# Patient Record
Sex: MALE | Race: WHITE | NOT HISPANIC OR LATINO | Employment: FULL TIME | URBAN - METROPOLITAN AREA
[De-identification: names, ages, dates, MRNs, and addresses within clinical notes are randomized per-mention and may not be internally consistent; named-entity substitution may affect disease eponyms.]

---

## 2017-01-16 DIAGNOSIS — Z94.0 KIDNEY TRANSPLANTED: ICD-10-CM

## 2017-01-16 DIAGNOSIS — Z94.0 KIDNEY REPLACED BY TRANSPLANT: Primary | ICD-10-CM

## 2017-01-16 DIAGNOSIS — Z94.0 KIDNEY TRANSPLANTED: Primary | ICD-10-CM

## 2017-01-16 RX ORDER — SULFAMETHOXAZOLE AND TRIMETHOPRIM 400; 80 MG/1; MG/1
1 TABLET ORAL
Qty: 15 TABLET | Refills: 0 | Status: CANCELLED | OUTPATIENT
Start: 2017-01-16

## 2017-01-16 RX ORDER — SULFAMETHOXAZOLE AND TRIMETHOPRIM 400; 80 MG/1; MG/1
1 TABLET ORAL
Qty: 15 TABLET | Refills: 0 | Status: SHIPPED | OUTPATIENT
Start: 2017-01-16 | End: 2017-02-16

## 2017-01-16 RX ORDER — MYCOPHENOLATE MOFETIL 250 MG/1
1000 CAPSULE ORAL 2 TIMES DAILY
Qty: 240 CAPSULE | Refills: 0 | Status: SHIPPED | OUTPATIENT
Start: 2017-01-16 | End: 2017-05-18

## 2017-01-16 RX ORDER — CYCLOSPORINE 100 MG/1
100 CAPSULE, LIQUID FILLED ORAL 2 TIMES DAILY
Qty: 60 CAPSULE | Refills: 0 | Status: SHIPPED | OUTPATIENT
Start: 2017-01-16 | End: 2017-05-18

## 2017-01-16 NOTE — TELEPHONE ENCOUNTER
Drug Name: Smz/Tmp 400-80 tabs  Last Fill Date: 12/21/2016  Quantity: 15    Melba Oro Cpht  [facility]  [phone]

## 2017-01-18 RX ORDER — CYCLOSPORINE 100 MG/1
100 CAPSULE, LIQUID FILLED ORAL 2 TIMES DAILY
Qty: 60 CAPSULE | Refills: 0 | Status: SHIPPED
Start: 2017-01-18 | End: 2017-04-18

## 2017-01-18 RX ORDER — MYCOPHENOLATE MOFETIL 250 MG/1
1000 CAPSULE ORAL 2 TIMES DAILY
Qty: 240 CAPSULE | Refills: 0 | Status: SHIPPED
Start: 2017-01-18 | End: 2017-04-18

## 2017-01-24 ENCOUNTER — OFFICE VISIT (OUTPATIENT)
Dept: ORTHOPEDICS | Facility: CLINIC | Age: 42
End: 2017-01-24
Payer: COMMERCIAL

## 2017-01-24 ENCOUNTER — RADIANT APPOINTMENT (OUTPATIENT)
Dept: GENERAL RADIOLOGY | Facility: CLINIC | Age: 42
End: 2017-01-24
Attending: PEDIATRICS
Payer: COMMERCIAL

## 2017-01-24 VITALS
SYSTOLIC BLOOD PRESSURE: 128 MMHG | DIASTOLIC BLOOD PRESSURE: 82 MMHG | WEIGHT: 195 LBS | HEIGHT: 70 IN | BODY MASS INDEX: 27.92 KG/M2

## 2017-01-24 DIAGNOSIS — M79.641 RIGHT HAND PAIN: ICD-10-CM

## 2017-01-24 DIAGNOSIS — M79.89 SWELLING OF RIGHT HAND: Primary | ICD-10-CM

## 2017-01-24 DIAGNOSIS — M79.89 SWELLING OF RIGHT HAND: ICD-10-CM

## 2017-01-24 PROCEDURE — 73130 X-RAY EXAM OF HAND: CPT | Mod: RT | Performed by: PEDIATRICS

## 2017-01-24 PROCEDURE — 99203 OFFICE O/P NEW LOW 30 MIN: CPT | Performed by: PEDIATRICS

## 2017-01-24 RX ORDER — INDOMETHACIN 25 MG/1
CAPSULE ORAL
Qty: 42 CAPSULE | Refills: 1 | Status: SHIPPED | OUTPATIENT
Start: 2017-01-24 | End: 2017-09-05

## 2017-01-24 NOTE — MR AVS SNAPSHOT
"              After Visit Summary   1/24/2017    Miguel Angel Piña    MRN: [id number]           Patient Information     Date Of Birth          1975        Visit Information        Provider Department      1/24/2017 3:40 PM Bradford Healy,  [facility] [place]        Today's Diagnoses     Swelling of right hand    -  1     Right hand pain            Follow-ups after your visit        Follow-up notes from your care team     Return in about 1 week (around 1/31/2017), or if symptoms worsen or fail to improve.      Who to contact     If you have questions or need follow up information about today's clinic visit or your schedule please contact [facility] directly at [phone].  Normal or non-critical lab and imaging results will be communicated to you by Palmap[employer], letter or phone within 4 business days after the clinic has received the results. If you do not hear from us within 7 days, please contact the clinic through Palmaphart or phone. If you have a critical or abnormal lab result, we will notify you by phone as soon as possible.  Submit refill requests through [employer] or call your pharmacy and they will forward the refill request to us. Please allow 3 business days for your refill to be completed.          Additional Information About Your Visit        MyChart Information     [employer] lets you send messages to your doctor, view your test results, renew your prescriptions, schedule appointments and more. To sign up, go to [email or web address] . Click on \"Log in\" on the left side of the screen, which will take you to the Welcome page. Then click on \"Sign up Now\" on the right side of the page.     You will be asked to enter the access code listed below, as well as some personal information. Please follow the directions to create your username and password.     Your access code is: [email or web address]  Expires: 2/27/2017 12:58 PM     Your access code will " " in 90 days. If you need help or a new code, please call your [place] clinic or [phone].        Care EveryWhere ID     This is your Care EveryWhere ID. This could be used by other organizations to access your [place] medical records  [id number]        Your Vitals Were     Height BMI (Body Mass Index)                5' 10\" (1.778 m) 27.98 kg/m2           Blood Pressure from Last 3 Encounters:   17 128/82   16 138/84   09/17/15 124/88    Weight from Last 3 Encounters:   17 195 lb (88.451 kg)   16 199 lb 9.6 oz (90.538 kg)   09/17/15 214 lb 12.8 oz (97.433 kg)                 Where to get your medicines      These medications were sent to [facility] [place] [place] [address] [facility] [place]  [address] [facility] [place] [address]     Phone:  [phone]    - indomethacin 25 MG capsule       Primary Care Provider Office Phone #    [facility] [phone]       No address on file        Thank you!     Thank you for choosing [facility]  for your care. Our goal is always to provide you with excellent care. Hearing back from our patients is one way we can continue to improve our services. Please take a few minutes to complete the written survey that you may receive in the mail after your visit with us. Thank you!             Your Updated Medication List - Protect others around you: Learn how to safely use, store and throw away your medicines at www.disposemymeds.org.          This list is accurate as of: 17 11:59 PM.  Always use your most recent med list.                   Brand Name Dispense Instructions for use    * cycloSPORINE modified capsule     60 capsule    TAKE ONE CAPSULE BY MOUTH TWICE A DAY (NEPHROLOGY APPOINTMENT IN TRANSPLANT CENTER AND KIDNEY TRANSPLANT LABS NEEDED FOR REFILLS) DAW1       * cycloSPORINE modified capsule     60 capsule    Take 1 capsule (100 mg) by mouth 2 times daily       * cycloSPORINE " modified capsule     60 capsule    Take 1 capsule (100 mg) by mouth 2 times daily       indomethacin 25 MG capsule    INDOCIN    42 capsule    Take 2 tabs three times daily x2 days, then 1 tab three times daily until symptoms have resolved x48 hours       * mycophenolate capsule     240 capsule    Take 4 capsules (1,000 mg) by mouth 2 times daily       * mycophenolate capsule     240 capsule    Take 4 capsules (1,000 mg) by mouth 2 times daily       * mycophenolate capsule     240 capsule    Take 4 capsules (1,000 mg) by mouth 2 times daily       sulfamethoxazole-trimethoprim 400-80 MG per tablet    BACTRIM/SEPTRA    15 tablet    Take 1 tablet by mouth Every Mon, Wed, Fri Morning       * Notice:  This list has 6 medication(s) that are the same as other medications prescribed for you. Read the directions carefully, and ask your doctor or other care provider to review them with you.

## 2017-01-24 NOTE — NURSING NOTE
"Chief Complaint   Patient presents with     Musculoskeletal Problem     right hand pain       Initial /82 mmHg  Ht 5' 10\" (1.778 m)  Wt 195 lb (88.451 kg)  BMI 27.98 kg/m2 Estimated body mass index is 27.98 kg/(m^2) as calculated from the following:    Height as of this encounter: 5' 10\" (1.778 m).    Weight as of this encounter: 195 lb (88.451 kg).  BP completed using cuff size: regular  "

## 2017-01-24 NOTE — PROGRESS NOTES
Sports Medicine Clinic Visit    PCP: Darling Renee    Miguel Angel Piña is a 41 year old male who is seen  as a self referral AIC presenting with right hand pain and swelling.  He noticed the swelling on 1/20/17.  No known injury.  Does have a history of gout.  Did have left over indomethacin and did take a few of these, and had a small amount of relief.  He did use ice on Sunday and this increased the pain, but did have relief with heat.   Patient is right hand dominant.     Injury: insidious onset. Awoke with swelling.  At rest has achy sensation in ulnar hand, with some persistent ulnar and dorsal swelling.  No fever, chills, ill symptoms.    Location of Pain: right hand, ulnar side of hand  Duration of Pain: 4 day(s)  Rating of Pain at worst: 6/10  Rating of Pain Currently: 2/10  Symptoms are better with: Ice and Tylenol  Symptoms are worse with: pressure over ulnar hand  Additional Features:   Positive: swelling   Negative: bruising, popping, grinding, catching, locking, instability, paresthesias, numbness, weakness, pain in other joints and systemic symptoms  Other evaluation and/or treatments so far consists of: Nothing  Prior History of related problems: denies    Social History: Retail at [employer]    Review of Systems  Musculoskeletal: as above  Remainder of review of systems is negative including constitutional, CV, pulmonary, GI, Skin and Neurologic except as noted in HPI or medical history.    Past Medical History   Diagnosis Date     Kidney transplant status, cadaveric 4/2010 (2nd)     prior 11/1993     Polycystic kidney disease      No past surgical history on file.  No family history on file.  Social History     Social History     Marital Status: Single     Spouse Name: N/A     Number of Children: N/A     Years of Education: N/A     Occupational History     Not on file.     Social History Main Topics     Smoking status: Never Smoker      Smokeless tobacco: Never Used     Alcohol Use: No      "Drug Use: No     Sexual Activity:     Partners: Female     Other Topics Concern     Not on file     Social History Narrative     Current Outpatient Prescriptions   Medication     indomethacin (INDOCIN) 25 MG capsule     CELLCEPT 250 MG PO CAPSULE     NEORAL 100 MG PO CAPSULE     CELLCEPT 250 MG PO CAPSULE     NEORAL 100 MG PO CAPSULE     sulfamethoxazole-trimethoprim (BACTRIM/SEPTRA) 400-80 MG per tablet     CELLCEPT 250 MG PO CAPSULE     NEORAL 100 MG PO CAPSULE     No current facility-administered medications for this visit.         Objective  /82 mmHg  Ht 5' 10\" (1.778 m)  Wt 195 lb (88.451 kg)  BMI 27.98 kg/m2    GENERAL APPEARANCE: healthy, alert and no distress   GAIT: NORMAL  SKIN: no suspicious lesions or rashes  NEURO: Normal strength and tone, mentation intact and speech normal  PSYCH:  mentation appears normal and affect normal/bright  HEENT: no scleral icterus  CV: no extremity edema  RESP: nonlabored breathing    Exam:  Hand/wrist (right):  No deformity noted.  Mild diffuse dorsal and ulnar hand swelling. No ecchymosis.  Full range of motion in forearm with pronation, supination.  Full range of motion in wrist with radial and ulnar deviation.  Flexion, extension minimal limitation with stiffness, swelling.  Full digit motion.  Full  and intrinsic strength.  Sensation grossly intact.  Radial pulses normal, +2/4, capillary refill brisk.  Focal tenderness over base of fifth metacarpal, more ulnar aspect, 5th CMC joint. Remainder without focal tenderness.    Skin:       well perfused       capillary refill brisk  No erythema or skin lesions.    Radiology:  Visualized radiographs of right hand obtained today, and reviewed the images with the patient.  Impression: Three views of the right hand demonstrate no acute osseous abnormality. Mild dorsal hand and wrist soft tissue swelling on lateral view.      Assessment:  1. Swelling of right hand    2. Right hand pain    suspect inflammatory given " atraumatic, but with focal pain, tenderness, and hand swelling.    Plan:  Discussed the assessment with the patient. We discussed the following treatment options: symptom treatment, activity modification/rest, imaging, rehab, support for the affected area and lab studies. Following discussion, plan:  Topical Treatments: Heat--more comfortable he has found, and may point more towards gout  Over the counter medication: Patient's preferred OTC medication as directed on packaging. No additional NSAIDs with indocin, and advised significant caution with indocin as well  Prescription Medication as directed: indomethacin; discussed his renal transplant, obviously use indocin with caution and only short term; also discussed potential use of oral steroid, he prefers indocin as he has had that in past   Pertinent potential adverse effect profile of prescribed medication(s) was discussed with the patient, who expressed understanding.  Plain films of the hand reviewed  Activity Modification: discussed  Elevate if able  Rehab: discussed as consideration, swelling control  Medical Equipment: declined support, brace vs splint  Discussed lab studies; potential check for inflammatory component; his main concern is infection, which is very unlikely clinically without ill symptoms, erythema, overlying skin lesion, and duration of symptoms; hold labs for now  Follow up: monitor closely next 1 week with above. If improving, gradual return to activities; if not improving, additional considerations may be above  We discussed potentially concerning signs and symptoms related to the condition(s) listed above, including increase in pain, changing pain, ill symptoms, and the patient was instructed to seek appropriate medical care if noted. All questions answered to patient's satisfaction. The patient expressed understanding of the plan.     Bradford Healy DO, CAQ        Disclaimer: This note consists of symbols derived from keyboarding,  dictation and/or voice recognition software. As a result, there may be errors in the script that have gone undetected. Please consider this when interpreting information found in this chart.

## 2017-02-16 DIAGNOSIS — Z94.0 KIDNEY TRANSPLANTED: ICD-10-CM

## 2017-02-16 RX ORDER — SULFAMETHOXAZOLE AND TRIMETHOPRIM 400; 80 MG/1; MG/1
TABLET ORAL
Qty: 15 TABLET | Refills: 1 | Status: SHIPPED | OUTPATIENT
Start: 2017-02-16 | End: 2017-04-18

## 2017-03-21 DIAGNOSIS — Z94.0 KIDNEY REPLACED BY TRANSPLANT: ICD-10-CM

## 2017-03-21 DIAGNOSIS — Z94.0 KIDNEY TRANSPLANTED: Primary | ICD-10-CM

## 2017-03-21 RX ORDER — CYCLOSPORINE 100 MG/1
100 CAPSULE, LIQUID FILLED ORAL 2 TIMES DAILY
Qty: 60 CAPSULE | Refills: 0 | Status: SHIPPED | OUTPATIENT
Start: 2017-03-21 | End: 2017-06-16

## 2017-03-21 RX ORDER — MYCOPHENOLATE MOFETIL 250 MG/1
1000 CAPSULE ORAL 2 TIMES DAILY
Qty: 240 CAPSULE | Refills: 0 | Status: SHIPPED | OUTPATIENT
Start: 2017-03-21 | End: 2017-06-16

## 2017-03-21 NOTE — TELEPHONE ENCOUNTER
Drug Name: cellcept 250  Last Fill Date: 2/21/17  Quantity: 240    Drug Name: neoral 100mg  Last Fill Date: 2/21/17  Quantity: 60    Mecca Schaffer   [facility]  [phone]

## 2017-04-18 DIAGNOSIS — Z94.0 KIDNEY REPLACED BY TRANSPLANT: ICD-10-CM

## 2017-04-18 DIAGNOSIS — Z94.0 KIDNEY TRANSPLANTED: Primary | ICD-10-CM

## 2017-04-18 RX ORDER — MYCOPHENOLATE MOFETIL 250 MG/1
1000 CAPSULE ORAL 2 TIMES DAILY
Qty: 240 CAPSULE | Refills: 0 | Status: SHIPPED | OUTPATIENT
Start: 2017-04-18 | End: 2017-07-18

## 2017-04-18 RX ORDER — SULFAMETHOXAZOLE AND TRIMETHOPRIM 400; 80 MG/1; MG/1
TABLET ORAL
Qty: 15 TABLET | Refills: 0 | Status: SHIPPED | OUTPATIENT
Start: 2017-04-18 | End: 2017-05-18

## 2017-04-18 RX ORDER — CYCLOSPORINE 100 MG/1
100 CAPSULE, LIQUID FILLED ORAL 2 TIMES DAILY
Qty: 60 CAPSULE | Refills: 0 | Status: SHIPPED | OUTPATIENT
Start: 2017-04-18 | End: 2017-09-05

## 2017-04-18 NOTE — TELEPHONE ENCOUNTER
Drug: Smz/Tmp  Last Fill Date: 3/22/2017  Quantity: 15          Drug: Cellcept  Last Fill Date: 3/22/2017  Quantity: 240        Drug: Neoral  Last Fill Date: 3/22/2017  Quantity: 60    [facility]  Phone: [phone]

## 2017-05-18 DIAGNOSIS — Z94.0 KIDNEY REPLACED BY TRANSPLANT: ICD-10-CM

## 2017-05-18 DIAGNOSIS — Z94.0 KIDNEY TRANSPLANTED: Primary | ICD-10-CM

## 2017-05-18 RX ORDER — MYCOPHENOLATE MOFETIL 250 MG/1
1000 CAPSULE ORAL 2 TIMES DAILY
Qty: 240 CAPSULE | Refills: 0 | Status: SHIPPED | OUTPATIENT
Start: 2017-05-18 | End: 2017-08-21

## 2017-05-18 RX ORDER — CYCLOSPORINE 100 MG/1
100 CAPSULE, LIQUID FILLED ORAL 2 TIMES DAILY
Qty: 60 CAPSULE | Refills: 0 | Status: SHIPPED | OUTPATIENT
Start: 2017-05-18 | End: 2017-09-05

## 2017-05-18 RX ORDER — SULFAMETHOXAZOLE AND TRIMETHOPRIM 400; 80 MG/1; MG/1
TABLET ORAL
Qty: 15 TABLET | Refills: 0 | Status: SHIPPED | OUTPATIENT
Start: 2017-05-18 | End: 2017-06-16

## 2017-05-18 NOTE — TELEPHONE ENCOUNTER
Drug: Smz/Tmp  Last Fill Date: 4/20/2017  Quantity: 15        Drug: Cellcept  Last Fill Date: 4/20/2017  Quantity: 240        Drug: Neoral  Last Fill Date: 4/20/2017  Quantity: 60

## 2017-06-16 DIAGNOSIS — Z94.0 KIDNEY TRANSPLANTED: Primary | ICD-10-CM

## 2017-06-16 DIAGNOSIS — Z94.0 KIDNEY REPLACED BY TRANSPLANT: ICD-10-CM

## 2017-06-16 RX ORDER — CYCLOSPORINE 100 MG/1
100 CAPSULE, LIQUID FILLED ORAL 2 TIMES DAILY
Qty: 60 CAPSULE | Refills: 0 | Status: SHIPPED | OUTPATIENT
Start: 2017-06-16 | End: 2017-07-18

## 2017-06-16 RX ORDER — SULFAMETHOXAZOLE AND TRIMETHOPRIM 400; 80 MG/1; MG/1
TABLET ORAL
Qty: 15 TABLET | Refills: 0 | Status: SHIPPED | OUTPATIENT
Start: 2017-06-16 | End: 2017-07-18

## 2017-06-16 RX ORDER — MYCOPHENOLATE MOFETIL 250 MG/1
1000 CAPSULE ORAL 2 TIMES DAILY
Qty: 240 CAPSULE | Refills: 0 | Status: SHIPPED | OUTPATIENT
Start: 2017-06-16 | End: 2017-09-05

## 2017-06-16 NOTE — TELEPHONE ENCOUNTER
Drug Name: neoral 100mg  Last Fill Date: 5/23/17  Quantity: 60    Drug Name: cellcept 250mg  Last Fill Date: 5/23/17  Quantity: 240    Drug Name: smz/tmp 400-80  Last Fill Date: 5/23/17  Quantity: 15    Mecca Schaffer   [facility]  [phone]

## 2017-07-18 DIAGNOSIS — Z94.0 KIDNEY TRANSPLANTED: ICD-10-CM

## 2017-07-18 DIAGNOSIS — Z94.0 KIDNEY REPLACED BY TRANSPLANT: ICD-10-CM

## 2017-07-18 RX ORDER — SULFAMETHOXAZOLE AND TRIMETHOPRIM 400; 80 MG/1; MG/1
TABLET ORAL
Qty: 15 TABLET | Refills: 0 | Status: SHIPPED | OUTPATIENT
Start: 2017-07-18 | End: 2017-08-21

## 2017-07-18 RX ORDER — MYCOPHENOLATE MOFETIL 250 MG/1
1000 CAPSULE ORAL 2 TIMES DAILY
Qty: 240 CAPSULE | Refills: 0 | Status: SHIPPED | OUTPATIENT
Start: 2017-07-18 | End: 2017-09-05

## 2017-07-18 RX ORDER — CYCLOSPORINE 100 MG/1
CAPSULE, LIQUID FILLED ORAL
Qty: 60 CAPSULE | Refills: 0 | Status: SHIPPED | OUTPATIENT
Start: 2017-07-18 | End: 2017-09-05

## 2017-08-21 DIAGNOSIS — Z94.0 KIDNEY REPLACED BY TRANSPLANT: Primary | ICD-10-CM

## 2017-08-21 DIAGNOSIS — Z94.0 KIDNEY TRANSPLANTED: ICD-10-CM

## 2017-08-21 RX ORDER — MYCOPHENOLATE MOFETIL 250 MG/1
1000 CAPSULE ORAL 2 TIMES DAILY
Qty: 240 CAPSULE | Refills: 0 | Status: SHIPPED | OUTPATIENT
Start: 2017-08-21 | End: 2017-09-05

## 2017-08-21 RX ORDER — SULFAMETHOXAZOLE AND TRIMETHOPRIM 400; 80 MG/1; MG/1
1 TABLET ORAL
Qty: 14 TABLET | Refills: 0 | Status: SHIPPED | OUTPATIENT
Start: 2017-08-21 | End: 2017-12-08

## 2017-08-21 RX ORDER — CYCLOSPORINE 100 MG/1
100 CAPSULE, LIQUID FILLED ORAL 2 TIMES DAILY
Qty: 60 CAPSULE | Refills: 0 | Status: SHIPPED | OUTPATIENT
Start: 2017-08-21 | End: 2017-09-05

## 2017-08-30 ENCOUNTER — TELEPHONE (OUTPATIENT)
Dept: NEPHROLOGY | Facility: CLINIC | Age: 42
End: 2017-08-30

## 2017-08-30 NOTE — TELEPHONE ENCOUNTER
Rosie, this is Carey's office from Medicine Specialty on the 3rd floor at [facility] located at [address] [place] [address]. We are reminding you of your upcoming Nephrology appointment on 9/5/2017 at 4:35 pm. Please arrive an hour prior to your appointment time for labs. Also, please bring an updated medication list including dose of prescribed and over the counter medications you are currently taking or your labeled medication bottles with you to your appointment. If you have any questions or would like to cancel or reschedule your appointment, please call us at [phone].     If you are having labs draw same day you need a lab appointment scheduled 1 hour prior to your visit.    You are welcome to have your labs done 2-7 days before your appointment at any [facility] or [facility] facility. If you have your labs completed before your appointment, please still come 30 minutes early for check-in.     Thank-You for allowing us to be a member of your Health Care Team,     Griselda French., CMA

## 2017-08-31 ENCOUNTER — TELEPHONE (OUTPATIENT)
Dept: NEPHROLOGY | Facility: CLINIC | Age: 42
End: 2017-08-31

## 2017-09-05 ENCOUNTER — RESULTS ONLY (OUTPATIENT)
Dept: OTHER | Facility: CLINIC | Age: 42
End: 2017-09-05

## 2017-09-05 ENCOUNTER — OFFICE VISIT (OUTPATIENT)
Dept: NEPHROLOGY | Facility: CLINIC | Age: 42
End: 2017-09-05
Attending: INTERNAL MEDICINE
Payer: COMMERCIAL

## 2017-09-05 VITALS
WEIGHT: 204 LBS | HEART RATE: 76 BPM | SYSTOLIC BLOOD PRESSURE: 142 MMHG | HEIGHT: 70 IN | DIASTOLIC BLOOD PRESSURE: 95 MMHG | OXYGEN SATURATION: 99 % | BODY MASS INDEX: 29.2 KG/M2

## 2017-09-05 DIAGNOSIS — Z48.298 AFTERCARE FOLLOWING ORGAN TRANSPLANT: ICD-10-CM

## 2017-09-05 DIAGNOSIS — D63.1 ANEMIA IN STAGE 4 CHRONIC KIDNEY DISEASE (H): ICD-10-CM

## 2017-09-05 DIAGNOSIS — D84.9 IMMUNOSUPPRESSED STATUS (H): ICD-10-CM

## 2017-09-05 DIAGNOSIS — Z94.0 KIDNEY TRANSPLANTED: ICD-10-CM

## 2017-09-05 DIAGNOSIS — Z94.0 KIDNEY REPLACED BY TRANSPLANT: ICD-10-CM

## 2017-09-05 DIAGNOSIS — N18.4 ANEMIA IN STAGE 4 CHRONIC KIDNEY DISEASE (H): ICD-10-CM

## 2017-09-05 DIAGNOSIS — M10.00 IDIOPATHIC GOUT, UNSPECIFIED CHRONICITY, UNSPECIFIED SITE: ICD-10-CM

## 2017-09-05 DIAGNOSIS — N25.81 SECONDARY RENAL HYPERPARATHYROIDISM (H): Primary | ICD-10-CM

## 2017-09-05 DIAGNOSIS — N25.81 SECONDARY RENAL HYPERPARATHYROIDISM (H): ICD-10-CM

## 2017-09-05 DIAGNOSIS — Z79.899 ENCOUNTER FOR LONG-TERM CURRENT USE OF MEDICATION: ICD-10-CM

## 2017-09-05 LAB
ANION GAP SERPL CALCULATED.3IONS-SCNC: 10 MMOL/L (ref 3–14)
BUN SERPL-MCNC: 55 MG/DL (ref 7–30)
CALCIUM SERPL-MCNC: 9.4 MG/DL (ref 8.5–10.1)
CHLORIDE SERPL-SCNC: 110 MMOL/L (ref 94–109)
CO2 SERPL-SCNC: 20 MMOL/L (ref 20–32)
CREAT SERPL-MCNC: 2.7 MG/DL (ref 0.66–1.25)
CREAT UR-MCNC: 133 MG/DL
ERYTHROCYTE [DISTWIDTH] IN BLOOD BY AUTOMATED COUNT: 12.9 % (ref 10–15)
FERRITIN SERPL-MCNC: 279 NG/ML (ref 26–388)
GFR SERPL CREATININE-BSD FRML MDRD: 26 ML/MIN/1.7M2
GLUCOSE SERPL-MCNC: 89 MG/DL (ref 70–99)
HCT VFR BLD AUTO: 34.8 % (ref 40–53)
HGB BLD-MCNC: 11.1 G/DL (ref 13.3–17.7)
IRON SATN MFR SERPL: 14 % (ref 15–46)
IRON SERPL-MCNC: 39 UG/DL (ref 35–180)
MCH RBC QN AUTO: 28.5 PG (ref 26.5–33)
MCHC RBC AUTO-ENTMCNC: 31.9 G/DL (ref 31.5–36.5)
MCV RBC AUTO: 89 FL (ref 78–100)
PLATELET # BLD AUTO: 194 10E9/L (ref 150–450)
POTASSIUM SERPL-SCNC: 4.4 MMOL/L (ref 3.4–5.3)
PROT UR-MCNC: 0.43 G/L
PROT/CREAT 24H UR: 0.32 G/G CR (ref 0–0.2)
PTH-INTACT SERPL-MCNC: 259 PG/ML (ref 12–72)
RBC # BLD AUTO: 3.9 10E12/L (ref 4.4–5.9)
SODIUM SERPL-SCNC: 139 MMOL/L (ref 133–144)
TIBC SERPL-MCNC: 280 UG/DL (ref 240–430)
URATE SERPL-MCNC: 10.4 MG/DL (ref 3.5–7.2)
WBC # BLD AUTO: 6.1 10E9/L (ref 4–11)

## 2017-09-05 PROCEDURE — 86832 HLA CLASS I HIGH DEFIN QUAL: CPT | Performed by: ANESTHESIOLOGY

## 2017-09-05 PROCEDURE — 99212 OFFICE O/P EST SF 10 MIN: CPT | Mod: ZF

## 2017-09-05 PROCEDURE — 84550 ASSAY OF BLOOD/URIC ACID: CPT | Performed by: INTERNAL MEDICINE

## 2017-09-05 PROCEDURE — 83970 ASSAY OF PARATHORMONE: CPT | Performed by: INTERNAL MEDICINE

## 2017-09-05 PROCEDURE — 86359 T CELLS TOTAL COUNT: CPT | Performed by: INTERNAL MEDICINE

## 2017-09-05 PROCEDURE — 84156 ASSAY OF PROTEIN URINE: CPT | Performed by: INTERNAL MEDICINE

## 2017-09-05 PROCEDURE — 86360 T CELL ABSOLUTE COUNT/RATIO: CPT | Performed by: INTERNAL MEDICINE

## 2017-09-05 PROCEDURE — 85027 COMPLETE CBC AUTOMATED: CPT | Performed by: INTERNAL MEDICINE

## 2017-09-05 PROCEDURE — 82728 ASSAY OF FERRITIN: CPT | Performed by: INTERNAL MEDICINE

## 2017-09-05 PROCEDURE — 80158 DRUG ASSAY CYCLOSPORINE: CPT | Performed by: INTERNAL MEDICINE

## 2017-09-05 PROCEDURE — 86833 HLA CLASS II HIGH DEFIN QUAL: CPT | Performed by: ANESTHESIOLOGY

## 2017-09-05 PROCEDURE — 83550 IRON BINDING TEST: CPT | Performed by: INTERNAL MEDICINE

## 2017-09-05 PROCEDURE — 82306 VITAMIN D 25 HYDROXY: CPT | Performed by: INTERNAL MEDICINE

## 2017-09-05 PROCEDURE — 83540 ASSAY OF IRON: CPT | Performed by: INTERNAL MEDICINE

## 2017-09-05 PROCEDURE — 36415 COLL VENOUS BLD VENIPUNCTURE: CPT | Performed by: INTERNAL MEDICINE

## 2017-09-05 PROCEDURE — 80048 BASIC METABOLIC PNL TOTAL CA: CPT | Performed by: INTERNAL MEDICINE

## 2017-09-05 RX ORDER — MYCOPHENOLATE MOFETIL 250 MG/1
1000 CAPSULE ORAL 2 TIMES DAILY
Qty: 240 CAPSULE | Refills: 11 | Status: SHIPPED | OUTPATIENT
Start: 2017-09-05 | End: 2018-09-24

## 2017-09-05 RX ORDER — CYCLOSPORINE 100 MG/1
100 CAPSULE, LIQUID FILLED ORAL 2 TIMES DAILY
Qty: 60 CAPSULE | Refills: 11 | Status: SHIPPED | OUTPATIENT
Start: 2017-09-05 | End: 2018-09-24

## 2017-09-05 ASSESSMENT — PAIN SCALES - GENERAL: PAINLEVEL: NO PAIN (0)

## 2017-09-05 NOTE — MR AVS SNAPSHOT
After Visit Summary   9/5/2017    Miguel Angel Piña    MRN: [id number]           Patient Information     Date Of Birth          1975        Visit Information        Provider Department      9/5/2017 4:25 PM Lan Patino MD [facility]        Today's Diagnoses     Secondary renal hyperparathyroidism (H)    -  1    Kidney replaced by transplant        Kidney transplanted        Anemia in stage 4 chronic kidney disease (H)        Immunosuppressed status (H)        Idiopathic gout, unspecified chronicity, unspecified site           Follow-ups after your visit        Follow-up notes from your care team     Return in about 1 year (around 9/5/2018).      Your next 10 appointments already scheduled     Sep 05, 2017  5:30 PM CDT   Lab with  LAB   [facility] ([facility])    [address] [place] [address]  1st Floor  [facility] [address] [phone]            Sep 04, 2018  4:25 PM CDT   (Arrive by 3:55 PM)   Return Kidney Transplant with Lan Patino MD   [facility] ([facility])    [address] [place] [address]  3rd [facility] [address] [phone]              Future tests that were ordered for you today     Open Future Orders        Priority Expected Expires Ordered    Protein  random urine with Creat Ratio Routine  10/5/2017 9/5/2017    Uric acid Routine  10/5/2017 9/5/2017    Basic metabolic panel Routine  10/5/2017 9/5/2017    CBC with platelets Routine  10/5/2017 9/5/2017    Parathyroid Hormone Intact Routine  10/5/2017 9/5/2017    Vitamin D Deficiency Routine  10/5/2017 9/5/2017    T cell subset profile Routine  9/6/2018 9/5/2017    IRON Routine  10/5/2017 9/5/2017    IRON AND IRON BINDING CAPACITY Routine  10/5/2017 9/5/2017    FERRITIN Routine  10/5/2017 9/5/2017            Who to contact     If you have questions or need follow up information about today's clinic visit or your schedule please contact [facility] directly at [phone].  Normal or non-critical lab and imaging results will be communicated to you by MyChart, letter or phone within 4 business days after the clinic has received the results. If you do not hear from us within 7 days, please contact the clinic through [employer] or phone. If you have a critical or abnormal lab result, we will notify you by phone as soon as possible.  Submit refill requests through [employer] or call your pharmacy and they will forward the refill request to us. Please allow 3 business days for your refill to be completed.          Additional Information About Your Visit        [employer] Information     [employer] lets you send messages to your doctor, view your test results, renew your prescriptions, schedule appointments and more. To sign up, go to [email or web address] . Click on \"Log in\" on the left side of the screen, which will take you to the Welcome page. Then click on \"Sign up Now\" on the right side of the page.     You will be asked to enter the access code listed below, as well as some personal information. Please follow the directions to create your username and password.     Your access code is: [email or web address]  Expires: 2017  6:30 AM     Your access code will  in 90 days. If you need help or a new code, please call your [place] clinic or [phone].        Care EveryWhere ID     This is your Care EveryWhere ID. This could be used by other organizations to access your [place] medical records  [id number]        Your Vitals Were     Pulse Height Pulse Oximetry BMI (Body Mass Index)          76 1.778 m (5' 10\") 99% 29.27 kg/m2         Blood Pressure from Last 3 Encounters:   17 (!) 142/95   17 128/82   16 138/84    Weight from Last 3 Encounters:   17 92.5 kg (204 lb)   17 88.5 kg (195 lb)   16 90.5 kg (199 lb 9.6 oz)              We Performed the Following     PRA Donor Specific Antibody          Today's " Medication Changes          These changes are accurate as of: 9/5/17  5:20 PM.  If you have any questions, ask your nurse or doctor.               These medicines have changed or have updated prescriptions.        Dose/Directions    cycloSPORINE modified capsule   This may have changed:  Another medication with the same name was removed. Continue taking this medication, and follow the directions you see here.   Used for:  Kidney replaced by transplant   Changed by:  Lan Patino MD        Dose:  100 mg   Take 1 capsule (100 mg) by mouth 2 times daily   Quantity:  60 capsule   Refills:  11       mycophenolate capsule   This may have changed:  Another medication with the same name was removed. Continue taking this medication, and follow the directions you see here.   Used for:  Kidney replaced by transplant   Changed by:  Lan Patino MD        Dose:  1000 mg   Take 4 capsules (1,000 mg) by mouth 2 times daily   Quantity:  240 capsule   Refills:  11         Stop taking these medicines if you haven't already. Please contact your care team if you have questions.     indomethacin 25 MG capsule   Commonly known as:  INDOCIN   Stopped by:  Lan Patino MD                Where to get your medicines      These medications were sent to [facility] - [place], [place] - [address] [place] [address]  [address] [facility] [facility], [facility] [address]    Hours:  Mon-Fri 8:30am-5:00pm Toll Free [phone] Phone:  [phone]     cycloSPORINE modified capsule    mycophenolate capsule                Primary Care Provider Office Phone #    [facility] [phone]       No address on file        Equal Access to Services     [facility] AH: Hadii judy Simons, watamy mcdaniel, qaybtom kaalmada nicolas, lanny olvera. So [facility] [phone].    ATENCIÓN: Si habla español, tiene a pollard disposición servicios gratuitos de asistencia lingüística. Llame  al [phone].    We comply with applicable federal civil rights laws and [place] laws. We do not discriminate on the basis of race, color, national origin, age, disability sex, sexual orientation or gender identity.            Thank you!     Thank you for choosing [facility]  for your care. Our goal is always to provide you with excellent care. Hearing back from our patients is one way we can continue to improve our services. Please take a few minutes to complete the written survey that you may receive in the mail after your visit with us. Thank you!             Your Updated Medication List - Protect others around you: Learn how to safely use, store and throw away your medicines at www.disposemymeds.org.          This list is accurate as of: 9/5/17  5:20 PM.  Always use your most recent med list.                   Brand Name Dispense Instructions for use Diagnosis    cycloSPORINE modified capsule     60 capsule    Take 1 capsule (100 mg) by mouth 2 times daily    Kidney replaced by transplant       mycophenolate capsule     240 capsule    Take 4 capsules (1,000 mg) by mouth 2 times daily    Kidney replaced by transplant       sulfamethoxazole-trimethoprim 400-80 MG per tablet    BACTRIM/SEPTRA    14 tablet    Take 1 tablet by mouth three times a week (Mondays, Wednesdays, Fridays)    Kidney transplanted, Kidney replaced by transplant

## 2017-09-05 NOTE — LETTER
9/5/2017      RE: Miguel Angel Piña  [address] [facility] [address]       Assessment and Plan:   1. DDKT - baseline Cr ~ 2.3-2.6, which has been stable to slightly increased with today's labs.  Minimal proteinuria.  No DSA, but not checked recently.  Will make no changes in immunosuppression.    2. BP - fair control above target of less than 140/90 at this clinic visit and not checked at home.  Recommend patient check BP and would start antihypertensive therapy if not consistently at goal.  With gout and hyperuricemia, would consider ARB.  3. Anemia in chronic renal disease - stable Hgb.  He is iron deficient and recommend starting oral iron sulfate 325 mg daily at lunch.  No evidence of GI bleeding, but recommend patient establish primary care and consider evaluation for GI bleeding.  Will follow.  4. Secondary renal hyperparathyroidism - mildly elevated PTH, okay for level of kidney function and will just treat vitamin D deficiency and follow.  5. Vitamin D deficiency - low vitamin D level and recommend starting cholecalciferol 2000 iu daily.  6. Gout - no recent flare, but significant hyperuricemia.  Would recommend starting allopurinol, but this is likely to precipitate a flare unless also given other prophylaxis and would consider using colchicine.  Recommend patient establish with primary care who can manage.  7. Skin cancer risk - no new skin lesions.  Recommend regular follow up with Dermatology.  8. Medical noncompliance - patient is mostly compliant with his medications, but has not been getting regular labs done, nor attending any medical appointments.  Discussed importance of compliance to keep this kidney and address his other medical issues.  Patient was also noncompliant following his first kidney transplant.  9. Recommend return visit in 12 months.    Assessment and plan was discussed with patient and he voiced his understanding and agreement.    Reason for Visit:  Mr. Piña is here for  transplant and immunosuppression management.    HPI:  Mr. 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         Transplant Hx:       Tx: DDKT  Date: 4/25/10       Present Maintenance IS: Cyclosporine and Mycophenolate mofetil       Baseline Creatinine: 2.3-2.6       Recent DSA: No  Date last checked: 7/2011       Biopsy: 7/25/11; unremarkable with no evidence of acute rejection.    Mr. Piña reports feeling good overall with minimal medical complaints.  He was last seen in clinic about 6 years ago and reports not seeing any provider in a couple of years.  He does say he takes his medications, maybe missing a dose about once a month.  He hasn't been as good about getting his labs done with last check almost 9 months ago.  His energy level is good and has remained pretty much normal.  He is active and works about 50-60 hrs per week, but doesn't get a lot of exercise outside work and walking.  Denies any chest pain or shortness of breath with exertion.  Appetite is good and he has gained about 5-8 lbs.  No nausea, vomiting or diarrhea.  No fever, sweats or chills.  No leg swelling.  No new skin lesions, but he hasn't seen Dermatology recently.    Home BP: Not checked      ROS:  A comprehensive review of systems was obtained and negative, except as noted in the HPI or PMH.    Active Medical Problems:  Patient Active Problem List   Diagnosis     Kidney replaced by transplant     Medullary cystic kidney disease     CARDIOVASCULAR SCREENING; LDL GOAL LESS THAN 100     Conductive hearing loss, unilateral     Aftercare following organ transplant     Immunosuppressed status (H)     Anemia in chronic renal disease     Secondary renal hyperparathyroidism (H)     Gout     Vitamin D deficiency     Cerebrovascular accident (H)     Dyslipidemia     Factor V Leiden (H)     Hypertension secondary to other renal disorders     PMH:   Medical record was reviewed and  "PMH was discussed with patient and noted below.  Past Medical History:   Diagnosis Date     Anemia in chronic renal disease      CVA (cerebral vascular accident) (H)      Dyslipidemia      Factor V Leiden (H)      Gout      Hypertension secondary to other renal disorders      Immunosuppressed status (H)      Kidney replaced by transplant 4/2010 (2nd)    prior 11/1993     Medullary cystic kidney disease      Secondary renal hyperparathyroidism (H)      PSH:   Past Surgical History:   Procedure Laterality Date     REPAIR PATENT FORAMEN OVALE       s/p LDKT       Family Hx:   Family History   Problem Relation Age of Onset     Lung Cancer Paternal Grandmother      KIDNEY DISEASE Sister      medullary cystic kidney disease, s/p kidney transplant     Personal Hx:   Social History     Social History     Marital status: Single     Spouse name: N/A     Number of children: 0     Years of education: N/A     Occupational History     Not on file.     Social History Main Topics     Smoking status: Never Smoker     Smokeless tobacco: Never Used     Alcohol use No     Drug use: No     Sexual activity: Yes     Partners: Female     Other Topics Concern     Not on file     Social History Narrative     Allergies:  No Known Allergies  Medications:  Prior to Admission medications    Medication Sig Start Date End Date Taking? Authorizing Provider   NEORAL (BRAND) 100 MG CAPSULE Take 1 capsule (100 mg) by mouth 2 times daily 9/5/17  Yes Lan Patino MD   CELLCEPT (BRAND) 250 MG CAPSULE Take 4 capsules (1,000 mg) by mouth 2 times daily 9/5/17  Yes Lan Patino MD   sulfamethoxazole-trimethoprim (BACTRIM/SEPTRA) 400-80 MG per tablet Take 1 tablet by mouth three times a week (Mondays, Wednesdays, Fridays) 8/21/17  Yes Lan Patino MD     Vitals:  BP (!) 142/95  Pulse 76  Ht 1.778 m (5' 10\")  Wt 92.5 kg (204 lb)  SpO2 99%  BMI 29.27 kg/m2    Exam:   GENERAL APPEARANCE: alert and no distress  HENT: mouth " without ulcers or lesions  LYMPHATICS: no cervical or supraclavicular nodes  RESP: lungs clear to auscultation - no rales, rhonchi or wheezes  CV: regular rhythm, normal rate, no rub, no murmur  EDEMA: no LE edema bilaterally  ABDOMEN: soft, nondistended, nontender, bowel sounds normal  MS: extremities normal - no gross deformities noted, no evidence of inflammation in joints, no muscle tenderness  SKIN: no rash, actinic keratoses  TX KIDNEY: normal    Results:     Recent Results (from the past 336 hour(s))   Protein  random urine with Creat Ratio    Collection Time: 09/05/17  5:42 PM   Result Value Ref Range    Protein Random Urine 0.43 g/L    Protein Total Urine g/gr Creatinine 0.32 (H) 0 - 0.2 g/g Cr   Creatinine urine calculation only    Collection Time: 09/05/17  5:42 PM   Result Value Ref Range    Creatinine Urine 133 mg/dL   Basic metabolic panel    Collection Time: 09/05/17  5:55 PM   Result Value Ref Range    Sodium 139 133 - 144 mmol/L    Potassium 4.4 3.4 - 5.3 mmol/L    Chloride 110 (H) 94 - 109 mmol/L    Carbon Dioxide 20 20 - 32 mmol/L    Anion Gap 10 3 - 14 mmol/L    Glucose 89 70 - 99 mg/dL    Urea Nitrogen 55 (H) 7 - 30 mg/dL    Creatinine 2.70 (H) 0.66 - 1.25 mg/dL    GFR Estimate 26 (L) >60 mL/min/1.7m2    GFR Estimate If Black 32 (L) >60 mL/min/1.7m2    Calcium 9.4 8.5 - 10.1 mg/dL   CBC with platelets    Collection Time: 09/05/17  5:55 PM   Result Value Ref Range    WBC 6.1 4.0 - 11.0 10e9/L    RBC Count 3.90 (L) 4.4 - 5.9 10e12/L    Hemoglobin 11.1 (L) 13.3 - 17.7 g/dL    Hematocrit 34.8 (L) 40.0 - 53.0 %    MCV 89 78 - 100 fl    MCH 28.5 26.5 - 33.0 pg    MCHC 31.9 31.5 - 36.5 g/dL    RDW 12.9 10.0 - 15.0 %    Platelet Count 194 150 - 450 10e9/L   Parathyroid Hormone Intact    Collection Time: 09/05/17  5:55 PM   Result Value Ref Range    Parathyroid Hormone Intact 259 (H) 12 - 72 pg/mL   Vitamin D Deficiency    Collection Time: 09/05/17  5:55 PM   Result Value Ref Range    Vitamin D  Deficiency screening 24 20 - 75 ug/L   T cell subset profile    Collection Time: 09/05/17  5:55 PM   Result Value Ref Range    IFC Specimen Blood     CD3 Mature T 82 49 - 84 %    CD4 [place] T 43 28 - 63 %    CD8 Suppressor T 36 10 - 40 %    CD4:CD8 Ratio 1.19 (L) 1.40 - 2.60    Absolute CD3 1092 603 - 2990 cells/uL    Absolute CD4 573 441 - 2156 cells/uL    Absolute CD8 480 125 - 1312 cells/uL   IRON AND IRON BINDING CAPACITY    Collection Time: 09/05/17  5:55 PM   Result Value Ref Range    Iron 39 35 - 180 ug/dL    Iron Binding Cap 280 240 - 430 ug/dL    Iron Saturation Index 14 (L) 15 - 46 %   FERRITIN    Collection Time: 09/05/17  5:55 PM   Result Value Ref Range    Ferritin 279 26 - 388 ng/mL   Uric acid    Collection Time: 09/05/17  5:55 PM   Result Value Ref Range    Uric Acid 10.4 (H) 3.5 - 7.2 mg/dL   PRA Donor Specific Antibody    Collection Time: 09/05/17  5:56 PM   Result Value Ref Range    PRA Donor Specific Nancy       Specimen received - Immunology report to follow upon completion.   Cyclosporine    Collection Time: 09/05/17  5:56 PM   Result Value Ref Range    Cyclosporine Last Dose 9/4/17 @1840     Cyclosporine Level 87 50 - 400 ug/L                 Lan Patino MD

## 2017-09-05 NOTE — LETTER
Date:September 11, 2017      Patient was self referred, no letter generated. Do not send.        [facility] Health Information

## 2017-09-05 NOTE — NURSING NOTE
"Chief Complaint   Patient presents with     RECHECK     Follow up kidney tansplant status, cadaveric.       Initial BP (!) 142/95  Pulse 76  Ht 1.778 m (5' 10\")  Wt 92.5 kg (204 lb)  SpO2 99%  BMI 29.27 kg/m2 Estimated body mass index is 29.27 kg/(m^2) as calculated from the following:    Height as of this encounter: 1.778 m (5' 10\").    Weight as of this encounter: 92.5 kg (204 lb).  Medication Reconciliation: complete   Griselda French., CMA    "

## 2017-09-06 LAB
CD3 CELLS # BLD: 1092 CELLS/UL (ref 603–2990)
CD3 CELLS NFR BLD: 82 % (ref 49–84)
CD3+CD4+ CELLS # BLD: 573 CELLS/UL (ref 441–2156)
CD3+CD4+ CELLS NFR BLD: 43 % (ref 28–63)
CD3+CD4+ CELLS/CD3+CD8+ CLL BLD: 1.19 % (ref 1.4–2.6)
CD3+CD8+ CELLS # BLD: 480 CELLS/UL (ref 125–1312)
CD3+CD8+ CELLS NFR BLD: 36 % (ref 10–40)
CYCLOSPORINE BLD LC/MS/MS-MCNC: 87 UG/L (ref 50–400)
DEPRECATED CALCIDIOL+CALCIFEROL SERPL-MC: 24 UG/L (ref 20–75)
IFC SPECIMEN: ABNORMAL
PRA DONOR SPECIFIC ABY: NORMAL
TME LAST DOSE: NORMAL H

## 2017-09-07 DIAGNOSIS — Z94.0 KIDNEY TRANSPLANT RECIPIENT: Primary | ICD-10-CM

## 2017-09-07 DIAGNOSIS — D84.9 IMMUNOSUPPRESSED STATUS (H): ICD-10-CM

## 2017-09-07 DIAGNOSIS — Z79.60 LONG-TERM USE OF IMMUNOSUPPRESSANT MEDICATION: ICD-10-CM

## 2017-09-08 ENCOUNTER — TELEPHONE (OUTPATIENT)
Dept: NEPHROLOGY | Facility: CLINIC | Age: 42
End: 2017-09-08

## 2017-09-08 PROBLEM — E55.9 VITAMIN D DEFICIENCY: Status: ACTIVE | Noted: 2017-09-08

## 2017-09-08 PROBLEM — E78.5 DYSLIPIDEMIA: Status: ACTIVE | Noted: 2017-09-08

## 2017-09-08 PROBLEM — I63.9 CEREBROVASCULAR ACCIDENT (H): Status: ACTIVE | Noted: 2017-09-08

## 2017-09-08 PROBLEM — D68.51 FACTOR V LEIDEN (H): Status: ACTIVE | Noted: 2017-09-08

## 2017-09-08 PROBLEM — M10.9 GOUT: Status: ACTIVE | Noted: 2017-09-08

## 2017-09-08 PROBLEM — Z48.298 AFTERCARE FOLLOWING ORGAN TRANSPLANT: Status: ACTIVE | Noted: 2017-09-08

## 2017-09-08 PROBLEM — N25.81 SECONDARY RENAL HYPERPARATHYROIDISM (H): Status: ACTIVE | Noted: 2017-09-08

## 2017-09-08 PROBLEM — N18.9 ANEMIA IN CHRONIC RENAL DISEASE: Status: ACTIVE | Noted: 2017-09-08

## 2017-09-08 NOTE — PROGRESS NOTES
Assessment and Plan:   1. DDKT - baseline Cr ~ 2.3-2.6, which has been stable to slightly increased with today's labs.  Minimal proteinuria.  No DSA, but not checked recently.  Will make no changes in immunosuppression.    2. BP - fair control above target of less than 140/90 at this clinic visit and not checked at home.  Recommend patient check BP and would start antihypertensive therapy if not consistently at goal.  With gout and hyperuricemia, would consider ARB.  3. Anemia in chronic renal disease - stable Hgb.  He is iron deficient and recommend starting oral iron sulfate 325 mg daily at lunch.  No evidence of GI bleeding, but recommend patient establish primary care and consider evaluation for GI bleeding.  Will follow.  4. Secondary renal hyperparathyroidism - mildly elevated PTH, okay for level of kidney function and will just treat vitamin D deficiency and follow.  5. Vitamin D deficiency - low vitamin D level and recommend starting cholecalciferol 2000 iu daily.  6. Gout - no recent flare, but significant hyperuricemia.  Would recommend starting allopurinol, but this is likely to precipitate a flare unless also given other prophylaxis and would consider using colchicine.  Recommend patient establish with primary care who can manage.  7. Skin cancer risk - no new skin lesions.  Recommend regular follow up with Dermatology.  8. Medical noncompliance - patient is mostly compliant with his medications, but has not been getting regular labs done, nor attending any medical appointments.  Discussed importance of compliance to keep this kidney and address his other medical issues.  Patient was also noncompliant following his first kidney transplant.  9. Recommend return visit in 12 months.    Assessment and plan was discussed with patient and he voiced his understanding and agreement.    Reason for Visit:  Mr. Piña is here for transplant and immunosuppression management.    HPI:  Mr. 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         Transplant Hx:       Tx: DDKT  Date: 4/25/10       Present Maintenance IS: Cyclosporine and Mycophenolate mofetil       Baseline Creatinine: 2.3-2.6       Recent DSA: No  Date last checked: 7/2011       Biopsy: 7/25/11; unremarkable with no evidence of acute rejection.    Mr. Piña reports feeling good overall with minimal medical complaints.  He was last seen in clinic about 6 years ago and reports not seeing any provider in a couple of years.  He does say he takes his medications, maybe missing a dose about once a month.  He hasn't been as good about getting his labs done with last check almost 9 months ago.  His energy level is good and has remained pretty much normal.  He is active and works about 50-60 hrs per week, but doesn't get a lot of exercise outside work and walking.  Denies any chest pain or shortness of breath with exertion.  Appetite is good and he has gained about 5-8 lbs.  No nausea, vomiting or diarrhea.  No fever, sweats or chills.  No leg swelling.  No new skin lesions, but he hasn't seen Dermatology recently.    Home BP: Not checked      ROS:  A comprehensive review of systems was obtained and negative, except as noted in the HPI or PMH.    Active Medical Problems:  Patient Active Problem List   Diagnosis     Kidney replaced by transplant     Medullary cystic kidney disease     CARDIOVASCULAR SCREENING; LDL GOAL LESS THAN 100     Conductive hearing loss, unilateral     Aftercare following organ transplant     Immunosuppressed status (H)     Anemia in chronic renal disease     Secondary renal hyperparathyroidism (H)     Gout     Vitamin D deficiency     Cerebrovascular accident (H)     Dyslipidemia     Factor V Leiden (H)     Hypertension secondary to other renal disorders     PMH:   Medical record was reviewed and PMH was discussed with patient and noted below.  Past Medical History:   Diagnosis  "Date     Anemia in chronic renal disease      CVA (cerebral vascular accident) (H)      Dyslipidemia      Factor V Leiden (H)      Gout      Hypertension secondary to other renal disorders      Immunosuppressed status (H)      Kidney replaced by transplant 4/2010 (2nd)    prior 11/1993     Medullary cystic kidney disease      Secondary renal hyperparathyroidism (H)      PSH:   Past Surgical History:   Procedure Laterality Date     REPAIR PATENT FORAMEN OVALE       s/p LDKT       Family Hx:   Family History   Problem Relation Age of Onset     Lung Cancer Paternal Grandmother      KIDNEY DISEASE Sister      medullary cystic kidney disease, s/p kidney transplant     Personal Hx:   Social History     Social History     Marital status: Single     Spouse name: N/A     Number of children: 0     Years of education: N/A     Occupational History     Not on file.     Social History Main Topics     Smoking status: Never Smoker     Smokeless tobacco: Never Used     Alcohol use No     Drug use: No     Sexual activity: Yes     Partners: Female     Other Topics Concern     Not on file     Social History Narrative     Allergies:  No Known Allergies  Medications:  Prior to Admission medications    Medication Sig Start Date End Date Taking? Authorizing Provider   NEORAL (BRAND) 100 MG CAPSULE Take 1 capsule (100 mg) by mouth 2 times daily 9/5/17  Yes Lan Patino MD   CELLCEPT (BRAND) 250 MG CAPSULE Take 4 capsules (1,000 mg) by mouth 2 times daily 9/5/17  Yes Lan Patino MD   sulfamethoxazole-trimethoprim (BACTRIM/SEPTRA) 400-80 MG per tablet Take 1 tablet by mouth three times a week (Mondays, Wednesdays, Fridays) 8/21/17  Yes Lan Patino MD     Vitals:  BP (!) 142/95  Pulse 76  Ht 1.778 m (5' 10\")  Wt 92.5 kg (204 lb)  SpO2 99%  BMI 29.27 kg/m2    Exam:   GENERAL APPEARANCE: alert and no distress  HENT: mouth without ulcers or lesions  LYMPHATICS: no cervical or supraclavicular nodes  RESP: lungs " clear to auscultation - no rales, rhonchi or wheezes  CV: regular rhythm, normal rate, no rub, no murmur  EDEMA: no LE edema bilaterally  ABDOMEN: soft, nondistended, nontender, bowel sounds normal  MS: extremities normal - no gross deformities noted, no evidence of inflammation in joints, no muscle tenderness  SKIN: no rash, actinic keratoses  TX KIDNEY: normal    Results:     Recent Results (from the past 336 hour(s))   Protein  random urine with Creat Ratio    Collection Time: 09/05/17  5:42 PM   Result Value Ref Range    Protein Random Urine 0.43 g/L    Protein Total Urine g/gr Creatinine 0.32 (H) 0 - 0.2 g/g Cr   Creatinine urine calculation only    Collection Time: 09/05/17  5:42 PM   Result Value Ref Range    Creatinine Urine 133 mg/dL   Basic metabolic panel    Collection Time: 09/05/17  5:55 PM   Result Value Ref Range    Sodium 139 133 - 144 mmol/L    Potassium 4.4 3.4 - 5.3 mmol/L    Chloride 110 (H) 94 - 109 mmol/L    Carbon Dioxide 20 20 - 32 mmol/L    Anion Gap 10 3 - 14 mmol/L    Glucose 89 70 - 99 mg/dL    Urea Nitrogen 55 (H) 7 - 30 mg/dL    Creatinine 2.70 (H) 0.66 - 1.25 mg/dL    GFR Estimate 26 (L) >60 mL/min/1.7m2    GFR Estimate If Black 32 (L) >60 mL/min/1.7m2    Calcium 9.4 8.5 - 10.1 mg/dL   CBC with platelets    Collection Time: 09/05/17  5:55 PM   Result Value Ref Range    WBC 6.1 4.0 - 11.0 10e9/L    RBC Count 3.90 (L) 4.4 - 5.9 10e12/L    Hemoglobin 11.1 (L) 13.3 - 17.7 g/dL    Hematocrit 34.8 (L) 40.0 - 53.0 %    MCV 89 78 - 100 fl    MCH 28.5 26.5 - 33.0 pg    MCHC 31.9 31.5 - 36.5 g/dL    RDW 12.9 10.0 - 15.0 %    Platelet Count 194 150 - 450 10e9/L   Parathyroid Hormone Intact    Collection Time: 09/05/17  5:55 PM   Result Value Ref Range    Parathyroid Hormone Intact 259 (H) 12 - 72 pg/mL   Vitamin D Deficiency    Collection Time: 09/05/17  5:55 PM   Result Value Ref Range    Vitamin D Deficiency screening 24 20 - 75 ug/L   T cell subset profile    Collection Time: 09/05/17  5:55 PM    Result Value Ref Range    IFC Specimen Blood     CD3 Mature T 82 49 - 84 %    CD4 [place] T 43 28 - 63 %    CD8 Suppressor T 36 10 - 40 %    CD4:CD8 Ratio 1.19 (L) 1.40 - 2.60    Absolute CD3 1092 603 - 2990 cells/uL    Absolute CD4 573 441 - 2156 cells/uL    Absolute CD8 480 125 - 1312 cells/uL   IRON AND IRON BINDING CAPACITY    Collection Time: 09/05/17  5:55 PM   Result Value Ref Range    Iron 39 35 - 180 ug/dL    Iron Binding Cap 280 240 - 430 ug/dL    Iron Saturation Index 14 (L) 15 - 46 %   FERRITIN    Collection Time: 09/05/17  5:55 PM   Result Value Ref Range    Ferritin 279 26 - 388 ng/mL   Uric acid    Collection Time: 09/05/17  5:55 PM   Result Value Ref Range    Uric Acid 10.4 (H) 3.5 - 7.2 mg/dL   PRA Donor Specific Antibody    Collection Time: 09/05/17  5:56 PM   Result Value Ref Range    PRA Donor Specific Nancy       Specimen received - Immunology report to follow upon completion.   Cyclosporine    Collection Time: 09/05/17  5:56 PM   Result Value Ref Range    Cyclosporine Last Dose 9/4/17 @1840     Cyclosporine Level 87 50 - 400 ug/L

## 2017-09-08 NOTE — TELEPHONE ENCOUNTER
Received message from Dr. Patino:  Stable kidney function.  Vitamin D deficient and recommend starting cholecalciferol 2000 iu daily.  Also iron deficient and recommend starting oral iron sulfate 325 mg daily with lunch.  Recommend patient establish care with primary care and may need evaluation for GI bleeding with iron deficiency.  Okay CD4 level and patient can stay off Bactrim.  Finally, very elevated uric acid level and again recommend establishing care with primary care to manage.  Otherwise, labs are normal or unchanged.  Would make no other changes or interventions at this time.     Left voicemail for patient to call back.    Lore Starks RN

## 2017-09-19 ENCOUNTER — CARE COORDINATION (OUTPATIENT)
Dept: NEPHROLOGY | Facility: CLINIC | Age: 42
End: 2017-09-19

## 2017-09-21 LAB
DONOR IDENTIFICATION: NORMAL
DSA COMMENTS: NORMAL
DSA PRESENT: NO
DSA TEST METHOD: NORMAL
ORGAN: NORMAL
PROTOCOL CUTOFF: NORMAL
SA1 CELL: NORMAL
SA1 COMMENTS: NORMAL
SA1 HI RISK ABY: NORMAL
SA1 MOD RISK ABY: NORMAL
SA1 TEST METHOD: NORMAL
SA2 CELL: NORMAL
SA2 COMMENTS: NORMAL
SA2 HI RISK ABY UA: NORMAL
SA2 MOD RISK ABY: NORMAL
SA2 TEST METHOD: NORMAL
UNACCEPTABLE ANTIGEN: NORMAL
UNOS CPRA: 92

## 2017-10-03 ENCOUNTER — CARE COORDINATION (OUTPATIENT)
Dept: NEPHROLOGY | Facility: CLINIC | Age: 42
End: 2017-10-03

## 2017-10-06 ENCOUNTER — TELEPHONE (OUTPATIENT)
Dept: FAMILY MEDICINE | Facility: CLINIC | Age: 42
End: 2017-10-06

## 2017-10-06 NOTE — LETTER
October 19, 2017      Miguel Angel Piña  [address] [facility] [address]        Dear Miguel Angel,     This is a reminder letter.     In order to ensure we are providing the best quality care, we have reviewed your chart and see that you are due for:    1.   Your next office visit is due for a physical  2.   Fasting lab works including a cholesterol check    For fasting labs please fast for at least 10 hours. You can still take your medications and have water.    We greatly appreciate the opportunity to serve you.  Thank you for trusting us with your health care.    If you are now being seen elsewhere please let us know and we will remove you from the list.    Sincerely,    [facility]

## 2017-10-06 NOTE — TELEPHONE ENCOUNTER
Panel Management Review      Patient has the following on his problem list:       IVD   ASA: FAILED    Last LDL:    Lab Results   Component Value Date    CHOL 136 04/19/2011     Lab Results   Component Value Date    HDL 28 04/19/2011     Lab Results   Component Value Date    LDL 90 04/19/2011     Lab Results   Component Value Date    TRIG 85 04/19/2011        Lab Results   Component Value Date    CHOLHDLRATIO 4.8 04/19/2011        Is the patient on a Statin? NO   Is the patient on Aspirin? NO                    Last three blood pressure readings:  BP Readings from Last 3 Encounters:   09/05/17 (!) 142/95   01/24/17 128/82   11/29/16 138/84        Tobacco History:     History   Smoking Status     Never Smoker   Smokeless Tobacco     Never Used           Composite cancer screening  Chart review shows that this patient is due/due soon for the following None  Summary:    Patient is due/failing the following:   Physical  Fasting labs-lipid, micro, urine  Phq9, gad7    Type of outreach:    Phone, left message for patient to call back.     Questions for provider review:    None                                                                                                                                    Bernie Rosas MA       Chart routed to Care Team .

## 2017-10-12 NOTE — TELEPHONE ENCOUNTER
Left message for patient to call back pod 1 hotline([phone])    Patient is due/failing the following:   Physical  Fasting labs-lipid, micro, urine  Phq9, gad7

## 2017-10-13 ENCOUNTER — CARE COORDINATION (OUTPATIENT)
Dept: NEPHROLOGY | Facility: CLINIC | Age: 42
End: 2017-10-13

## 2017-12-08 ENCOUNTER — OFFICE VISIT (OUTPATIENT)
Dept: FAMILY MEDICINE | Facility: CLINIC | Age: 42
End: 2017-12-08
Payer: COMMERCIAL

## 2017-12-08 VITALS
HEART RATE: 80 BPM | SYSTOLIC BLOOD PRESSURE: 142 MMHG | BODY MASS INDEX: 30.41 KG/M2 | DIASTOLIC BLOOD PRESSURE: 99 MMHG | WEIGHT: 212.4 LBS | OXYGEN SATURATION: 99 % | TEMPERATURE: 97.8 F | HEIGHT: 70 IN

## 2017-12-08 DIAGNOSIS — Z13.220 LIPID SCREENING: ICD-10-CM

## 2017-12-08 DIAGNOSIS — I15.1 HYPERTENSION SECONDARY TO OTHER RENAL DISORDERS: ICD-10-CM

## 2017-12-08 DIAGNOSIS — Z13.1 SCREENING FOR DIABETES MELLITUS: ICD-10-CM

## 2017-12-08 DIAGNOSIS — Z94.0 KIDNEY TRANSPLANT STATUS, CADAVERIC: ICD-10-CM

## 2017-12-08 DIAGNOSIS — Z23 NEED FOR PROPHYLACTIC VACCINATION AND INOCULATION AGAINST INFLUENZA: ICD-10-CM

## 2017-12-08 DIAGNOSIS — Z00.00 ROUTINE GENERAL MEDICAL EXAMINATION AT A HEALTH CARE FACILITY: Primary | ICD-10-CM

## 2017-12-08 PROCEDURE — 90686 IIV4 VACC NO PRSV 0.5 ML IM: CPT | Performed by: FAMILY MEDICINE

## 2017-12-08 PROCEDURE — 90472 IMMUNIZATION ADMIN EACH ADD: CPT | Performed by: FAMILY MEDICINE

## 2017-12-08 PROCEDURE — 99396 PREV VISIT EST AGE 40-64: CPT | Mod: 25 | Performed by: FAMILY MEDICINE

## 2017-12-08 PROCEDURE — 90715 TDAP VACCINE 7 YRS/> IM: CPT | Performed by: FAMILY MEDICINE

## 2017-12-08 PROCEDURE — 90471 IMMUNIZATION ADMIN: CPT | Performed by: FAMILY MEDICINE

## 2017-12-08 ASSESSMENT — PATIENT HEALTH QUESTIONNAIRE - PHQ9
5. POOR APPETITE OR OVEREATING: NOT AT ALL
SUM OF ALL RESPONSES TO PHQ QUESTIONS 1-9: 0

## 2017-12-08 ASSESSMENT — ANXIETY QUESTIONNAIRES
GAD7 TOTAL SCORE: 0
3. WORRYING TOO MUCH ABOUT DIFFERENT THINGS: NOT AT ALL
5. BEING SO RESTLESS THAT IT IS HARD TO SIT STILL: NOT AT ALL
7. FEELING AFRAID AS IF SOMETHING AWFUL MIGHT HAPPEN: NOT AT ALL
IF YOU CHECKED OFF ANY PROBLEMS ON THIS QUESTIONNAIRE, HOW DIFFICULT HAVE THESE PROBLEMS MADE IT FOR YOU TO DO YOUR WORK, TAKE CARE OF THINGS AT HOME, OR GET ALONG WITH OTHER PEOPLE: NOT DIFFICULT AT ALL
2. NOT BEING ABLE TO STOP OR CONTROL WORRYING: NOT AT ALL
1. FEELING NERVOUS, ANXIOUS, OR ON EDGE: NOT AT ALL
6. BECOMING EASILY ANNOYED OR IRRITABLE: NOT AT ALL

## 2017-12-08 NOTE — PROGRESS NOTES

## 2017-12-08 NOTE — PROGRESS NOTES
SUBJECTIVE:   CC: Miguel Angel Piña is an 42 year old male who presents for preventative health visit.     Healthy Habits:    Do you get at least three servings of calcium containing foods daily (dairy, green leafy vegetables, etc.)? yes    Amount of exercise or daily activities, outside of work: none outside of work    Problems taking medications regularly No    Medication side effects: No    Have you had an eye exam in the past two years? yes    Do you see a dentist twice per year? yes    Do you have sleep apnea, excessive snoring or daytime drowsiness?no        No additional concerns.     Patient has a history of kidney transplant to a history of medullary cystic kidney disease. Following with Dr Patino for aftercare following organ transplant.   Recently seen in 9/2017.         Patient informed that anything we discuss that is not related to preventative medicine, may be billed for; patient verbalizes understanding.      Today's PHQ-2 Score:   PHQ-2 ( 1999 [employer]) 12/8/2017 7/8/2013   Q1: Little interest or pleasure in doing things 0 0   Q2: Feeling down, depressed or hopeless 0 0   PHQ-2 Score 0 0         Abuse: Current or Past(Physical, Sexual or Emotional)- No  Do you feel safe in your environment - Yes  Social History   Substance Use Topics     Smoking status: Never Smoker     Smokeless tobacco: Never Used     Alcohol use No     The patient does not drink >3 drinks per day nor >7 drinks per week.    Last PSA: No results found for: PSA    Reviewed orders with patient. Reviewed health maintenance and updated orders accordingly - Yes  Patient Active Problem List   Diagnosis     Kidney replaced by transplant     Medullary cystic kidney disease     CARDIOVASCULAR SCREENING; LDL GOAL LESS THAN 100     Conductive hearing loss, unilateral     Aftercare following organ transplant     Immunosuppressed status (H)     Anemia in chronic renal disease     Secondary renal hyperparathyroidism (H)     Gout     Vitamin D  deficiency     Cerebrovascular accident (H)     Dyslipidemia     Factor V Leiden (H)     Hypertension secondary to other renal disorders     Past Surgical History:   Procedure Laterality Date     REPAIR PATENT FORAMEN OVALE       s/p LDKT         Social History   Substance Use Topics     Smoking status: Never Smoker     Smokeless tobacco: Never Used     Alcohol use No     Family History   Problem Relation Age of Onset     Lung Cancer Paternal Grandmother      KIDNEY DISEASE Sister      medullary cystic kidney disease, s/p kidney transplant     Prostate Cancer No family hx of      Colon Cancer No family hx of          Current Outpatient Prescriptions   Medication Sig Dispense Refill     NEORAL (BRAND) 100 MG CAPSULE Take 1 capsule (100 mg) by mouth 2 times daily 60 capsule 11     CELLCEPT (BRAND) 250 MG CAPSULE Take 4 capsules (1,000 mg) by mouth 2 times daily 240 capsule 11     No Known Allergies      Reviewed and updated as needed this visit by clinical staff  Tobacco  Allergies  Meds  Soc Hx        Reviewed and updated as needed this visit by Provider        Past Medical History:   Diagnosis Date     Anemia in chronic renal disease      CVA (cerebral vascular accident) (H)      Dyslipidemia      Factor V Leiden (H)      Gout      Hypertension secondary to other renal disorders      Immunosuppressed status (H)      Kidney replaced by transplant 4/2010 (2nd)    prior 11/1993     Medullary cystic kidney disease      Secondary renal hyperparathyroidism (H)       Past Surgical History:   Procedure Laterality Date     REPAIR PATENT FORAMEN OVALE       s/p LDKT         ROS:  C: NEGATIVE for fever, chills, change in weight  I: NEGATIVE for worrisome rashes, moles or lesions  E: NEGATIVE for vision changes or irritation  ENT: NEGATIVE for ear, mouth and throat problems  R: NEGATIVE for significant cough or SOB  CV: NEGATIVE for chest pain, palpitations or peripheral edema  GI: NEGATIVE for nausea, abdominal pain,  "heartburn, or change in bowel habits   male: negative for dysuria, hematuria, decreased urinary stream, erectile dysfunction, urethral discharge  M: NEGATIVE for significant arthralgias or myalgia  N: NEGATIVE for weakness, dizziness or paresthesias  P: NEGATIVE for changes in mood or affect    OBJECTIVE:   BP (!) 142/99  Pulse 80  Temp 97.8  F (36.6  C) (Tympanic)  Ht 5' 10\" (1.778 m)  Wt 212 lb 6.4 oz (96.3 kg)  SpO2 99%  BMI 30.48 kg/m2  EXAM:  GENERAL: healthy, alert and no distress  EYES: Eyes grossly normal to inspection, PERRL and conjunctivae and sclerae normal  HENT: ear canals and TM's normal, nose and mouth without ulcers or lesions  NECK: no adenopathy, no asymmetry, masses, or scars and thyroid normal to palpation  RESP: lungs clear to auscultation - no rales, rhonchi or wheezes  CV: regular rate and rhythm, normal S1 S2, no S3 or S4, no murmur, click or rub, no peripheral edema and peripheral pulses strong  ABDOMEN: soft, nontender, no hepatosplenomegaly, no masses and bowel sounds normal  MS: no gross musculoskeletal defects noted, no edema  SKIN: no suspicious lesions or rashes  NEURO: Normal strength and tone, mentation intact and speech normal  PSYCH: mentation appears normal, affect normal/bright    DATA  Future labs ordered.  ASSESSMENT/PLAN:   Miguel Angel was seen today for physical.    Diagnoses and all orders for this visit:    Routine general medical examination at a health care facility  Miguel Angel was seen today for physical.    Diagnoses and all orders for this visit:    Routine general medical examination at a health care facility  -     VACCINE ADMINISTRATION, EACH ADDITIONAL  -     TDAP VACCINE (ADACEL)  -     FLU VAC, SPLIT VIRUS IM > 3 YO (QUADRIVALENT) [55413]  -     Vaccine Administration, Initial [19373]    Lipid screening  -     Lipid Profile (Chol, Trig, HDL, LDL calc); Future    Screening for diabetes mellitus  -     Glucose; Future    Hypertension secondary to other renal " "disorders  Repeat BP at the end of the visit improved, continue to monitor    Kidney transplant status, cadaveric  Following with Nephrology- Dr Patino      Need for prophylactic vaccination and inoculation against influenza  -     FLU VAC, SPLIT VIRUS IM > 3 YO (QUADRIVALENT) [44330]  -     Vaccine Administration, Initial [85501]            COUNSELING:  Reviewed preventive health counseling, as reflected in patient instructions       Regular exercise       Healthy diet/nutrition    BP Screening:   Last 3 BP Readings:    BP Readings from Last 3 Encounters:   12/08/17 (!) 142/99   09/05/17 (!) 142/95   01/24/17 128/82       The following was recommended to the patient:  Re-screen within 4 weeks and recommend lifestyle modifications       reports that he has never smoked. He has never used smokeless tobacco.      Estimated body mass index is 30.48 kg/(m^2) as calculated from the following:    Height as of this encounter: 5' 10\" (1.778 m).    Weight as of this encounter: 212 lb 6.4 oz (96.3 kg).   Weight management plan: Discussed healthy diet and exercise guidelines and patient will follow up in 12 months in clinic to re-evaluate.    Counseling Resources:  ATP IV Guidelines  Pooled Cohorts Equation Calculator  FRAX Risk Assessment  ICSI Preventive Guidelines  Dietary Guidelines for Americans, 2010  USDA's MyPlate  ASA Prophylaxis  Lung CA Screening    Follow up annually and as needed thoughout the year.    Lucrecia Hutson MD  [facility]  "

## 2017-12-08 NOTE — MR AVS SNAPSHOT
After Visit Summary   12/8/2017    Miguel Angel Piña    MRN: [id number]           Patient Information     Date Of Birth          1975        Visit Information        Provider Department      12/8/2017 2:00 PM Lucrecia Hutson MD [facility] [place]        Today's Diagnoses     Routine general medical examination at a health care facility    -  1    Lipid screening        Screening for diabetes mellitus        Hypertension secondary to other renal disorders        Kidney replaced by transplant          Care Instructions      Preventive Health Recommendations  Male Ages 40 to 49    Yearly exam:             See your health care provider every year in order to  o   Review health changes.   o   Discuss preventive care.    o   Review your medicines if your doctor has prescribed any.    You should be tested each year for STDs (sexually transmitted diseases) if you re at risk.     Have a cholesterol test every 5 years.     Have a colonoscopy (test for colon cancer) if someone in your family has had colon cancer or polyps before age 50.     After age 45, have a diabetes test (fasting glucose). If you are at risk for diabetes, you should have this test every 3 years.      Talk with your health care provider about whether or not a prostate cancer screening test (PSA) is right for you.    Shots: Get a flu shot each year. Get a tetanus shot every 10 years.     Nutrition:    Eat at least 5 servings of fruits and vegetables daily.     Eat whole-grain bread, whole-wheat pasta and brown rice instead of white grains and rice.     Talk to your provider about Calcium and Vitamin D.     Lifestyle    Exercise for at least 150 minutes a week (30 minutes a day, 5 days a week). This will help you control your weight and prevent disease.     Limit alcohol to one drink per day.     No smoking.     Wear sunscreen to prevent skin cancer.     See your dentist every six months for an exam and cleaning.               "Follow-ups after your visit        Follow-up notes from your care team     Return in about 1 year (around 12/8/2018) for Physical Exam and as needed throughout the year.      Your next 10 appointments already scheduled     Sep 04, 2018  4:25 PM CDT   (Arrive by 3:55 PM)   Return Kidney Transplant with Lan Patino MD   [facility] ([facility])    [address] [place] [address] [phone]              Future tests that were ordered for you today     Open Future Orders        Priority Expected Expires Ordered    Glucose Routine  12/8/2018 12/8/2017    Lipid Profile (Chol, Trig, HDL, LDL calc) Routine  12/8/2018 12/8/2017            Who to contact     Normal or non-critical lab and imaging results will be communicated to you by [employer], letter or phone within 4 business days after the clinic has received the results. If you do not hear from us within 7 days, please contact the clinic through [employer] or phone. If you have a critical or abnormal lab result, we will notify you by phone as soon as possible.  Submit refill requests through [employer] or call your pharmacy and they will forward the refill request to us. Please allow 3 business days for your refill to be completed.          If you need to speak with a  for additional information , please call: [phone]             Additional Information About Your Visit        [employer] Information     [employer] lets you send messages to your doctor, view your test results, renew your prescriptions, schedule appointments and more. To sign up, go to [email or web address] . Click on \"Log in\" on the left side of the screen, which will take you to the Welcome page. Then click on \"Sign up Now\" on the right side of the page.     You will be asked to enter the access code listed below, as well as some personal information. Please follow the directions to create your username and password.   "   Your access code is: [email or web address]  Expires: 3/8/2018  2:32 PM     Your access code will  in 90 days. If you need help or a new code, please call your [place] clinic or [phone].        Care EveryWhere ID     This is your Care EveryWhere ID. This could be used by other organizations to access your [place] medical records  [id number]         Blood Pressure from Last 3 Encounters:   17 (!) 142/95   17 128/82   16 138/84    Weight from Last 3 Encounters:   17 204 lb (92.5 kg)   17 195 lb (88.5 kg)   16 199 lb 9.6 oz (90.5 kg)               Primary Care Provider Office Phone # Fax #    Lucrecia Hutson [phone]       [address]  [place] [address]        Equal Access to Services     [facility]: Hadii aad ku hadasho Soomaali, waaxda luqadaha, qaybta kaalmada adeegyada, waxay idiin hayaan rosalioeg kharash la'tgn . So [facility] [phone].    ATENCIÓN: Si habla español, tiene a pollard disposición servicios gratuitos de asistencia lingüística. Llame al [phone].    We comply with applicable federal civil rights laws and [place] laws. We do not discriminate on the basis of race, color, national origin, age, disability, sex, sexual orientation, or gender identity.            Thank you!     Thank you for choosing [facility]  for your care. Our goal is always to provide you with excellent care. Hearing back from our patients is one way we can continue to improve our services. Please take a few minutes to complete the written survey that you may receive in the mail after your visit with us. Thank you!             Your Updated Medication List - Protect others around you: Learn how to safely use, store and throw away your medicines at www.disposemymeds.org.          This list is accurate as of: 17  2:32 PM.  Always use your most recent med list.                   Brand Name Dispense Instructions for use Diagnosis    cycloSPORINE  modified 100 MG capsule     60 capsule    Take 1 capsule (100 mg) by mouth 2 times daily    Kidney replaced by transplant       mycophenolate 250 MG capsule     240 capsule    Take 4 capsules (1,000 mg) by mouth 2 times daily    Kidney replaced by transplant

## 2017-12-09 ASSESSMENT — ANXIETY QUESTIONNAIRES: GAD7 TOTAL SCORE: 0

## 2018-01-16 ENCOUNTER — TELEPHONE (OUTPATIENT)
Dept: FAMILY MEDICINE | Facility: CLINIC | Age: 43
End: 2018-01-16

## 2018-01-16 NOTE — LETTER
February 19, 2018      Miguel Angel Piña  [address] [address] [address]        Dear Miguel Angel,       This is a friendly reminder that you are due for a blood pressure recheck as it was elevated at your last visit.    Your health is important to us.  We periodically review your chart to make sure that you are up-to-date on all health maintenance and that your chronic illnesses are adequately controlled.       You can schedule a appointment with the nurse in the clinic-this is a no charge visit. Or do a walk in appointment with the pharmacy.    [facility]

## 2018-01-16 NOTE — TELEPHONE ENCOUNTER
Panel Management Review      Patient has the following on his problem list:     Hypertension   Last three blood pressure readings:  BP Readings from Last 3 Encounters:   12/08/17 (!) 142/99   09/05/17 (!) 142/95   01/24/17 128/82     Blood pressure: Passed    HTN Guidelines:  Age 18-59 BP range:  Less than 140/90  Age 60-85 with Diabetes:  Less than 140/90  Age 60-85 without Diabetes:  less than 150/90        Composite cancer screening  Chart review shows that this patient is due/due soon for the following None  Summary:    Patient is due/failing the following:   ASA and BP CHECK    Action needed:   Patient needs nurse only appointment.    Type of outreach:    Phone, left message for patient to call back.     Questions for provider review:      Dr. Hutson, please advise:    Patient is failing ASA;  Please advise patient to begin taking aspirin or add aspirin not prescribed intentionally on medication list.                                                                                                                                     Griselda Esparza MA       Chart routed to Care Team .

## 2018-02-12 NOTE — TELEPHONE ENCOUNTER
Left message for patient to call back pod 2 line.([phone])    Patient needs ancillary appt for recheck of BP.     If no call back in x1 week- send letter.

## 2018-02-26 ENCOUNTER — TELEPHONE (OUTPATIENT)
Dept: FAMILY MEDICINE | Facility: CLINIC | Age: 43
End: 2018-02-26

## 2018-02-26 DIAGNOSIS — M79.641 RIGHT HAND PAIN: ICD-10-CM

## 2018-02-26 DIAGNOSIS — M79.89 SWELLING OF RIGHT HAND: ICD-10-CM

## 2018-02-26 RX ORDER — INDOMETHACIN 25 MG/1
CAPSULE ORAL
Qty: 42 CAPSULE | Refills: 0 | Status: SHIPPED | OUTPATIENT
Start: 2018-02-26 | End: 2018-09-04

## 2018-02-26 NOTE — TELEPHONE ENCOUNTER
Left message on voice mail for patient to call clinic. [phone]  Need more info, symptoms, how long has he had them?  Med last filled by another provider.  Tracie Teixeira RN

## 2018-02-26 NOTE — TELEPHONE ENCOUNTER
Patient is calling back this is his left foot for 2 days has been treated for this in the past and nothing new with medication   Thank you

## 2018-02-26 NOTE — TELEPHONE ENCOUNTER
Refill request for indomethacin needed as soon as possible for gout pain.  Pharmacy sais Rx has .

## 2018-02-26 NOTE — TELEPHONE ENCOUNTER
See notes below, can you send in script for his indocin? Or do you need to see him?  Script pended from historical med list.  Tracie Teixeira RN

## 2018-03-13 ENCOUNTER — TELEPHONE (OUTPATIENT)
Dept: FAMILY MEDICINE | Facility: CLINIC | Age: 43
End: 2018-03-13

## 2018-03-13 NOTE — TELEPHONE ENCOUNTER
Panel Management Review      Patient has the following on his problem list:       IVD   ASA: Passed    Last LDL:    Lab Results   Component Value Date    CHOL 136 04/19/2011     Lab Results   Component Value Date    HDL 28 04/19/2011     Lab Results   Component Value Date    LDL 90 04/19/2011     Lab Results   Component Value Date    TRIG 85 04/19/2011        Lab Results   Component Value Date    CHOLHDLRATIO 4.8 04/19/2011        Is the patient on a Statin? YES   Is the patient on Aspirin? YES                    Last three blood pressure readings:  BP Readings from Last 3 Encounters:   12/08/17 (!) 142/99   09/05/17 (!) 142/95   01/24/17 128/82        Tobacco History:     History   Smoking Status     Never Smoker   Smokeless Tobacco     Never Used         Composite cancer screening  Chart review shows that this patient is due/due soon for the following None  Summary:    Patient is due/failing the following:   BP CHECK    Action needed:   Patient needs nurse only appointment.    Type of outreach:    Phone, left message for patient to call back.     Questions for provider review:    None                                                                                                                                    Griselda Esparza MA       Chart routed to Care Team .

## 2018-03-14 ENCOUNTER — ALLIED HEALTH/NURSE VISIT (OUTPATIENT)
Dept: NURSING | Facility: CLINIC | Age: 43
End: 2018-03-14
Payer: COMMERCIAL

## 2018-03-14 ENCOUNTER — OFFICE VISIT (OUTPATIENT)
Dept: FAMILY MEDICINE | Facility: CLINIC | Age: 43
End: 2018-03-14
Payer: COMMERCIAL

## 2018-03-14 VITALS
OXYGEN SATURATION: 100 % | BODY MASS INDEX: 31.35 KG/M2 | DIASTOLIC BLOOD PRESSURE: 104 MMHG | HEIGHT: 70 IN | HEART RATE: 66 BPM | SYSTOLIC BLOOD PRESSURE: 162 MMHG | WEIGHT: 219 LBS

## 2018-03-14 VITALS — SYSTOLIC BLOOD PRESSURE: 152 MMHG | HEART RATE: 66 BPM | DIASTOLIC BLOOD PRESSURE: 110 MMHG

## 2018-03-14 DIAGNOSIS — Z94.0 KIDNEY REPLACED BY TRANSPLANT: ICD-10-CM

## 2018-03-14 DIAGNOSIS — I10 HYPERTENSION GOAL BP (BLOOD PRESSURE) < 140/90: ICD-10-CM

## 2018-03-14 DIAGNOSIS — I15.1 HYPERTENSION SECONDARY TO OTHER RENAL DISORDERS: Primary | ICD-10-CM

## 2018-03-14 DIAGNOSIS — Z13.220 LIPID SCREENING: ICD-10-CM

## 2018-03-14 DIAGNOSIS — Q61.5 MEDULLARY CYSTIC KIDNEY DISEASE: ICD-10-CM

## 2018-03-14 DIAGNOSIS — R42 DIZZINESS: Primary | ICD-10-CM

## 2018-03-14 LAB
ANION GAP SERPL CALCULATED.3IONS-SCNC: 9 MMOL/L (ref 3–14)
BUN SERPL-MCNC: 58 MG/DL (ref 7–30)
CALCIUM SERPL-MCNC: 8.8 MG/DL (ref 8.5–10.1)
CHLORIDE SERPL-SCNC: 108 MMOL/L (ref 94–109)
CHOLEST SERPL-MCNC: 160 MG/DL
CO2 SERPL-SCNC: 20 MMOL/L (ref 20–32)
CREAT SERPL-MCNC: 2.63 MG/DL (ref 0.66–1.25)
CREAT UR-MCNC: 48 MG/DL
ERYTHROCYTE [DISTWIDTH] IN BLOOD BY AUTOMATED COUNT: 13 % (ref 10–15)
GFR SERPL CREATININE-BSD FRML MDRD: 27 ML/MIN/1.7M2
GLUCOSE SERPL-MCNC: 86 MG/DL (ref 70–99)
HCT VFR BLD AUTO: 34.9 % (ref 40–53)
HDLC SERPL-MCNC: 37 MG/DL
HGB BLD-MCNC: 11.4 G/DL (ref 13.3–17.7)
LDLC SERPL CALC-MCNC: 95 MG/DL
MCH RBC QN AUTO: 29.5 PG (ref 26.5–33)
MCHC RBC AUTO-ENTMCNC: 32.7 G/DL (ref 31.5–36.5)
MCV RBC AUTO: 90 FL (ref 78–100)
MICROALBUMIN UR-MCNC: 308 MG/L
MICROALBUMIN/CREAT UR: 644.35 MG/G CR (ref 0–17)
NONHDLC SERPL-MCNC: 123 MG/DL
PLATELET # BLD AUTO: 172 10E9/L (ref 150–450)
POTASSIUM SERPL-SCNC: 4.8 MMOL/L (ref 3.4–5.3)
RBC # BLD AUTO: 3.86 10E12/L (ref 4.4–5.9)
SODIUM SERPL-SCNC: 137 MMOL/L (ref 133–144)
TRIGL SERPL-MCNC: 139 MG/DL
WBC # BLD AUTO: 5.6 10E9/L (ref 4–11)

## 2018-03-14 PROCEDURE — 85027 COMPLETE CBC AUTOMATED: CPT | Performed by: FAMILY MEDICINE

## 2018-03-14 PROCEDURE — 99207 ZZC NO CHARGE NURSE ONLY: CPT

## 2018-03-14 PROCEDURE — 36415 COLL VENOUS BLD VENIPUNCTURE: CPT | Performed by: FAMILY MEDICINE

## 2018-03-14 PROCEDURE — 80048 BASIC METABOLIC PNL TOTAL CA: CPT | Performed by: FAMILY MEDICINE

## 2018-03-14 PROCEDURE — 93000 ELECTROCARDIOGRAM COMPLETE: CPT | Performed by: FAMILY MEDICINE

## 2018-03-14 PROCEDURE — 80061 LIPID PANEL: CPT | Performed by: FAMILY MEDICINE

## 2018-03-14 PROCEDURE — 99214 OFFICE O/P EST MOD 30 MIN: CPT | Performed by: FAMILY MEDICINE

## 2018-03-14 PROCEDURE — 82043 UR ALBUMIN QUANTITATIVE: CPT | Performed by: FAMILY MEDICINE

## 2018-03-14 RX ORDER — LISINOPRIL 20 MG/1
20 TABLET ORAL DAILY
Qty: 30 TABLET | Refills: 1 | Status: SHIPPED | OUTPATIENT
Start: 2018-03-14 | End: 2018-09-04

## 2018-03-14 NOTE — LETTER
March 14, 2018      Miguel Angel Piña  [address] [address] [address]        To Whom It May Concern,        Miguel Angel Piña was seen and treated at the [facility] today.     If you have any additional questions or concerns, please do not hesitate to contact me.         Sincerely,        Lucrecia Hutson MD

## 2018-03-14 NOTE — MR AVS SNAPSHOT
"              After Visit Summary   3/14/2018    Miguel Angel Piña    MRN: [id number]           Patient Information     Date Of Birth          1975        Visit Information        Provider Department      3/14/2018 8:45 AM [facility]        Today's Diagnoses     Hypertension secondary to other renal disorders    -  1       Follow-ups after your visit        Your next 10 appointments already scheduled     Mar 14, 2018 10:00 AM CDT   SHORT with Lucrecia Hutson MD   [facility] [place] ([facility])    [address]  [place] [id number] [phone]            Sep 04, 2018  4:25 PM CDT   (Arrive by 3:55 PM)   Return Kidney Transplant with Lan Patino MD   [facility] ([facility])    [address] [place] [address]  [address]  [facility] [address] [phone]              Who to contact     If you have questions or need follow up information about today's clinic visit or your schedule please contact [facility] [place] directly at [phone].  Normal or non-critical lab and imaging results will be communicated to you by [employer], letter or phone within 4 business days after the clinic has received the results. If you do not hear from us within 7 days, please contact the clinic through [employer] or phone. If you have a critical or abnormal lab result, we will notify you by phone as soon as possible.  Submit refill requests through [employer] or call your pharmacy and they will forward the refill request to us. Please allow 3 business days for your refill to be completed.          Additional Information About Your Visit        MyChart Information     [employer] lets you send messages to your doctor, view your test results, renew your prescriptions, schedule appointments and more. To sign up, go to [email or web address] . Click on \"Log in\" on the left side of the screen, which will take you to the Welcome page. Then click " "on \"Sign up Now\" on the right side of the page.     You will be asked to enter the access code listed below, as well as some personal information. Please follow the directions to create your username and password.     Your access code is: [email or web address]  Expires: 2018  9:04 AM     Your access code will  in 90 days. If you need help or a new code, please call your [place] clinic or [phone].        Care EveryWhere ID     This is your Care EveryWhere ID. This could be used by other organizations to access your [place] medical records  [id number]        Your Vitals Were     Pulse                   66            Blood Pressure from Last 3 Encounters:   18 (!) 152/110   17 (!) 142/99   17 (!) 142/95    Weight from Last 3 Encounters:   17 212 lb 6.4 oz (96.3 kg)   17 204 lb (92.5 kg)   17 195 lb (88.5 kg)              Today, you had the following     No orders found for display       Primary Care Provider Office Phone # Fax #    Lucrecia Hutson [phone]       [address]  [place] [address]        Equal Access to Services     [facility] : Hadii aad ku hadasho Soomaali, waaxda luqadaha, qaybta kaalmada adeegyada, waxay idiin haybrianne marino . So [facility] [phone].    ATENCIÓN: Si habla español, tiene a pollard disposición servicios gratuitos de asistencia lingüística. Llame al [phone].    We comply with applicable federal civil rights laws and [place] laws. We do not discriminate on the basis of race, color, national origin, age, disability, sex, sexual orientation, or gender identity.            Thank you!     Thank you for choosing [facility]  for your care. Our goal is always to provide you with excellent care. Hearing back from our patients is one way we can continue to improve our services. Please take a few minutes to complete the written survey that you may receive in the mail after your visit with us. Thank " you!             Your Updated Medication List - Protect others around you: Learn how to safely use, store and throw away your medicines at www.disposemymeds.org.          This list is accurate as of 3/14/18  9:04 AM.  Always use your most recent med list.                   Brand Name Dispense Instructions for use Diagnosis    ASPIRIN NOT PRESCRIBED    INTENTIONAL     Please choose reason not prescribed, below        cycloSPORINE modified 100 MG capsule     60 capsule    Take 1 capsule (100 mg) by mouth 2 times daily    Kidney replaced by transplant       indomethacin 25 MG capsule    INDOCIN    42 capsule    Take 2 tabs three times daily x2 days, then 1 tab three times daily until symptoms have resolved x48 hours    Swelling of right hand, Right hand pain       mycophenolate 250 MG capsule     240 capsule    Take 4 capsules (1,000 mg) by mouth 2 times daily    Kidney replaced by transplant

## 2018-03-14 NOTE — PROGRESS NOTES
Miguel Angel Piña is a 42 year old male who comes in today for a Blood Pressure check because of ongoing blood pressure monitoring.    *Document pulse and BP  *Use new set of vitals button for multiple readings.  *Use extended vitals for orthostatic    Vitals as recorded, a large cuff was used.    Patient is not currently prescribed medication.  Patient just started monitoring Blood Pressure at home  This AM was 170/110.    Current complaints: light-headedness    Disposition: Patient was schedule for appt today at 10 am.    Isela Mcgarry CMA

## 2018-03-14 NOTE — PATIENT INSTRUCTIONS
Schedule BP check with nurse in 2 weeks.   Uncontrolled High Blood Pressure (Established)    Your blood pressure was unusually high today. This can occur if you ve missed doses of your blood pressure medicine. Or it can happen if you are taking other medicines. These include some asthma inhalers, decongestants, diet pills, and street drugs like cocaine and amphetamine.  Other causes include:    Weight gain    More salt in your diet    Smoking    Caffeine  Your blood pressure can also rise if you are emotionally upset or in intense pain. It may go back to normal after a period of rest.  A blood pressure reading is made up of 2 numbers. There is a top number over a bottom number. The top number is the systolic pressure. The bottom number is the diastolic pressure. A normal blood pressure is a systolic pressure of less than 120 over a diastolic pressure of less than 80. High blood pressure (hypertension) is when the top number is 140 or higher. Or it is when the bottom number is 90 or higher. You will see your blood pressure readings written together. For example, a person with a systolic pressure of 118 and a diastolic pressure of 78 will have 118/78 written in the medical record. To be high blood pressure, the numbers must be higher when tested over a period of time. The blood pressures between normal and hypertension are called prehypertension. Prehypertension is a warning sign. The information gives you a chance to make lifestyle changes (weight loss, more exercise) that can keep your blood pressure from going higher.  Home care  It s important to take steps to lower your blood pressure. If you are taking blood pressure medicine, the guidelines below may help you need less or no medicines in the future.    Begin a weight-loss program if you are overweight.    Cut back on the amount of salt in your diet:    Avoid high-salt foods like olives, pickles, smoked meats, and salted potato chips.    Don t add salt to your  food at the table.    Use only small amounts of salt when cooking.    Begin an exercise program. Talk with your health care provider about what exercise program is best for you. It doesn t have to be difficult. Even brisk walking for 20 minutes 3 times a week is a good form of exercise.    Avoid medicines that stimulates the heart. This includes many over-the-counter cold and sinus decongestant pills and sprays, as well as diet pills. Check the warnings about hypertension on the label. Before purchasing any over-the-counter medicines or supplements, always ask the pharmacist about the product's potential interaction with your high blood pressure and your medicines.    Stimulants such as amphetamine or cocaine could be lethal for someone with hypertension. Never take these.    Limit how much caffeine you drink. Or switch to noncaffeinated beverages.    Stop smoking. If you are a long-time smoker, this can be hard. Enroll in a stop-smoking program to make it more likely that you will succeed. Talk with your provider about ways to quit.    Learn how to handle stress better. This is an important part of any program to lower blood pressure. Learn ways to relax. These include meditation, yoga, and biofeedback.    If medicines were prescribed, take them exactly as directed. Missing doses may cause your blood pressure to get out of control.    If you miss a dose or doses of your medicines, check with your healthcare provider or pharmacist about what to do.    Consider buying an automatic blood pressure machine. Your provider may recommend a certain type. You can get one of these at most pharmacies. Measure your blood pressure twice a day, in the morning, and in the late afternoon. Keep a written record of your home blood pressure readings and take the record to your medical appointments.  Here are some additional guidelines on home blood pressure monitoring from the American Heart Association.    Don't smoke or drink coffee  for 30 minutes    Go to the bathroom before the test.    Relax for 5 minutes before taking the measurement.    Sit correctly. Be sure your back is supported. Don't sit on a couch or soft chair. Uncross your feet and place them flat on the floor. Place your arm on a solid, flat surface like a table with the upper arm at heart level. Make certain the middle of the cuff is directly above the eye of the elbow. Check the monitor's instruction manual for an illustration.    Take multiple readings. When you measure, take 2 or 3 readings one minute apart and record all of the results.    Take your blood pressure at the same time every day, or as your healthcare provider recommends.    Record the date, time, and blood pressure reading.    Take the record with you to your next appointment. If your blood pressure monitor has a built-in memory, simply take the monitor with you to your next appointment.    Call your provider if you have several high readings. Don't be frightened by a single high reading, but if you get several high readings, check in with your healthcare provider.    Note: When blood pressure reaches a systolic (top number) of 180 or higher or a diastolic (bottom number) of 110 or higher, emergency medical treatment is required. Call your healthcare provider immediately.  Follow-up care  Regular visits to your own healthcare provider for blood pressure and medicine checks are an important part of your care. Make a follow-up appointment as directed. Bring the record of your home blood pressure readings to the appointment.  When to seek medical advice  Call your healthcare provider right away if any of these occur:    Blood pressure reaches a systolic (top number) of 180 or higher or diastolic (bottom number) of 110 or higher, emergency medical treatment is required.    Chest, arm, shoulder, neck, or upper back pain    Shortness of breath    Severe headache    Throbbing or rushing sound in the  ears    Nosebleed    Extreme drowsiness, confusion, or fainting    Dizziness or dizziness with spinning sensation (vertigo)    Weakness in an arm or leg or on one side of the face    Trouble speaking or seeing   Date Last Reviewed: 1/1/2017 2000-2017 [employer]. [address] [place] [address]. All rights reserved. This information is not intended as a substitute for professional medical care. Always follow your healthcare professional's instructions.

## 2018-03-14 NOTE — LETTER
March 16, 2018      Miguel Angel Piña  [address] [facility] [address]        Dear ,    We are writing to inform you of your test results.    Your recent labs showed;     Stable kidney function and anemia.   Cholesterol improved compared to 6 years ago.   Will work on keeping your blood pressure under control.   Keep your upcoming appointments as scheduled.     Resulted Orders   Albumin Random Urine Quantitative with Creat Ratio   Result Value Ref Range    Creatinine Urine 48 mg/dL    Albumin Urine mg/L 308 mg/L    Albumin Urine mg/g Cr 644.35 (H) 0 - 17 mg/g Cr   Lipid Profile (Chol, Trig, HDL, LDL calc)   Result Value Ref Range    Cholesterol 160 <200 mg/dL    Triglycerides 139 <150 mg/dL      Comment:      Fasting specimen    HDL Cholesterol 37 (L) >39 mg/dL    LDL Cholesterol Calculated 95 <100 mg/dL      Comment:      Desirable:       <100 mg/dl    Non HDL Cholesterol 123 <130 mg/dL   CBC with platelets   Result Value Ref Range    WBC 5.6 4.0 - 11.0 10e9/L    RBC Count 3.86 (L) 4.4 - 5.9 10e12/L    Hemoglobin 11.4 (L) 13.3 - 17.7 g/dL    Hematocrit 34.9 (L) 40.0 - 53.0 %    MCV 90 78 - 100 fl    MCH 29.5 26.5 - 33.0 pg    MCHC 32.7 31.5 - 36.5 g/dL    RDW 13.0 10.0 - 15.0 %    Platelet Count 172 150 - 450 10e9/L   Basic metabolic panel   Result Value Ref Range    Sodium 137 133 - 144 mmol/L    Potassium 4.8 3.4 - 5.3 mmol/L    Chloride 108 94 - 109 mmol/L    Carbon Dioxide 20 20 - 32 mmol/L    Anion Gap 9 3 - 14 mmol/L    Glucose 86 70 - 99 mg/dL      Comment:      Fasting specimen    Urea Nitrogen 58 (H) 7 - 30 mg/dL    Creatinine 2.63 (H) 0.66 - 1.25 mg/dL    GFR Estimate 27 (L) >60 mL/min/1.7m2      Comment:      Non  GFR Calc    GFR Estimate If Black 32 (L) >60 mL/min/1.7m2      Comment:       GFR Calc    Calcium 8.8 8.5 - 10.1 mg/dL       If you have any questions or concerns, please call the clinic at the number listed above.        Sincerely,        Lucrecia Hutson MD/mp

## 2018-03-14 NOTE — MR AVS SNAPSHOT
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   Uncontrolled High Blood Pressure (Established)    Your blood pressure was unusually high today. This can occur if you ve missed doses of your blood pressure medicine. Or it can happen if you are taking other medicines. These include some asthma inhalers, decongestants, diet pills, and street drugs like cocaine and amphetamine.  Other causes include:    Weight gain    More salt in your diet    Smoking    Caffeine  Your blood pressure can also rise if you are emotionally upset or in intense pain. It may go back to normal after a period of rest.  A blood pressure reading is made up of 2 numbers. There is a top number over a bottom number. The top number is the systolic pressure. The bottom number is the diastolic pressure. A normal blood pressure is a systolic pressure of less than 120 over a diastolic pressure of less than 80. High blood pressure (hypertension) is when the top number is 140 or higher. Or it is when the bottom number is 90 or higher. You will see your blood pressure readings written together. For example, a person with a systolic pressure of 118 and a diastolic pressure of 78 will have 118/78 written in the medical record. To be high blood pressure, the numbers must be higher when tested over a period of time. The blood pressures between normal and hypertension are called prehypertension. Prehypertension is a warning sign. The information gives you a chance to make  lifestyle changes (weight loss, more exercise) that can keep your blood pressure from going higher.  Home care  It s important to take steps to lower your blood pressure. If you are taking blood pressure medicine, the guidelines below may help you need less or no medicines in the future.    Begin a weight-loss program if you are overweight.    Cut back on the amount of salt in your diet:    Avoid high-salt foods like olives, pickles, smoked meats, and salted potato chips.    Don t add salt to your food at the table.    Use only small amounts of salt when cooking.    Begin an exercise program. Talk with your health care provider about what exercise program is best for you. It doesn t have to be difficult. Even brisk walking for 20 minutes 3 times a week is a good form of exercise.    Avoid medicines that stimulates the heart. This includes many over-the-counter cold and sinus decongestant pills and sprays, as well as diet pills. Check the warnings about hypertension on the label. Before purchasing any over-the-counter medicines or supplements, always ask the pharmacist about the product's potential interaction with your high blood pressure and your medicines.    Stimulants such as amphetamine or cocaine could be lethal for someone with hypertension. Never take these.    Limit how much caffeine you drink. Or switch to noncaffeinated beverages.    Stop smoking. If you are a long-time smoker, this can be hard. Enroll in a stop-smoking program to make it more likely that you will succeed. Talk with your provider about ways to quit.    Learn how to handle stress better. This is an important part of any program to lower blood pressure. Learn ways to relax. These include meditation, yoga, and biofeedback.    If medicines were prescribed, take them exactly as directed. Missing doses may cause your blood pressure to get out of control.    If you miss a dose or doses of your medicines, check with your healthcare provider or  pharmacist about what to do.    Consider buying an automatic blood pressure machine. Your provider may recommend a certain type. You can get one of these at most pharmacies. Measure your blood pressure twice a day, in the morning, and in the late afternoon. Keep a written record of your home blood pressure readings and take the record to your medical appointments.  Here are some additional guidelines on home blood pressure monitoring from the American Heart Association.    Don't smoke or drink coffee for 30 minutes    Go to the bathroom before the test.    Relax for 5 minutes before taking the measurement.    Sit correctly. Be sure your back is supported. Don't sit on a couch or soft chair. Uncross your feet and place them flat on the floor. Place your arm on a solid, flat surface like a table with the upper arm at heart level. Make certain the middle of the cuff is directly above the eye of the elbow. Check the monitor's instruction manual for an illustration.    Take multiple readings. When you measure, take 2 or 3 readings one minute apart and record all of the results.    Take your blood pressure at the same time every day, or as your healthcare provider recommends.    Record the date, time, and blood pressure reading.    Take the record with you to your next appointment. If your blood pressure monitor has a built-in memory, simply take the monitor with you to your next appointment.    Call your provider if you have several high readings. Don't be frightened by a single high reading, but if you get several high readings, check in with your healthcare provider.    Note: When blood pressure reaches a systolic (top number) of 180 or higher or a diastolic (bottom number) of 110 or higher, emergency medical treatment is required. Call your healthcare provider immediately.  Follow-up care  Regular visits to your own healthcare provider for blood pressure and medicine checks are an important part of your care. Make a  follow-up appointment as directed. Bring the record of your home blood pressure readings to the appointment.  When to seek medical advice  Call your healthcare provider right away if any of these occur:    Blood pressure reaches a systolic (top number) of 180 or higher or diastolic (bottom number) of 110 or higher, emergency medical treatment is required.    Chest, arm, shoulder, neck, or upper back pain    Shortness of breath    Severe headache    Throbbing or rushing sound in the ears    Nosebleed    Extreme drowsiness, confusion, or fainting    Dizziness or dizziness with spinning sensation (vertigo)    Weakness in an arm or leg or on one side of the face    Trouble speaking or seeing   Date Last Reviewed: 1/1/2017 2000-2017 [employer]. [address] [place] [address], [place] [address]. All rights reserved. This information is not intended as a substitute for professional medical care. Always follow your healthcare professional's instructions.                Follow-ups after your visit        Follow-up notes from your care team     Return in about 1 month (around 4/14/2018) for Follow up, sooner if needed..      Your next 10 appointments already scheduled     Sep 04, 2018  4:25 PM CDT   (Arrive by 3:55 PM)   Return Kidney Transplant with Lan Patino MD   [facility] ([facility] [place])    [address] [place] [address]  [address]  [facility] [address] [phone]              Future tests that were ordered for you today     Open Future Orders        Priority Expected Expires Ordered    Echocardiogram Complete Routine  3/14/2019 3/14/2018            Who to contact     Normal or non-critical lab and imaging results will be communicated to you by MyChart, letter or phone within 4 business days after the clinic has received the results. If you do not hear from us within 7 days, please contact the clinic through MyChart or phone. If you have a critical or abnormal lab  "result, we will notify you by phone as soon as possible.  Submit refill requests through [employer] or call your pharmacy and they will forward the refill request to us. Please allow 3 business days for your refill to be completed.          If you need to speak with a  for additional information , please call: [phone]             Additional Information About Your Visit        [employer] Information     [employer] lets you send messages to your doctor, view your test results, renew your prescriptions, schedule appointments and more. To sign up, go to [email or web address] . Click on \"Log in\" on the left side of the screen, which will take you to the Welcome page. Then click on \"Sign up Now\" on the right side of the page.     You will be asked to enter the access code listed below, as well as some personal information. Please follow the directions to create your username and password.     Your access code is: [email or web address]  Expires: 2018  9:04 AM     Your access code will  in 90 days. If you need help or a new code, please call your [facility] clinic or [phone].        Care EveryWhere ID     This is your Care EveryWhere ID. This could be used by other organizations to access your [facility] medical records  [id number]        Your Vitals Were     Pulse Height Pulse Oximetry BMI (Body Mass Index)          66 5' 10\" (1.778 m) 100% 31.42 kg/m2         Blood Pressure from Last 3 Encounters:   18 (!) 162/104   18 (!) 152/110   17 (!) 142/99    Weight from Last 3 Encounters:   18 219 lb (99.3 kg)   17 212 lb 6.4 oz (96.3 kg)   17 204 lb (92.5 kg)              We Performed the Following     Albumin Random Urine Quantitative with Creat Ratio     Basic metabolic panel     CBC with platelets     EKG 12-lead complete w/read - Clinics     Lipid Profile (Chol, Trig, HDL, LDL calc)          Today's Medication Changes          These changes are accurate as of 3/14/18 " 10:29 AM.  If you have any questions, ask your nurse or doctor.               Start taking these medicines.        Dose/Directions    lisinopril 20 MG tablet   Commonly known as:  PRINIVIL/ZESTRIL   Used for:  Hypertension goal BP (blood pressure) < 140/90   Started by:  Lucrecia Hutson MD        Dose:  20 mg   Take 1 tablet (20 mg) by mouth daily   Quantity:  30 tablet   Refills:  1            Where to get your medicines      These medications were sent to [facility] - [address] [facility]  [address] [facility] [place] [address]     Phone:  [phone]     lisinopril 20 MG tablet                Primary Care Provider Office Phone # Fax #    Lucrecia Hutson [phone] [address] [facility]  [place] [address]        Equal Access to Services     [facility]: Hadii aad ku hadasho Soomaali, waaxda luqadaha, qaybta kaalmada adeegyada, waxay jessein hayaan anel marino . So [facility] [phone].    ATENCIÓN: Si habla español, tiene a pollard disposición servicios gratuitos de asistencia lingüística. Llame al [phone].    We comply with applicable federal civil rights laws and [place] laws. We do not discriminate on the basis of race, color, national origin, age, disability, sex, sexual orientation, or gender identity.            Thank you!     Thank you for choosing [facility]  for your care. Our goal is always to provide you with excellent care. Hearing back from our patients is one way we can continue to improve our services. Please take a few minutes to complete the written survey that you may receive in the mail after your visit with us. Thank you!             Your Updated Medication List - Protect others around you: Learn how to safely use, store and throw away your medicines at www.disposemymeds.org.          This list is accurate as of 3/14/18 10:29 AM.  Always use your most recent med list.                   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

## 2018-03-14 NOTE — PROGRESS NOTES
SUBJECTIVE:   Miguel Angel Piña is a 42 year old male who presents to clinic today for the following health issues:      Hypertension Follow-up      Outpatient blood pressures are being checked at home.  Results are 170/110.    Low Salt Diet: no added salt      Amount of exercise or physical activity: 2-3 days/week for an average of 30-45 minutes    Problems taking medications regularly: No    Medication side effects: none    Diet: regular (no restrictions)    BP has  been elevated above goal in the recent past.     BP Readings from Last 3 Encounters:   03/14/18 (!) 162/104   03/14/18 (!) 152/110   12/08/17 (!) 142/99       Patient states dizziness started 3/13/18 in the afternoon, noticed when he would bend over. He also experienced dizziness today at work. Denies having any headaches, blurry vision, weakness or numbness of upper or lower extremities.  Denies having any chest pain, shortness of breath or dyspnea on exertion.      Patient has a known history of medullary cystic kidney disease. S/p kidney transplant in 4/2010. Has stage 3 CKD with anemia of chronic disease. Has been stable doing well. Following with a Nephrologist.     Problem list and histories reviewed & adjusted, as indicated.  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     Medullary cystic kidney disease     CARDIOVASCULAR SCREENING; LDL GOAL LESS THAN 100     Conductive hearing loss, unilateral     Aftercare following organ transplant     Immunosuppressed status (H)     Anemia in chronic renal disease     Secondary renal hyperparathyroidism (H)     Gout     Vitamin D deficiency     Cerebrovascular accident (H)     Dyslipidemia     Factor V Leiden (H)     Hypertension secondary to other renal disorders     Past Surgical History:   Procedure Laterality Date     REPAIR PATENT FORAMEN OVALE       s/p LDKT         Social History   Substance Use Topics     Smoking status: Never Smoker  "    Smokeless tobacco: Never Used     Alcohol use No     Family History   Problem Relation Age of Onset     Lung Cancer Paternal Grandmother      KIDNEY DISEASE Sister      medullary cystic kidney disease, s/p kidney transplant     Prostate Cancer No family hx of      Colon Cancer No family hx of          Current Outpatient Prescriptions   Medication Sig Dispense Refill     lisinopril (PRINIVIL/ZESTRIL) 20 MG tablet Take 1 tablet (20 mg) by mouth daily 30 tablet 1     indomethacin (INDOCIN) 25 MG capsule Take 2 tabs three times daily x2 days, then 1 tab three times daily until symptoms have resolved x48 hours 42 capsule 0     NEORAL (BRAND) 100 MG CAPSULE Take 1 capsule (100 mg) by mouth 2 times daily 60 capsule 11     CELLCEPT (BRAND) 250 MG CAPSULE Take 4 capsules (1,000 mg) by mouth 2 times daily 240 capsule 11     ASPIRIN NOT PRESCRIBED (INTENTIONAL) Please choose reason not prescribed, below  0     No Known Allergies    Reviewed and updated as needed this visit by clinical staff  Tobacco  Allergies  Meds  Med Hx  Surg Hx  Fam Hx  Soc Hx      Reviewed and updated as needed this visit by Provider         ROS:  Constitutional, HEENT, cardiovascular, pulmonary, gi and gu systems are negative, except as otherwise noted.    OBJECTIVE:     BP (!) 162/104  Pulse 66  Ht 5' 10\" (1.778 m)  Wt 219 lb (99.3 kg)  SpO2 100%  BMI 31.42 kg/m2  Body mass index is 31.42 kg/(m^2).  GENERAL: healthy, alert and no distress  RESP: lungs clear to auscultation - no rales, rhonchi or wheezes  CV: regular rate and rhythm, normal S1 S2, no S3 or S4, no murmur, click or rub, no peripheral edema and peripheral pulses strong  ABDOMEN: soft, nontender, no hepatosplenomegaly, no masses and bowel sounds normal  NEURO: Normal strength and tone, mentation intact and speech normal. CN II-XII grossly intact. No neurologic deficits.     Diagnostic Test Results:  Reviewed and discussed with patient prior to discharge.  Results for orders " placed or performed in visit on 03/14/18   CBC with platelets   Result Value Ref Range    WBC 5.6 4.0 - 11.0 10e9/L    RBC Count 3.86 (L) 4.4 - 5.9 10e12/L    Hemoglobin 11.4 (L) 13.3 - 17.7 g/dL    Hematocrit 34.9 (L) 40.0 - 53.0 %    MCV 90 78 - 100 fl    MCH 29.5 26.5 - 33.0 pg    MCHC 32.7 31.5 - 36.5 g/dL    RDW 13.0 10.0 - 15.0 %    Platelet Count 172 150 - 450 10e9/L     EKG: atrial rhythm, normal intervals, no acute ST/T changes c/w ischemia, no LVH by voltage criteria, nonspecific ST-T changes. No previous tracings to compare      ASSESSMENT/PLAN:   Miguel Angel was seen today for hypertension.    Diagnoses and all orders for this visit:    Dizziness, intermittent  -     Orthostatics done in the clinic were negative.   -     CBC with platelets  -     Basic metabolic panel  -     EKG 12-lead complete w/read - Clinics  -     Encouraged to increase fluid intake.    Hypertension goal BP (blood pressure) < 140/90, uncontrolled  -     Albumin Random Urine Quantitative with Creat Ratio  -     Basic metabolic panel  -     EKG 12-lead complete w/read - Clinics  -    Start:  lisinopril (PRINIVIL/ZESTRIL) 20 MG tablet; Take 1 tablet (20 mg) by mouth daily  -     Echocardiogram Complete; Future    Medullary cystic kidney disease. Kidney replaced by transplant, stable  -     CBC with platelets  -     Basic metabolic panel  -     Following with a Nephrologist    Lipid screening  -     Lipid Profile (Chol, Trig, HDL, LDL calc)      BP recheck in 2 weeks (Ancillary)    Follow up with me in a month, sooner if needed.       Lucrecia Hutson MD  [facility]

## 2018-03-19 ENCOUNTER — RADIANT APPOINTMENT (OUTPATIENT)
Dept: CARDIOLOGY | Facility: CLINIC | Age: 43
End: 2018-03-19
Attending: FAMILY MEDICINE
Payer: COMMERCIAL

## 2018-03-19 DIAGNOSIS — I10 HYPERTENSION GOAL BP (BLOOD PRESSURE) < 140/90: ICD-10-CM

## 2018-03-19 PROCEDURE — 93306 TTE W/DOPPLER COMPLETE: CPT | Mod: GC | Performed by: INTERNAL MEDICINE

## 2018-03-19 NOTE — NURSING NOTE
Patient presents today for resting echo ordered by MD.   Echo Tech provided patient education.    Echo technician completed resting echo. Data transferred to [facility] for final interpretation through [employer].   Patient education provided about cardiology interpretation and primary provider will be notified of results.    Denisse Matthews RDCS

## 2018-03-19 NOTE — LETTER
2018      Carlos Rodrigues  [address] [facility] [address]        Dear ,    We are writing to inform you of your test results.    I have tried to contact you via telephone discuss results. No answer,  Left message to call back.   Your recent Echo revealed overall heart function is normal.   Incidental finding of an enlarged ascending aorta, measuring about 4.3 cm. We get concerned when it's > 5 cm.   Recommend Cardiology consult. Referral completed.  Your BP is high and I would like it under better control, < 130/80. Will need to add another BP agent if BP is not controlled.   BP Readings from Last 3 Encounters:  18 : (!) 162/104  18 : (!) 152/110  17 : (!) 142/99  I did send in a Rx, but would like you to come in sooner for a hypertension follow up visit.    Please schedule visit sooner than .     Resulted Orders   Echocardiogram Complete    Narrative    [id number]  [facility]  [id number]  [id number]^CARROLL^APOLINAR^           [facility], Echocardiography Laboratory  [address] [place] [id number]        Name: CARLOS RODRIGUES  MRN: [id number]  : 1975  Study Date: 2018 03:55 PM  Age: 42 yrs  Gender: Male  Patient Location: [facility]  Reason For Study: Hypertension goal BP (blood pressure) < 140/90  Ordering Physician: APOLINAR HODGES  Referring Physician: APOLINAR HODGES  Performed By: Denisse Matthews RDCS     BSA: 2.2 m2  Height: 70 in  Weight: 219 lb  BP: 162/104 mmHg  _____________________________________________________________________________  __        Procedure  Echocardiogram with two-dimensional, color and spectral Doppler performed.  _____________________________________________________________________________  __        Interpretation Summary        Global and regional left ventricular function is normal with an EF of 55-60%.  The right ventricle is normal size. Global right ventricular function  is  normal.  Pulmonary artery systolic pressure is normal.  The inferior vena cava was normal in size with preserved respiratory  variability. Estimated mean right atrial pressure is 3 mmHg.  Dilated proximal ascending aorta measuring 4.3 cm (indexed at 1.9 cm/m2, Z-  score +3.25).  Dilated sinuses of Valsalva measured at 4.1 cm (indexed at 1.8 cm/m2, Z-score  +2.3)  No pericardial effusion is present.     Previous study not available for comparison.  _____________________________________________________________________________  __        Left Ventricle  Left ventricular size is normal. Global and regional left ventricular function  is normal with an EF of 55-60%. Relative wall thickness is increased  consistent with concentric remodeling. Left ventricular diastolic function is  indeterminate.     Right Ventricle  The right ventricle is normal size. Global right ventricular function is  normal.     Atria  Mild left atrial enlargement is present. Mild right atrial enlargement is  present. The atrial septum is intact as assessed by color Doppler .     Mitral Valve  Trace to mild mitral insufficiency is present.        Aortic Valve  The aortic valve is tricuspid. Mild aortic insufficiency is present.     Tricuspid Valve  The tricuspid valve is normal. Mild tricuspid insufficiency is present. The  right ventricular systolic pressure is approximated at 22.3 mmHg plus the  right atrial pressure. Pulmonary artery systolic pressure is normal.     Pulmonic Valve  The pulmonic valve is normal. Mild pulmonic insufficiency is present.     Vessels  Dilated proximal ascending aorta measuring 4.3 cm (indexed at 1.9 cm/m2, Z-  score +3.25).  Dilated sinuses of Valsalva measured at 4.1 cm (indexed at 1.8 cm/m2, Z-score  +2.3). The inferior vena cava was normal in size with preserved respiratory  variability. Estimated mean right atrial pressure is 3 mmHg.     Pericardium  No pericardial effusion is present.        Compared to  Previous Study  Previous study not available for comparison.     Attestation  I have personally viewed the imaging and agree with the interpretation and  report as documented by the fellow, Girish Yip, and/or edited by me.  _____________________________________________________________________________  __     MMode/2D Measurements & Calculations  IVSd: 1.2 cm  LVIDd: 4.4 cm  LVIDs: 2.7 cm  LVPWd: 1.2 cm  FS: 39.8 %  LV mass(C)d: 193.4 grams  LV mass(C)dI: 89.1 grams/m2  Ao root diam: 4.0 cm  LA dimension: 4.6 cm  asc Aorta Diam: 4.3 cm  LA/Ao: 1.1  LVOT diam: 2.5 cm  LVOT area: 4.9 cm2  LA Volume (BP): 82.0 ml     LA Volume Index (BP): 37.8 ml/m2  RWT: 0.54        Doppler Measurements & Calculations  MV E max racquel: 87.9 cm/sec  MV A max racquel: 68.3 cm/sec  MV E/A: 1.3  MV dec time: 0.19 sec  AI P1/2t: 555.5 msec  PA acc time: 0.15 sec  TR max racquel: 236.2 cm/sec  TR max P.3 mmHg  E/E' av.6  Lateral E/e': 6.2  Medial E/e': 13.1              _____________________________________________________________________________  __        Report approved by: Shelley Palumbo 2018 05:07 PM          If you have any questions or concerns, please call the clinic at the number listed above.       Sincerely,        Lucrecia Hutson MD/hlo

## 2018-04-04 DIAGNOSIS — I77.89 ASCENDING AORTA ENLARGEMENT (H): Primary | ICD-10-CM

## 2018-04-04 DIAGNOSIS — I10 HYPERTENSION GOAL BP (BLOOD PRESSURE) < 140/90: ICD-10-CM

## 2018-04-04 RX ORDER — METOPROLOL SUCCINATE 25 MG/1
25 TABLET, EXTENDED RELEASE ORAL DAILY
Qty: 30 TABLET | Refills: 1 | Status: SHIPPED | OUTPATIENT
Start: 2018-04-04 | End: 2018-09-04

## 2018-08-21 ENCOUNTER — TELEPHONE (OUTPATIENT)
Dept: FAMILY MEDICINE | Facility: CLINIC | Age: 43
End: 2018-08-21

## 2018-08-21 ENCOUNTER — TELEPHONE (OUTPATIENT)
Dept: NEPHROLOGY | Facility: CLINIC | Age: 43
End: 2018-08-21

## 2018-08-21 DIAGNOSIS — M79.89 SWELLING OF RIGHT HAND: ICD-10-CM

## 2018-08-21 DIAGNOSIS — M79.641 RIGHT HAND PAIN: ICD-10-CM

## 2018-08-21 RX ORDER — INDOMETHACIN 25 MG/1
CAPSULE ORAL
Qty: 42 CAPSULE | Refills: 0 | Status: CANCELLED | OUTPATIENT
Start: 2018-08-21

## 2018-08-21 NOTE — TELEPHONE ENCOUNTER
Patient calling he took the last gout pill he had just now, is having a gout flare up. Requested a refill from his pharmacy for Indomethacin. Patient states is having pain in L foot hard to walk. Declined appt wants refill called in.

## 2018-08-21 NOTE — TELEPHONE ENCOUNTER
See note below, declined appt, ok to refill x 1 in pcp's absence.  Pended for approval with reminder appt due.  Tracie Teixeira RN

## 2018-08-21 NOTE — TELEPHONE ENCOUNTER
Left voicemail for patient to call back (follow up from last transplant appointment).    Lore Starks RN

## 2018-08-21 NOTE — TELEPHONE ENCOUNTER
Spoke with patient and gave below information and instructions, he will call his Nephrologist to discuss.  If any problems will call clinic back.  Tracie Teixeira RN

## 2018-08-21 NOTE — TELEPHONE ENCOUNTER
I'm not comfortable filling non-steroidal anti-inflammatory drug given last Cr 2.6 in kidney transplant patient.  If he's had updates since 3/2018, may consider.  Otherwise could offer colchicine or steroid course.  Would want to discuss risks and benefits of either.  Appropriate to ask opinion of his nephrologist as well.

## 2018-08-22 NOTE — TELEPHONE ENCOUNTER
Left message on voice mail for patient to call clinic. [phone]  Tracie Teixeira RN

## 2018-08-23 NOTE — TELEPHONE ENCOUNTER
Left message on voice mail for patient to call clinic. [phone]  Tracie Teixeira RN

## 2018-09-04 ENCOUNTER — OFFICE VISIT (OUTPATIENT)
Dept: NEPHROLOGY | Facility: CLINIC | Age: 43
End: 2018-09-04
Attending: INTERNAL MEDICINE
Payer: COMMERCIAL

## 2018-09-04 VITALS
OXYGEN SATURATION: 98 % | SYSTOLIC BLOOD PRESSURE: 162 MMHG | WEIGHT: 218.6 LBS | TEMPERATURE: 98.3 F | DIASTOLIC BLOOD PRESSURE: 110 MMHG | HEART RATE: 67 BPM | BODY MASS INDEX: 31.37 KG/M2

## 2018-09-04 DIAGNOSIS — N18.4 ANEMIA IN STAGE 4 CHRONIC KIDNEY DISEASE (H): ICD-10-CM

## 2018-09-04 DIAGNOSIS — Z94.0 KIDNEY TRANSPLANTED: Primary | ICD-10-CM

## 2018-09-04 DIAGNOSIS — N25.81 SECONDARY RENAL HYPERPARATHYROIDISM (H): ICD-10-CM

## 2018-09-04 DIAGNOSIS — Z48.298 AFTERCARE FOLLOWING ORGAN TRANSPLANT: ICD-10-CM

## 2018-09-04 DIAGNOSIS — E55.9 VITAMIN D DEFICIENCY: ICD-10-CM

## 2018-09-04 DIAGNOSIS — Z94.0 KIDNEY REPLACED BY TRANSPLANT: Primary | ICD-10-CM

## 2018-09-04 DIAGNOSIS — Z94.0 KIDNEY REPLACED BY TRANSPLANT: ICD-10-CM

## 2018-09-04 DIAGNOSIS — M10.00 IDIOPATHIC GOUT, UNSPECIFIED CHRONICITY, UNSPECIFIED SITE: ICD-10-CM

## 2018-09-04 DIAGNOSIS — Z94.0 KIDNEY TRANSPLANTED: ICD-10-CM

## 2018-09-04 DIAGNOSIS — D63.1 ANEMIA IN STAGE 4 CHRONIC KIDNEY DISEASE (H): ICD-10-CM

## 2018-09-04 DIAGNOSIS — D84.9 IMMUNOSUPPRESSED STATUS (H): ICD-10-CM

## 2018-09-04 LAB
ALBUMIN SERPL-MCNC: 3.6 G/DL (ref 3.4–5)
ANION GAP SERPL CALCULATED.3IONS-SCNC: 8 MMOL/L (ref 3–14)
BUN SERPL-MCNC: 52 MG/DL (ref 7–30)
CALCIUM SERPL-MCNC: 8.7 MG/DL (ref 8.5–10.1)
CHLORIDE SERPL-SCNC: 104 MMOL/L (ref 94–109)
CO2 SERPL-SCNC: 22 MMOL/L (ref 20–32)
CREAT SERPL-MCNC: 2.82 MG/DL (ref 0.66–1.25)
CREAT UR-MCNC: 87 MG/DL
ERYTHROCYTE [DISTWIDTH] IN BLOOD BY AUTOMATED COUNT: 12.8 % (ref 10–15)
FERRITIN SERPL-MCNC: 165 NG/ML (ref 26–388)
GFR SERPL CREATININE-BSD FRML MDRD: 25 ML/MIN/1.7M2
GLUCOSE SERPL-MCNC: 79 MG/DL (ref 70–99)
HCT VFR BLD AUTO: 33.8 % (ref 40–53)
HGB BLD-MCNC: 11 G/DL (ref 13.3–17.7)
IRON SATN MFR SERPL: 28 % (ref 15–46)
IRON SERPL-MCNC: 76 UG/DL (ref 35–180)
MCH RBC QN AUTO: 28.8 PG (ref 26.5–33)
MCHC RBC AUTO-ENTMCNC: 32.5 G/DL (ref 31.5–36.5)
MCV RBC AUTO: 89 FL (ref 78–100)
PHOSPHATE SERPL-MCNC: 3.5 MG/DL (ref 2.5–4.5)
PLATELET # BLD AUTO: 160 10E9/L (ref 150–450)
POTASSIUM SERPL-SCNC: 4.9 MMOL/L (ref 3.4–5.3)
PROT UR-MCNC: 0.55 G/L
PROT/CREAT 24H UR: 0.62 G/G CR (ref 0–0.2)
PTH-INTACT SERPL-MCNC: 347 PG/ML (ref 18–80)
RBC # BLD AUTO: 3.82 10E12/L (ref 4.4–5.9)
SODIUM SERPL-SCNC: 135 MMOL/L (ref 133–144)
TIBC SERPL-MCNC: 269 UG/DL (ref 240–430)
URATE SERPL-MCNC: 9.9 MG/DL (ref 3.5–7.2)
WBC # BLD AUTO: 5.3 10E9/L (ref 4–11)

## 2018-09-04 PROCEDURE — 83540 ASSAY OF IRON: CPT | Performed by: INTERNAL MEDICINE

## 2018-09-04 PROCEDURE — 84550 ASSAY OF BLOOD/URIC ACID: CPT | Performed by: INTERNAL MEDICINE

## 2018-09-04 PROCEDURE — 84156 ASSAY OF PROTEIN URINE: CPT | Performed by: INTERNAL MEDICINE

## 2018-09-04 PROCEDURE — 36415 COLL VENOUS BLD VENIPUNCTURE: CPT | Performed by: INTERNAL MEDICINE

## 2018-09-04 PROCEDURE — G0463 HOSPITAL OUTPT CLINIC VISIT: HCPCS | Mod: ZF

## 2018-09-04 PROCEDURE — 83970 ASSAY OF PARATHORMONE: CPT | Performed by: INTERNAL MEDICINE

## 2018-09-04 PROCEDURE — 80069 RENAL FUNCTION PANEL: CPT | Performed by: INTERNAL MEDICINE

## 2018-09-04 PROCEDURE — 82306 VITAMIN D 25 HYDROXY: CPT | Performed by: INTERNAL MEDICINE

## 2018-09-04 PROCEDURE — 83550 IRON BINDING TEST: CPT | Performed by: INTERNAL MEDICINE

## 2018-09-04 PROCEDURE — 80158 DRUG ASSAY CYCLOSPORINE: CPT | Performed by: INTERNAL MEDICINE

## 2018-09-04 PROCEDURE — 82728 ASSAY OF FERRITIN: CPT | Performed by: INTERNAL MEDICINE

## 2018-09-04 PROCEDURE — 85027 COMPLETE CBC AUTOMATED: CPT | Performed by: INTERNAL MEDICINE

## 2018-09-04 ASSESSMENT — PAIN SCALES - GENERAL: PAINLEVEL: NO PAIN (0)

## 2018-09-04 NOTE — LETTER
9/4/2018      RE: Miguel Angel Piña  [address] [facility] [address]       Assessment & Plan   # DDKT: basline Cr ~ 2.3-2.6; Increased slightly with today's labs   - Proteinuria: Mild   - Latest DSA: No Date of DSA last checked: 9/2017   - BK Viremia: Not checked recently.   - Kidney Tx Biopsy: 7/25/11; unremarkable with no evidence of acute rejection.    # Immunosuppression: Cyclosporine (goal  ) and Mycophenolate mofetil (goal  1-3.5)   - Changes: No    # Prophylaxis:   - PJP: None    # Hypertension: Inadequate control; Goal BP: 140/90   - Changes: Yes - will start amlodipine 5 mg nightly.  With proteinuria, would also consider addition of ARB in the future.    # Anemia in chronic renal disease: Hgb: Stable   - Iron studies: Replete    # Mineral Bone Disorder:   - Secondary renal hyperparathyroidism; PTH level is:  moderately elevated  - Vitamin D; level is:  low    # Gout: a few recent flares.  Recommend patient follow up with PCP and/or be referred to Rheumatology.  With very elevated uric acid, would recommend he start allopurinol, but starting this would likely precipitate a flare and he will probably require colchicine (dose reduced due to low GFR) or prednisone for prophylaxis for a few months.  Will defer to PCP to manage.    # Skin Cancer Risk:    - Discussed sun protection and recommend regular follow up with Dermatology    # Medical noncompliance: patient notes missing some medications, but rarely, as well as having labs only twice a year.  Discussed importance of checking labs regularly as recommended, taking medications as prescribed and attending scheduled medical appointments.  Reviewed that patient would not be a transplant candidate again if he continues to be noncompliant.    Return visit: 6 months.    # Transplant History:  Etiology of kidney failure: medullary cystic kidney disease  Tx: DDKT  Transplant: 4/25/2010 (Kidney)  Donor Type:  Donor Class:   Crossmatch at time of Tx:  negative  DSA at time of Tx: No  Significant changes in immunosuppression: None  Significant Complications: None    Transplant Office Phone Number: [phone]    Assessment and plan was discussed with patient and he voiced his understanding and agreement.    Reason for Visit:  Mr. Piña is here for routine follow up.    HPI:  Mr. 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         Transplant Hx:       Tx: DDKT  Date: 4/25/10       Present Maintenance IS: Cyclosporine and Mycophenolate mofetil       Baseline Creatinine: 2.3-2.6       Recent DSA: No  Date last checked: 7/2011       Biopsy:     Mr. Piña reports feeling good overall with minimal medical complaints.  Since last clinic visit, patient reports no hospitalizations or new medical complaints and has been doing well overall.  His energy level is good and remains normal.  He is active, mostly at work, and really gets minimal exercise.  Denies any chest pain or shortness of breath with exertion.  Appetite is good and he has gained about 5-10 lbs.  No nausea, vomiting or diarrhea.  No fever, sweats or chills.  No leg swelling.  Patient does report gout flares about 6 months with last one about 2 weeks ago in his left foot.  No symptoms now.    Home BP: Not checked      ROS:  A comprehensive review of systems was obtained and negative, except as noted in the HPI or PMH.    Active Medical Problems:  Patient Active Problem List   Diagnosis     Kidney replaced by transplant     Medullary cystic kidney disease     CARDIOVASCULAR SCREENING; LDL GOAL LESS THAN 100     Conductive hearing loss, unilateral     Aftercare following organ transplant     Immunosuppressed status (H)     Anemia in chronic renal disease     Secondary renal hyperparathyroidism (H)     Gout     Vitamin D deficiency     Cerebrovascular accident (H)     Dyslipidemia     Factor V Leiden (H)     Hypertension secondary to other  renal disorders     Personal Hx:   Social History     Social History     Marital status: Single     Spouse name: N/A     Number of children: 0     Years of education: N/A     Occupational History     Not on file.     Social History Main Topics     Smoking status: Never Smoker     Smokeless tobacco: Never Used     Alcohol use No     Drug use: No     Sexual activity: Yes     Partners: Female     Other Topics Concern     Not on file     Social History Narrative     Allergies:  No Known Allergies  Medications:  Prior to Admission medications    Medication Sig Start Date End Date Taking? Authorizing Provider   NEORAL (BRAND) 100 MG CAPSULE Take 1 capsule (100 mg) by mouth 2 times daily 9/5/17  Yes Lan Patino MD   CELLCEPT (BRAND) 250 MG CAPSULE Take 4 capsules (1,000 mg) by mouth 2 times daily 9/5/17  Yes Lan Patino MD   sulfamethoxazole-trimethoprim (BACTRIM/SEPTRA) 400-80 MG per tablet Take 1 tablet by mouth three times a week (Mondays, Wednesdays, Fridays) 8/21/17  Yes Lan Patino MD     Vitals:  BP (!) 162/110 (BP Location: Right arm)  Pulse 67  Temp 98.3  F (36.8  C) (Oral)  Wt 99.2 kg (218 lb 9.6 oz)  SpO2 98%  BMI 31.37 kg/m2    Exam:   GENERAL APPEARANCE: alert and no distress  HENT: mouth without ulcers or lesions  LYMPHATICS: no cervical or supraclavicular nodes  RESP: lungs clear to auscultation - no rales, rhonchi or wheezes  CV: regular rhythm, normal rate, no rub, no murmur  EDEMA: no LE edema bilaterally  ABDOMEN: soft, nondistended, nontender, bowel sounds normal  MS: extremities normal - no gross deformities noted, no evidence of inflammation in joints, no muscle tenderness  SKIN: no rash, actinic keratoses  TX KIDNEY: normal    Results:     Recent Results (from the past 336 hour(s))   CBC with platelets    Collection Time: 09/04/18  5:03 PM   Result Value Ref Range    WBC 5.3 4.0 - 11.0 10e9/L    RBC Count 3.82 (L) 4.4 - 5.9 10e12/L    Hemoglobin 11.0 (L) 13.3 -  17.7 g/dL    Hematocrit 33.8 (L) 40.0 - 53.0 %    MCV 89 78 - 100 fl    MCH 28.8 26.5 - 33.0 pg    MCHC 32.5 31.5 - 36.5 g/dL    RDW 12.8 10.0 - 15.0 %    Platelet Count 160 150 - 450 10e9/L   Cyclosporine    Collection Time: 09/04/18  5:03 PM   Result Value Ref Range    Cyclosporine Last Dose 0620 9/4/18     Cyclosporine Level 100 50 - 400 ug/L   Parathyroid Hormone Intact    Collection Time: 09/04/18  5:03 PM   Result Value Ref Range    Parathyroid Hormone Intact 347 (H) 18 - 80 pg/mL   Vitamin D Deficiency    Collection Time: 09/04/18  5:03 PM   Result Value Ref Range    Vitamin D Deficiency screening 29 20 - 75 ug/L   Uric acid    Collection Time: 09/04/18  5:03 PM   Result Value Ref Range    Uric Acid 9.9 (H) 3.5 - 7.2 mg/dL   Renal panel    Collection Time: 09/04/18  5:03 PM   Result Value Ref Range    Sodium 135 133 - 144 mmol/L    Potassium 4.9 3.4 - 5.3 mmol/L    Chloride 104 94 - 109 mmol/L    Carbon Dioxide 22 20 - 32 mmol/L    Anion Gap 8 3 - 14 mmol/L    Glucose 79 70 - 99 mg/dL    Urea Nitrogen 52 (H) 7 - 30 mg/dL    Creatinine 2.82 (H) 0.66 - 1.25 mg/dL    GFR Estimate 25 (L) >60 mL/min/1.7m2    GFR Estimate If Black 30 (L) >60 mL/min/1.7m2    Calcium 8.7 8.5 - 10.1 mg/dL    Phosphorus 3.5 2.5 - 4.5 mg/dL    Albumin 3.6 3.4 - 5.0 g/dL   IRON AND IRON BINDING CAPACITY    Collection Time: 09/04/18  5:03 PM   Result Value Ref Range    Iron 76 35 - 180 ug/dL    Iron Binding Cap 269 240 - 430 ug/dL    Iron Saturation Index 28 15 - 46 %   FERRITIN    Collection Time: 09/04/18  5:03 PM   Result Value Ref Range    Ferritin 165 26 - 388 ng/mL   Protein  random urine with Creat Ratio    Collection Time: 09/04/18  5:35 PM   Result Value Ref Range    Protein Random Urine 0.55 g/L    Protein Total Urine g/gr Creatinine 0.62 (H) 0 - 0.2 g/g Cr   Creatinine urine calculation only    Collection Time: 09/04/18  5:35 PM   Result Value Ref Range    Creatinine Urine 87 mg/dL       Lan Patino MD

## 2018-09-04 NOTE — MR AVS SNAPSHOT
After Visit Summary   9/4/2018    Miguel Angel Piña    MRN: [id number]           Patient Information     Date Of Birth          1975        Visit Information        Provider Department      9/4/2018 4:25 PM Recipient, [facility]        Today's Diagnoses     Kidney replaced by transplant    -  1    Anemia in stage 4 chronic kidney disease (H)        Secondary renal hyperparathyroidism (H)        Idiopathic gout, unspecified chronicity, unspecified site        Immunosuppressed status (H)        Vitamin D deficiency        Aftercare following organ transplant           Follow-ups after your visit        Follow-up notes from your care team     Return in about 6 months (around 3/4/2019).      Your next 10 appointments already scheduled     Mar 05, 2019  4:35 PM CST   (Arrive by 4:05 PM)   Return Kidney Transplant with  Kidney/Pancreas Recipient   [facility] ([facility])    [address] [place] [id number] [phone]              Future tests that were ordered for you today     Open Standing Orders        Priority Remaining Interval Expires Ordered    CBC with platelets Routine 4/4 Every 3 Months 9/4/2019 9/4/2018    Basic metabolic panel Routine 4/4 Every 3 Months 9/4/2019 9/4/2018    Protein  random urine with Creat Ratio Routine 4/4 Every 3 Months 9/4/2019 9/4/2018    Cyclosporine Routine 4/4 Every 3 Months 9/4/2019 9/4/2018          Open Future Orders        Priority Expected Expires Ordered    CBC with platelets Routine  10/4/2018 9/4/2018    Protein  random urine with Creat Ratio Routine  10/4/2018 9/4/2018    Parathyroid Hormone Intact Routine  10/4/2018 9/4/2018    Vitamin D Deficiency Routine  10/4/2018 9/4/2018    Uric acid Routine  10/4/2018 9/4/2018    Renal panel Routine  10/4/2018 9/4/2018    IRON Routine  10/4/2018 9/4/2018    IRON AND IRON BINDING CAPACITY Routine  10/4/2018 9/4/2018    FERRITIN  "Routine  10/4/2018 2018            Who to contact     If you have questions or need follow up information about today's clinic visit or your schedule please contact [facility] directly at [phone].  Normal or non-critical lab and imaging results will be communicated to you by MyChart, letter or phone within 4 business days after the clinic has received the results. If you do not hear from us within 7 days, please contact the clinic through [employer] or phone. If you have a critical or abnormal lab result, we will notify you by phone as soon as possible.  Submit refill requests through [employer] or call your pharmacy and they will forward the refill request to us. Please allow 3 business days for your refill to be completed.          Additional Information About Your Visit        [employer] Information     [employer] lets you send messages to your doctor, view your test results, renew your prescriptions, schedule appointments and more. To sign up, go to [email or web address] . Click on \"Log in\" on the left side of the screen, which will take you to the Welcome page. Then click on \"Sign up Now\" on the right side of the page.     You will be asked to enter the access code listed below, as well as some personal information. Please follow the directions to create your username and password.     Your access code is: [email or web address]  Expires: 2018  6:30 AM     Your access code will  in 90 days. If you need help or a new code, please call your [place] clinic or [phone].        Care EveryWhere ID     This is your Care EveryWhere ID. This could be used by other organizations to access your [place] medical records  [id number]        Your Vitals Were     Pulse Temperature Pulse Oximetry BMI (Body Mass Index)          67 98.3  F (36.8  C) (Oral) 98% 31.37 kg/m2         Blood Pressure from Last 3 Encounters:   18 (!) 162/110   18 (!) 162/104   18 (!) 152/110    Weight from Last 3 " Encounters:   09/04/18 99.2 kg (218 lb 9.6 oz)   03/14/18 99.3 kg (219 lb)   12/08/17 96.3 kg (212 lb 6.4 oz)                 Today's Medication Changes          These changes are accurate as of 9/4/18  4:42 PM.  If you have any questions, ask your nurse or doctor.               Stop taking these medicines if you haven't already. Please contact your care team if you have questions.     indomethacin 25 MG capsule   Commonly known as:  INDOCIN   Stopped by:  Recipient, [facility]           lisinopril 20 MG tablet   Commonly known as:  PRINIVIL/ZESTRIL   Stopped by:  Recipient, [facility]           metoprolol succinate 25 MG 24 hr tablet   Commonly known as:  TOPROL-XL   Stopped by:  Recipient, [facility]                    Primary Care Provider Office Phone # Fax #    Lucrecia Hutson [phone]       [address] [address]  [place] [address]        Equal Access to Services     [facility]: Hadii aad ku hadasho Soomaali, waaxda luqadaha, qaybta kaalmada adeegyada, waxay idiin hayaan anel marino . So [facility] [phone].    ATENCIÓN: Si habla español, tiene a pollard disposición servicios gratuitos de asistencia lingüística. Llame al [phone].    We comply with applicable federal civil rights laws and [place] laws. We do not discriminate on the basis of race, color, national origin, age, disability, sex, sexual orientation, or gender identity.            Thank you!     Thank you for choosing [facility]  for your care. Our goal is always to provide you with excellent care. Hearing back from our patients is one way we can continue to improve our services. Please take a few minutes to complete the written survey that you may receive in the mail after your visit with us. Thank you!             Your Updated Medication List - Protect others around you: Learn how to safely use, store and throw away your medicines at www.disposemymeds.org.          This list is  accurate as of 9/4/18  4:42 PM.  Always use your most recent med list.                   Brand Name Dispense Instructions for use Diagnosis    cycloSPORINE modified 100 MG capsule     60 capsule    Take 1 capsule (100 mg) by mouth 2 times daily    Kidney replaced by transplant       mycophenolate 250 MG capsule     240 capsule    Take 4 capsules (1,000 mg) by mouth 2 times daily    Kidney replaced by transplant

## 2018-09-04 NOTE — NURSING NOTE
Chief Complaint   Patient presents with     RECHECK     Annaual Follow uP kidney Tx 2010     BP (!) 162/110 (BP Location: Right arm)  Pulse 67  Temp 98.3  F (36.8  C) (Oral)  Wt 99.2 kg (218 lb 9.6 oz)  SpO2 98%  BMI 31.37 kg/m2   April Rick

## 2018-09-05 ENCOUNTER — TELEPHONE (OUTPATIENT)
Dept: TRANSPLANT | Facility: CLINIC | Age: 43
End: 2018-09-05

## 2018-09-05 DIAGNOSIS — R03.0 ELEVATED BLOOD PRESSURE READING: ICD-10-CM

## 2018-09-05 DIAGNOSIS — Z94.0 KIDNEY TRANSPLANTED: Primary | ICD-10-CM

## 2018-09-05 LAB
CYCLOSPORINE BLD LC/MS/MS-MCNC: 100 UG/L (ref 50–400)
DEPRECATED CALCIDIOL+CALCIFEROL SERPL-MC: 29 UG/L (ref 20–75)
TME LAST DOSE: NORMAL H

## 2018-09-05 RX ORDER — AMLODIPINE BESYLATE 5 MG/1
5 TABLET ORAL DAILY
Qty: 30 TABLET | Refills: 11 | Status: SHIPPED | OUTPATIENT
Start: 2018-09-05 | End: 2019-03-05

## 2018-09-05 NOTE — TELEPHONE ENCOUNTER
ISSUE:  Pt seen in clinic yesterday with Dr. Patino.  Pt to have transplant labs drawn prior to deciding what BP medication to start.    Lab results reviewed with Dr Patino today.  Per Dr Patino, pt to start amlodipine 5 mg at night.  Other lab results need no change at this time per Dr Patino.    OUTCOME:   Call placed to pt and reviewed Dr Patino's plan as listed above.  Pt aware he will also need updated drug level in 1-2 weeks with good 12 hour trough.  Pt v/u.   RX placed at pt preferred pharmacy and he will start tonight.  Pt aware he is to check BP at home daily and nephrology nurses will reach out to him in regards to BP.      Pt questions answered, pt v/u.

## 2018-09-11 ENCOUNTER — TELEPHONE (OUTPATIENT)
Dept: NEPHROLOGY | Facility: CLINIC | Age: 43
End: 2018-09-11

## 2018-09-11 DIAGNOSIS — E55.9 VITAMIN D DEFICIENCY: Primary | ICD-10-CM

## 2018-09-11 NOTE — PROGRESS NOTES
Assessment & Plan   # DDKT: basline Cr ~ 2.3-2.6; Increased slightly with today's labs   - Proteinuria: Mild   - Latest DSA: No Date of DSA last checked: 9/2017   - BK Viremia: Not checked recently.   - Kidney Tx Biopsy: 7/25/11; unremarkable with no evidence of acute rejection.    # Immunosuppression: Cyclosporine (goal  ) and Mycophenolate mofetil (goal  1-3.5)   - Changes: No    # Prophylaxis:   - PJP: None    # Hypertension: Inadequate control; Goal BP: 140/90   - Changes: Yes - will start amlodipine 5 mg nightly.  With proteinuria, would also consider addition of ARB in the future.    # Anemia in chronic renal disease: Hgb: Stable   - Iron studies: Replete    # Mineral Bone Disorder:   - Secondary renal hyperparathyroidism; PTH level is:  moderately elevated  - Vitamin D; level is:  low    # Gout: a few recent flares.  Recommend patient follow up with PCP and/or be referred to Rheumatology.  With very elevated uric acid, would recommend he start allopurinol, but starting this would likely precipitate a flare and he will probably require colchicine (dose reduced due to low GFR) or prednisone for prophylaxis for a few months.  Will defer to PCP to manage.    # Skin Cancer Risk:    - Discussed sun protection and recommend regular follow up with Dermatology    # Medical noncompliance: patient notes missing some medications, but rarely, as well as having labs only twice a year.  Discussed importance of checking labs regularly as recommended, taking medications as prescribed and attending scheduled medical appointments.  Reviewed that patient would not be a transplant candidate again if he continues to be noncompliant.    Return visit: 6 months.    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    Reason for Visit:  Mr. Piña is here for routine follow up.    HPI:  Mr. 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         Transplant Hx:       Tx: DDKT  Date: 4/25/10       Present Maintenance IS: Cyclosporine and Mycophenolate mofetil       Baseline Creatinine: 2.3-2.6       Recent DSA: No  Date last checked: 7/2011       Biopsy:     Mr. Piña reports feeling good overall with minimal medical complaints.  Since last clinic visit, patient reports no hospitalizations or new medical complaints and has been doing well overall.  His energy level is good and remains normal.  He is active, mostly at work, and really gets minimal exercise.  Denies any chest pain or shortness of breath with exertion.  Appetite is good and he has gained about 5-10 lbs.  No nausea, vomiting or diarrhea.  No fever, sweats or chills.  No leg swelling.  Patient does report gout flares about 6 months with last one about 2 weeks ago in his left foot.  No symptoms now.    Home BP: Not checked      ROS:  A comprehensive review of systems was obtained and negative, except as noted in the HPI or PMH.    Active Medical Problems:  Patient Active Problem List   Diagnosis     Kidney replaced by transplant     Medullary cystic kidney disease     CARDIOVASCULAR SCREENING; LDL GOAL LESS THAN 100     Conductive hearing loss, unilateral     Aftercare following organ transplant     Immunosuppressed status (H)     Anemia in chronic renal disease     Secondary renal hyperparathyroidism (H)     Gout     Vitamin D deficiency     Cerebrovascular accident (H)     Dyslipidemia     Factor V Leiden (H)     Hypertension secondary to other renal disorders     Personal Hx:   Social History     Social History      Marital status: Single     Spouse name: N/A     Number of children: 0     Years of education: N/A     Occupational History     Not on file.     Social History Main Topics     Smoking status: Never Smoker     Smokeless tobacco: Never Used     Alcohol use No     Drug use: No     Sexual activity: Yes     Partners: Female     Other Topics Concern     Not on file     Social History Narrative     Allergies:  No Known Allergies  Medications:  Prior to Admission medications    Medication Sig Start Date End Date Taking? Authorizing Provider   NEORAL (BRAND) 100 MG CAPSULE Take 1 capsule (100 mg) by mouth 2 times daily 9/5/17  Yes Lan Patino MD   CELLCEPT (BRAND) 250 MG CAPSULE Take 4 capsules (1,000 mg) by mouth 2 times daily 9/5/17  Yes Lan Patino MD   sulfamethoxazole-trimethoprim (BACTRIM/SEPTRA) 400-80 MG per tablet Take 1 tablet by mouth three times a week (Mondays, Wednesdays, Fridays) 8/21/17  Yes Lan Patino MD     Vitals:  BP (!) 162/110 (BP Location: Right arm)  Pulse 67  Temp 98.3  F (36.8  C) (Oral)  Wt 99.2 kg (218 lb 9.6 oz)  SpO2 98%  BMI 31.37 kg/m2    Exam:   GENERAL APPEARANCE: alert and no distress  HENT: mouth without ulcers or lesions  LYMPHATICS: no cervical or supraclavicular nodes  RESP: lungs clear to auscultation - no rales, rhonchi or wheezes  CV: regular rhythm, normal rate, no rub, no murmur  EDEMA: no LE edema bilaterally  ABDOMEN: soft, nondistended, nontender, bowel sounds normal  MS: extremities normal - no gross deformities noted, no evidence of inflammation in joints, no muscle tenderness  SKIN: no rash, actinic keratoses  TX KIDNEY: normal    Results:     Recent Results (from the past 336 hour(s))   CBC with platelets    Collection Time: 09/04/18  5:03 PM   Result Value Ref Range    WBC 5.3 4.0 - 11.0 10e9/L    RBC Count 3.82 (L) 4.4 - 5.9 10e12/L    Hemoglobin 11.0 (L) 13.3 - 17.7 g/dL    Hematocrit 33.8 (L) 40.0 - 53.0 %    MCV 89 78 - 100 fl     MCH 28.8 26.5 - 33.0 pg    MCHC 32.5 31.5 - 36.5 g/dL    RDW 12.8 10.0 - 15.0 %    Platelet Count 160 150 - 450 10e9/L   Cyclosporine    Collection Time: 09/04/18  5:03 PM   Result Value Ref Range    Cyclosporine Last Dose 0620 9/4/18     Cyclosporine Level 100 50 - 400 ug/L   Parathyroid Hormone Intact    Collection Time: 09/04/18  5:03 PM   Result Value Ref Range    Parathyroid Hormone Intact 347 (H) 18 - 80 pg/mL   Vitamin D Deficiency    Collection Time: 09/04/18  5:03 PM   Result Value Ref Range    Vitamin D Deficiency screening 29 20 - 75 ug/L   Uric acid    Collection Time: 09/04/18  5:03 PM   Result Value Ref Range    Uric Acid 9.9 (H) 3.5 - 7.2 mg/dL   Renal panel    Collection Time: 09/04/18  5:03 PM   Result Value Ref Range    Sodium 135 133 - 144 mmol/L    Potassium 4.9 3.4 - 5.3 mmol/L    Chloride 104 94 - 109 mmol/L    Carbon Dioxide 22 20 - 32 mmol/L    Anion Gap 8 3 - 14 mmol/L    Glucose 79 70 - 99 mg/dL    Urea Nitrogen 52 (H) 7 - 30 mg/dL    Creatinine 2.82 (H) 0.66 - 1.25 mg/dL    GFR Estimate 25 (L) >60 mL/min/1.7m2    GFR Estimate If Black 30 (L) >60 mL/min/1.7m2    Calcium 8.7 8.5 - 10.1 mg/dL    Phosphorus 3.5 2.5 - 4.5 mg/dL    Albumin 3.6 3.4 - 5.0 g/dL   IRON AND IRON BINDING CAPACITY    Collection Time: 09/04/18  5:03 PM   Result Value Ref Range    Iron 76 35 - 180 ug/dL    Iron Binding Cap 269 240 - 430 ug/dL    Iron Saturation Index 28 15 - 46 %   FERRITIN    Collection Time: 09/04/18  5:03 PM   Result Value Ref Range    Ferritin 165 26 - 388 ng/mL   Protein  random urine with Creat Ratio    Collection Time: 09/04/18  5:35 PM   Result Value Ref Range    Protein Random Urine 0.55 g/L    Protein Total Urine g/gr Creatinine 0.62 (H) 0 - 0.2 g/g Cr   Creatinine urine calculation only    Collection Time: 09/04/18  5:35 PM   Result Value Ref Range    Creatinine Urine 87 mg/dL

## 2018-09-11 NOTE — TELEPHONE ENCOUNTER
Per Dr. Patino:    Vitamin D deficiency and recommend starting cholecalciferol 2000 units daily.     Left detailed voicemail for patient to call back.    Lore Starks RN

## 2018-09-12 RX ORDER — CHOLECALCIFEROL (VITAMIN D3) 50 MCG
2000 TABLET ORAL DAILY
Qty: 90 TABLET | Refills: 3 | Status: SHIPPED | OUTPATIENT
Start: 2018-09-12

## 2018-09-14 DIAGNOSIS — Z94.0 KIDNEY TRANSPLANTED: ICD-10-CM

## 2018-09-14 LAB
CYCLOSPORINE BLD LC/MS/MS-MCNC: 92 UG/L (ref 50–400)
TME LAST DOSE: 0 H

## 2018-09-14 PROCEDURE — 80158 DRUG ASSAY CYCLOSPORINE: CPT | Performed by: INTERNAL MEDICINE

## 2018-09-14 PROCEDURE — 36415 COLL VENOUS BLD VENIPUNCTURE: CPT | Performed by: INTERNAL MEDICINE

## 2018-09-19 ENCOUNTER — CARE COORDINATION (OUTPATIENT)
Dept: NEPHROLOGY | Facility: CLINIC | Age: 43
End: 2018-09-19

## 2018-09-24 DIAGNOSIS — Z94.0 KIDNEY REPLACED BY TRANSPLANT: Primary | ICD-10-CM

## 2018-09-24 RX ORDER — MYCOPHENOLATE MOFETIL 250 MG/1
1000 CAPSULE ORAL 2 TIMES DAILY
Qty: 240 CAPSULE | Refills: 11 | Status: SHIPPED | OUTPATIENT
Start: 2018-09-24 | End: 2019-10-07

## 2018-09-24 RX ORDER — CYCLOSPORINE 100 MG/1
100 CAPSULE, LIQUID FILLED ORAL 2 TIMES DAILY
Qty: 60 CAPSULE | Refills: 11 | Status: SHIPPED | OUTPATIENT
Start: 2018-09-24 | End: 2019-09-17

## 2018-09-24 NOTE — PROGRESS NOTES
Patient left voicemail stating BP averaging 130/80's at home. Returned his call. Pulse is steady around 70-75. Denied any physical symptoms or concerns. He will continue to monitor BP at home and call if any changes. Update sent to Dr. Patino.    Lore Starks RN

## 2018-10-15 ENCOUNTER — TELEPHONE (OUTPATIENT)
Dept: FAMILY MEDICINE | Facility: CLINIC | Age: 43
End: 2018-10-15

## 2018-10-15 NOTE — TELEPHONE ENCOUNTER
Panel Management Review      Patient has the following on his problem list:     Hypertension   Last three blood pressure readings:  BP Readings from Last 3 Encounters:   09/04/18 (!) 162/110   03/14/18 (!) 162/104   03/14/18 (!) 152/110     Blood pressure: FAILED    HTN Guidelines:  Age 18-59 BP range:  Less than 140/90  Age 60-85 with Diabetes:  Less than 140/90  Age 60-85 without Diabetes:  less than 150/90      Composite cancer screening  Chart review shows that this patient is due/due soon for the following None  Summary:    Patient is due/failing the following:   BP CHECK    Action needed:   Patient needs office visit for BP follow up with Dr. Hutson.    Type of outreach:    Multiple no shows and letters have been sent to follow up; will send additional letter to schedule appt with provider.    Questions for provider review:    None                                                                                                                                    Griselda Esparza MA       Chart routed to Care Team .

## 2018-10-15 NOTE — LETTER
[facility]  [address] [address]  [phone]        October 15, 2018    Miguel Angel Piña  [address] [address]              Dear Miguel Angel Piña    This is to remind you that you are due to follow up for your blood pressure with a provider.     You may call our office at [phone] to schedule an appointment.    Please disregard this notice if you have already made an appointment.        Sincerely,      [facility]

## 2018-10-22 ENCOUNTER — RADIANT APPOINTMENT (OUTPATIENT)
Dept: GENERAL RADIOLOGY | Facility: CLINIC | Age: 43
End: 2018-10-22
Attending: PEDIATRICS
Payer: COMMERCIAL

## 2018-10-22 ENCOUNTER — OFFICE VISIT (OUTPATIENT)
Dept: ORTHOPEDICS | Facility: CLINIC | Age: 43
End: 2018-10-22
Payer: COMMERCIAL

## 2018-10-22 VITALS
DIASTOLIC BLOOD PRESSURE: 89 MMHG | HEIGHT: 70 IN | WEIGHT: 218 LBS | SYSTOLIC BLOOD PRESSURE: 136 MMHG | BODY MASS INDEX: 31.21 KG/M2

## 2018-10-22 DIAGNOSIS — M25.531 RIGHT WRIST PAIN: ICD-10-CM

## 2018-10-22 DIAGNOSIS — M25.531 RIGHT WRIST PAIN: Primary | ICD-10-CM

## 2018-10-22 PROCEDURE — 99203 OFFICE O/P NEW LOW 30 MIN: CPT | Performed by: PEDIATRICS

## 2018-10-22 PROCEDURE — 73110 X-RAY EXAM OF WRIST: CPT | Mod: RT

## 2018-10-22 NOTE — LETTER
[facility]  [address]  [place] [address]  Phone: [phone]  Fax: [phone]      October 22, 2018      RE: Miguel Angel Piña  [address] [address] [address]        To whom it may concern:    Miguel Angel Piña is under my professional care. The employee is UNABLE to return to work until tomorrow, Tuesday, 10/23/18.    When the patient returns to work, the following restrictions apply until Monday, 10/29/18:  Limit use of right hand for comfort. Brace as needed for support.      Sincerely,            Bradford CLAUDIO.

## 2018-10-22 NOTE — PROGRESS NOTES
Sports Medicine Clinic Visit    PCP: Lucrecia Hutson Wang is a 43 year old male who is seen  as a self referral presenting with right wrist pain.  Right hand dominant.    Injury: Patient noticed a little bit of pain on Saturday 10/20/18. No known GIGI.   **  ~ 1 mo ago, had some left wrist pain. Improved with medication.  On right, flared up ~2 days ago. Similar to past issues with ankle.  On Sat was loading firewood. Had not done that recently. No specific injury at the time.  No pain at rest currently, as has been resting.   Yesterday noted focal swelling near ulnar wrist.  Indomethacin has been helpful for gout, some joint issues in past.    Location of Pain: posterior right wrist  Duration of Pain: 2 day(s)  Rating of Pain at worst: 8/10  Rating of Pain Currently: 6/10  Symptoms are better with: tylenol  Symptoms are worse with: extension  Additional Features:   Positive: swelling   Negative: bruising, popping, grinding, catching, locking, instability, paresthesias, numbness and weakness  Other evaluation and/or treatments so far consists of: Nothing  Prior History of related problems: History of gout    Social History: [employer] work    Review of Systems  Musculoskeletal: as above  Remainder of review of systems is negative including constitutional, CV, pulmonary, GI, Skin and Neurologic except as noted in HPI or medical history.    Past Medical History:   Diagnosis Date     Anemia in chronic renal disease      CVA (cerebral vascular accident) (H)      Dyslipidemia      Factor V Leiden (H)      Gout      Hypertension secondary to other renal disorders      Immunosuppressed status (H)      Kidney replaced by transplant 4/2010 (2nd)    prior 11/1993     Medullary cystic kidney disease      Secondary renal hyperparathyroidism (H)      Past Surgical History:   Procedure Laterality Date     REPAIR PATENT FORAMEN OVALE       s/p LDKT       Family History   Problem Relation Age of Onset     Lung  "Cancer Paternal Grandmother      KIDNEY DISEASE Sister      medullary cystic kidney disease, s/p kidney transplant     Prostate Cancer No family hx of      Colon Cancer No family hx of      Social History     Social History     Marital status: Single     Spouse name: N/A     Number of children: 0     Years of education: N/A     Occupational History     Not on file.     Social History Main Topics     Smoking status: Never Smoker     Smokeless tobacco: Never Used     Alcohol use No     Drug use: No     Sexual activity: Yes     Partners: Female     Other Topics Concern     Not on file     Social History Narrative       Objective  /89 (BP Location: Right arm, Patient Position: Chair, Cuff Size: Adult Regular)  Ht 5' 10\" (1.778 m)  Wt 218 lb (98.9 kg)  BMI 31.28 kg/m2    GENERAL APPEARANCE: healthy, alert and no distress   GAIT: NORMAL  SKIN: no suspicious lesions or rashes  NEURO: Normal strength and tone, mentation intact and speech normal  PSYCH:  mentation appears normal and affect normal/bright  HEENT: no scleral icterus  CV: no extremity edema  RESP: nonlabored breathing    Exam:  Hand/wrist (right):    Inspection:  No deformity noted.  Moderate diffuse circumferential wrist swelling, more dorsally. No ecchymosis.    Motion:  Forearm pronation full, forearm supination min limitation with dorsal pain.  Wrist flexion ~20 deg, tight, mild pain  Wrist extension ~10 deg, tight, some pain; also ulnar pain  Wrist radial deviation some limitation  Wrist ulnar deviation some limitation  Pain with deviation  Digit motion grossly full    Strength:  Grossly intact wrist, hand.    Sensation:  Grossly intact.    Radial pulses normal, +2/4, capillary refill brisk.    Palpation:  Tender diffuse wrist    Small, ~5 mm cyst at the dorsal ulnar aspect of the wrist, just distal to TFC. Mobile, nontender.    Skin:       well perfused       capillary refill brisk    Radiology:    Visualized radiographs of right wrist obtained " today, and reviewed the images with the patient.  Impression: osseous irregularity in carpals, nonspecific, question related to degenerative change.  Recent Results (from the past 744 hour(s))   XR Wrist Right G/E 3 Views    Narrative    RIGHT WRIST THREE OR MORE VIEWS  10/22/2018 9:03 AM     HISTORY:  Right wrist pain.      Impression    IMPRESSION: Scaphoid proximal pole radiolucency, possibly a cyst with  adjacent sclerosis. Radiocarpal joint space width appears within  normal limits. However, increase in scapholunate angle on the lateral  view raises the possibility of scaphoid ligament injury. There are  nonspecific erosions involving the proximal pole of the hamate and  adjacent capitate. Midcarpal joint space with also appears within  normal limits. Arterial calcifications are noted in the distal forearm  and at the wrist.    FREDRICK WATKINS MD       Assessment:  1. Right wrist pain         Plan:  Discussed the assessment with the patient. Acute inflammation; no injury. Underlying inflammatory condition considered, including degenerative change, crystal dz.  We discussed the following: symptom treatment, activity modification/rest, imaging, rehab, injection therapy, medication, potential for improvement with time and lab studies. Following discussion, plan:  Topical Treatments: Ice, Heat or Topical Analgesics discussed; if icing worsens pain, consider crystal dz.  Over the counter medication: Patient's preferred OTC medication prn  Prescription Medication: oral steroid discussed. Declined, hold for now. If persistent issues, may consider trying oral steroid.  Plain films of the wrist reviewed. Discussed potential for advanced imaging.  Discussed potential lab studies.  Activity Modification: discussed  Rehab: consideration, pending course.  Medical Equipment: wrist brace provided for comfort with activities.  Follow up: 1-2 weeks if not improving with above, sooner prn.  Questions answered. The patient  indicates understanding of these issues and agrees with the plan.    Bradford Healy, DO, CAQ        Disclaimer: This note consists of symbols derived from keyboarding, dictation and/or voice recognition software. As a result, there may be errors in the script that have gone undetected. Please consider this when interpreting information found in this chart.

## 2018-10-22 NOTE — PATIENT INSTRUCTIONS
Possible gout flare, vs related to overuse. X-ray shows mild irregularity that could be related to early degenerative change.    Start with bracing. Elevate, may ice for comfort.    Letter for work provided.    We discussed possible steroid or imaging (MRI) if not improving with time.

## 2018-10-22 NOTE — MR AVS SNAPSHOT
After Visit Summary   10/22/2018    Miguel Angel Piña    MRN: [id number]           Patient Information     Date Of Birth          1975        Visit Information        Provider Department      10/22/2018 8:40 AM Bradford Healy,  [facility]        Today's Diagnoses     Right wrist pain    -  1      Care Instructions    Possible gout flare, vs related to overuse. X-ray shows mild irregularity that could be related to early degenerative change.    Start with bracing. Elevate, may ice for comfort.    Letter for work provided.    We discussed possible steroid or imaging (MRI) if not improving with time.          Follow-ups after your visit        Follow-up notes from your care team     Return in about 1 week (around 10/29/2018), or if symptoms worsen or fail to improve.      Your next 10 appointments already scheduled     Mar 05, 2019  4:35 PM CST   (Arrive by 4:05 PM)   Return Kidney Transplant with  Kidney/Pancreas Recipient [address] [place] [address] Nephrology ([facility] [place])    [address] [place] [address]  [address]  [facility] [address] [phone]              Who to contact     If you have questions or need follow up information about today's clinic visit or your schedule please contact [facility] directly at [phone].  Normal or non-critical lab and imaging results will be communicated to you by MyChart, letter or phone within 4 business days after the clinic has received the results. If you do not hear from us within 7 days, please contact the clinic through [employer] or phone. If you have a critical or abnormal lab result, we will notify you by phone as soon as possible.  Submit refill requests through [employer] or call your pharmacy and they will forward the refill request to us. Please allow 3 business days for your refill to be completed.          Additional Information About Your Visit        [employer]  "Information     [employer] lets you send messages to your doctor, view your test results, renew your prescriptions, schedule appointments and more. To sign up, go to [email or web address] . Click on \"Log in\" on the left side of the screen, which will take you to the Welcome page. Then click on \"Sign up Now\" on the right side of the page.     You will be asked to enter the access code listed below, as well as some personal information. Please follow the directions to create your username and password.     Your access code is: [email or web address]  Expires: 2018  6:30 AM     Your access code will  in 90 days. If you need help or a new code, please call your [place] clinic or [phone].        Care EveryWhere ID     This is your [facility] EveryWhere ID. This could be used by other organizations to access your [place] medical records  [id number]        Your Vitals Were     Height BMI (Body Mass Index)                5' 10\" (1.778 m) 31.28 kg/m2           Blood Pressure from Last 3 Encounters:   10/22/18 136/89   18 (!) 162/110   18 (!) 162/104    Weight from Last 3 Encounters:   10/22/18 218 lb (98.9 kg)   18 218 lb 9.6 oz (99.2 kg)   18 219 lb (99.3 kg)               Primary Care Provider Office Phone # Fax #    Lucrecia Hutson [phone]       [address]  [place] [address]        Equal Access to Services     [facility]: Hadii aad ku hadasho Soomaali, waaxda luqadaha, qaybta kaalmada nicolas, lanny olvera. So [facility] [phone].    ATENCIÓN: Si habla español, tiene a pollard disposición servicios gratuitos de asistencia lingüística. Llame al [phone].    We comply with applicable federal civil rights laws and [place] laws. We do not discriminate on the basis of race, color, national origin, age, disability, sex, sexual orientation, or gender identity.            Thank you!     Thank you for choosing [facility]  for your care. Our goal is always to provide you with excellent care. Hearing back from our patients is one way we can continue to improve our services. Please take a few minutes to complete the written survey that you may receive in the mail after your visit with us. Thank you!             Your Updated Medication List - Protect others around you: Learn how to safely use, store and throw away your medicines at www.disposemymeds.org.          This list is accurate as of 10/22/18  9:53 AM.  Always use your most recent med list.                   Brand Name Dispense Instructions for use Diagnosis    amLODIPine 5 MG tablet    NORVASC    30 tablet    Take 1 tablet (5 mg) by mouth daily At night    Kidney transplanted, Elevated blood pressure reading       cycloSPORINE modified 100 MG capsule     60 capsule    Take 1 capsule (100 mg) by mouth 2 times daily    Kidney replaced by transplant       mycophenolate 250 MG capsule     240 capsule    Take 4 capsules (1,000 mg) by mouth 2 times daily    Kidney replaced by transplant       Vitamin D3 2000 units Tabs     90 tablet    Take 2,000 Units by mouth daily    Vitamin D deficiency

## 2018-10-23 ENCOUNTER — TELEPHONE (OUTPATIENT)
Dept: FAMILY MEDICINE | Facility: CLINIC | Age: 43
End: 2018-10-23

## 2018-10-23 ENCOUNTER — CARE COORDINATION (OUTPATIENT)
Dept: NEPHROLOGY | Facility: CLINIC | Age: 43
End: 2018-10-23

## 2018-10-23 DIAGNOSIS — M79.641 RIGHT HAND PAIN: ICD-10-CM

## 2018-10-23 DIAGNOSIS — M79.89 SWELLING OF RIGHT HAND: ICD-10-CM

## 2018-10-23 DIAGNOSIS — M10.9 GOUT, UNSPECIFIED CAUSE, UNSPECIFIED CHRONICITY, UNSPECIFIED SITE: Primary | ICD-10-CM

## 2018-10-23 RX ORDER — INDOMETHACIN 25 MG/1
CAPSULE ORAL
Qty: 42 CAPSULE | Refills: 1 | Status: CANCELLED | OUTPATIENT
Start: 2018-10-23

## 2018-10-23 NOTE — TELEPHONE ENCOUNTER
Medication T'd up.   Patient is having gout flare. Please advise if refill can be given or OV is needed.    Helga Fox RN, BSN, PHN

## 2018-10-23 NOTE — PROGRESS NOTES
Nephrology  Note: Nursing Triage Note    SITUATION/BACKROUND:                                                    Miguel Angel Piña is a 43 year old male who calls for gout flare.      ASSESSMENT:                                                      Description: Right wrist pain, swelling  Uremic Symptoms: No   Onset/duration:  Sunday  Precip. factors:  N/A  Associated symptoms:  Wrist pain, swelling, unable to apply any pressure  Improves/worsens symptoms:  Tylenol  Pain scale (0-10):  6/10  Pain Location: right wrist    MEDICATIONS:   Medication reconciliation completed: Yes  Taking medication(s) as prescribed? Yes  Taking over the counter medication(s?) Yes  Any medication side effects? No significant side effects    Any barriers to taking medication(s) as prescribed?  No  Medication(s) improving/managing symptoms?  minimally  Allergies: N/A    PLAN:                                                      Nursing Plan:   Route to provider    Per Dr. Terry:    NO indomethacin (his remaining kidney function will suffer)     He will need to be seen by his PCP or his rheumatologist to make sure no infection. If no infection then we can give a brief course of prednisone daily for 5 days.      Patient to comply with recommendation: Yes    If further questions/concerns or if symptoms do not improve, worsen or new symptoms develop, patient is advised to: present to Urgent Care/Emergency Room.      Lore Starks RN

## 2018-10-23 NOTE — TELEPHONE ENCOUNTER
Patient is looking to refill his RX for: indomethace 21mg  That he takes for gout.  Please call when ready.  Thank you

## 2018-10-24 RX ORDER — PREDNISONE 20 MG/1
20 TABLET ORAL DAILY
Qty: 5 TABLET | Refills: 0 | Status: SHIPPED | OUTPATIENT
Start: 2018-10-24 | End: 2018-10-31

## 2018-10-24 RX ORDER — INDOMETHACIN 25 MG/1
CAPSULE ORAL
Qty: 42 CAPSULE | Refills: 0 | Status: SHIPPED | OUTPATIENT
Start: 2018-10-24 | End: 2018-10-24 | Stop reason: ALTCHOICE

## 2018-10-24 NOTE — TELEPHONE ENCOUNTER
RN called pharmacy and canceled script sent for Indomethacin today. 10/24/2018    Helga Fox RN, BSN, PHN

## 2018-10-24 NOTE — TELEPHONE ENCOUNTER
RN called patient and left message on voicemail advising Indomethacin is contraindicated and should not be taken by patient.   RN gave number to clinic to call back with questions.   [phone] or [phone]     Please advise if there is another medication that can take its place.  Noted in 'staff message' prednisone was a possible option?    Helga Fox RN, BSN, PHN

## 2018-10-24 NOTE — TELEPHONE ENCOUNTER
Good catch!  Indomethacin is contraindicated for this patient due to his declining renal function.   Prednisone OK. Rx sent  Recommend patient to discuss with his nephrologist the best option regarding future flares and gout prophylaxis- since most meds are renally excreted.

## 2018-10-24 NOTE — TELEPHONE ENCOUNTER
Rn was informed by scheduling that patient called back to see status of medication.    informed patient that medication was sent to pharmacy.   RN noted and sent message to provider that Indomethacin was sent to pharmacy which in staff message was contraindicated.     RN waiting to hear back on clarification from MD on which medication patient should be taking.     RN called patient and left voicemail with the above information and clinic number given to call back with questions.     Helga Fox RN, BSN, PHN

## 2018-10-24 NOTE — TELEPHONE ENCOUNTER
RN called patient and left voicemail informing that [facility]one [facility], and MD would like patient to follow up with nephrologist for future medication options.      predniSONE (DELTASONE) 20 MG tablet 5 tablet 0 10/24/2018  --      Sig - Route: Take 1 tablet (20 mg) by mouth daily - Oral     Class: E-Prescribe     Order: [id number]     E-Prescribing Status: Receipt confirmed by pharmacy (10/24/2018  9:40 AM CDT)       Printout Tracking      External Result Report       Pharmacy      [facility] - [address] [facility] number given to call back with questions.     Helga Fox, RN, BSN, PHN

## 2018-10-25 NOTE — TELEPHONE ENCOUNTER
Per pharmacy Prednisone script picked up yesterday, will close encounter, detailed message was left yesterday as noted below.  Tracie Teixeira RN

## 2018-10-31 ENCOUNTER — OFFICE VISIT (OUTPATIENT)
Dept: FAMILY MEDICINE | Facility: CLINIC | Age: 43
End: 2018-10-31
Payer: COMMERCIAL

## 2018-10-31 VITALS
HEIGHT: 68 IN | SYSTOLIC BLOOD PRESSURE: 135 MMHG | OXYGEN SATURATION: 100 % | DIASTOLIC BLOOD PRESSURE: 78 MMHG | BODY MASS INDEX: 33.95 KG/M2 | HEART RATE: 89 BPM | TEMPERATURE: 98.8 F | WEIGHT: 224 LBS

## 2018-10-31 DIAGNOSIS — M25.531 RIGHT WRIST PAIN: Primary | ICD-10-CM

## 2018-10-31 DIAGNOSIS — N18.4 CKD (CHRONIC KIDNEY DISEASE) STAGE 4, GFR 15-29 ML/MIN (H): ICD-10-CM

## 2018-10-31 DIAGNOSIS — Z94.0 KIDNEY REPLACED BY TRANSPLANT: ICD-10-CM

## 2018-10-31 DIAGNOSIS — M10.9 GOUT, UNSPECIFIED CAUSE, UNSPECIFIED CHRONICITY, UNSPECIFIED SITE: ICD-10-CM

## 2018-10-31 LAB
CREAT SERPL-MCNC: 2.59 MG/DL (ref 0.66–1.25)
GFR SERPL CREATININE-BSD FRML MDRD: 27 ML/MIN/1.7M2
URATE SERPL-MCNC: 9.7 MG/DL (ref 3.5–7.2)

## 2018-10-31 PROCEDURE — 36415 COLL VENOUS BLD VENIPUNCTURE: CPT | Performed by: FAMILY MEDICINE

## 2018-10-31 PROCEDURE — 82565 ASSAY OF CREATININE: CPT | Performed by: FAMILY MEDICINE

## 2018-10-31 PROCEDURE — 84550 ASSAY OF BLOOD/URIC ACID: CPT | Performed by: FAMILY MEDICINE

## 2018-10-31 PROCEDURE — 99214 OFFICE O/P EST MOD 30 MIN: CPT | Performed by: FAMILY MEDICINE

## 2018-10-31 RX ORDER — PREDNISONE 20 MG/1
TABLET ORAL
Qty: 5 TABLET | Refills: 0 | OUTPATIENT
Start: 2018-10-31

## 2018-10-31 RX ORDER — METHYLPREDNISOLONE 4 MG
TABLET, DOSE PACK ORAL
Qty: 21 TABLET | Refills: 0 | Status: SHIPPED | OUTPATIENT
Start: 2018-10-31 | End: 2018-12-17

## 2018-10-31 NOTE — MR AVS SNAPSHOT
After Visit Summary   10/31/2018    Miguel Angel Piña    MRN: [id number]           Patient Information     Date Of Birth          1975        Visit Information        Provider Department      10/31/2018 3:00 PM [facility]        Today's Diagnoses     Right wrist pain    -  1    Kidney replaced by transplant          Care Instructions      Gout Diet  Gout is a painful condition caused by an excess of uric acid, a waste product made by the body. Uric acid forms crystals that collect in the joints. The immune response to these crystals brings on symptoms of joint pain and swelling. This is called a gout attack. Often, medications and diet changes are combined to manage gout. Below are some guidelines for changing your diet to help you manage gout and prevent attacks. Your health care provider will help you determine the best eating plan for you.     Eating to manage gout  Weight loss for those who are overweight may help reduce gout attacks.  Eat less of these foods  Eating too many foods containing purines may raise the levels of uric acid in your body. This raises your risk for a gout attack. Try to limit these foods and drinks:    Alcohol, such as beer and red wine. You may be told to avoid alcohol completely.    Soft drinks that contain sugar or high fructose corn syrup    Certain fish, including anchovies, sardines, fish eggs, and herring    Shellfish    Certain meats, such as red meat, hot dogs, luncheon meats, and turkey    Organ meats, such as liver, kidneys, and sweetbreads    Legumes, such as dried beans and peas    Other high fat foods such as gravy, whole milk, and high fat cheeses    Vegetables such as asparagus, cauliflower, spinach, and mushrooms used to be thought to contribute to an increased risk for a gout attack, but recent studies show that high purine vegetables don't increase the risk for a gout attack.  Eat more of these foods  Other foods may  be helpful for people with gout. Add some of these foods to your diet:    Cherries contain chemicals that may lower uric acid.    Omega fatty acids. These are found in some fatty fish such as salmon, certain oils (flax, olive, or nut), and nuts themselves. Omega fatty acids may help prevent inflammation due to gout.    Dairy products that are low-fat or fat-free, such as cheese and yogurt    Complex carbohydrate foods, including whole grains, brown rice, oats, and beans    Coffee, in moderation    Water, approximately 64 ounces per day  Follow-up care  Follow up with your healthcare provider as advised.  When to seek medical advice  Call your healthcare provider right away if any of these occur:    Return of gout symptoms, usually at night:    Severe pain, swelling, and heat in a joint, especially the base of the big toe    Affected joint is hard to move    Skin of the affected joint is purple or red    Fever of 100.4 F (38 C) or higher    Pain that doesn't get better even with prescribed medicine   Date Last Reviewed: 1/12/2016 2000-2017 [employer]. [address] [place] [address]. All rights reserved. This information is not intended as a substitute for professional medical care. Always follow your healthcare professional's instructions.                Follow-ups after your visit        Follow-up notes from your care team     Return if symptoms worsen or fail to improve.      Your next 10 appointments already scheduled     Mar 05, 2019  4:35 PM CST   (Arrive by 4:05 PM)   Return Kidney Transplant with [facility] Kidney/Pancreas Recipient [address] [place] [address] Nephrology ([facility])    [address] [place] [address]  [address] [place] [address] [phone]              Who to contact     Normal or non-critical lab and imaging results will be communicated to you by MyChart, letter or phone within 4 business days after the clinic has received the results. If you do not hear from  "us within 7 days, please contact the clinic through [employer] or phone. If you have a critical or abnormal lab result, we will notify you by phone as soon as possible.  Submit refill requests through [employer] or call your pharmacy and they will forward the refill request to us. Please allow 3 business days for your refill to be completed.          If you need to speak with a  for additional information , please call: [phone]             Additional Information About Your Visit        Care EveryWhere ID     This is your Care EveryWhere ID. This could be used by other organizations to access your [place] medical records  [id number]        Your Vitals Were     Pulse Temperature Height Pulse Oximetry BMI (Body Mass Index)       89 98.8  F (37.1  C) (Oral) 5' 8\" (1.727 m) 100% 34.06 kg/m2        Blood Pressure from Last 3 Encounters:   10/31/18 135/78   10/22/18 136/89   09/04/18 (!) 162/110    Weight from Last 3 Encounters:   10/31/18 224 lb (101.6 kg)   10/22/18 218 lb (98.9 kg)   09/04/18 218 lb 9.6 oz (99.2 kg)              We Performed the Following     Creatinine     Uric acid          Today's Medication Changes          These changes are accurate as of 10/31/18  3:10 PM.  If you have any questions, ask your nurse or doctor.               Start taking these medicines.        Dose/Directions    methylPREDNISolone 4 MG tablet   Commonly known as:  MEDROL DOSEPAK   Used for:  Right wrist pain   Started by:  Lucrecia Hutson MD        Follow package instructions   Quantity:  21 tablet   Refills:  0            Where to get your medicines      These medications were sent to [facility] - [address] [facility]  [address] [facility] [address]     Phone:  [phone]     methylPREDNISolone 4 MG tablet                Primary Care Provider Office Phone # Fax #    Lucrecia Hutson [phone] [address] [place] [address]        Equal " Access to Services     [facility]: Hadii aad ku hadkarinmicah Carmenali, wasinda luqadaha, qaybta kaalmalanny nguyen. So [facility] [phone].    ATENCIÓN: Si habla español, tiene a pollard disposición servicios gratuitos de asistencia lingüística. Llame al [phone].    We comply with applicable federal civil rights laws and [place] laws. We do not discriminate on the basis of race, color, national origin, age, disability, sex, sexual orientation, or gender identity.            Thank you!     Thank you for choosing [facility]  for your care. Our goal is always to provide you with excellent care. Hearing back from our patients is one way we can continue to improve our services. Please take a few minutes to complete the written survey that you may receive in the mail after your visit with us. Thank you!             Your Updated Medication List - Protect others around you: Learn how to safely use, store and throw away your medicines at www.disposemymeds.org.          This list is accurate as of 10/31/18  3:10 PM.  Always use your most recent med list.                   Brand Name Dispense Instructions for use Diagnosis    amLODIPine 5 MG tablet    NORVASC    30 tablet    Take 1 tablet (5 mg) by mouth daily At night    Kidney transplanted, Elevated blood pressure reading       cycloSPORINE modified 100 MG capsule     60 capsule    Take 1 capsule (100 mg) by mouth 2 times daily    Kidney replaced by transplant       methylPREDNISolone 4 MG tablet    MEDROL DOSEPAK    21 tablet    Follow package instructions    Right wrist pain       mycophenolate 250 MG capsule     240 capsule    Take 4 capsules (1,000 mg) by mouth 2 times daily    Kidney replaced by transplant       Vitamin D3 2000 units Tabs     90 tablet    Take 2,000 Units by mouth daily    Vitamin D deficiency

## 2018-10-31 NOTE — TELEPHONE ENCOUNTER
Requested Prescriptions   Pending Prescriptions Disp Refills     predniSONE (DELTASONE) 20 MG tablet [Pharmacy Med Name: PREDNISONE 20MG TABS] 5 tablet 0     Sig: TAKE ONE TABLET BY MOUTH EVERY DAY  Prednisone      Last Written Prescription Date:  10/24/18  Last Fill Quantity: 5,   # refills: 0  Last Office Visit: 3/14/18 Haresh  Future Office visit:       Routing refill request to provider for review/approval because:  Drug not on the G, P or [facility] refill protocol or controlled substance    There is no refill protocol information for this order

## 2018-10-31 NOTE — LETTER
November 2, 2018      Miguel Angel Piña  [address] [facility] [id number]        Dear ,    We are writing to inform you of your test results.      Your kidney function is stable compared to a month ago- Stage IV Chronic kidney disease.   Uric acid level was elevated. Hyperuricemia does not always lead to gout but the incidence of gout increases with urate levels.  Since Allopurinol is contraindicated in your case; avoid risk factors that precipitate gout like excessive alcohol consumption, eating foods that contain purine (as listed on your recent After visit Summary) and lose weight.    Resulted Orders   Uric acid   Result Value Ref Range    Uric Acid 9.7 (H) 3.5 - 7.2 mg/dL   Creatinine   Result Value Ref Range    Creatinine 2.59 (H) 0.66 - 1.25 mg/dL    GFR Estimate 27 (L) >60 mL/min/1.7m2      Comment:      Non  GFR Calc    GFR Estimate If Black 33 (L) >60 mL/min/1.7m2      Comment:       GFR Calc       If you have any questions or concerns, please call the clinic at the number listed above.       Sincerely,        Lucrecia Hutson MD/mp

## 2018-10-31 NOTE — PROGRESS NOTES
SUBJECTIVE:   Miguel Angel Piña is a 43 year old male who presents to clinic today for the following health issues:      Gout/ single inflamed joint   Onset: 2 days    Description:   Location: wrist - right  Joint Swelling: YES  Redness: no   Pain: YES    Intensity: moderate, severe    Progression of Symptoms:  worsening    Accompanying Signs & Symptoms:  Fevers: no     History:   Trauma to the area: no   Previous history of gout: YES   Recent illness:  no     Precipitating factors:   Diet-rich in purine: no  Alcohol use: no   Diuretic use: no     Alleviating factors:  ice    Therapies Tried and outcome: ice        States that the prednisone that was recently prescribed on 1023 helped a great deal but after he was done taking it is right wrist pain worsened.  Reports a history of gout with a last uric acid level of 9.9 on 9/4/2018.  He has a known history of stage IV chronic kidney disease with a history of a kidney transplant unable to take allopurinol prophylaxis for this reason.    States that he is still also on 10/22 as a self-referral with right wrist pain and had a right wrist x-ray done.    Problem list and histories reviewed & adjusted, as indicated.  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     Medullary cystic kidney disease     CARDIOVASCULAR SCREENING; LDL GOAL LESS THAN 100     Conductive hearing loss, unilateral     Aftercare following organ transplant     Immunosuppressed status (H)     Anemia in chronic renal disease     Secondary renal hyperparathyroidism (H)     Gout     Vitamin D deficiency     Cerebrovascular accident (H)     Dyslipidemia     Factor V Leiden (H)     Hypertension secondary to other renal disorders     CKD (chronic kidney disease) stage 4, GFR 15-29 ml/min (H)     Past Surgical History:   Procedure Laterality Date     REPAIR PATENT FORAMEN OVALE       s/p LDKT         Social History   Substance Use Topics      "Smoking status: Never Smoker     Smokeless tobacco: Never Used     Alcohol use No     Family History   Problem Relation Age of Onset     Lung Cancer Paternal Grandmother      KIDNEY DISEASE Sister      medullary cystic kidney disease, s/p kidney transplant     Prostate Cancer No family hx of      Colon Cancer No family hx of          Current Outpatient Prescriptions   Medication Sig Dispense Refill     methylPREDNISolone (MEDROL DOSEPAK) 4 MG tablet Follow package instructions 21 tablet 0     amLODIPine (NORVASC) 5 MG tablet Take 1 tablet (5 mg) by mouth daily At night 30 tablet 11     CELLCEPT (BRAND) 250 MG CAPSULE Take 4 capsules (1,000 mg) by mouth 2 times daily 240 capsule 11     cholecalciferol 2000 units TABS Take 2,000 Units by mouth daily 90 tablet 3     NEORAL (BRAND) 100 MG CAPSULE Take 1 capsule (100 mg) by mouth 2 times daily 60 capsule 11     No Known Allergies  Labs reviewed in EPIC    Reviewed and updated as needed this visit by clinical staff  Tobacco  Allergies  Meds       Reviewed and updated as needed this visit by Provider         ROS:  All others are negative except as above.      OBJECTIVE:     /78  Pulse 89  Temp 98.8  F (37.1  C) (Oral)  Ht 5' 8\" (1.727 m)  Wt 224 lb (101.6 kg)  SpO2 100%  BMI 34.06 kg/m2  Body mass index is 34.06 kg/(m^2).  GENERAL: healthy, alert and no distress  MS, right wrist is swollen, warm to touch with decreased range of motion.  Radial pulse present.  No cyanosis    Diagnostic Test Results:  Xray - recent Wrist X ray reviewed.  RIGHT WRIST THREE OR MORE VIEWS  10/22/2018 9:03 AM      HISTORY:  Right wrist pain.         IMPRESSION: Scaphoid proximal pole radiolucency, possibly a cyst with  adjacent sclerosis. Radiocarpal joint space width appears within  normal limits. However, increase in scapholunate angle on the lateral  view raises the possibility of scaphoid ligament injury. There are  nonspecific erosions involving the proximal pole of the " hamate and  adjacent capitate. Midcarpal joint space with also appears within  normal limits. Arterial calcifications are noted in the distal forearm  and at the wrist.     FREDRICK WATKINS MD    ASSESSMENT/PLAN:     Miguel Angel was seen today for arthritis.    Diagnoses and all orders for this visit:    Right wrist pain  -     Uric acid  -     methylPREDNISolone (MEDROL DOSEPAK) 4 MG tablet; Follow package instructions  - Following with Ortho  - Recommended Ice to the area, rest from aggravating activities.     Kidney replaced by transplant with CKD (chronic kidney disease) stage 4, GFR 15-29 ml/min (H)  -     Creatinine  Following with the kidney transplant team      Notify him with results.     Follow up if symptoms fail to improve or worsen.      The patient was in agreement with the plan today and had no questions or concerns prior to leaving the clinic.    Due for physical in December 2018.       Lucrecia Hutson MD  [facility]

## 2018-10-31 NOTE — PATIENT INSTRUCTIONS
Gout Diet  Gout is a painful condition caused by an excess of uric acid, a waste product made by the body. Uric acid forms crystals that collect in the joints. The immune response to these crystals brings on symptoms of joint pain and swelling. This is called a gout attack. Often, medications and diet changes are combined to manage gout. Below are some guidelines for changing your diet to help you manage gout and prevent attacks. Your health care provider will help you determine the best eating plan for you.     Eating to manage gout  Weight loss for those who are overweight may help reduce gout attacks.  Eat less of these foods  Eating too many foods containing purines may raise the levels of uric acid in your body. This raises your risk for a gout attack. Try to limit these foods and drinks:    Alcohol, such as beer and red wine. You may be told to avoid alcohol completely.    Soft drinks that contain sugar or high fructose corn syrup    Certain fish, including anchovies, sardines, fish eggs, and herring    Shellfish    Certain meats, such as red meat, hot dogs, luncheon meats, and turkey    Organ meats, such as liver, kidneys, and sweetbreads    Legumes, such as dried beans and peas    Other high fat foods such as gravy, whole milk, and high fat cheeses    Vegetables such as asparagus, cauliflower, spinach, and mushrooms used to be thought to contribute to an increased risk for a gout attack, but recent studies show that high purine vegetables don't increase the risk for a gout attack.  Eat more of these foods  Other foods may be helpful for people with gout. Add some of these foods to your diet:    Cherries contain chemicals that may lower uric acid.    Omega fatty acids. These are found in some fatty fish such as salmon, certain oils (flax, olive, or nut), and nuts themselves. Omega fatty acids may help prevent inflammation due to gout.    Dairy products that are low-fat or fat-free, such as cheese and  yogurt    Complex carbohydrate foods, including whole grains, brown rice, oats, and beans    Coffee, in moderation    Water, approximately 64 ounces per day  Follow-up care  Follow up with your healthcare provider as advised.  When to seek medical advice  Call your healthcare provider right away if any of these occur:    Return of gout symptoms, usually at night:    Severe pain, swelling, and heat in a joint, especially the base of the big toe    Affected joint is hard to move    Skin of the affected joint is purple or red    Fever of 100.4 F (38 C) or higher    Pain that doesn't get better even with prescribed medicine   Date Last Reviewed: 1/12/2016 2000-2017 [employer]. [address] [place] [address], [place] [address]. All rights reserved. This information is not intended as a substitute for professional medical care. Always follow your healthcare professional's instructions.

## 2018-10-31 NOTE — TELEPHONE ENCOUNTER
Brain is having a gout flair and needs a refill on his prednisone if possible.    Thank You,  Tiffanie Oviedo, Pharmacy Tech  [facility]/ [facility]

## 2018-10-31 NOTE — TELEPHONE ENCOUNTER
Left message on voice mail for patient to call clinic. [phone]  Please schedule appt as directed below when patient calls back.  Tracie Teixeira RN

## 2018-12-17 ENCOUNTER — TELEPHONE (OUTPATIENT)
Dept: FAMILY MEDICINE | Facility: CLINIC | Age: 43
End: 2018-12-17

## 2018-12-17 ENCOUNTER — TELEPHONE (OUTPATIENT)
Dept: NEPHROLOGY | Facility: CLINIC | Age: 43
End: 2018-12-17

## 2018-12-17 DIAGNOSIS — M25.531 RIGHT WRIST PAIN: ICD-10-CM

## 2018-12-17 RX ORDER — METHYLPREDNISOLONE 4 MG
TABLET, DOSE PACK ORAL
Qty: 21 TABLET | Refills: 0 | Status: SHIPPED | OUTPATIENT
Start: 2018-12-17 | End: 2019-01-02

## 2018-12-17 NOTE — TELEPHONE ENCOUNTER
Patiet believes he has gout, and he had to take off work due to right wrist pain. Please advise as soon as possible. Asking for prednisone like he used in the past. Ok to leave a message.

## 2018-12-17 NOTE — TELEPHONE ENCOUNTER
Prescription sent in Dr. Hutson's absence. Let him know that it can take 1-3 days to respond to messages/telephone calls as we do see patients throughout the day as well

## 2018-12-17 NOTE — TELEPHONE ENCOUNTER
Can someone please look at this in Dr. FATIMA's absence?    Routing refill request to provider for review/approval because:  Drug not on the [facility] refill protocol       Patient last saw Dr. Hutson on 10/31:  Right wrist pain  -     Uric acid  -     methylPREDNISolone (MEDROL DOSEPAK) 4 MG tablet; Follow package instructions  - Following with Ortho  - Recommended Ice to the area, rest from aggravating activities.     F/U for physical due December 2018    Julia Argueta, RN, BSN

## 2018-12-17 NOTE — TELEPHONE ENCOUNTER
Patient called back. States he's experiencing a gout flare in his wrist, started on Friday. He's tried reaching his PCP's office and was told his PCP is out. He's still waiting to hear back from them on refilling prednisone. He asked if I could reach out to their team as well, message sent.    Lore Starks RN

## 2018-12-17 NOTE — TELEPHONE ENCOUNTER
Patient left a voicemail asking for a call back. Left message for patient to return call.    Lore Starks RN

## 2018-12-17 NOTE — TELEPHONE ENCOUNTER
Called patient. Notified him that RX was sent. Advised him of how long it can typically take for a message to get responded to.    Patient verbalized understanding and agrees with plan.     Julia Argueta, RN, BSN

## 2018-12-17 NOTE — TELEPHONE ENCOUNTER
Reason for Call:  Medication or medication refill:    Do you use a [facility]?  Name of the pharmacy and phone number for the current request:  [facility] [phone]    Name of the medication requested: Prednisone    Other request: Having a flare up of gout    Can we leave a detailed message on this number? YES    Phone number patient can be reached at: Home number on file [phone] (home)    Best Time: any    Call taken on 12/17/2018 at 8:10 AM by Irene Garrido

## 2018-12-17 NOTE — TELEPHONE ENCOUNTER
Patient's third call regarding this today.    High Priority routing covering providers in Dr. Hutson's absence.    Julia Argueta, RN, BSN

## 2018-12-28 ENCOUNTER — TELEPHONE (OUTPATIENT)
Dept: TRANSPLANT | Facility: CLINIC | Age: 43
End: 2018-12-28

## 2018-12-28 ENCOUNTER — DOCUMENTATION ONLY (OUTPATIENT)
Dept: TRANSPLANT | Facility: CLINIC | Age: 43
End: 2018-12-28

## 2018-12-28 NOTE — LETTER
PHYSICIAN ORDERS    DATE & TIME ISSUED: 2018 10:59 PM  PATIENT NAME: Miguel Angel Piña   : 1975     [facility] MR# [if applicable]: [id number]     DIAGNOSIS / ICD - 10 CODES    Kidney Transplanted (Z94.0)    After Care Following Organ Transplant (Z48.298)    Long Term Use of Medication (Z79.899)      Every 3 months    Hemogram and Platelet    Basic Metabolic Panel (Sodium,potassium,chloride,CO2,creatinine,urea,nitrogen,glucose,calcium)    Cyclosporine drug level    Every 6 months    Urine for protein creatinine ratio      Patient should release information to the [facility].   Please fax results to the Transplant Center at [phone].  Any questions please call [phone] or [phone].      .

## 2018-12-29 NOTE — TELEPHONE ENCOUNTER
Clinic Visit on:  3/5/19  Last lab completed:  9/4/18, labs due      LPN task:  Please call Miguel Angel Piña and have Transplant labs prior to Clinic Visit.  Discuss importance of completing transplant labs regularly.  Check if recent labs completed but are not in Epicand request [facility] admin to track down.

## 2018-12-29 NOTE — PROGRESS NOTES
Chart Prep    Clinic Visit on:  3/5/19    Last lab completed:  9/4/18, labs due    Lab letter updated:  12/28/18    Lab orders up to date in Epic.

## 2018-12-31 NOTE — TELEPHONE ENCOUNTER
Call placed to patient. No answer. Voice message left instructing patient to complete transplant labs the week prior to his clinic visit.

## 2019-01-02 DIAGNOSIS — M25.531 RIGHT WRIST PAIN: ICD-10-CM

## 2019-01-02 NOTE — TELEPHONE ENCOUNTER
Requested Prescriptions   Pending Prescriptions Disp Refills     methylPREDNISolone (MEDROL DOSEPAK) 4 MG tablet therapy pack [Pharmacy Med Name: METHYLPREDNISOLONE 4MG TBPK] 21 tablet 0     Sig: FOLLOWS AS DIRECTED ON PACKAGE    There is no refill protocol information for this order      Last Written Prescription Date:  12-17-18  Last Fill Quantity: 21,  # refills: 0   Last office visit: 10/31/2018 with prescribing provider:  10-31-18   Future Office Visit:

## 2019-01-02 NOTE — TELEPHONE ENCOUNTER
Routing refill request to provider for review/approval because:  Drug not on the [facility] refill protocol     Last OV with Dr. Hutson: 10/31/18  Last filled: 12/17/18    Julia Argueta, RN, BSN

## 2019-01-02 NOTE — TELEPHONE ENCOUNTER
Called patient to inquire re: steroid refill as this was for an acute concern: R wrist pain.    No answer. Left message on voice mail for patient to call clinic. [phone]    Julia Argueta RN, BSN

## 2019-01-03 RX ORDER — METHYLPREDNISOLONE 4 MG
TABLET, DOSE PACK ORAL
Qty: 21 TABLET | Refills: 0 | Status: SHIPPED | OUTPATIENT
Start: 2019-01-03 | End: 2019-02-04

## 2019-01-03 NOTE — TELEPHONE ENCOUNTER
Called patient to notify RX sent. No answer. Left detailed VM that rx sent to pharmacy. [phone]    Closing encounter.    Julia Argueta, RN, BSN

## 2019-02-04 DIAGNOSIS — M25.531 RIGHT WRIST PAIN: ICD-10-CM

## 2019-02-04 NOTE — TELEPHONE ENCOUNTER
Miguel Angel has picked up the Medrol 4mg dose pack but he lost it and would like another refill to have of file just incase he has a gout flare.    Thank You,  Tiffanie Oviedo, Pharmacy Tech  [facility]/ [facility]

## 2019-02-05 RX ORDER — METHYLPREDNISOLONE 4 MG
TABLET, DOSE PACK ORAL
Qty: 21 TABLET | Refills: 0 | Status: SHIPPED | OUTPATIENT
Start: 2019-02-05 | End: 2019-05-06

## 2019-03-05 ENCOUNTER — OFFICE VISIT (OUTPATIENT)
Dept: NEPHROLOGY | Facility: CLINIC | Age: 44
End: 2019-03-05
Attending: INTERNAL MEDICINE

## 2019-03-05 VITALS
WEIGHT: 221.6 LBS | DIASTOLIC BLOOD PRESSURE: 93 MMHG | HEART RATE: 78 BPM | HEIGHT: 69 IN | TEMPERATURE: 98.3 F | SYSTOLIC BLOOD PRESSURE: 165 MMHG | BODY MASS INDEX: 32.82 KG/M2

## 2019-03-05 DIAGNOSIS — Z48.298 AFTERCARE FOLLOWING ORGAN TRANSPLANT: ICD-10-CM

## 2019-03-05 DIAGNOSIS — E87.20 METABOLIC ACIDOSIS: ICD-10-CM

## 2019-03-05 DIAGNOSIS — N18.4 ANEMIA IN STAGE 4 CHRONIC KIDNEY DISEASE (H): ICD-10-CM

## 2019-03-05 DIAGNOSIS — E55.9 VITAMIN D DEFICIENCY: ICD-10-CM

## 2019-03-05 DIAGNOSIS — N25.81 SECONDARY RENAL HYPERPARATHYROIDISM (H): ICD-10-CM

## 2019-03-05 DIAGNOSIS — Z94.0 KIDNEY TRANSPLANTED: ICD-10-CM

## 2019-03-05 DIAGNOSIS — Z94.0 HTN, KIDNEY TRANSPLANT RELATED: Primary | ICD-10-CM

## 2019-03-05 DIAGNOSIS — D63.1 ANEMIA IN STAGE 4 CHRONIC KIDNEY DISEASE (H): ICD-10-CM

## 2019-03-05 DIAGNOSIS — R03.0 ELEVATED BLOOD PRESSURE READING: ICD-10-CM

## 2019-03-05 DIAGNOSIS — D84.9 IMMUNOSUPPRESSION (H): ICD-10-CM

## 2019-03-05 DIAGNOSIS — I15.1 HTN, KIDNEY TRANSPLANT RELATED: Primary | ICD-10-CM

## 2019-03-05 DIAGNOSIS — Z94.0 KIDNEY REPLACED BY TRANSPLANT: ICD-10-CM

## 2019-03-05 LAB
ANION GAP SERPL CALCULATED.3IONS-SCNC: 7 MMOL/L (ref 3–14)
BUN SERPL-MCNC: 41 MG/DL (ref 7–30)
CALCIUM SERPL-MCNC: 9 MG/DL (ref 8.5–10.1)
CHLORIDE SERPL-SCNC: 108 MMOL/L (ref 94–109)
CO2 SERPL-SCNC: 18 MMOL/L (ref 20–32)
CREAT SERPL-MCNC: 2.43 MG/DL (ref 0.66–1.25)
CREAT UR-MCNC: 84 MG/DL
CYCLOSPORINE BLD LC/MS/MS-MCNC: 95 UG/L (ref 50–400)
ERYTHROCYTE [DISTWIDTH] IN BLOOD BY AUTOMATED COUNT: 13.2 % (ref 10–15)
GFR SERPL CREATININE-BSD FRML MDRD: 31 ML/MIN/{1.73_M2}
GLUCOSE SERPL-MCNC: 114 MG/DL (ref 70–99)
HCT VFR BLD AUTO: 32 % (ref 40–53)
HGB BLD-MCNC: 10.5 G/DL (ref 13.3–17.7)
MCH RBC QN AUTO: 29.3 PG (ref 26.5–33)
MCHC RBC AUTO-ENTMCNC: 32.8 G/DL (ref 31.5–36.5)
MCV RBC AUTO: 89 FL (ref 78–100)
PLATELET # BLD AUTO: 194 10E9/L (ref 150–450)
POTASSIUM SERPL-SCNC: 4.9 MMOL/L (ref 3.4–5.3)
PROT UR-MCNC: 0.73 G/L
PROT/CREAT 24H UR: 0.87 G/G CR (ref 0–0.2)
RBC # BLD AUTO: 3.58 10E12/L (ref 4.4–5.9)
SODIUM SERPL-SCNC: 133 MMOL/L (ref 133–144)
TME LAST DOSE: NORMAL H
WBC # BLD AUTO: 7.1 10E9/L (ref 4–11)

## 2019-03-05 PROCEDURE — 84156 ASSAY OF PROTEIN URINE: CPT | Performed by: INTERNAL MEDICINE

## 2019-03-05 PROCEDURE — 80048 BASIC METABOLIC PNL TOTAL CA: CPT | Performed by: INTERNAL MEDICINE

## 2019-03-05 PROCEDURE — G0463 HOSPITAL OUTPT CLINIC VISIT: HCPCS | Mod: ZF

## 2019-03-05 PROCEDURE — 85027 COMPLETE CBC AUTOMATED: CPT | Performed by: INTERNAL MEDICINE

## 2019-03-05 PROCEDURE — 80158 DRUG ASSAY CYCLOSPORINE: CPT | Performed by: INTERNAL MEDICINE

## 2019-03-05 PROCEDURE — 36415 COLL VENOUS BLD VENIPUNCTURE: CPT | Performed by: INTERNAL MEDICINE

## 2019-03-05 RX ORDER — LOSARTAN POTASSIUM 25 MG/1
25 TABLET ORAL DAILY
Qty: 90 TABLET | Refills: 3 | Status: SHIPPED | OUTPATIENT
Start: 2019-03-05 | End: 2020-02-17

## 2019-03-05 RX ORDER — FERROUS GLUCONATE 324(37.5)
TABLET ORAL DAILY
COMMUNITY

## 2019-03-05 RX ORDER — AMLODIPINE BESYLATE 5 MG/1
5 TABLET ORAL AT BEDTIME
Qty: 90 TABLET | Refills: 3 | Status: SHIPPED | OUTPATIENT
Start: 2019-03-05 | End: 2020-02-17

## 2019-03-05 ASSESSMENT — MIFFLIN-ST. JEOR: SCORE: 1890.55

## 2019-03-05 ASSESSMENT — PAIN SCALES - GENERAL: PAINLEVEL: NO PAIN (0)

## 2019-03-05 NOTE — PROGRESS NOTES
[facility]  Nephrology Clinic  Kidney/Pancreas Recipient  2019     Name: Miguel Angel Piña  MRN: [id number]   Age: 43 year old  : 1975  Referring provider: Dr Mead Admit     CHRONIC TRANSPLANT NEPHROLOGY VISIT    Assessment and Plan:   # DDKT: Stable   - Baseline Cr ~ 2.3-2.6;    - Proteinuria: Mild   - Date of DSA last checked: 2017  Latest DSA: Not checked recently   - BK Viremia: Not checked recently   - Kidney Tx Biopsy: Yes 11. Unremarkable. No evidence of acute rejection    # Immunosuppression: Cyclosporine (goal  ) and Mycophenolate mofetil (goal  1-3.5)   - Changes: No    # Prophylaxis:    - PJP: Nnone.     # Hypertension: Borderline Control; Goal BP: 130/80   - Changes: Yes - Start losartan 25 mg nightly.    # Anemia in chronic renal disease: Hgb: Stable   - Iron studies: Replete    # Mineral Bone Disorder:    - Secondary renal hyperparathyroidism; PTH level is: Minimally elevated and will recheck PTH with next clinic visit.   - Vitamin D; level is: Low and will continue on cholecalciferol.  Will recheck vitamin D level with next clinic visit.   - Calcium; level is: Normal    # Electrolytes:   - Bicarbonate; level: Low. Will start on sodium bicarbonate 650 mg bid.    # Gout: History of occasional flares and last uric acid 9.7.   - Recommend follow up with PCP to set up consult with a Rheumatologist.      # Skin Cancer Risk:    - Discussed sun protection and recommend regular follow up with Dermatology.    # Medical Compliance: No. Very inconsistent with home blood pressure readings.   - Discussed importance of checking labs regularly as recommended, taking medications as prescribed and attending scheduled medical appointments    Follow-up: Return in about 6 months (around 2019).     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 The patient was last evaluated on 09/04/18 by myself. Please see note for further details. Today, patient reports he has been doing well overall. States he has been having difficulty with gout flares and is not being followed for this by a rheumatologist. Does report that he has been inconsistent with taking his home blood pressure readings and does not frequently keep up with his PCP. Also states he has a difficult time getting to clinics as he lives further away and the weather often makes this more difficult.      Recent Hospitalizations:  [x] No [] Yes    New Medical Issues: [x] No [] Yes    Decreased energy: [x] No [] Yes    Chest pain or SOB with exertion:  [x] No [] Yes    Appetite change or weight change: [x] No [] Yes    Nausea, vomiting or diarrhea:  [x] No [] Yes    Fever, sweats or chills: [x] No [] Yes    Leg swelling: [x] No [] Yes      Home BP: Insistent with this. Last check 135/80     Review of Systems:   A comprehensive review of systems was obtained and negative, except as noted in the HPI or past medical history.     Active Medications:     Current Outpatient Medications:      amLODIPine (NORVASC) 5 MG tablet, Take 1 tablet (5 mg) by mouth At Bedtime At night, Disp: 90 tablet, Rfl: 3     CELLCEPT (BRAND) 250 MG CAPSULE, Take 4 capsules (1,000 mg) by mouth 2 times daily, Disp: 240 capsule, Rfl: 11     cholecalciferol 2000 units TABS, Take 2,000 Units by mouth daily, Disp: 90 tablet,  "Rfl: 3     Ferrous Gluconate 324 (37.5 Fe) MG TABS, Take by mouth daily, Disp: , Rfl:      losartan (COZAAR) 25 MG tablet, Take 1 tablet (25 mg) by mouth daily, Disp: 90 tablet, Rfl: 3     methylPREDNISolone (MEDROL DOSEPAK) 4 MG tablet therapy pack, FOLLOW AS DIRECTED ON PACKAGE, Disp: 21 tablet, Rfl: 0     NEORAL (BRAND) 100 MG CAPSULE, Take 1 capsule (100 mg) by mouth 2 times daily, Disp: 60 capsule, Rfl: 11      Allergies:   No known drug allergies.       Active Medical Problems:  Immunosuppressed   Secondary renal hyperparathyroidism  Gout  Vitamin D deficiency  Cerebrovascular accident  Dyslipidemia  Factor V Leiden  Hypertension      Social History:   Never smoker. Does not consume alcohol     Physical Exam:   BP (!) 165/93   Pulse 78   Temp 98.3  F (36.8  C) (Oral)   Ht 1.753 m (5' 9\")   Wt 100.5 kg (221 lb 9.6 oz)   BMI 32.72 kg/m     Wt Readings from Last 4 Encounters:   03/05/19 100.5 kg (221 lb 9.6 oz)   10/31/18 101.6 kg (224 lb)   10/22/18 98.9 kg (218 lb)   09/04/18 99.2 kg (218 lb 9.6 oz)     GENERAL APPEARANCE: alert and no distress  HENT: mouth without ulcers or lesions  LYMPHATICS: no cervical or supraclavicular nodes  RESP: lungs clear to auscultation - no rales, rhonchi or wheezes  CV: regular rhythm, normal rate, no rub, no murmur  EDEMA: no LE edema bilaterally  ABDOMEN: soft, nondistended, nontender, bowel sounds normal  MS: extremities normal - no gross deformities noted, no evidence of inflammation in joints, no muscle tenderness  SKIN: no rash  TX KIDNEY: normal   Dialysis access: LUE Graft with no thrill    Data:     Renal Latest Ref Rng & Units 3/5/2019 10/31/2018 9/4/2018   Na 133 - 144 mmol/L 133 - 135   K 3.4 - 5.3 mmol/L 4.9 - 4.9   Cl 94 - 109 mmol/L 108 - 104   CO2 20 - 32 mmol/L 18(L) - 22   BUN 7 - 30 mg/dL 41(H) - 52(H)   Cr 0.66 - 1.25 mg/dL 2.43(H) 2.59(H) 2.82(H)   Glucose 70 - 99 mg/dL 114(H) - 79   Ca  8.5 - 10.1 mg/dL 9.0 - 8.7   Mg 1.6 - 2.3 mg/dL - - -     Bone " Health Latest Ref Rng & Units 9/4/2018 9/5/2017 12/31/2010   Phos 2.5 - 4.5 mg/dL 3.5 - 2.7   PTHi 18 - 80 pg/mL 347(H) 259(H) -   Vit D Def 20 - 75 ug/L 29 24 -     Heme Latest Ref Rng & Units 3/5/2019 9/4/2018 3/14/2018   WBC 4.0 - 11.0 10e9/L 7.1 5.3 5.6   Hgb 13.3 - 17.7 g/dL 10.5(L) 11.0(L) 11.4(L)   Plt 150 - 450 10e9/L 194 160 172     Liver Latest Ref Rng & Units 9/4/2018 4/19/2011 11/16/2010   AP 40 - 150 U/L - 148 124   TBili 0.2 - 1.3 mg/dL - 1.7(H) 1.1   ALT 0 - 70 U/L - 51 34   AST 0 - 55 U/L - 60(H) 23   Tot Protein 6.8 - 8.8 g/dL - 6.7(L) 6.7(L)   Albumin 3.4 - 5.0 g/dL 3.6 4.1 3.8(L)        Iron studies Latest Ref Rng & Units 9/4/2018 9/5/2017 7/27/2010   Iron 35 - 180 ug/dL 76 39 74   Iron sat 15 - 46 % 28 14(L) 35   Ferritin 26 - 388 ng/mL 165 279 801(H)     [facility] Txp Virology Latest Ref Rng & Units 9/13/2012 12/20/2011 7/25/2011   CMV IgG EU/mL - - -   CVM DNA Quant - - - Whole blood, EDTA anticoagulant   CMV Quant <100 Copies/mL - - <100  No CMV DNA detected.   CMV QT Log <2.0 Log copies/mL - - <2.0  The Cytomegalovirus DNA Quantitation assay is a real-time polymerase chain   reaction (PCR) utilizing analyte specific reagents manufactured by [employer]   [facility]. Analyte Specific Reagents (ASRs) are used in many laboratory   tests necessary for standard medical care and generally do not require FDA   approval.   This test was developed and its performance characteristics determined by   [facility].  It has not been   cleared or approved by the US Food and Drug Administration.   BK Spec - PLASMA Plasma, EDTA anticoagulant -   BK Res <1000 copies/mL <1000 <1000 -   BK Log <3.0 Log copies/mL <3.0  The lower limit of detection for this assay is 1000 copies/mL.  Real-time TaqMan   PCR was performed using BK primers and probe for the detection of a 90 bp   portion of the  1 gene.  The performance characteristics were validated by   the [facility].   It has not been cleared or approved by the U.S. Food and Drug Administration. <3.0  The lower limit of detection for this assay is 1000 copies/mL.  Real-time TaqMan   PCR was performed using BK primers and probe for the detection of a 90 bp   portion of the  1 gene.  The performance characteristics were validated by   the [facility].   It has not been cleared or approved by the U.S. Food and Drug Administration. -   Hep B Core NEG - - -   Hep B Surf - - - -   HIV 1&2 NEG - - -        Recent Labs   Lab Test 04/19/11  0930   DOSTAC Not Provided   TACROL <3.0*     Recent Labs   Lab Test 07/25/11  0807   DOSMPA 7/24/11 AT 2000   MPAG 184.1*         Scribe Disclosure:   I, Rajat Ontiveros, am serving as a scribe to document services personally performed by Dr. Patino at this visit, based upon the provider's statements to me. All documentation has been reviewed by the aforementioned provider prior to being entered into the official medical record.     Portions of this medical record were completed by a scribe. UPON MY REVIEW AND AUTHENTICATION BY ELECTRONIC SIGNATURE, this confirms (a) I performed the applicable clinical services, and (b) the record is accurate.

## 2019-03-05 NOTE — LETTER
3/5/2019      RE: Miguel Angel Piña  [address] [address] [facility] [address]       [facility]  Nephrology Clinic  Kidney/Pancreas Recipient  2019     Name: Miguel Angel Piña  MRN: [id number]   Age: 43 year old  : 1975  Referring provider: Dr Mead Admit     CHRONIC TRANSPLANT NEPHROLOGY VISIT    Assessment and Plan:   # DDKT: Stable   - Baseline Cr ~ 2.3-2.6;    - Proteinuria: Mild   - Date of DSA last checked: 2017  Latest DSA: Not checked recently   - BK Viremia: Not checked recently   - Kidney Tx Biopsy: Yes 11. Unremarkable. No evidence of acute rejection    # Immunosuppression: Cyclosporine (goal  ) and Mycophenolate mofetil (goal  1-3.5)   - Changes: No    # Prophylaxis:    - PJP: Nnone.     # Hypertension: Borderline Control; Goal BP: 130/80   - Changes: Yes - Start losartan 25 mg nightly.    # Anemia in chronic renal disease: Hgb: Stable   - Iron studies: Replete    # Mineral Bone Disorder:    - Secondary renal hyperparathyroidism; PTH level is: Minimally elevated and will recheck PTH with next clinic visit.   - Vitamin D; level is: Low and will continue on cholecalciferol.  Will recheck vitamin D level with next clinic visit.   - Calcium; level is: Normal    # Electrolytes:   - Bicarbonate; level: Low. Will start on sodium bicarbonate 650 mg bid.    # Gout: History of occasional flares and last uric acid 9.7.   - Recommend follow up with PCP to set up consult with a Rheumatologist.      # Skin Cancer Risk:    - Discussed sun protection and recommend regular follow up with Dermatology.    # Medical Compliance: No. Very inconsistent with home blood pressure readings.   - Discussed importance of checking labs regularly as recommended, taking medications as prescribed and attending scheduled medical appointments    Follow-up: Return in about 6 months (around 2019).     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 The patient was last evaluated on 18 by myself. Please see note for further details. Today, patient reports he has been doing well overall. States he has been having difficulty with gout flares and is not being followed for this by a rheumatologist. Does report that he has been inconsistent with taking his home blood pressure readings and does not frequently keep up with his PCP. Also states he has a difficult time getting to clinics as he lives further away and the weather often makes this more difficult.      Recent Hospitalizations:  [x] No [] Yes    New Medical Issues: [x] No [] Yes    Decreased energy: [x] No [] Yes    Chest pain or SOB with exertion:  [x] No [] Yes    Appetite change or weight change: [x] No [] Yes    Nausea, vomiting or diarrhea:  [x] No [] Yes    Fever, sweats or chills: [x] No [] Yes    Leg swelling: [x] No [] Yes      Home BP: Insistent with this. Last check 135/80     Review of Systems:   A comprehensive review of systems was obtained and negative, except as noted in the HPI or past medical history.     Active Medications:     Current Outpatient Medications:      amLODIPine (NORVASC) 5 MG tablet, Take 1 tablet (5 mg) by mouth At Bedtime At night, Disp: 90 tablet, Rfl: 3     CELLCEPT (BRAND) 250 MG CAPSULE, Take 4 capsules (1,000 mg) by mouth 2 times daily, Disp: 240 capsule, Rfl:  "11     cholecalciferol 2000 units TABS, Take 2,000 Units by mouth daily, Disp: 90 tablet, Rfl: 3     Ferrous Gluconate 324 (37.5 Fe) MG TABS, Take by mouth daily, Disp: , Rfl:      losartan (COZAAR) 25 MG tablet, Take 1 tablet (25 mg) by mouth daily, Disp: 90 tablet, Rfl: 3     methylPREDNISolone (MEDROL DOSEPAK) 4 MG tablet therapy pack, FOLLOW AS DIRECTED ON PACKAGE, Disp: 21 tablet, Rfl: 0     NEORAL (BRAND) 100 MG CAPSULE, Take 1 capsule (100 mg) by mouth 2 times daily, Disp: 60 capsule, Rfl: 11      Allergies:   No known drug allergies.       Active Medical Problems:  Immunosuppressed   Secondary renal hyperparathyroidism  Gout  Vitamin D deficiency  Cerebrovascular accident  Dyslipidemia  Factor V Leiden  Hypertension      Social History:   Never smoker. Does not consume alcohol     Physical Exam:   BP (!) 165/93   Pulse 78   Temp 98.3  F (36.8  C) (Oral)   Ht 1.753 m (5' 9\")   Wt 100.5 kg (221 lb 9.6 oz)   BMI 32.72 kg/m      Wt Readings from Last 4 Encounters:   03/05/19 100.5 kg (221 lb 9.6 oz)   10/31/18 101.6 kg (224 lb)   10/22/18 98.9 kg (218 lb)   09/04/18 99.2 kg (218 lb 9.6 oz)     GENERAL APPEARANCE: alert and no distress  HENT: mouth without ulcers or lesions  LYMPHATICS: no cervical or supraclavicular nodes  RESP: lungs clear to auscultation - no rales, rhonchi or wheezes  CV: regular rhythm, normal rate, no rub, no murmur  EDEMA: no LE edema bilaterally  ABDOMEN: soft, nondistended, nontender, bowel sounds normal  MS: extremities normal - no gross deformities noted, no evidence of inflammation in joints, no muscle tenderness  SKIN: no rash  TX KIDNEY: normal   Dialysis access: LUE Graft with no thrill    Data:     Renal Latest Ref Rng & Units 3/5/2019 10/31/2018 9/4/2018   Na 133 - 144 mmol/L 133 - 135   K 3.4 - 5.3 mmol/L 4.9 - 4.9   Cl 94 - 109 mmol/L 108 - 104   CO2 20 - 32 mmol/L 18(L) - 22   BUN 7 - 30 mg/dL 41(H) - 52(H)   Cr 0.66 - 1.25 mg/dL 2.43(H) 2.59(H) 2.82(H)   Glucose 70 - 99 " mg/dL 114(H) - 79   Ca  8.5 - 10.1 mg/dL 9.0 - 8.7   Mg 1.6 - 2.3 mg/dL - - -     Bone Health Latest Ref Rng & Units 9/4/2018 9/5/2017 12/31/2010   Phos 2.5 - 4.5 mg/dL 3.5 - 2.7   PTHi 18 - 80 pg/mL 347(H) 259(H) -   Vit D Def 20 - 75 ug/L 29 24 -     Heme Latest Ref Rng & Units 3/5/2019 9/4/2018 3/14/2018   WBC 4.0 - 11.0 10e9/L 7.1 5.3 5.6   Hgb 13.3 - 17.7 g/dL 10.5(L) 11.0(L) 11.4(L)   Plt 150 - 450 10e9/L 194 160 172     Liver Latest Ref Rng & Units 9/4/2018 4/19/2011 11/16/2010   AP 40 - 150 U/L - 148 124   TBili 0.2 - 1.3 mg/dL - 1.7(H) 1.1   ALT 0 - 70 U/L - 51 34   AST 0 - 55 U/L - 60(H) 23   Tot Protein 6.8 - 8.8 g/dL - 6.7(L) 6.7(L)   Albumin 3.4 - 5.0 g/dL 3.6 4.1 3.8(L)        Iron studies Latest Ref Rng & Units 9/4/2018 9/5/2017 7/27/2010   Iron 35 - 180 ug/dL 76 39 74   Iron sat 15 - 46 % 28 14(L) 35   Ferritin 26 - 388 ng/mL 165 279 801(H)     [facility] Txp Virology Latest Ref Rng & Units 9/13/2012 12/20/2011 7/25/2011   CMV IgG EU/mL - - -   CVM DNA Quant - - - Whole blood, EDTA anticoagulant   CMV Quant <100 Copies/mL - - <100  No CMV DNA detected.   CMV QT Log <2.0 Log copies/mL - - <2.0  The Cytomegalovirus DNA Quantitation assay is a real-time polymerase chain   reaction (PCR) utilizing analyte specific reagents manufactured by [employer]   [facility]. Analyte Specific Reagents (ASRs) are used in many laboratory   tests necessary for standard medical care and generally do not require FDA   approval.   This test was developed and its performance characteristics determined by   [facility].  It has not been   cleared or approved by the US Food and Drug Administration.   BK Spec - PLASMA Plasma, EDTA anticoagulant -   BK Res <1000 copies/mL <1000 <1000 -   BK Log <3.0 Log copies/mL <3.0  The lower limit of detection for this assay is 1000 copies/mL.  Real-time TaqMan   PCR was performed using BK primers and probe for the detection of a 90 bp   portion of the   1 gene.  The performance characteristics were validated by   the [facility].   It has not been cleared or approved by the U.S. Food and Drug Administration. <3.0  The lower limit of detection for this assay is 1000 copies/mL.  Real-time TaqMan   PCR was performed using BK primers and probe for the detection of a 90 bp   portion of the  1 gene.  The performance characteristics were validated by   the [facility].   It has not been cleared or approved by the U.S. Food and Drug Administration. -   Hep B Core NEG - - -   Hep B Surf - - - -   HIV 1&2 NEG - - -        Recent Labs   Lab Test 04/19/11  0930   DOSTAC Not Provided   TACROL <3.0*     Recent Labs   Lab Test 07/25/11  0807   DOSMPA 7/24/11 AT 2000   MPAG 184.1*         Scribe Disclosure:   I, Rajat Ontiveros, am serving as a scribe to document services personally performed by Dr. Patino at this visit, based upon the provider's statements to me. All documentation has been reviewed by the aforementioned provider prior to being entered into the official medical record.     Portions of this medical record were completed by a scribe. UPON MY REVIEW AND AUTHENTICATION BY ELECTRONIC SIGNATURE, this confirms (a) I performed the applicable clinical services, and (b) the record is accurate.       Lan Patino MD

## 2019-03-05 NOTE — NURSING NOTE
"BP (!) 165/93   Pulse 78   Temp 98.3  F (36.8  C) (Oral)   Ht 1.753 m (5' 9\")   Wt 100.5 kg (221 lb 9.6 oz)   BMI 32.72 kg/m    Chief Complaint   Patient presents with     RECHECK     follow up with kidney transplant, [facility]immer cma       "

## 2019-03-06 DIAGNOSIS — Z94.0 KIDNEY TRANSPLANTED: Primary | ICD-10-CM

## 2019-03-06 NOTE — PROGRESS NOTES
Pt in clinic for f/u appointment.  Per Dr Patino, pt started on Losartan and will need updated BMP in 2 weeks. Order placed.  Message sent to nephrology to check in on pt BP in 1 week.

## 2019-03-07 ENCOUNTER — TELEPHONE (OUTPATIENT)
Dept: TRANSPLANT | Facility: CLINIC | Age: 44
End: 2019-03-07

## 2019-03-07 PROBLEM — D84.9 IMMUNOSUPPRESSION (H): Status: ACTIVE | Noted: 2017-09-08

## 2019-03-07 RX ORDER — SODIUM BICARBONATE 650 MG/1
650 TABLET ORAL 4 TIMES DAILY
Qty: 180 TABLET | Refills: 3 | Status: SHIPPED | OUTPATIENT
Start: 2019-03-07 | End: 2019-09-05

## 2019-03-07 NOTE — TELEPHONE ENCOUNTER
ISSUE:  Hanh Weller, Arline Chacon RN Hey- Spong saw him the other day and is finishing a note.  He wants him to start sodium bicarb 650 mg BID.  He sent the rx, but just wants you to give him a call to let him know.     Gissel      OUTCOME:   Call placed to pt. Pt aware his CO2 level 18. Pt denies diarrhea. Pt educated per Dr Patino to start Sodium Bicarb 650 mg BID. RX placed.

## 2019-03-13 ENCOUNTER — CARE COORDINATION (OUTPATIENT)
Dept: NEPHROLOGY | Facility: CLINIC | Age: 44
End: 2019-03-13

## 2019-03-18 ENCOUNTER — TELEPHONE (OUTPATIENT)
Dept: NEPHROLOGY | Facility: CLINIC | Age: 44
End: 2019-03-18

## 2019-03-20 ENCOUNTER — CARE COORDINATION (OUTPATIENT)
Dept: NEPHROLOGY | Facility: CLINIC | Age: 44
End: 2019-03-20

## 2019-05-06 DIAGNOSIS — M25.531 RIGHT WRIST PAIN: ICD-10-CM

## 2019-05-06 RX ORDER — METHYLPREDNISOLONE 4 MG
TABLET, DOSE PACK ORAL
Qty: 21 TABLET | Refills: 0 | Status: SHIPPED | OUTPATIENT
Start: 2019-05-06 | End: 2019-05-29

## 2019-05-06 NOTE — TELEPHONE ENCOUNTER
Patient returned call.  Patient is having a gout flare to his left ankle and foot and requesting med refill.  Missed work do to flare today.  Please advise.

## 2019-05-06 NOTE — TELEPHONE ENCOUNTER
Requested Prescriptions   Pending Prescriptions Disp Refills     methylPREDNISolone (MEDROL DOSEPAK) 4 MG tablet therapy pack [Pharmacy Med Name: METHYLPREDNISOLONE 4MG TBPK] 21 tablet 0     Sig: FOLLOW AS DIRECTED ON PACKAGE       There is no refill protocol information for this order        Requested Prescriptions   Last Written Prescription Date:  2-5-19  Last Fill Quantity: 21,  # refills: 0   Last office visit: 10/31/2018 with prescribing provider:  10-31-18   Future Office Visit:

## 2019-05-06 NOTE — TELEPHONE ENCOUNTER
Left message on voice mail for patient to call clinic. [phone]  Need to know why patient is requesting med.  Not a routine med.

## 2019-05-06 NOTE — TELEPHONE ENCOUNTER
Spoke with patient, advised him of RX sent to the pharmacy and he can contact them to arrange for , given pharmacy hours also. Patient verbalized understanding and agrees with plan.     Julia Argueta, RN, BSN

## 2019-05-29 DIAGNOSIS — M25.531 RIGHT WRIST PAIN: ICD-10-CM

## 2019-05-29 NOTE — TELEPHONE ENCOUNTER
Requested Prescriptions   Pending Prescriptions Disp Refills     methylPREDNISolone (MEDROL DOSEPAK) 4 MG tablet therapy pack [Pharmacy Med Name: METHYLPREDNISOLONE 4MG TBPK] 21 tablet 0     Sig: FOLLOW AS DIRECTED ON PACKAGE  methylprednisone      Last Written Prescription Date:  5/6/19  Last Fill Quantity: 21,   # refills: 0  Last Office Visit: 10/31/18 Haresh  Future Office visit:       Routing refill request to provider for review/approval because:  Drug not on the [facility] [place] or [facility] refill protocol or controlled substance       There is no refill protocol information for this order

## 2019-05-30 RX ORDER — METHYLPREDNISOLONE 4 MG
TABLET, DOSE PACK ORAL
Qty: 21 TABLET | Refills: 0 | Status: SHIPPED | OUTPATIENT
Start: 2019-05-30 | End: 2019-07-26

## 2019-05-30 NOTE — TELEPHONE ENCOUNTER
Not a routine med.  Patient just had a RX ordered on 5-6-19 for gout flare.  Spoke with patient and per him his gout is better.  Patient requesting refill to have on hand due to recurring flares.  Will send to provider to advise.

## 2019-06-12 DIAGNOSIS — Z94.0 KIDNEY TRANSPLANTED: ICD-10-CM

## 2019-06-12 LAB
ANION GAP SERPL CALCULATED.3IONS-SCNC: 10 MMOL/L (ref 3–14)
BUN SERPL-MCNC: 55 MG/DL (ref 7–30)
CALCIUM SERPL-MCNC: 9 MG/DL (ref 8.5–10.1)
CHLORIDE SERPL-SCNC: 110 MMOL/L (ref 94–109)
CO2 SERPL-SCNC: 16 MMOL/L (ref 20–32)
CREAT SERPL-MCNC: 2.6 MG/DL (ref 0.66–1.25)
CREAT UR-MCNC: 93 MG/DL
ERYTHROCYTE [DISTWIDTH] IN BLOOD BY AUTOMATED COUNT: 13.3 % (ref 10–15)
GFR SERPL CREATININE-BSD FRML MDRD: 29 ML/MIN/{1.73_M2}
GLUCOSE SERPL-MCNC: 104 MG/DL (ref 70–99)
HCT VFR BLD AUTO: 30 % (ref 40–53)
HGB BLD-MCNC: 9.6 G/DL (ref 13.3–17.7)
MCH RBC QN AUTO: 29 PG (ref 26.5–33)
MCHC RBC AUTO-ENTMCNC: 32 G/DL (ref 31.5–36.5)
MCV RBC AUTO: 91 FL (ref 78–100)
PLATELET # BLD AUTO: 178 10E9/L (ref 150–450)
POTASSIUM SERPL-SCNC: 4.9 MMOL/L (ref 3.4–5.3)
PROT UR-MCNC: 0.41 G/L
PROT/CREAT 24H UR: 0.44 G/G CR (ref 0–0.2)
RBC # BLD AUTO: 3.31 10E12/L (ref 4.4–5.9)
SODIUM SERPL-SCNC: 136 MMOL/L (ref 133–144)
WBC # BLD AUTO: 6.5 10E9/L (ref 4–11)

## 2019-06-12 PROCEDURE — 85027 COMPLETE CBC AUTOMATED: CPT | Performed by: INTERNAL MEDICINE

## 2019-06-12 PROCEDURE — 84156 ASSAY OF PROTEIN URINE: CPT | Performed by: INTERNAL MEDICINE

## 2019-06-12 PROCEDURE — 36415 COLL VENOUS BLD VENIPUNCTURE: CPT | Performed by: INTERNAL MEDICINE

## 2019-06-12 PROCEDURE — 80158 DRUG ASSAY CYCLOSPORINE: CPT | Performed by: INTERNAL MEDICINE

## 2019-06-12 PROCEDURE — 80048 BASIC METABOLIC PNL TOTAL CA: CPT | Performed by: INTERNAL MEDICINE

## 2019-06-13 ENCOUNTER — TELEPHONE (OUTPATIENT)
Dept: TRANSPLANT | Facility: CLINIC | Age: 44
End: 2019-06-13

## 2019-06-13 LAB
CYCLOSPORINE BLD LC/MS/MS-MCNC: 107 UG/L (ref 50–400)
TME LAST DOSE: NORMAL H

## 2019-06-13 NOTE — TELEPHONE ENCOUNTER
Notes recorded by Lan Patino MD on 6/13/2019 at 11:09 AM CDT  Stable kidney function, but low serum bicarbonate level.    Would ensure patient is taking sodium bicarbonate supplement and if he is, then would increase sodium bicarbonate to 1300 mg tid.     Carbon dioxide = 16    Called Miguel Angel Piña who reports he wasn't taking it last week but now he is.  No dose change made.

## 2019-07-26 ENCOUNTER — TELEPHONE (OUTPATIENT)
Dept: FAMILY MEDICINE | Facility: CLINIC | Age: 44
End: 2019-07-26

## 2019-07-26 DIAGNOSIS — M25.531 RIGHT WRIST PAIN: ICD-10-CM

## 2019-07-26 RX ORDER — METHYLPREDNISOLONE 4 MG
TABLET, DOSE PACK ORAL
Qty: 21 TABLET | Refills: 0 | Status: SHIPPED | OUTPATIENT
Start: 2019-07-26 | End: 2019-09-30

## 2019-07-26 NOTE — TELEPHONE ENCOUNTER
Patient calling is completely out of his prednisone, ran out today. Has contacted pharmacy they told him to contact  directly for refill.

## 2019-07-26 NOTE — TELEPHONE ENCOUNTER
Requested Prescriptions   Pending Prescriptions Disp Refills     methylPREDNISolone (MEDROL DOSEPAK) 4 MG tablet therapy pack [Pharmacy Med Name: METHYLPREDNISOLONE 4MG TBPK] 21 tablet 0     Sig: FOLLOW AS DIRECTED ON PACKAGE  methylprednisolone      Last Written Prescription Date: 5/31/19  Last Fill Quantity: 21,   # refills: 0  Last Office Visit: 10/31/18 Haresh  Future Office visit:       Routing refill request to provider for review/approval because:  Drug not on the [facility] P or [facility] refill protocol or controlled substance       There is no refill protocol information for this order

## 2019-07-26 NOTE — TELEPHONE ENCOUNTER
Rx completed. Please remind patient that he is due for a Physical and fasting labs- schedule at earliest convenience.

## 2019-09-05 ENCOUNTER — TELEPHONE (OUTPATIENT)
Dept: NEPHROLOGY | Facility: CLINIC | Age: 44
End: 2019-09-05

## 2019-09-05 ENCOUNTER — OFFICE VISIT (OUTPATIENT)
Dept: NEPHROLOGY | Facility: CLINIC | Age: 44
End: 2019-09-05
Attending: INTERNAL MEDICINE
Payer: COMMERCIAL

## 2019-09-05 VITALS
DIASTOLIC BLOOD PRESSURE: 82 MMHG | SYSTOLIC BLOOD PRESSURE: 130 MMHG | OXYGEN SATURATION: 100 % | TEMPERATURE: 97.9 F | HEART RATE: 79 BPM | BODY MASS INDEX: 32.38 KG/M2 | WEIGHT: 219.3 LBS

## 2019-09-05 DIAGNOSIS — D84.9 IMMUNOSUPPRESSION (H): ICD-10-CM

## 2019-09-05 DIAGNOSIS — Z94.0 HTN, KIDNEY TRANSPLANT RELATED: ICD-10-CM

## 2019-09-05 DIAGNOSIS — Z48.298 AFTERCARE FOLLOWING ORGAN TRANSPLANT: ICD-10-CM

## 2019-09-05 DIAGNOSIS — Z94.0 KIDNEY TRANSPLANTED: Primary | ICD-10-CM

## 2019-09-05 DIAGNOSIS — I15.1 HTN, KIDNEY TRANSPLANT RELATED: ICD-10-CM

## 2019-09-05 DIAGNOSIS — Z94.0 KIDNEY REPLACED BY TRANSPLANT: ICD-10-CM

## 2019-09-05 DIAGNOSIS — E87.20 METABOLIC ACIDOSIS: ICD-10-CM

## 2019-09-05 PROCEDURE — G0463 HOSPITAL OUTPT CLINIC VISIT: HCPCS | Mod: ZF

## 2019-09-05 RX ORDER — SODIUM BICARBONATE 650 MG/1
1300 TABLET ORAL 2 TIMES DAILY
Qty: 360 TABLET | Refills: 3 | Status: SHIPPED | OUTPATIENT
Start: 2019-09-05 | End: 2020-09-25

## 2019-09-05 ASSESSMENT — PAIN SCALES - GENERAL: PAINLEVEL: NO PAIN (0)

## 2019-09-05 NOTE — NURSING NOTE
"Chief Complaint   Patient presents with     RECHECK     Follow up Kidney TX     Vital signs:  Temp: 97.9  F (36.6  C)   BP: 130/82 Pulse: 79     SpO2: 100 %       Weight: 99.5 kg (219 lb 4.8 oz)  Estimated body mass index is 32.38 kg/m  as calculated from the following:    Height as of 3/5/19: 1.753 m (5' 9\").    Weight as of this encounter: 99.5 kg (219 lb 4.8 oz).        April Rick, CMA    "

## 2019-09-05 NOTE — LETTER
2019      RE: Miguel Angel Piña  [id number] [facility] [id number]       CHRONIC TRANSPLANT NEPHROLOGY VISIT    Assessment & Plan   # DDKT: Stable   - Baseline Cr ~ 2.3-2.6   - Proteinuria: Minimal (0.2-0.5 grams)   - Date DSA Last Checked: Sep/2017       Latest DSA: No   - BK Viremia: No   - Kidney Tx Biopsy: 2011; Result: Unremarkable. No diagnostic evidence of acute rejection.     # Immunosuppression: Cyclosporine (goal  ) and Mycophenolate mofetil (goal not followed)   - Changes: No    # Prophylaxis:   - PJP: None    # Hypertension: Borderline control; Goal BP: < 130/80   - Changes: No    - Instructed patient to check blood pressure at home.     # Anemia in Chronic Renal Disease: Hgb: Stable      CINDY: No   - Iron studies: Replete, on oral iron    # Mineral Bone Disorder:   - Secondary renal hyperparathyroidism; PTH level: Moderately elevated (301-600 pg/ml)  - Vitamin D; level: Low, on supplement  - Calcium; level: Normal     # Electrolytes:   - Potassium; level: Normal  - Bicarbonate; level: Low    - Not currently taking, will take 2 tablets BID.   - Sodium; level: Normal    # Gout: No recent gout flares. High uric acid at last check. Will recheck with next labs.     # Skin Cancer Risk:    - Discussed sun protection and recommend regular follow up with Dermatology.    # Medical Compliance: Yes    # Transplant History:  Etiology of kidney failure: Medullary cystic kidney disease  Tx: DDKT  Transplant: 2010 (Kidney), 11/3/1993 (Kidney)  Donor Type:  - Brain Death Donor Class: Standard Criteria Donor  Significant changes in immunosuppression: None  Significant transplant-related complications: None    Transplant Office Phone Number: [phone]    Assessment and plan was discussed with the patient and he voiced his understanding and agreement.    Return visit: No follow-ups on file.    Chief Complaint   Mr. Piña is a 43 year old here for routine follow up.    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 He was last seen in clinic by Dr. Patino on 3/5/19; please see that note for further details.     Since his last visit, the patient reports nothing new. He has been working and is on his feet a lot with his job. He does not describe any exercise other than his work, and is not very active. The patient thinks that he should be better with hydration, but was cautioned to not overdo it.     Today, the patient continues to feel well. He reported not taking his sodium bicarbonate today. He had no other questions or concerns today. The patient is due for a Dermatology check up, and was told to schedule one in the near future.     Recent Hospitalizations:  [x] No [] Yes    New Medical Issues: [x] No [] Yes    Decreased energy: [x] No [] Yes    Chest pain or SOB with exertion:  [x] No [] Yes    Appetite change or weight change: [x] No [] Yes    Nausea, vomiting or diarrhea:  [x] No [] Yes    Fever, sweats or chills: [x] No [] Yes    Leg swelling: [] No [x] Yes Trace bilaterally     Home BP:     Review of Systems   A comprehensive review of systems was obtained and negative, except as noted in the HPI or PMH.    Problem List   Patient Active Problem List   Diagnosis     Kidney replaced by transplant     Medullary cystic kidney disease     CARDIOVASCULAR SCREENING; LDL GOAL LESS THAN 100     Conductive hearing loss, unilateral     Aftercare following organ transplant     Immunosuppression (H)     Anemia in chronic renal disease     Secondary renal hyperparathyroidism (H)     Gout     Vitamin D deficiency     Cerebrovascular accident (H)     Dyslipidemia     Factor V Leiden (H)     HTN, kidney transplant related     CKD (chronic kidney disease) stage 4, GFR 15-29 ml/min (H)       Social History   Social History     Tobacco Use      Smoking status: Never Smoker     Smokeless tobacco: Never Used   Substance Use Topics     Alcohol use: No     Drug use: No       Allergies   No Known Allergies    Medications   Current Outpatient Medications   Medication Sig     amLODIPine (NORVASC) 5 MG tablet Take 1 tablet (5 mg) by mouth At Bedtime At night     CELLCEPT (BRAND) 250 MG CAPSULE Take 4 capsules (1,000 mg) by mouth 2 times daily     cholecalciferol 2000 units TABS Take 2,000 Units by mouth daily     Ferrous Gluconate 324 (37.5 Fe) MG TABS Take by mouth daily     losartan (COZAAR) 25 MG tablet Take 1 tablet (25 mg) by mouth daily     methylPREDNISolone (MEDROL DOSEPAK) 4 MG tablet therapy pack FOLLOW AS DIRECTED ON PACKAGE     NEORAL (BRAND) 100 MG CAPSULE Take 1 capsule (100 mg) by mouth 2 times daily     sodium bicarbonate 650 MG tablet Take 1 tablet (650 mg) by mouth 4 times daily     No current facility-administered medications for this visit.      There are no discontinued medications.    Physical Exam   Vital Signs: /82   Pulse 79   Temp 97.9  F (36.6  C)   Wt 99.5 kg (219 lb 4.8 oz)   SpO2 100%   BMI 32.38 kg/m       GENERAL APPEARANCE: alert and no distress  HENT: mouth without ulcers or lesions  LYMPHATICS: no cervical or supraclavicular nodes  RESP: lungs clear to auscultation - no rales, rhonchi or wheezes  CV: regular rhythm, normal rate, no rub, no murmur  EDEMA: no LE edema bilaterally  ABDOMEN: soft, nondistended, nontender, bowel sounds normal  MS: extremities normal - no gross deformities noted, no evidence of inflammation in joints, no muscle tenderness  SKIN: no rash  TX KIDNEY: normal  DIALYSIS ACCESS:  None      Data     Renal Latest Ref Rng & Units 6/12/2019 3/5/2019 10/31/2018   Na 133 - 144 mmol/L 136 133 -   K 3.4 - 5.3 mmol/L 4.9 4.9 -   Cl 94 - 109 mmol/L 110(H) 108 -   CO2 20 - 32 mmol/L 16(L) 18(L) -   BUN 7 - 30 mg/dL 55(H) 41(H) -   Cr 0.66 - 1.25 mg/dL 2.60(H) 2.43(H) 2.59(H)   Glucose 70 - 99 mg/dL 104(H)  114(H) -   Ca  8.5 - 10.1 mg/dL 9.0 9.0 -   Mg 1.6 - 2.3 mg/dL - - -     Bone Health Latest Ref Rng & Units 9/4/2018 9/5/2017 12/31/2010   Phos 2.5 - 4.5 mg/dL 3.5 - 2.7   PTHi 18 - 80 pg/mL 347(H) 259(H) -   Vit D Def 20 - 75 ug/L 29 24 -     Heme Latest Ref Rng & Units 6/12/2019 3/5/2019 9/4/2018   WBC 4.0 - 11.0 10e9/L 6.5 7.1 5.3   Hgb 13.3 - 17.7 g/dL 9.6(L) 10.5(L) 11.0(L)   Plt 150 - 450 10e9/L 178 194 160     Liver Latest Ref Rng & Units 9/4/2018 4/19/2011 11/16/2010   AP 40 - 150 U/L - 148 124   TBili 0.2 - 1.3 mg/dL - 1.7(H) 1.1   ALT 0 - 70 U/L - 51 34   AST 0 - 55 U/L - 60(H) 23   Tot Protein 6.8 - 8.8 g/dL - 6.7(L) 6.7(L)   Albumin 3.4 - 5.0 g/dL 3.6 4.1 3.8(L)        Iron studies Latest Ref Rng & Units 9/4/2018 9/5/2017 7/27/2010   Iron 35 - 180 ug/dL 76 39 74   Iron sat 15 - 46 % 28 14(L) 35   Ferritin 26 - 388 ng/mL 165 279 801(H)     [facility] Txp Virology Latest Ref Rng & Units 9/13/2012 12/20/2011 7/25/2011   CMV IgG EU/mL - - -   CVM DNA Quant - - - Whole blood, EDTA anticoagulant   CMV Quant <100 Copies/mL - - <100  No CMV DNA detected.   CMV QT Log <2.0 Log copies/mL - - <2.0  The Cytomegalovirus DNA Quantitation assay is a real-time polymerase chain   reaction (PCR) utilizing analyte specific reagents manufactured by [employer]   [facility]. Analyte Specific Reagents (ASRs) are used in many laboratory   tests necessary for standard medical care and generally do not require FDA   approval.   This test was developed and its performance characteristics determined by   [facility].  It has not been   cleared or approved by the US Food and Drug Administration.   BK Spec - PLASMA Plasma, EDTA anticoagulant -   BK Res <1000 copies/mL <1000 <1000 -   BK Log <3.0 Log copies/mL <3.0  The lower limit of detection for this assay is 1000 copies/mL.  Real-time TaqMan   PCR was performed using BK primers and probe for the detection of a 90 bp   portion of the  1 gene.   The performance characteristics were validated by   the [facility].   It has not been cleared or approved by the U.S. Food and Drug Administration. <3.0  The lower limit of detection for this assay is 1000 copies/mL.  Real-time TaqMan   PCR was performed using BK primers and probe for the detection of a 90 bp   portion of the  1 gene.  The performance characteristics were validated by   the [facility].   It has not been cleared or approved by the U.S. Food and Drug Administration. -   Hep B Core NEG - - -   Hep B Surf - - - -   HIV 1&2 NEG - - -            Recent Labs   Lab Test 07/25/11  0807   DOSMPA 7/24/11 AT 2000   MPAG 184.1*     Scribe Disclosure:  I, Alisha Mock, am serving as a scribe to document services personally performed by Lan Patino M.D. at this visit, based upon the provider's statements to me. All documentation has been reviewed by the aforementioned provider prior to being entered into the official medical record.       Kidney/Pancreas Recipient

## 2019-09-05 NOTE — NURSING NOTE
Miguel Angel Piña was seen today in clinic by this writer. Medications, lab orders, lab frequency, and necessary follow up discussed with patient. Patient was provided with a copy of the current lab letter. Patient voiced understanding and agreement of education and plan.     Hanh Weller RN

## 2019-09-05 NOTE — PROGRESS NOTES
CHRONIC TRANSPLANT NEPHROLOGY VISIT    Assessment & Plan   # DDKT: Stable   - Baseline Cr ~ 2.3-2.6   - Proteinuria: Minimal (0.2-0.5 grams)   - Date DSA Last Checked: Sep/2017       Latest DSA: No   - BK Viremia: No   - Kidney Tx Biopsy: 2011; Result: Unremarkable. No diagnostic evidence of acute rejection.     # Immunosuppression: Cyclosporine (goal  ) and Mycophenolate mofetil (goal not followed)   - Changes: No    # Prophylaxis:   - PJP: None    # Hypertension: Borderline control; Goal BP: < 130/80   - Changes: No    - Instructed patient to check blood pressure at home.     # Anemia in Chronic Renal Disease: Hgb: Stable      CINDY: No   - Iron studies: Replete, on oral iron    # Mineral Bone Disorder:   - Secondary renal hyperparathyroidism; PTH level: Moderately elevated (301-600 pg/ml)  - Vitamin D; level: Low, on supplement  - Calcium; level: Normal     # Electrolytes:   - Potassium; level: Normal  - Bicarbonate; level: Low    - Not currently taking, will take 2 tablets BID.   - Sodium; level: Normal    # Gout: No recent gout flares. High uric acid at last check. Will recheck with next labs.     # Skin Cancer Risk:    - Discussed sun protection and recommend regular follow up with Dermatology.    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    Return visit: Return in about 6 months (around 3/5/2020).    Chief Complaint   Mr. Piña is a 43 year old here for routine follow up.    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 He was last seen in clinic by Dr. Patino on 3/5/19; please see that note for further details.     Since his last visit, the patient reports nothing new. He has been working and is on his feet a lot with his job. He does not describe any exercise other than his work, and is not very active. The patient thinks that he should be better with hydration, but was cautioned to not overdo it.     Today, the patient continues to feel well. He reported not taking his sodium bicarbonate today. He had no other questions or concerns today. The patient is due for a Dermatology check up, and was told to schedule one in the near future.     Recent Hospitalizations:  [x] No [] Yes    New Medical Issues: [x] No [] Yes    Decreased energy: [x] No [] Yes    Chest pain or SOB with exertion:  [x] No [] Yes    Appetite change or weight change: [x] No [] Yes    Nausea, vomiting or diarrhea:  [x] No [] Yes    Fever, sweats or chills: [x] No [] Yes    Leg swelling: [] No [x] Yes Trace bilaterally     Home BP:     Review of Systems   A comprehensive review of systems was obtained and negative, except as noted in the HPI or PMH.    Problem List   Patient Active Problem List   Diagnosis     Kidney replaced by transplant     Medullary cystic kidney disease     CARDIOVASCULAR SCREENING; LDL GOAL LESS THAN 100     Conductive hearing loss, unilateral     Aftercare following organ transplant     Immunosuppression (H)     Anemia in chronic renal disease     Secondary renal hyperparathyroidism (H)     Gout     Vitamin D deficiency     Cerebrovascular accident (H)     Dyslipidemia     Factor V Leiden (H)     HTN, kidney transplant related     CKD (chronic kidney disease) stage 4, GFR 15-29 ml/min (H)       Social History   Social History     Tobacco Use     Smoking status: Never Smoker     Smokeless tobacco: Never Used    Substance Use Topics     Alcohol use: No     Drug use: No       Allergies   No Known Allergies    Medications   Current Outpatient Medications   Medication Sig     amLODIPine (NORVASC) 5 MG tablet Take 1 tablet (5 mg) by mouth At Bedtime At night     CELLCEPT (BRAND) 250 MG CAPSULE Take 4 capsules (1,000 mg) by mouth 2 times daily     cholecalciferol 2000 units TABS Take 2,000 Units by mouth daily     Ferrous Gluconate 324 (37.5 Fe) MG TABS Take by mouth daily     losartan (COZAAR) 25 MG tablet Take 1 tablet (25 mg) by mouth daily     methylPREDNISolone (MEDROL DOSEPAK) 4 MG tablet therapy pack FOLLOW AS DIRECTED ON PACKAGE     NEORAL (BRAND) 100 MG CAPSULE Take 1 capsule (100 mg) by mouth 2 times daily     sodium bicarbonate 650 MG tablet Take 1 tablet (650 mg) by mouth 4 times daily     No current facility-administered medications for this visit.      There are no discontinued medications.    Physical Exam   Vital Signs: /82   Pulse 79   Temp 97.9  F (36.6  C)   Wt 99.5 kg (219 lb 4.8 oz)   SpO2 100%   BMI 32.38 kg/m      GENERAL APPEARANCE: alert and no distress  HENT: mouth without ulcers or lesions  LYMPHATICS: no cervical or supraclavicular nodes  RESP: lungs clear to auscultation - no rales, rhonchi or wheezes  CV: regular rhythm, normal rate, no rub, no murmur  EDEMA: no LE edema bilaterally  ABDOMEN: soft, nondistended, nontender, bowel sounds normal  MS: extremities normal - no gross deformities noted, no evidence of inflammation in joints, no muscle tenderness  SKIN: no rash  TX KIDNEY: normal  DIALYSIS ACCESS:  None      Data     Renal Latest Ref Rng & Units 6/12/2019 3/5/2019 10/31/2018   Na 133 - 144 mmol/L 136 133 -   K 3.4 - 5.3 mmol/L 4.9 4.9 -   Cl 94 - 109 mmol/L 110(H) 108 -   CO2 20 - 32 mmol/L 16(L) 18(L) -   BUN 7 - 30 mg/dL 55(H) 41(H) -   Cr 0.66 - 1.25 mg/dL 2.60(H) 2.43(H) 2.59(H)   Glucose 70 - 99 mg/dL 104(H) 114(H) -   Ca  8.5 - 10.1 mg/dL 9.0 9.0 -   Mg 1.6 - 2.3 mg/dL - -  -     Bone Health Latest Ref Rng & Units 9/4/2018 9/5/2017 12/31/2010   Phos 2.5 - 4.5 mg/dL 3.5 - 2.7   PTHi 18 - 80 pg/mL 347(H) 259(H) -   Vit D Def 20 - 75 ug/L 29 24 -     Heme Latest Ref Rng & Units 6/12/2019 3/5/2019 9/4/2018   WBC 4.0 - 11.0 10e9/L 6.5 7.1 5.3   Hgb 13.3 - 17.7 g/dL 9.6(L) 10.5(L) 11.0(L)   Plt 150 - 450 10e9/L 178 194 160     Liver Latest Ref Rng & Units 9/4/2018 4/19/2011 11/16/2010   AP 40 - 150 U/L - 148 124   TBili 0.2 - 1.3 mg/dL - 1.7(H) 1.1   ALT 0 - 70 U/L - 51 34   AST 0 - 55 U/L - 60(H) 23   Tot Protein 6.8 - 8.8 g/dL - 6.7(L) 6.7(L)   Albumin 3.4 - 5.0 g/dL 3.6 4.1 3.8(L)        Iron studies Latest Ref Rng & Units 9/4/2018 9/5/2017 7/27/2010   Iron 35 - 180 ug/dL 76 39 74   Iron sat 15 - 46 % 28 14(L) 35   Ferritin 26 - 388 ng/mL 165 279 801(H)     [facility] Txp Virology Latest Ref Rng & Units 9/13/2012 12/20/2011 7/25/2011   CMV IgG EU/mL - - -   CVM DNA Quant - - - Whole blood, EDTA anticoagulant   CMV Quant <100 Copies/mL - - <100  No CMV DNA detected.   CMV QT Log <2.0 Log copies/mL - - <2.0  The Cytomegalovirus DNA Quantitation assay is a real-time polymerase chain   reaction (PCR) utilizing analyte specific reagents manufactured by [employer]   [facility]. Analyte Specific Reagents (ASRs) are used in many laboratory   tests necessary for standard medical care and generally do not require FDA   approval.   This test was developed and its performance characteristics determined by   [facility].  It has not been   cleared or approved by the US Food and Drug Administration.   BK Spec - PLASMA Plasma, EDTA anticoagulant -   BK Res <1000 copies/mL <1000 <1000 -   BK Log <3.0 Log copies/mL <3.0  The lower limit of detection for this assay is 1000 copies/mL.  Real-time TaqMan   PCR was performed using BK primers and probe for the detection of a 90 bp   portion of the  1 gene.  The performance characteristics were validated by   the [place]  [facility].   It has not been cleared or approved by the U.S. Food and Drug Administration. <3.0  The lower limit of detection for this assay is 1000 copies/mL.  Real-time TaqMan   PCR was performed using BK primers and probe for the detection of a 90 bp   portion of the  1 gene.  The performance characteristics were validated by   the [facility].   It has not been cleared or approved by the U.S. Food and Drug Administration. -   Hep B Core NEG - - -   Hep B Surf - - - -   HIV 1&2 NEG - - -            Recent Labs   Lab Test 07/25/11  0807   DOSMPA 7/24/11 AT 2000   MPAG 184.1*     Scribe Disclosure:  I, Alisha Mock, am serving as a scribe to document services personally performed by Lan Patino M.D. at this visit, based upon the provider's statements to me. All documentation has been reviewed by the aforementioned provider prior to being entered into the official medical record.

## 2019-09-16 DIAGNOSIS — Z94.0 KIDNEY TRANSPLANTED: Primary | ICD-10-CM

## 2019-09-16 DIAGNOSIS — Z94.0 KIDNEY TRANSPLANTED: ICD-10-CM

## 2019-09-16 LAB
ANION GAP SERPL CALCULATED.3IONS-SCNC: 7 MMOL/L (ref 3–14)
BUN SERPL-MCNC: 57 MG/DL (ref 7–30)
CALCIUM SERPL-MCNC: 8.9 MG/DL (ref 8.5–10.1)
CHLORIDE SERPL-SCNC: 115 MMOL/L (ref 94–109)
CO2 SERPL-SCNC: 19 MMOL/L (ref 20–32)
CREAT SERPL-MCNC: 2.8 MG/DL (ref 0.66–1.25)
CREAT UR-MCNC: 97 MG/DL
CYCLOSPORINE BLD LC/MS/MS-MCNC: 130 UG/L (ref 50–400)
ERYTHROCYTE [DISTWIDTH] IN BLOOD BY AUTOMATED COUNT: 12.5 % (ref 10–15)
GFR SERPL CREATININE-BSD FRML MDRD: 26 ML/MIN/{1.73_M2}
GLUCOSE SERPL-MCNC: 94 MG/DL (ref 70–99)
HCT VFR BLD AUTO: 27.5 % (ref 40–53)
HGB BLD-MCNC: 8.7 G/DL (ref 13.3–17.7)
MCH RBC QN AUTO: 29.1 PG (ref 26.5–33)
MCHC RBC AUTO-ENTMCNC: 31.6 G/DL (ref 31.5–36.5)
MCV RBC AUTO: 92 FL (ref 78–100)
PLATELET # BLD AUTO: 208 10E9/L (ref 150–450)
POTASSIUM SERPL-SCNC: 4.7 MMOL/L (ref 3.4–5.3)
PROT UR-MCNC: 0.27 G/L
PROT/CREAT 24H UR: 0.28 G/G CR (ref 0–0.2)
PTH-INTACT SERPL-MCNC: 328 PG/ML (ref 18–80)
RBC # BLD AUTO: 2.99 10E12/L (ref 4.4–5.9)
SODIUM SERPL-SCNC: 141 MMOL/L (ref 133–144)
TME LAST DOSE: 0 H
URATE SERPL-MCNC: 10.2 MG/DL (ref 3.5–7.2)
WBC # BLD AUTO: 4.2 10E9/L (ref 4–11)

## 2019-09-16 PROCEDURE — 80158 DRUG ASSAY CYCLOSPORINE: CPT | Performed by: INTERNAL MEDICINE

## 2019-09-16 PROCEDURE — 83970 ASSAY OF PARATHORMONE: CPT | Performed by: INTERNAL MEDICINE

## 2019-09-16 PROCEDURE — 84156 ASSAY OF PROTEIN URINE: CPT | Performed by: INTERNAL MEDICINE

## 2019-09-16 PROCEDURE — 82306 VITAMIN D 25 HYDROXY: CPT | Performed by: INTERNAL MEDICINE

## 2019-09-16 PROCEDURE — 36415 COLL VENOUS BLD VENIPUNCTURE: CPT | Performed by: INTERNAL MEDICINE

## 2019-09-16 PROCEDURE — 85027 COMPLETE CBC AUTOMATED: CPT | Performed by: INTERNAL MEDICINE

## 2019-09-16 PROCEDURE — 84550 ASSAY OF BLOOD/URIC ACID: CPT | Performed by: INTERNAL MEDICINE

## 2019-09-16 PROCEDURE — 80048 BASIC METABOLIC PNL TOTAL CA: CPT | Performed by: INTERNAL MEDICINE

## 2019-09-16 NOTE — TELEPHONE ENCOUNTER
IMMUNOSUPPRESSION  Cyclosporine level 130, goal  for 12-hour trough  Timing of last Neoral dose not reported  Currently taking Neoral 100 mg twice daily per medication profile    PLAN  Verify timing of drug level and current dose  Decrease Neoral dose to 75 mg twice daily  Offer generic cyclosporine modified in place of Neoral brand  Check cyclosporine level 1-2 weeks after dose change    LPN TASK  Call with questions and instructions per plan.

## 2019-09-17 RX ORDER — CYCLOSPORINE 25 MG/1
75 CAPSULE, LIQUID FILLED ORAL 2 TIMES DAILY
Qty: 180 CAPSULE | Refills: 11 | Status: SHIPPED | OUTPATIENT
Start: 2019-09-17 | End: 2020-08-02

## 2019-09-17 NOTE — TELEPHONE ENCOUNTER
Call placed to patient. Patient confirms current dose and accurate trough level. Denies any recent illness, diarrhea or medication changes. Patient v\u to decrease dose to 75 mg bid and repeat level in 1-2 weeks. Patient state that he is ok for generic. Order sent.

## 2019-09-18 ENCOUNTER — TELEPHONE (OUTPATIENT)
Dept: TRANSPLANT | Facility: CLINIC | Age: 44
End: 2019-09-18

## 2019-09-18 DIAGNOSIS — Z94.0 KIDNEY TRANSPLANTED: Primary | ICD-10-CM

## 2019-09-18 LAB — DEPRECATED CALCIDIOL+CALCIFEROL SERPL-MC: 38 UG/L (ref 20–75)

## 2019-09-18 NOTE — TELEPHONE ENCOUNTER
ISSUE:  Creatinine 2.8, up from baseline of 2.3-2.6  Likely elevated d/t increase CSA level of 130  CO2 level 19, sodium bicarb dose increased to 1300 mg BID on 9/5/19    OUTCOME:   Call placed to pt, no answer.  Left detailed message letting pt know creatinine elevated at 2.8, likely d/t increased CSA level.  Pt encouraged to return our call to let us know his hydration status, any recent illnesses or medication changes. RNCC also requested pt confirm his current sodium bicarb dose.  Pt educated to repeat full set of transplant labs in 1-2 weeks with his repeat drug level as discussed with Anh DUKE 9/17/19.

## 2019-09-19 ENCOUNTER — TELEPHONE (OUTPATIENT)
Dept: TRANSPLANT | Facility: CLINIC | Age: 44
End: 2019-09-19

## 2019-09-19 NOTE — TELEPHONE ENCOUNTER
ISSUE:  Uric acid level elevated    PLAN:   Lan Patino MD Etcheverry, Katherine, RN             High uric acid level and recommend patient follow up with his PCP for management.  He will likely need to start allopurinol along with another medication to prevent gout flares.        OUTCOME:   Call placed to pt, no answer.  Left detailed message requesting he f/u with his PCP regarding level and medications to help with gout flares.

## 2019-09-30 ENCOUNTER — OFFICE VISIT (OUTPATIENT)
Dept: FAMILY MEDICINE | Facility: CLINIC | Age: 44
End: 2019-09-30
Payer: COMMERCIAL

## 2019-09-30 VITALS
SYSTOLIC BLOOD PRESSURE: 111 MMHG | OXYGEN SATURATION: 99 % | RESPIRATION RATE: 20 BRPM | WEIGHT: 220 LBS | BODY MASS INDEX: 32.49 KG/M2 | DIASTOLIC BLOOD PRESSURE: 69 MMHG | TEMPERATURE: 99.2 F | HEART RATE: 101 BPM

## 2019-09-30 DIAGNOSIS — Z94.0 KIDNEY REPLACED BY TRANSPLANT: ICD-10-CM

## 2019-09-30 DIAGNOSIS — H66.002 NON-RECURRENT ACUTE SUPPURATIVE OTITIS MEDIA OF LEFT EAR WITHOUT SPONTANEOUS RUPTURE OF TYMPANIC MEMBRANE: Primary | ICD-10-CM

## 2019-09-30 DIAGNOSIS — J20.9 ACUTE BRONCHITIS, UNSPECIFIED ORGANISM: ICD-10-CM

## 2019-09-30 PROCEDURE — 99214 OFFICE O/P EST MOD 30 MIN: CPT | Performed by: NURSE PRACTITIONER

## 2019-09-30 NOTE — PATIENT INSTRUCTIONS
Reminders: If you are signed up for [facility] please be aware your results and communications will be sent within your mychart. Please remember to arrive 5-10 minutes early for your appointments. If you are late you may need to reschedule your appointment.    Patient Education     Middle Ear Infection (Adult)  You have an infection of the middle ear, the space behind the eardrum. This is also called acute otitis media (AOM). Sometimes it is caused by the common cold. This is because congestion can block the internal passage (eustachian tube) that drains fluid from the middle ear. When the middle ear fills with fluid, bacteria can grow there and cause an infection. Oral antibiotics are used to treat this illness, not ear drops. Symptoms usually start to improve within 1 to 2 days of treatment.    Home care  The following are general care guidelines:    Finish all of the antibiotic medicine given, even though you may feel better after the first few days.    You may use over-the-counter medicine, such as acetaminophen or ibuprofen, to control pain and fever, unless something else was prescribed. If you have chronic liver or kidney disease or have ever had a stomach ulcer or gastrointestinal bleeding, talk with your healthcare provider before using these medicines. Do not give aspirin to anyone under 18 years of age who has a fever. It may cause severe illness or death.  Follow-up care  Follow up with your healthcare provider, or as advised, in 2 weeks if all symptoms have not gotten better, or if hearing doesn't go back to normal within 1 month.  When to seek medical advice  Call your healthcare provider right away if any of these occur:    Ear pain gets worse or does not improve after 3 days of treatment    Unusual drowsiness or confusion    Neck pain, stiff neck, or headache    Fluid or blood draining from the ear canal    Fever of 100.4 F (38 C) or as advised     Seizure  Date Last Reviewed: 6/1/2016 2000-2018 [employer]  [employer]. [address] [place] [address], [place] [address]. All rights reserved. This information is not intended as a substitute for professional medical care. Always follow your healthcare professional's instructions.

## 2019-09-30 NOTE — PROGRESS NOTES
SUBJECTIVE:  Miguel Angel Piña  is a 44 year old  male  who presents with the following concerns;    Symptom duration:  6 days   Sympom severity:  mild   Treatments tried:  nyquil   Contacts:  wife had a cold              Symptoms: Present Comment   Fever/Chills     Fatigue     Muscle Aches     Eye Irritation     Sneezing     Nasal Kiet/Drg x    Sinus Pressure/Pain     Loss of smell     Dental pain     Sore Throat     Swollen Glands     Ear Pain/Fullness x Left ear pain   Cough     Wheeze     Chest Pain     Shortness of breath     Rash     Other       Medications updated and reviewed.  Past, family and surgical history is updated and reviewed in the record.  Patient Active Problem List    Diagnosis Date Noted     CKD (chronic kidney disease) stage 4, GFR 15-29 ml/min (H) 10/31/2018     Priority: Medium     Aftercare following organ transplant 09/08/2017     Priority: Medium     Immunosuppression (H) 09/08/2017     Priority: Medium     Anemia in chronic renal disease 09/08/2017     Priority: Medium     Secondary renal hyperparathyroidism (H) 09/08/2017     Priority: Medium     Gout 09/08/2017     Priority: Medium     Vitamin D deficiency 09/08/2017     Priority: Medium     Cerebrovascular accident (H) 09/08/2017     Priority: Medium     Felt secondary to PFO       Dyslipidemia 09/08/2017     Priority: Medium     Factor V Leiden (H) 09/08/2017     Priority: Medium     HTN, kidney transplant related      Priority: Medium     Conductive hearing loss, unilateral 10/03/2013     Priority: Medium     Pt reports insidious onset of apparent hearing loss, that had not bothered him, but his wife feels like he must have hearing loss, given that he cannot hear her talking to him often.  He denies any trauma, past issues with ear infections.  He is s/p two kidney transplants and is on rejection medications chronically (stable of late).      He denies excessive loud noise exposure, hazardous work environment (works at [employer]),  does drive with window down during summer.  No shooting guns.         CARDIOVASCULAR SCREENING; LDL GOAL LESS THAN 100 10/31/2010     Priority: Medium     Kidney replaced by transplant      Priority: Medium     prior 11/1993       Medullary cystic kidney disease      Priority: Medium     Past Medical History:   Diagnosis Date     Anemia in chronic renal disease      CVA (cerebral vascular accident) (H)      Dyslipidemia      Factor V Leiden (H)      Gout      Hypertension secondary to other renal disorders      Immunosuppressed status (H)      Kidney replaced by transplant 4/2010 (2nd)    prior 11/1993     Medullary cystic kidney disease      Secondary renal hyperparathyroidism (H)       Family History   Problem Relation Age of Onset     Lung Cancer Paternal Grandmother      Kidney Disease Sister         medullary cystic kidney disease, s/p kidney transplant     Prostate Cancer No family hx of      Colon Cancer No family hx of      ROS:  Other than noted above, general, HEENT, respiratory, cardiac and gastrointestinal systems are negative.    OBJECTIVE:  GENERAL:  Alert, no acute distress  EYES:  PERRL, EOM normal, conjunctiva and lids normal  HEENT:  Ears normal, oral mucosa and posterior oropharynx normal POSITIVE R TM erythematous, L TM erythematous, bulging with loss of landmarks.   RESP:  POSITIVE for exp wheeze bilat LL improved with cough  CV:  Normal rate, regular rhythm, no murmur or gallop.  LYMPHATICS:  No cervical, supraclavicular adenopathy  SKIN:  No suspicious rashes.    Assessment/Plan:     ICD-10-CM    1. Non-recurrent acute suppurative otitis media of left ear without spontaneous rupture of tympanic membrane H66.002 amoxicillin-clavulanate (AUGMENTIN) 875-125 MG tablet   2. Acute bronchitis, unspecified organism J20.9    3. Kidney replaced by transplant Z94.0         See patient instructions: Discussed taking full course of ABX, hearing will take time to improve. Follow up if symptoms persist or  worsen.     Piedad Saenz, DANIEL, FNP-BC

## 2019-10-07 DIAGNOSIS — Z94.0 KIDNEY REPLACED BY TRANSPLANT: Primary | ICD-10-CM

## 2019-10-07 RX ORDER — MYCOPHENOLATE MOFETIL 250 MG/1
1000 CAPSULE ORAL 2 TIMES DAILY
Qty: 240 CAPSULE | Refills: 11 | Status: SHIPPED | OUTPATIENT
Start: 2019-10-07 | End: 2020-08-02

## 2020-02-03 ENCOUNTER — TELEPHONE (OUTPATIENT)
Dept: FAMILY MEDICINE | Facility: CLINIC | Age: 45
End: 2020-02-03

## 2020-02-03 DIAGNOSIS — M25.531 RIGHT WRIST PAIN: ICD-10-CM

## 2020-02-03 NOTE — TELEPHONE ENCOUNTER
Reason for Call:  Medication or medication refill:    Do you use a [facility]?  Name of the pharmacy and phone number for the current request:  [facility] [phone]    Name of the medication requested: prednisone     Other request: patient is calling to get this medication refilled he had a issue with gout and is almost out of this medication   Please call to advice  Thank you    Can we leave a detailed message on this number? YES    Phone number patient can be reached at: Home number on file [phone] (home)    Best Time: any    Call taken on 2/3/2020 at 1:42 PM by Manda Parekh

## 2020-02-05 NOTE — TELEPHONE ENCOUNTER
Left message on voice mail for patient to call clinic. Need more information. [phone].  Medication not on current med list.  Tracie Teixeira RN

## 2020-02-06 RX ORDER — METHYLPREDNISOLONE 4 MG
TABLET, DOSE PACK ORAL
Qty: 21 TABLET | Refills: 0 | Status: SHIPPED | OUTPATIENT
Start: 2020-02-06 | End: 2020-04-13

## 2020-02-06 NOTE — TELEPHONE ENCOUNTER
Left message on voice mail for patient to call clinic. [phone]    Jade Perez RN BSN

## 2020-02-06 NOTE — TELEPHONE ENCOUNTER
Patient notified and voiced understanding and agreement.  My chart sign up number given.  [phone].  Tracie Teixeira RN

## 2020-02-06 NOTE — TELEPHONE ENCOUNTER
We have been unable to connect with patient. Are you willing to prescribe medication for treatment of Gout without seeing him in the clinic?     Please review and advise.     Jade Perez RN BSN

## 2020-02-17 ENCOUNTER — OFFICE VISIT (OUTPATIENT)
Dept: FAMILY MEDICINE | Facility: CLINIC | Age: 45
End: 2020-02-17
Payer: COMMERCIAL

## 2020-02-17 VITALS
BODY MASS INDEX: 32.97 KG/M2 | HEART RATE: 88 BPM | RESPIRATION RATE: 18 BRPM | HEIGHT: 69 IN | WEIGHT: 222.6 LBS | TEMPERATURE: 97.8 F | DIASTOLIC BLOOD PRESSURE: 79 MMHG | SYSTOLIC BLOOD PRESSURE: 135 MMHG | OXYGEN SATURATION: 99 %

## 2020-02-17 DIAGNOSIS — Z13.220 LIPID SCREENING: ICD-10-CM

## 2020-02-17 DIAGNOSIS — Z94.0 KIDNEY TRANSPLANTED: ICD-10-CM

## 2020-02-17 DIAGNOSIS — Z12.5 SCREENING FOR PROSTATE CANCER: ICD-10-CM

## 2020-02-17 DIAGNOSIS — D63.1 ANEMIA IN STAGE 4 CHRONIC KIDNEY DISEASE (H): ICD-10-CM

## 2020-02-17 DIAGNOSIS — Z94.0 HTN, KIDNEY TRANSPLANT RELATED: ICD-10-CM

## 2020-02-17 DIAGNOSIS — Z00.00 ROUTINE GENERAL MEDICAL EXAMINATION AT A HEALTH CARE FACILITY: Primary | ICD-10-CM

## 2020-02-17 DIAGNOSIS — I15.1 HTN, KIDNEY TRANSPLANT RELATED: ICD-10-CM

## 2020-02-17 DIAGNOSIS — N18.4 ANEMIA IN STAGE 4 CHRONIC KIDNEY DISEASE (H): ICD-10-CM

## 2020-02-17 LAB
ALBUMIN SERPL-MCNC: 3.7 G/DL (ref 3.4–5)
ALP SERPL-CCNC: 86 U/L (ref 40–150)
ALT SERPL W P-5'-P-CCNC: 18 U/L (ref 0–70)
ANION GAP SERPL CALCULATED.3IONS-SCNC: 9 MMOL/L (ref 3–14)
AST SERPL W P-5'-P-CCNC: 13 U/L (ref 0–45)
BILIRUB SERPL-MCNC: 0.7 MG/DL (ref 0.2–1.3)
BUN SERPL-MCNC: 54 MG/DL (ref 7–30)
CALCIUM SERPL-MCNC: 8.9 MG/DL (ref 8.5–10.1)
CHLORIDE SERPL-SCNC: 112 MMOL/L (ref 94–109)
CHOLEST SERPL-MCNC: 164 MG/DL
CO2 SERPL-SCNC: 19 MMOL/L (ref 20–32)
CREAT SERPL-MCNC: 3.06 MG/DL (ref 0.66–1.25)
CREAT UR-MCNC: 92 MG/DL
ERYTHROCYTE [DISTWIDTH] IN BLOOD BY AUTOMATED COUNT: 13.2 % (ref 10–15)
GFR SERPL CREATININE-BSD FRML MDRD: 24 ML/MIN/{1.73_M2}
GLUCOSE SERPL-MCNC: 91 MG/DL (ref 70–99)
HCT VFR BLD AUTO: 29.1 % (ref 40–53)
HDLC SERPL-MCNC: 36 MG/DL
HGB BLD-MCNC: 9.2 G/DL (ref 13.3–17.7)
LDLC SERPL CALC-MCNC: 102 MG/DL
MCH RBC QN AUTO: 28.9 PG (ref 26.5–33)
MCHC RBC AUTO-ENTMCNC: 31.6 G/DL (ref 31.5–36.5)
MCV RBC AUTO: 92 FL (ref 78–100)
MICROALBUMIN UR-MCNC: 117 MG/L
MICROALBUMIN/CREAT UR: 127.17 MG/G CR (ref 0–17)
NONHDLC SERPL-MCNC: 128 MG/DL
PLATELET # BLD AUTO: 191 10E9/L (ref 150–450)
POTASSIUM SERPL-SCNC: 4.5 MMOL/L (ref 3.4–5.3)
PROT SERPL-MCNC: 6.8 G/DL (ref 6.8–8.8)
PSA SERPL-ACNC: 1.09 UG/L (ref 0–4)
RBC # BLD AUTO: 3.18 10E12/L (ref 4.4–5.9)
SODIUM SERPL-SCNC: 140 MMOL/L (ref 133–144)
TRIGL SERPL-MCNC: 129 MG/DL
WBC # BLD AUTO: 4.1 10E9/L (ref 4–11)

## 2020-02-17 PROCEDURE — 99396 PREV VISIT EST AGE 40-64: CPT | Performed by: FAMILY MEDICINE

## 2020-02-17 PROCEDURE — 80061 LIPID PANEL: CPT | Performed by: FAMILY MEDICINE

## 2020-02-17 PROCEDURE — 99213 OFFICE O/P EST LOW 20 MIN: CPT | Mod: 25 | Performed by: FAMILY MEDICINE

## 2020-02-17 PROCEDURE — 80053 COMPREHEN METABOLIC PANEL: CPT | Performed by: FAMILY MEDICINE

## 2020-02-17 PROCEDURE — 82043 UR ALBUMIN QUANTITATIVE: CPT | Performed by: FAMILY MEDICINE

## 2020-02-17 PROCEDURE — G0103 PSA SCREENING: HCPCS | Performed by: FAMILY MEDICINE

## 2020-02-17 PROCEDURE — 36415 COLL VENOUS BLD VENIPUNCTURE: CPT | Performed by: FAMILY MEDICINE

## 2020-02-17 PROCEDURE — 85027 COMPLETE CBC AUTOMATED: CPT | Performed by: FAMILY MEDICINE

## 2020-02-17 RX ORDER — AMLODIPINE BESYLATE 5 MG/1
5 TABLET ORAL AT BEDTIME
Qty: 90 TABLET | Refills: 3 | Status: SHIPPED | OUTPATIENT
Start: 2020-02-17 | End: 2020-07-27

## 2020-02-17 RX ORDER — LOSARTAN POTASSIUM 25 MG/1
25 TABLET ORAL DAILY
Qty: 90 TABLET | Refills: 3 | Status: SHIPPED | OUTPATIENT
Start: 2020-02-17 | End: 2020-07-27

## 2020-02-17 ASSESSMENT — MIFFLIN-ST. JEOR: SCORE: 1890.09

## 2020-02-17 NOTE — LETTER
February 24, 2020      Miguel Angel Piña  [address] [facility] [id number]        Dear ,    We are writing to inform you of your test results.    Your recent labs showed;     PSA (prostate cancer maker) was normal.   Cholesterol panel showed that your LDL (bad cholesterol) is within goal < 130; your HDL ( good cholesterol) was slightly low.   No cholesterol lowering medications are recommended at this time. Recommend eating foods that are well balanced, rich in Omega 3 fatty acids   Complete blood count showed stable anemia.   Urine microalbumin test showed evidence of a blood protein (albumin) in your urine, a complication known as microalbuminuria.   Complete Metabolic Panel (panel that checks liver function, kidney function and electrolytes) showed electrolytes were normal.   Glucose was normal- no evidence of diabetes at this time. Kidney function appears stable compared to previous labs.   Continue to follow up with your Nephrologist.       Resulted Orders   Lipid Profile (Chol, Trig, HDL, LDL calc)   Result Value Ref Range    Cholesterol 164 <200 mg/dL    Triglycerides 129 <150 mg/dL      Comment:      Fasting specimen    HDL Cholesterol 36 (L) >39 mg/dL    LDL Cholesterol Calculated 102 (H) <100 mg/dL      Comment:      Above desirable:  100-129 mg/dl  Borderline High:  130-159 mg/dL  High:             160-189 mg/dL  Very high:       >189 mg/dl      Non HDL Cholesterol 128 <130 mg/dL   Comprehensive metabolic panel (BMP + Alb, Alk Phos, ALT, AST, Total. Bili, TP)   Result Value Ref Range    Sodium 140 133 - 144 mmol/L    Potassium 4.5 3.4 - 5.3 mmol/L    Chloride 112 (H) 94 - 109 mmol/L    Carbon Dioxide 19 (L) 20 - 32 mmol/L    Anion Gap 9 3 - 14 mmol/L    Glucose 91 70 - 99 mg/dL      Comment:      Fasting specimen    Urea Nitrogen 54 (H) 7 - 30 mg/dL    Creatinine 3.06 (H) 0.66 - 1.25 mg/dL    GFR Estimate 24 (L) >60 mL/min/[1.73_m2]      Comment:      Non  GFR  Calc  Starting 12/18/2018, serum creatinine based estimated GFR (eGFR) will be   calculated using the Chronic Kidney Disease Epidemiology Collaboration   (CKD-EPI) equation.      GFR Estimate If Black 27 (L) >60 mL/min/[1.73_m2]      Comment:       GFR Calc  Starting 12/18/2018, serum creatinine based estimated GFR (eGFR) will be   calculated using the Chronic Kidney Disease Epidemiology Collaboration   (CKD-EPI) equation.      Calcium 8.9 8.5 - 10.1 mg/dL    Bilirubin Total 0.7 0.2 - 1.3 mg/dL    Albumin 3.7 3.4 - 5.0 g/dL    Protein Total 6.8 6.8 - 8.8 g/dL    Alkaline Phosphatase 86 40 - 150 U/L    ALT 18 0 - 70 U/L    AST 13 0 - 45 U/L   Albumin Random Urine Quantitative with Creat Ratio   Result Value Ref Range    Creatinine Urine 92 mg/dL    Albumin Urine mg/L 117 mg/L    Albumin Urine mg/g Cr 127.17 (H) 0 - 17 mg/g Cr   PSA, screen   Result Value Ref Range    PSA 1.09 0 - 4 ug/L      Comment:      Assay Method:  Chemiluminescence using Siemens Vista analyzer   CBC with platelets   Result Value Ref Range    WBC 4.1 4.0 - 11.0 10e9/L    RBC Count 3.18 (L) 4.4 - 5.9 10e12/L    Hemoglobin 9.2 (L) 13.3 - 17.7 g/dL    Hematocrit 29.1 (L) 40.0 - 53.0 %    MCV 92 78 - 100 fl    MCH 28.9 26.5 - 33.0 pg    MCHC 31.6 31.5 - 36.5 g/dL    RDW 13.2 10.0 - 15.0 %    Platelet Count 191 150 - 450 10e9/L       If you have any questions or concerns, please call the clinic at the number listed above.       Sincerely,        Lucrecia Hutson MD/autumn

## 2020-02-17 NOTE — PROGRESS NOTES
3  SUBJECTIVE:   CC: Miguel Angel Piña is an 44 year old male who presents for preventive health visit.     Healthy Habits:    Do you get at least three servings of calcium containing foods daily (dairy, green leafy vegetables, etc.)? Yes 2 servings/ day    Amount of exercise or daily activities, outside of work: none    Problems taking medications regularly No    Medication side effects: No    Have you had an eye exam in the past two years? yes    Do you see a dentist twice per year? 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   No evidence of any acute injection currently stable on current medication.  Following with our transplant team twice a year.    Blood pressure is currently well controlled on losartan and amlodipine requesting for refill of that today.    Anemia of kidney disease has been stable with oral supplementation.    Reports some recurrent gouty attacks this year but reports that his gout attacks decreased with the addition of sodium bicarbonate.     HEALTH CARE MAINTENANCE: requesting a PSA screen. Due for annual labs.     Patient informed that anything we discuss that is not related to preventative medicine, may be billed for; patient verbalizes understanding.      Today's PHQ-2 Score:   PHQ-2 ( 1999 [employer]) 2/17/2020 9/30/2019   Q1: Little interest or pleasure in doing things 0 0   Q2: Feeling down, depressed or hopeless 0 0   PHQ-2 Score 0 0       Abuse: Current or Past(Physical, Sexual or Emotional)- No  Do you feel safe in your environment? Yes        Social History     Tobacco Use     Smoking status: Never Smoker     Smokeless tobacco: Never Used   Substance Use Topics     Alcohol use: No     If you drink alcohol do you typically have >3 drinks per day or >7 drinks per week? No                      Last PSA: No results found for:  PSA    Reviewed orders with patient. Reviewed health maintenance and updated orders accordingly - Yes  Lab work is in process  Labs reviewed in EPIC  BP Readings from Last 3 Encounters:   02/17/20 135/79   09/30/19 111/69   09/05/19 130/82    Wt Readings from Last 3 Encounters:   02/17/20 101 kg (222 lb 9.6 oz)   09/30/19 99.8 kg (220 lb)   09/05/19 99.5 kg (219 lb 4.8 oz)                  Patient Active Problem List   Diagnosis     Kidney replaced by transplant     Medullary cystic kidney disease     CARDIOVASCULAR SCREENING; LDL GOAL LESS THAN 100     Conductive hearing loss, unilateral     Aftercare following organ transplant     Immunosuppression (H)     Anemia in chronic renal disease     Secondary renal hyperparathyroidism (H)     Gout     Vitamin D deficiency     Cerebrovascular accident (H)     Dyslipidemia     Factor V Leiden (H)     HTN, kidney transplant related     CKD (chronic kidney disease) stage 4, GFR 15-29 ml/min (H)     Past Surgical History:   Procedure Laterality Date     REPAIR PATENT FORAMEN OVALE       s/p LDKT         Social History     Tobacco Use     Smoking status: Never Smoker     Smokeless tobacco: Never Used   Substance Use Topics     Alcohol use: No     Family History   Problem Relation Age of Onset     Lung Cancer Paternal Grandmother      Kidney Disease Sister         medullary cystic kidney disease, s/p kidney transplant     Prostate Cancer No family hx of      Colon Cancer No family hx of          Current Outpatient Medications   Medication Sig Dispense Refill     amLODIPine (NORVASC) 5 MG tablet Take 1 tablet (5 mg) by mouth At Bedtime At night 90 tablet 3     CELLCEPT (BRAND) 250 MG capsule Take 4 capsules (1,000 mg) by mouth 2 times daily 240 capsule 11     cholecalciferol 2000 units TABS Take 2,000 Units by mouth daily 90 tablet 3     cycloSPORINE modified (GENERIC EQUIVALENT) 25 MG capsule Take 3 capsules (75 mg) by mouth 2 times daily 180 capsule 11     Ferrous Gluconate 324  "(37.5 Fe) MG TABS Take by mouth daily       losartan (COZAAR) 25 MG tablet Take 1 tablet (25 mg) by mouth daily 90 tablet 3     methylPREDNISolone (MEDROL DOSEPAK) 4 MG tablet therapy pack Follow Package Directions 21 tablet 0     sodium bicarbonate 650 MG tablet Take 2 tablets (1,300 mg) by mouth 2 times daily 360 tablet 3     No Known Allergies    Reviewed and updated as needed this visit by clinical staff  Tobacco  Allergies  Meds  Med Hx  Surg Hx  Fam Hx  Soc Hx        Reviewed and updated as needed this visit by Provider  Tobacco  Med Hx  Surg Hx  Fam Hx  Soc Hx           ROS:  CONSTITUTIONAL: NEGATIVE for fever, chills, change in weight  INTEGUMENTARY/SKIN: NEGATIVE for worrisome rashes, moles or lesions  EYES: NEGATIVE for vision changes or irritation  ENT: NEGATIVE for ear, mouth and throat problems  RESP: NEGATIVE for significant cough or SOB  CV: NEGATIVE for chest pain, palpitations or peripheral edema  GI: NEGATIVE for nausea, abdominal pain, heartburn, or change in bowel habits   male: negative for dysuria, hematuria, decreased urinary stream, erectile dysfunction, urethral discharge  MUSCULOSKELETAL: NEGATIVE for significant arthralgias or myalgia  NEURO: NEGATIVE for weakness, dizziness or paresthesias  PSYCHIATRIC: NEGATIVE for changes in mood or affect    OBJECTIVE:   /79   Pulse 88   Temp 97.8  F (36.6  C) (Tympanic)   Resp 18   Ht 1.753 m (5' 9\")   Wt 101 kg (222 lb 9.6 oz)   SpO2 99%   BMI 32.87 kg/m    EXAM:  GENERAL: healthy, alert and no distress  EYES: Eyes grossly normal to inspection, PERRL and conjunctivae and sclerae normal  HENT: ear canals and TM's normal, nose and mouth without ulcers or lesions  NECK: no adenopathy, no asymmetry, masses, or scars and thyroid normal to palpation  RESP: lungs clear to auscultation - no rales, rhonchi or wheezes  CV: regular rate and rhythm, normal S1 S2, no S3 or S4, no murmur, click or rub, no peripheral edema and peripheral " "pulses strong  ABDOMEN: soft, nontender, no hepatosplenomegaly, no masses and bowel sounds normal  MS: no gross musculoskeletal defects noted, no edema  SKIN: multiple skin nodules noted on the face.   NEURO: Normal strength and tone, mentation intact and speech normal  PSYCH: mentation appears normal, affect normal/bright    Diagnostic Test Results:  Labs reviewed in [facility]  Labs drawn and in process.      ASSESSMENT/PLAN:   Miguel Angel was seen today for physical.    Diagnoses and all orders for this visit:    Routine general medical examination at a health care facility  -     Comprehensive metabolic panel (BMP + Alb, Alk Phos, ALT, AST, Total. Bili, TP)  -     CBC with platelets    Screening for prostate cancer  -     PSA, screen    Lipid screening  -     Lipid Profile (Chol, Trig, HDL, LDL calc)    Kidney transplanted  -     Albumin Random Urine Quantitative with Creat Ratio  -     Following with Nephrology/Transplant Team    HTN, kidney transplant related, controlled  -     Albumin Random Urine Quantitative with Creat Ratio  -     Refill: amLODIPine (NORVASC) 5 MG tablet; Take 1 tablet (5 mg) by mouth At Bedtime At night  -     Refill: losartan (COZAAR) 25 MG tablet; Take 1 tablet (25 mg) by mouth daily    Anemia in stage 4 chronic kidney disease (H)  -     CBC with platelets        COUNSELING:  Reviewed preventive health counseling, as reflected in patient instructions       Regular exercise       Healthy diet/nutrition    Estimated body mass index is 32.87 kg/m  as calculated from the following:    Height as of this encounter: 1.753 m (5' 9\").    Weight as of this encounter: 101 kg (222 lb 9.6 oz).    Weight management plan: Discussed healthy diet and exercise guidelines     reports that he has never smoked. He has never used smokeless tobacco.      Counseling Resources:  ATP IV Guidelines  Pooled Cohorts Equation Calculator  FRAX Risk Assessment  ICSI Preventive Guidelines  Dietary Guidelines for Americans, " 2010  USDA's MyPlate  ASA Prophylaxis  Lung CA Screening    Follow up annually and as needed thoughout the year.    Lucrecia Hutson MD  [facility]

## 2020-02-18 ENCOUNTER — TELEPHONE (OUTPATIENT)
Dept: TRANSPLANT | Facility: CLINIC | Age: 45
End: 2020-02-18

## 2020-02-18 DIAGNOSIS — Z94.0 KIDNEY TRANSPLANTED: Primary | ICD-10-CM

## 2020-02-18 NOTE — TELEPHONE ENCOUNTER
ISSUE:  Creatinine elevated 3.06  Last CSA level was 9/2019 and was elevated    PLAN:  Call and assess hydration status.  How much water is he drinking per day?  Any recent illness, diarrhea, s/s of a UTI, or medication changes?  Any increased intake of alcohol or caffeine?  Recommend increasing hydration and repeating labs within 1 week including CSA level      OUTCOME:  Called and left v/m requesting a return call to sot office.

## 2020-02-20 NOTE — TELEPHONE ENCOUNTER
Spoke with Miguel Angel.  He denies any illness, diarrhea, or medication changes.   He reports that he has been drinking a lot of caffeine and minimal water.    Encouraged him to increase his hydration and repeat labs within a week.    We also discussed the need to get a 12 hour CSA level.  Patient verbalized understanding.   Order placed.

## 2020-05-28 ENCOUNTER — MYC MEDICAL ADVICE (OUTPATIENT)
Dept: FAMILY MEDICINE | Facility: CLINIC | Age: 45
End: 2020-05-28

## 2020-05-28 DIAGNOSIS — M25.531 RIGHT WRIST PAIN: ICD-10-CM

## 2020-05-29 RX ORDER — METHYLPREDNISOLONE 4 MG
TABLET, DOSE PACK ORAL
Qty: 21 TABLET | Refills: 0 | Status: SHIPPED | OUTPATIENT
Start: 2020-05-29 | End: 2020-07-16

## 2020-06-02 DIAGNOSIS — M25.531 RIGHT WRIST PAIN: ICD-10-CM

## 2020-06-03 ENCOUNTER — MYC MEDICAL ADVICE (OUTPATIENT)
Dept: NURSING | Facility: CLINIC | Age: 45
End: 2020-06-03

## 2020-06-03 RX ORDER — METHYLPREDNISOLONE 4 MG
TABLET, DOSE PACK ORAL
Qty: 21 TABLET | Refills: 0 | OUTPATIENT
Start: 2020-06-03

## 2020-06-03 NOTE — TELEPHONE ENCOUNTER
Duplicate refill request for Medrol Dosepak.     Pharmacy confirmed that patient has not yet picked up   this prescription.     Current refill request refused.     Left message on voice mail for patient to call clinic. [phone]    My Chart message also sent to patient.     Jade Perez RN BSN

## 2020-07-15 ENCOUNTER — MYC MEDICAL ADVICE (OUTPATIENT)
Dept: FAMILY MEDICINE | Facility: CLINIC | Age: 45
End: 2020-07-15

## 2020-07-16 DIAGNOSIS — M25.531 RIGHT WRIST PAIN: ICD-10-CM

## 2020-07-16 RX ORDER — METHYLPREDNISOLONE 4 MG
TABLET, DOSE PACK ORAL
Qty: 21 TABLET | Refills: 1 | Status: SHIPPED | OUTPATIENT
Start: 2020-07-16 | End: 2020-07-21

## 2020-07-16 NOTE — TELEPHONE ENCOUNTER
appt to discuss gout meds? I did briefly discuss that there are medications to help prevent gout attacks, not just the medrol dose pack to treat?  Tracie Teixeira RN

## 2020-07-20 ENCOUNTER — TELEPHONE (OUTPATIENT)
Dept: FAMILY MEDICINE | Facility: CLINIC | Age: 45
End: 2020-07-20

## 2020-07-20 DIAGNOSIS — M25.531 RIGHT WRIST PAIN: ICD-10-CM

## 2020-07-20 DIAGNOSIS — M25.571 RIGHT ANKLE PAIN, UNSPECIFIED CHRONICITY: Primary | ICD-10-CM

## 2020-07-20 NOTE — TELEPHONE ENCOUNTER
Patient has scheduled an appointment with Dr. Hutson for Monday, July 27 (soonest opening).     He is having a lot of right ankle pain and wonders if a Medrol Dose pack can be sent to his pharmacy ([facility] on [address]).     Please review and advise.     Jade Perez RN BSN

## 2020-07-20 NOTE — TELEPHONE ENCOUNTER
Patient is calling with RT ankle pain, he believe this is due to gout. Please see message from last week, he is looking for some type of medication. He is at work, OK to leave a confidential message at this number.

## 2020-07-21 RX ORDER — METHYLPREDNISOLONE 4 MG
TABLET, DOSE PACK ORAL
Qty: 21 TABLET | Refills: 1 | Status: SHIPPED | OUTPATIENT
Start: 2020-07-21 | End: 2020-07-27

## 2020-07-27 ENCOUNTER — OFFICE VISIT (OUTPATIENT)
Dept: FAMILY MEDICINE | Facility: CLINIC | Age: 45
End: 2020-07-27
Payer: COMMERCIAL

## 2020-07-27 VITALS
TEMPERATURE: 97.4 F | BODY MASS INDEX: 34.08 KG/M2 | DIASTOLIC BLOOD PRESSURE: 81 MMHG | HEART RATE: 79 BPM | WEIGHT: 230.8 LBS | SYSTOLIC BLOOD PRESSURE: 137 MMHG

## 2020-07-27 DIAGNOSIS — I15.1 HTN, KIDNEY TRANSPLANT RELATED: ICD-10-CM

## 2020-07-27 DIAGNOSIS — Z87.39 HISTORY OF GOUT: Primary | ICD-10-CM

## 2020-07-27 DIAGNOSIS — Z94.0 HTN, KIDNEY TRANSPLANT RELATED: ICD-10-CM

## 2020-07-27 PROCEDURE — 99214 OFFICE O/P EST MOD 30 MIN: CPT | Performed by: FAMILY MEDICINE

## 2020-07-27 RX ORDER — AMLODIPINE BESYLATE 5 MG/1
5 TABLET ORAL AT BEDTIME
Qty: 90 TABLET | Refills: 3 | Status: SHIPPED | OUTPATIENT
Start: 2020-07-27 | End: 2021-08-10

## 2020-07-27 RX ORDER — LOSARTAN POTASSIUM 25 MG/1
25 TABLET ORAL DAILY
Qty: 90 TABLET | Refills: 3 | Status: SHIPPED | OUTPATIENT
Start: 2020-07-27 | End: 2021-08-09

## 2020-07-27 NOTE — LETTER
July 27, 2020      Miguel Angel L Wang  [address] [facility] [id number]        To Whom It May Concern:    Miguel Angel MEDINA Wang  was seen in the clinic today for a follow up visit.     Please contact me if you have any additional questions or concerns.        Sincerely,        Lucrecia Hutson MD

## 2020-07-27 NOTE — PATIENT INSTRUCTIONS
Discuss with your Kidney Transplant Team about starting a Gout medication called Febuxostat 40 mg/day.        Patient Education     Eating to Prevent Gout  Gout is a painful form of arthritis caused by an excess of uric acid. This is a waste product made by the body. It builds up in the body and forms crystals that collect in the joints, causing a gout attack. Alcohol and certain foods can trigger a gout attack. Below are some guidelines for changing your diet to help you manage gout. Your healthcare provider can work with you to determine the best eating plan for you. Know that diet is only one part of managing gout. Take your medicines as prescribed and follow the other guidelines your healthcare provider has given you.  Foods to limit  Eating too many foods containing purines may increase the levels of uric acid in your body and increase your risk for a gout attack. It may be best to limit these high-purine foods:    Alcohol (beer and red wine). You may be told to avoid alcohol completely.    Certain fish (anchovies, sardines, fish roes, herring, tuna, mussels, codfish, scallops, trout, and vida)    Certain meats (red meat, processed meat, rizvi, turkey, wild game, and goose)    Sauces and gravies made with meat    Organ meats (such as liver, kidneys, sweetbreads, and tripe)    Legumes (such as dried beans and peas)    Mushrooms, spinach, asparagus, and cauliflower    Yeast and yeast extract supplements  Foods to try  Some foods may be helpful for people with gout. You may want to try adding some of the following foods to your diet:    Dark berries. These include blueberries, blackberries, and cherries. These berries contain chemicals that may lower uric acid.    Tofu. Tofu, which is made from soy, is a good source of protein. Studies have shown that it may be a better choice than meat for people with gout.    Omega fatty acids. These acids are found in fatty fish (such as salmon), certain oils (such as flax,  olive, or nut oils), or nuts. They may help prevent inflammation due to gout.  The following guidelines are recommended by the American Medical Association for people with gout. Your diet should be:    High in fiber, whole grains, fruits, and vegetables.    Low in protein (15% of calories should come from protein. Choose lean sources, such as soy, lean meats, and poultry).    Low in fat (no more than 30% of calories should come from fat, with only 10% coming from animal, or saturated, fat).  Date Last Reviewed: 11/1/2017 2000-2019 [employer]. [address] [place], [place] [id number]. All rights reserved. This information is not intended as a substitute for professional medical care. Always follow your healthcare professional's instructions.

## 2020-07-27 NOTE — PROGRESS NOTES
Subjective     Miguel Angel Piña is a 44 year old male who presents to clinic today for the following health issues:    HPI     ** Will need doctors note for today's visit **     Follow up Gout  Has had recurrent gouty attacks in the recent past needing multiple refills on Medro dosepak.  Am concerned about the frequency of use of oral steroids and potential side effects.   Unable to get in with Rheumatology until the end of the year.   Has a known history polycystic kidney diease s/p kidney transplant. Following the Nephrology Transplant team .  Last Cr-    Creatinine   Date Value Ref Range Status   02/17/2020 3.06 (H) 0.66 - 1.25 mg/dL Final     Has not tried any prophylactic urate-lowering drugs in the past.    Onset: yesterday     Description:   Location: ankle - left  Joint Swelling: no   Redness: no   Pain: YES    Intensity: moderate    Progression of Symptoms:  Improving, no longer having pain in ankle     Accompanying Signs & Symptoms:  Fevers: no     History:   Trauma to the area: no   Previous history of gout: YES  Recent illness:  no     Precipitating factors:   Diet-rich in purine: no  Alcohol use: no   Diuretic use: no     Alleviating factors:  None     Therapies Tried and outcome: apple cider vinigar, medrol dosepak       Patient Active Problem List   Diagnosis     Kidney replaced by transplant     Medullary cystic kidney disease     CARDIOVASCULAR SCREENING; LDL GOAL LESS THAN 100     Conductive hearing loss, unilateral     Aftercare following organ transplant     Immunosuppression (H)     Anemia in chronic renal disease     Secondary renal hyperparathyroidism (H)     Gout     Vitamin D deficiency     Cerebrovascular accident (H)     Dyslipidemia     Factor V Leiden (H)     HTN, kidney transplant related     CKD (chronic kidney disease) stage 4, GFR 15-29 ml/min (H)     Past Surgical History:   Procedure Laterality Date     REPAIR PATENT FORAMEN OVALE       s/p LDKT         Social History     Tobacco  Use     Smoking status: Never Smoker     Smokeless tobacco: Never Used   Substance Use Topics     Alcohol use: No     Family History   Problem Relation Age of Onset     Lung Cancer Paternal Grandmother      Kidney Disease Sister         medullary cystic kidney disease, s/p kidney transplant     Prostate Cancer No family hx of      Colon Cancer No family hx of          Current Outpatient Medications   Medication Sig Dispense Refill     amLODIPine (NORVASC) 5 MG tablet Take 1 tablet (5 mg) by mouth At Bedtime At night 90 tablet 3     CELLCEPT (BRAND) 250 MG capsule Take 4 capsules (1,000 mg) by mouth 2 times daily 240 capsule 11     cholecalciferol 2000 units TABS Take 2,000 Units by mouth daily 90 tablet 3     cycloSPORINE modified (GENERIC EQUIVALENT) 25 MG capsule Take 3 capsules (75 mg) by mouth 2 times daily 180 capsule 11     Ferrous Gluconate 324 (37.5 Fe) MG TABS Take by mouth daily       losartan (COZAAR) 25 MG tablet Take 1 tablet (25 mg) by mouth daily 90 tablet 3     sodium bicarbonate 650 MG tablet Take 2 tablets (1,300 mg) by mouth 2 times daily 360 tablet 3     No Known Allergies    Reviewed and updated as needed this visit by Provider         Review of Systems   Constitutional, HEENT, cardiovascular, pulmonary, gi and gu systems are negative, except as otherwise noted.      Objective    /81   Pulse 79   Temp 97.4  F (36.3  C) (Tympanic)   Wt 104.7 kg (230 lb 12.8 oz)   BMI 34.08 kg/m    Body mass index is 34.08 kg/m .  Physical Exam   GENERAL: healthy, alert and no distress  PSYCH: mentation appears normal, affect normal/bright    Diagnostic Test Results:  Labs reviewed in Epic        Assessment & Plan     Miguel Angel was seen today for arthritis.    Diagnoses and all orders for this visit:    History of recurrent gout  -     Uric acid; Future  -     Has had multiple episodes of gouty attacks in the recent past needing multiple refills on the Medro dosepak.   -      Recommend starting a daily  Urate-lowering medication. Will  trial Febuxostate 40 mg/day if ok with his Nephrology Transplant Team. Patient will contact them as well    HTN, kidney transplant related controlled  -     Refill: amLODIPine (NORVASC) 5 MG tablet; Take 1 tablet (5 mg) by mouth At Bedtime At night  -     Refill: losartan (COZAAR) 25 MG tablet; Take 1 tablet (25 mg) by mouth daily  -    Following with Nephrology      Return in about 7 months (around 2/27/2021) for Physical Exam, Medication check.    Lucrecia Hutson MD  [facility]

## 2020-07-29 ENCOUNTER — TELEPHONE (OUTPATIENT)
Dept: TRANSPLANT | Facility: CLINIC | Age: 45
End: 2020-07-29

## 2020-07-29 DIAGNOSIS — Z87.39 HISTORY OF GOUT: ICD-10-CM

## 2020-07-29 DIAGNOSIS — E79.0 HYPERURICEMIA: Primary | ICD-10-CM

## 2020-07-29 DIAGNOSIS — R79.89 ELEVATED SERUM CREATININE: ICD-10-CM

## 2020-07-29 DIAGNOSIS — Z94.0 KIDNEY TRANSPLANTED: ICD-10-CM

## 2020-07-29 DIAGNOSIS — Z79.60 LONG-TERM USE OF IMMUNOSUPPRESSANT MEDICATION: ICD-10-CM

## 2020-07-29 DIAGNOSIS — D64.9 ANEMIA: Primary | ICD-10-CM

## 2020-07-29 LAB
ANION GAP SERPL CALCULATED.3IONS-SCNC: 9 MMOL/L (ref 3–14)
BUN SERPL-MCNC: 61 MG/DL (ref 7–30)
CALCIUM SERPL-MCNC: 8.4 MG/DL (ref 8.5–10.1)
CHLORIDE SERPL-SCNC: 110 MMOL/L (ref 94–109)
CO2 SERPL-SCNC: 19 MMOL/L (ref 20–32)
CREAT SERPL-MCNC: 3.2 MG/DL (ref 0.66–1.25)
CREAT UR-MCNC: 70 MG/DL
CYCLOSPORINE BLD LC/MS/MS-MCNC: 60 UG/L (ref 50–400)
ERYTHROCYTE [DISTWIDTH] IN BLOOD BY AUTOMATED COUNT: 12.8 % (ref 10–15)
GFR SERPL CREATININE-BSD FRML MDRD: 22 ML/MIN/{1.73_M2}
GLUCOSE SERPL-MCNC: 92 MG/DL (ref 70–99)
HCT VFR BLD AUTO: 28.7 % (ref 40–53)
HGB BLD-MCNC: 9.2 G/DL (ref 13.3–17.7)
MCH RBC QN AUTO: 29.6 PG (ref 26.5–33)
MCHC RBC AUTO-ENTMCNC: 32.1 G/DL (ref 31.5–36.5)
MCV RBC AUTO: 92 FL (ref 78–100)
PLATELET # BLD AUTO: 202 10E9/L (ref 150–450)
POTASSIUM SERPL-SCNC: 4.6 MMOL/L (ref 3.4–5.3)
PROT UR-MCNC: 0.21 G/L
PROT/CREAT 24H UR: 0.3 G/G CR (ref 0–0.2)
RBC # BLD AUTO: 3.11 10E12/L (ref 4.4–5.9)
SODIUM SERPL-SCNC: 138 MMOL/L (ref 133–144)
TME LAST DOSE: NORMAL H
URATE SERPL-MCNC: 9.9 MG/DL (ref 3.5–7.2)
WBC # BLD AUTO: 4.5 10E9/L (ref 4–11)

## 2020-07-29 PROCEDURE — 36415 COLL VENOUS BLD VENIPUNCTURE: CPT | Performed by: INTERNAL MEDICINE

## 2020-07-29 PROCEDURE — 85027 COMPLETE CBC AUTOMATED: CPT | Performed by: INTERNAL MEDICINE

## 2020-07-29 PROCEDURE — 84550 ASSAY OF BLOOD/URIC ACID: CPT | Performed by: INTERNAL MEDICINE

## 2020-07-29 PROCEDURE — 80048 BASIC METABOLIC PNL TOTAL CA: CPT | Performed by: INTERNAL MEDICINE

## 2020-07-29 PROCEDURE — 80158 DRUG ASSAY CYCLOSPORINE: CPT | Performed by: INTERNAL MEDICINE

## 2020-07-29 PROCEDURE — 84156 ASSAY OF PROTEIN URINE: CPT | Performed by: INTERNAL MEDICINE

## 2020-07-29 RX ORDER — FEBUXOSTAT 40 MG/1
40 TABLET, FILM COATED ORAL DAILY
Qty: 90 TABLET | Refills: 3 | Status: SHIPPED | OUTPATIENT
Start: 2020-07-29 | End: 2021-08-23

## 2020-07-29 NOTE — TELEPHONE ENCOUNTER
ISSUE: Hemoglobin 9.2 on 7/29/20   Trending down over last couple years.     PLAN: Call Miguel Angel and assess for s/s of anemia. Ensure still taking iron.     OUTCOME: VM message left for Miguel Angel to discuss lab results and see MyChart message. Anemia services referral placed.

## 2020-07-30 NOTE — TELEPHONE ENCOUNTER
ISSUE:   Cyclosporine level 60 on 7/29/20, goal , dose 75 mg BID. Last dose recorded as 7/28/20; no time given.    PLAN:   Please call patient and confirm this was an accurate 12-hour trough. Verify Cyclosporine dose 75 mg BID. Confirm no new medications or illness. Confirm no missed doses. If accurate trough and accurate dose, increase Cyclosporine dose to 75 mg in the morning and 100 mg in the evening and repeat labs next week.      OUTCOME:   LUCAS message left for Miguel Angel. Mitchell message sent.  Orders sent for repeat labs.

## 2020-07-31 ENCOUNTER — TELEPHONE (OUTPATIENT)
Dept: PHARMACY | Facility: CLINIC | Age: 45
End: 2020-07-31

## 2020-07-31 DIAGNOSIS — D63.1 ANEMIA OF CHRONIC RENAL FAILURE, STAGE 4 (SEVERE) (H): ICD-10-CM

## 2020-07-31 DIAGNOSIS — N18.4 ANEMIA OF CHRONIC RENAL FAILURE, STAGE 4 (SEVERE) (H): ICD-10-CM

## 2020-07-31 DIAGNOSIS — Z94.0 KIDNEY REPLACED BY TRANSPLANT: Primary | ICD-10-CM

## 2020-07-31 DIAGNOSIS — N18.30 ANEMIA OF CHRONIC RENAL FAILURE, STAGE 3 (MODERATE) (H): ICD-10-CM

## 2020-07-31 DIAGNOSIS — D63.1 ANEMIA OF CHRONIC RENAL FAILURE, STAGE 3 (MODERATE) (H): ICD-10-CM

## 2020-07-31 DIAGNOSIS — N18.4 CKD (CHRONIC KIDNEY DISEASE) STAGE 4, GFR 15-29 ML/MIN (H): ICD-10-CM

## 2020-07-31 NOTE — TELEPHONE ENCOUNTER
Anemia Management Note - Enrollment  SUBJECTIVE/OBJECTIVE:    Referred by Dr. Lan Patino on 2020  Primary Diagnosis: Anemia in Chronic Kidney Disease (N18.4, D63.1)     Secondary Diagnosis:  Chronic Kidney Disease, Stage 4 (N18.4)   Kidney Tx:  &   Hgb goal range:  9-10  Epo/Darbo: To Be initiated when Hgb <9.   8/3/20; Ok to use  CINDY with Factor V per Dr. Patino  20:Per Miguel Angel, No clot in over 20 years.  Hesitant to start CINDY d/t increased risk of blood cots.  Iron regimen:  Ferrous Gluconate  once daily  Labs : 2021  Recent CINDY use, transfusion, IV iron: NA  RX/TX plans : TBD  No history of stroke and MI or cancers.  Factor V (clotting disorder)    Contact:  No Consent to Communicate on File.     Anemia Latest Ref Rng & Units 3/14/2018 2018 3/5/2019 2019 2019 2020 2020   Hemoglobin 13.3 - 17.7 g/dL 11.4(L) 11.0(L) 10.5(L) 9.6(L) 8.7(L) 9.2(L) 9.2(L)   TSAT 15 - 46 % - 28 - - - - -   Ferritin 26 - 388 ng/mL - 165 - - - - -       BP Readings from Last 3 Encounters:   20 137/81   20 135/79   19 111/69     Wt Readings from Last 2 Encounters:   20 104.7 kg (230 lb 12.8 oz)   20 101 kg (222 lb 9.6 oz)     Current Outpatient Medications   Medication Sig Dispense Refill     amLODIPine (NORVASC) 5 MG tablet Take 1 tablet (5 mg) by mouth At Bedtime At night 90 tablet 3     CELLCEPT (BRAND) 250 MG capsule Take 4 capsules (1,000 mg) by mouth 2 times daily 240 capsule 11     cholecalciferol 2000 units TABS Take 2,000 Units by mouth daily 90 tablet 3     cycloSPORINE modified (GENERIC EQUIVALENT) 25 MG capsule Take 3 capsules (75 mg) by mouth 2 times daily 180 capsule 11     febuxostat (ULORIC) 40 MG TABS tablet Take 1 tablet (40 mg) by mouth daily 90 tablet 3     Ferrous Gluconate 324 (37.5 Fe) MG TABS Take by mouth daily       losartan (COZAAR) 25 MG tablet Take 1 tablet (25 mg) by mouth daily 90 tablet 3     sodium bicarbonate 650 MG  tablet Take 2 tablets (1,300 mg) by mouth 2 times daily 360 tablet 3     ASSESSMENT:  Hgb At goal   Ferritin: Due for labs  TSat: Due for labs  Iron regimen recommended: TBD, taking Ferrous Gluconate 1 tab daily.   No CINDY therapy, Factor V.   Blood Pressure: Stable    PLAN:  1. Called patient for enrollment in Anemia Management Service.  2. Discussed:  anemia overview, monitoring service and goal hemoglobin range and rationale and risks of CINDY blood clots, stroke and increase in blood pressure  3. Dose location: [facility]  4. Labs: [facility]  5. Pharmacy: NA     8/3/20; Ok to use  CINDY with Factor V per Dr. Patino  8/4/20:Per Miguel Angel, No clot in over 20 years.  Hesitant to start CINDY d/t increased risk of blood cots. Labs will be drawn again on 8/12/20.  Will discuss plan going forward after Iron levels are available.    Next call date:  8/13/2020 Review labs.     Janna Louis RN   Anemia Services  [facility]  [address] [place] [id number]   [email or web address]   Office : [phone]  Fax: [phone]

## 2020-08-02 ENCOUNTER — MYC REFILL (OUTPATIENT)
Dept: FAMILY MEDICINE | Facility: CLINIC | Age: 45
End: 2020-08-02

## 2020-08-02 ENCOUNTER — MYC REFILL (OUTPATIENT)
Dept: TRANSPLANT | Facility: CLINIC | Age: 45
End: 2020-08-02

## 2020-08-02 DIAGNOSIS — I15.1 HTN, KIDNEY TRANSPLANT RELATED: ICD-10-CM

## 2020-08-02 DIAGNOSIS — Z94.0 KIDNEY REPLACED BY TRANSPLANT: ICD-10-CM

## 2020-08-02 DIAGNOSIS — Z94.0 KIDNEY TRANSPLANTED: Primary | ICD-10-CM

## 2020-08-02 DIAGNOSIS — Z94.0 HTN, KIDNEY TRANSPLANT RELATED: ICD-10-CM

## 2020-08-02 RX ORDER — LOSARTAN POTASSIUM 25 MG/1
25 TABLET ORAL DAILY
Qty: 90 TABLET | Refills: 3 | Status: CANCELLED | OUTPATIENT
Start: 2020-08-02

## 2020-08-02 RX ORDER — AMLODIPINE BESYLATE 5 MG/1
5 TABLET ORAL AT BEDTIME
Qty: 90 TABLET | Refills: 3 | Status: CANCELLED | OUTPATIENT
Start: 2020-08-02

## 2020-08-03 RX ORDER — CYCLOSPORINE 25 MG/1
CAPSULE, LIQUID FILLED ORAL
Qty: 210 CAPSULE | Refills: 11 | Status: SHIPPED | OUTPATIENT
Start: 2020-08-03 | End: 2021-08-23

## 2020-08-03 RX ORDER — MYCOPHENOLATE MOFETIL 250 MG/1
1000 CAPSULE ORAL 2 TIMES DAILY
Qty: 240 CAPSULE | Refills: 11 | Status: SHIPPED | OUTPATIENT
Start: 2020-08-03 | End: 2020-09-29

## 2020-08-05 NOTE — TELEPHONE ENCOUNTER
Refused refill as patient has refills available. Refilled on 7/27/2020 for 90 day supply with 3 refills. Zelnas message sent to patient notifying RX at his pharmacy.    Julia Argueta, RN, BSN

## 2020-08-12 DIAGNOSIS — R79.89 ELEVATED SERUM CREATININE: ICD-10-CM

## 2020-08-12 DIAGNOSIS — N18.4 CKD (CHRONIC KIDNEY DISEASE) STAGE 4, GFR 15-29 ML/MIN (H): ICD-10-CM

## 2020-08-12 DIAGNOSIS — Z94.0 KIDNEY TRANSPLANTED: ICD-10-CM

## 2020-08-12 DIAGNOSIS — D64.9 ANEMIA: ICD-10-CM

## 2020-08-12 DIAGNOSIS — Z94.0 KIDNEY REPLACED BY TRANSPLANT: ICD-10-CM

## 2020-08-12 DIAGNOSIS — N18.4 ANEMIA OF CHRONIC RENAL FAILURE, STAGE 4 (SEVERE) (H): ICD-10-CM

## 2020-08-12 DIAGNOSIS — D63.1 ANEMIA OF CHRONIC RENAL FAILURE, STAGE 4 (SEVERE) (H): ICD-10-CM

## 2020-08-12 DIAGNOSIS — Z79.60 LONG-TERM USE OF IMMUNOSUPPRESSANT MEDICATION: ICD-10-CM

## 2020-08-12 LAB
CREAT SERPL-MCNC: 3.51 MG/DL (ref 0.66–1.25)
GFR SERPL CREATININE-BSD FRML MDRD: 20 ML/MIN/{1.73_M2}
HCT VFR BLD AUTO: 28.2 % (ref 40–53)
HGB BLD-MCNC: 9 G/DL (ref 13.3–17.7)

## 2020-08-12 PROCEDURE — 36415 COLL VENOUS BLD VENIPUNCTURE: CPT | Performed by: INTERNAL MEDICINE

## 2020-08-12 PROCEDURE — 83540 ASSAY OF IRON: CPT | Performed by: INTERNAL MEDICINE

## 2020-08-12 PROCEDURE — 85014 HEMATOCRIT: CPT | Performed by: INTERNAL MEDICINE

## 2020-08-12 PROCEDURE — 80158 DRUG ASSAY CYCLOSPORINE: CPT | Performed by: INTERNAL MEDICINE

## 2020-08-12 PROCEDURE — 82565 ASSAY OF CREATININE: CPT | Performed by: INTERNAL MEDICINE

## 2020-08-12 PROCEDURE — 82728 ASSAY OF FERRITIN: CPT | Performed by: INTERNAL MEDICINE

## 2020-08-12 PROCEDURE — 83550 IRON BINDING TEST: CPT | Performed by: INTERNAL MEDICINE

## 2020-08-12 PROCEDURE — 85018 HEMOGLOBIN: CPT | Performed by: INTERNAL MEDICINE

## 2020-08-13 ENCOUNTER — TELEPHONE (OUTPATIENT)
Dept: INTERVENTIONAL RADIOLOGY/VASCULAR | Facility: CLINIC | Age: 45
End: 2020-08-13

## 2020-08-13 ENCOUNTER — TELEPHONE (OUTPATIENT)
Dept: TRANSPLANT | Facility: CLINIC | Age: 45
End: 2020-08-13

## 2020-08-13 ENCOUNTER — TELEPHONE (OUTPATIENT)
Dept: PHARMACY | Facility: CLINIC | Age: 45
End: 2020-08-13

## 2020-08-13 DIAGNOSIS — T86.11 KIDNEY TRANSPLANT REJECTION: ICD-10-CM

## 2020-08-13 DIAGNOSIS — Z94.0 KIDNEY TRANSPLANTED: Primary | ICD-10-CM

## 2020-08-13 DIAGNOSIS — T86.10 UNSPECIFIED COMPLICATION OF KIDNEY TRANSPLANT: ICD-10-CM

## 2020-08-13 DIAGNOSIS — R79.89 ELEVATED SERUM CREATININE: Primary | ICD-10-CM

## 2020-08-13 DIAGNOSIS — Z94.0 KIDNEY TRANSPLANTED: ICD-10-CM

## 2020-08-13 LAB
CYCLOSPORINE BLD LC/MS/MS-MCNC: 99 UG/L (ref 50–400)
FERRITIN SERPL-MCNC: 226 NG/ML (ref 26–388)
IRON SATN MFR SERPL: 34 % (ref 15–46)
IRON SERPL-MCNC: 96 UG/DL (ref 35–180)
TIBC SERPL-MCNC: 281 UG/DL (ref 240–430)
TME LAST DOSE: NORMAL H

## 2020-08-13 NOTE — TELEPHONE ENCOUNTER
Transplant Coordinator Renal Biopsy Communication    Call placed to Miguel Angel Piña to discuss indication for kidney transplant biopsy per Dr. Patino.     Indication for transplant renal biopsy: Elevated Creatinine   Laterality: LEFT iliac fossa (Per operative notes 2010)  Date of biopsy: Monday, August 17, 2020 at 11 AM      Patient location within 70 miles of [facility]: Yes.  If no, must stay overnight locally. Pt verbalizes staying: N/A.     Miguel Angel Piña's medication list was reviewed.   Anticoagulant: none   Ibuprofen: No.  Fish Oil:  No.  Medications held: Holding cyclosporine until labs drawn    Recent blood pressure readings are WNL or not applicable. Instructed to take medication, especially blood pressure medications (losartan), before arriving to [facility] at 9:30 AM day of procedure 8/17/20.    Procedure expectations and duration of stay discussed. Expressed pt can expect a phone call from IR nurse to confirm biopsy date/time/location/directions/review of medications.   Patient verbalized understanding they will need to arrive by 9:30 a.m. on 8/17/20 to [facility] waiting room and plan to stay 4-5 hours post biopsy for monitoring. No food after 5 AM. Clear liquid okay up until 9 AM. Bring  and 1 person can stay. Pt has no additional questions at this time. Transplant Office phone number given to pt for future questions. If he is unable to get thru, he may send [employer] message.    Ivanna Rivera RN  Kidney/Pancreas Transplant Coordinator  [phone] option 5

## 2020-08-13 NOTE — TELEPHONE ENCOUNTER
Anemia Management Note  SUBJECTIVE/OBJECTIVE:  Referred by Dr. Lan Patino on 2020  Primary Diagnosis: Anemia in Chronic Kidney Disease (N18.4, D63.1)     Secondary Diagnosis:  Chronic Kidney Disease, Stage 4 (N18.4)   Kidney Tx:  &   Hgb goal range:  9-10  Epo/Darbo: To Be initiated when Hgb <9.   8/3/20; Ok to use  CINDY with Factor V per Dr. Patino  20:Per Miguel Angel, No clot in over 20 years.  Hesitant to start CINDY d/t increased risk of blood cots.  Iron regimen:  Ferrous Gluconate  once daily  Labs : 2021  Recent CINDY use, transfusion, IV iron: NA  RX/TX plans : TBD  No history of stroke and MI or cancers.  Factor V (clotting disorder)     Contact:            No Consent to Communicate on File.     Anemia Latest Ref Rng & Units 2018 3/5/2019 2019 2019 2020 2020 2020   Hemoglobin 13.3 - 17.7 g/dL 11.0(L) 10.5(L) 9.6(L) 8.7(L) 9.2(L) 9.2(L) 9.0(L)   TSAT 15 - 46 % 28 - - - - - 34   Ferritin 26 - 388 ng/mL 165 - - - - - 226     BP Readings from Last 3 Encounters:   20 137/81   20 135/79   19 111/69     Wt Readings from Last 2 Encounters:   20 104.7 kg (230 lb 12.8 oz)   20 101 kg (222 lb 9.6 oz)           ASSESSMENT:  Hgb:at goal - continue to monitor. Declines CINDY therapy d/t side effects. Dx of Factor V.   TSat: at goal >30% Ferritin: At goal (>100ng/mL)    PLAN:  RTC for Hgb in 2 week(s) and Check iron studies in 4 week(s)    Orders needed to be renewed (for next follow-up date) in EPIC: None    Iron labs due:  4 weeks    Plan discussed with:  Spoke with Miguel Angel, he was not able to talk. He will call back later to discuss lab results.   Plan provided by:  henrry    NEXT FOLLOW-UP DATE:  20    Janna Louis RN   [facility]  [address] [address]   [email or web address]   Office : [phone]  Fax: [phone]

## 2020-08-13 NOTE — TELEPHONE ENCOUNTER
ISSUE: Creatinine elevation 3.51 on 8/12/20         PLAN:  Message   Received: Today   Message Contents   Lan Patino MD Blaisdell, Ivanna Schafer, RN               Trend up in serum creatinine for unclear etiology and recommend a kidney transplant biopsy for further evaluation.      OUTCOME: Left message for Miguel Angel that his creatinine continues to trend up and Dr. Patino had recommended kidney biopsy. Asked for return call to discuss further. MyChart message sent as well.     Second VM message left for patient to return call.     Spoke with Miguel Angel 8/14/20. He received VM messages and message from IR nurse. He is on his way now to get Asymptomatic COVID-19 testing pre-procedure.

## 2020-08-14 ENCOUNTER — TELEPHONE (OUTPATIENT)
Dept: TRANSPLANT | Facility: CLINIC | Age: 45
End: 2020-08-14

## 2020-08-14 DIAGNOSIS — Z11.59 ENCOUNTER FOR SCREENING FOR OTHER VIRAL DISEASES: Primary | ICD-10-CM

## 2020-08-14 DIAGNOSIS — Z11.59 ENCOUNTER FOR SCREENING FOR OTHER VIRAL DISEASES: ICD-10-CM

## 2020-08-14 PROCEDURE — U0003 INFECTIOUS AGENT DETECTION BY NUCLEIC ACID (DNA OR RNA); SEVERE ACUTE RESPIRATORY SYNDROME CORONAVIRUS 2 (SARS-COV-2) (CORONAVIRUS DISEASE [COVID-19]), AMPLIFIED PROBE TECHNIQUE, MAKING USE OF HIGH THROUGHPUT TECHNOLOGIES AS DESCRIBED BY CMS-2020-01-R: HCPCS | Performed by: INTERNAL MEDICINE

## 2020-08-14 NOTE — TELEPHONE ENCOUNTER
Patient Call: Voicemail  Date/Time: 8/14/20 901am  Reason for call: Patient left message requesting a call back from transplant coordinator

## 2020-08-14 NOTE — TELEPHONE ENCOUNTER
Miguel Angel thought that he received another [employer] message while at work and will review when he gets home to his desk top. RNCC does not see that he was sent any new messages. Miguel Angel has no further questions at this time. COVID- 19 PCR swab collected.

## 2020-08-14 NOTE — TELEPHONE ENCOUNTER
RNCC spoke with patient at 9:39 AM today 8/14/20 after 3 attempts to speak with him and 2 MyChart messages on 8/13/20.

## 2020-08-16 LAB
SARS-COV-2 RNA SPEC QL NAA+PROBE: NOT DETECTED
SPECIMEN SOURCE: NORMAL

## 2020-08-17 ENCOUNTER — RESULTS ONLY (OUTPATIENT)
Dept: OTHER | Facility: CLINIC | Age: 45
End: 2020-08-17

## 2020-08-17 ENCOUNTER — APPOINTMENT (OUTPATIENT)
Dept: MEDSURG UNIT | Facility: CLINIC | Age: 45
End: 2020-08-17
Attending: INTERNAL MEDICINE
Payer: COMMERCIAL

## 2020-08-17 ENCOUNTER — HOSPITAL ENCOUNTER (OUTPATIENT)
Facility: CLINIC | Age: 45
Discharge: HOME | End: 2020-08-17
Attending: INTERNAL MEDICINE | Admitting: RADIOLOGY
Payer: COMMERCIAL

## 2020-08-17 ENCOUNTER — APPOINTMENT (OUTPATIENT)
Dept: INTERVENTIONAL RADIOLOGY/VASCULAR | Facility: CLINIC | Age: 45
End: 2020-08-17
Attending: INTERNAL MEDICINE
Payer: COMMERCIAL

## 2020-08-17 ENCOUNTER — TELEPHONE (OUTPATIENT)
Dept: TRANSPLANT | Facility: CLINIC | Age: 45
End: 2020-08-17

## 2020-08-17 VITALS
DIASTOLIC BLOOD PRESSURE: 73 MMHG | OXYGEN SATURATION: 97 % | HEART RATE: 73 BPM | SYSTOLIC BLOOD PRESSURE: 107 MMHG | RESPIRATION RATE: 16 BRPM | TEMPERATURE: 98.1 F | HEIGHT: 68 IN | WEIGHT: 220 LBS | BODY MASS INDEX: 33.34 KG/M2

## 2020-08-17 DIAGNOSIS — T86.10 UNSPECIFIED COMPLICATION OF KIDNEY TRANSPLANT: ICD-10-CM

## 2020-08-17 DIAGNOSIS — R79.89 ELEVATED SERUM CREATININE: ICD-10-CM

## 2020-08-17 DIAGNOSIS — Z94.0 KIDNEY TRANSPLANTED: ICD-10-CM

## 2020-08-17 LAB
ABO + RH BLD: NORMAL
ABO + RH BLD: NORMAL
ALBUMIN UR-MCNC: 10 MG/DL
ANION GAP SERPL CALCULATED.3IONS-SCNC: 6 MMOL/L (ref 3–14)
APPEARANCE UR: CLEAR
BASOPHILS # BLD AUTO: 0 10E9/L (ref 0–0.2)
BASOPHILS NFR BLD AUTO: 0.7 %
BILIRUB UR QL STRIP: NEGATIVE
BLD GP AB SCN SERPL QL: NORMAL
BLOOD BANK CMNT PATIENT-IMP: NORMAL
BUN SERPL-MCNC: 49 MG/DL (ref 7–30)
CALCIUM SERPL-MCNC: 8.6 MG/DL (ref 8.5–10.1)
CHLORIDE SERPL-SCNC: 112 MMOL/L (ref 94–109)
CO2 SERPL-SCNC: 19 MMOL/L (ref 20–32)
COLOR UR AUTO: ABNORMAL
CREAT SERPL-MCNC: 2.9 MG/DL (ref 0.66–1.25)
CREAT UR-MCNC: 73 MG/DL
DIFFERENTIAL METHOD BLD: ABNORMAL
EOSINOPHIL # BLD AUTO: 0.1 10E9/L (ref 0–0.7)
EOSINOPHIL NFR BLD AUTO: 2.1 %
ERYTHROCYTE [DISTWIDTH] IN BLOOD BY AUTOMATED COUNT: 12.7 % (ref 10–15)
GFR SERPL CREATININE-BSD FRML MDRD: 25 ML/MIN/{1.73_M2}
GLUCOSE SERPL-MCNC: 91 MG/DL (ref 70–99)
GLUCOSE UR STRIP-MCNC: NEGATIVE MG/DL
HCT VFR BLD AUTO: 28.8 % (ref 40–53)
HGB BLD-MCNC: 9.2 G/DL (ref 13.3–17.7)
HGB UR QL STRIP: ABNORMAL
IMM GRANULOCYTES # BLD: 0 10E9/L (ref 0–0.4)
IMM GRANULOCYTES NFR BLD: 0.2 %
INR PPP: 1 (ref 0.86–1.14)
KETONES UR STRIP-MCNC: NEGATIVE MG/DL
LEUKOCYTE ESTERASE UR QL STRIP: NEGATIVE
LYMPHOCYTES # BLD AUTO: 1.1 10E9/L (ref 0.8–5.3)
LYMPHOCYTES NFR BLD AUTO: 25.2 %
MCH RBC QN AUTO: 28.8 PG (ref 26.5–33)
MCHC RBC AUTO-ENTMCNC: 31.9 G/DL (ref 31.5–36.5)
MCV RBC AUTO: 90 FL (ref 78–100)
MICROALBUMIN UR-MCNC: 65 MG/L
MICROALBUMIN/CREAT UR: 88.81 MG/G CR (ref 0–17)
MONOCYTES # BLD AUTO: 0.3 10E9/L (ref 0–1.3)
MONOCYTES NFR BLD AUTO: 6 %
NEUTROPHILS # BLD AUTO: 2.9 10E9/L (ref 1.6–8.3)
NEUTROPHILS NFR BLD AUTO: 65.8 %
NITRATE UR QL: NEGATIVE
NRBC # BLD AUTO: 0 10*3/UL
NRBC BLD AUTO-RTO: 0 /100
PH UR STRIP: 5.5 PH (ref 5–7)
PLATELET # BLD AUTO: 201 10E9/L (ref 150–450)
POTASSIUM SERPL-SCNC: 5.2 MMOL/L (ref 3.4–5.3)
PROT UR-MCNC: 0.23 G/L
PROT/CREAT 24H UR: 0.31 G/G CR (ref 0–0.2)
RBC # BLD AUTO: 3.2 10E12/L (ref 4.4–5.9)
RBC #/AREA URNS AUTO: 31 /HPF (ref 0–2)
SODIUM SERPL-SCNC: 137 MMOL/L (ref 133–144)
SOURCE: ABNORMAL
SP GR UR STRIP: 1.01 (ref 1–1.03)
SPECIMEN EXP DATE BLD: NORMAL
SQUAMOUS #/AREA URNS AUTO: <1 /HPF (ref 0–1)
UROBILINOGEN UR STRIP-MCNC: NORMAL MG/DL (ref 0–2)
WBC # BLD AUTO: 4.4 10E9/L (ref 4–11)
WBC #/AREA URNS AUTO: 0 /HPF (ref 0–5)

## 2020-08-17 PROCEDURE — 36415 COLL VENOUS BLD VENIPUNCTURE: CPT | Performed by: INTERNAL MEDICINE

## 2020-08-17 PROCEDURE — 99152 MOD SED SAME PHYS/QHP 5/>YRS: CPT

## 2020-08-17 PROCEDURE — 81001 URINALYSIS AUTO W/SCOPE: CPT | Performed by: INTERNAL MEDICINE

## 2020-08-17 PROCEDURE — 99153 MOD SED SAME PHYS/QHP EA: CPT

## 2020-08-17 PROCEDURE — 88313 SPECIAL STAINS GROUP 2: CPT | Performed by: INTERNAL MEDICINE

## 2020-08-17 PROCEDURE — 86901 BLOOD TYPING SEROLOGIC RH(D): CPT | Performed by: INTERNAL MEDICINE

## 2020-08-17 PROCEDURE — 88348 ELECTRON MICROSCOPY DX: CPT | Performed by: INTERNAL MEDICINE

## 2020-08-17 PROCEDURE — 25000125 ZZHC RX 250: Performed by: STUDENT IN AN ORGANIZED HEALTH CARE EDUCATION/TRAINING PROGRAM

## 2020-08-17 PROCEDURE — 86833 HLA CLASS II HIGH DEFIN QUAL: CPT | Performed by: INTERNAL MEDICINE

## 2020-08-17 PROCEDURE — 86850 RBC ANTIBODY SCREEN: CPT | Performed by: INTERNAL MEDICINE

## 2020-08-17 PROCEDURE — 88346 IMFLUOR 1ST 1ANTB STAIN PX: CPT | Performed by: INTERNAL MEDICINE

## 2020-08-17 PROCEDURE — 85025 COMPLETE CBC W/AUTO DIFF WBC: CPT | Performed by: RADIOLOGY

## 2020-08-17 PROCEDURE — 88350 IMFLUOR EA ADDL 1ANTB STN PX: CPT | Performed by: INTERNAL MEDICINE

## 2020-08-17 PROCEDURE — 25800030 ZZH RX IP 258 OP 636: Performed by: PHYSICIAN ASSISTANT

## 2020-08-17 PROCEDURE — 40000428 ZZHCL STATISTIC R-RUSH PROCESSING: Performed by: INTERNAL MEDICINE

## 2020-08-17 PROCEDURE — 86900 BLOOD TYPING SEROLOGIC ABO: CPT | Performed by: INTERNAL MEDICINE

## 2020-08-17 PROCEDURE — 25000128 H RX IP 250 OP 636: Performed by: STUDENT IN AN ORGANIZED HEALTH CARE EDUCATION/TRAINING PROGRAM

## 2020-08-17 PROCEDURE — 27210732 IR RENAL BIOPSY LEFT

## 2020-08-17 PROCEDURE — 87799 DETECT AGENT NOS DNA QUANT: CPT | Performed by: INTERNAL MEDICINE

## 2020-08-17 PROCEDURE — 80048 BASIC METABOLIC PNL TOTAL CA: CPT | Performed by: INTERNAL MEDICINE

## 2020-08-17 PROCEDURE — 40000167 ZZH STATISTIC PP CARE STAGE 2

## 2020-08-17 PROCEDURE — 84156 ASSAY OF PROTEIN URINE: CPT | Performed by: INTERNAL MEDICINE

## 2020-08-17 PROCEDURE — 87086 URINE CULTURE/COLONY COUNT: CPT | Performed by: INTERNAL MEDICINE

## 2020-08-17 PROCEDURE — 86832 HLA CLASS I HIGH DEFIN QUAL: CPT | Performed by: INTERNAL MEDICINE

## 2020-08-17 PROCEDURE — 82043 UR ALBUMIN QUANTITATIVE: CPT | Performed by: INTERNAL MEDICINE

## 2020-08-17 PROCEDURE — 88305 TISSUE EXAM BY PATHOLOGIST: CPT | Performed by: INTERNAL MEDICINE

## 2020-08-17 PROCEDURE — 85610 PROTHROMBIN TIME: CPT | Performed by: RADIOLOGY

## 2020-08-17 RX ORDER — FENTANYL CITRATE 50 UG/ML
25-50 INJECTION, SOLUTION INTRAMUSCULAR; INTRAVENOUS EVERY 5 MIN PRN
Status: DISCONTINUED | OUTPATIENT
Start: 2020-08-17 | End: 2020-08-17 | Stop reason: HOSPADM

## 2020-08-17 RX ORDER — SODIUM CHLORIDE 9 MG/ML
INJECTION, SOLUTION INTRAVENOUS CONTINUOUS
Status: DISCONTINUED | OUTPATIENT
Start: 2020-08-17 | End: 2020-08-17 | Stop reason: HOSPADM

## 2020-08-17 RX ORDER — DEXTROSE MONOHYDRATE 25 G/50ML
25-50 INJECTION, SOLUTION INTRAVENOUS
Status: DISCONTINUED | OUTPATIENT
Start: 2020-08-17 | End: 2020-08-17 | Stop reason: HOSPADM

## 2020-08-17 RX ORDER — NICOTINE POLACRILEX 4 MG
15-30 LOZENGE BUCCAL
Status: DISCONTINUED | OUTPATIENT
Start: 2020-08-17 | End: 2020-08-17 | Stop reason: HOSPADM

## 2020-08-17 RX ORDER — LIDOCAINE HYDROCHLORIDE 10 MG/ML
1-30 INJECTION, SOLUTION EPIDURAL; INFILTRATION; INTRACAUDAL; PERINEURAL
Status: COMPLETED | OUTPATIENT
Start: 2020-08-17 | End: 2020-08-17

## 2020-08-17 RX ORDER — NALOXONE HYDROCHLORIDE 0.4 MG/ML
.1-.4 INJECTION, SOLUTION INTRAMUSCULAR; INTRAVENOUS; SUBCUTANEOUS
Status: DISCONTINUED | OUTPATIENT
Start: 2020-08-17 | End: 2020-08-17 | Stop reason: HOSPADM

## 2020-08-17 RX ORDER — LIDOCAINE 40 MG/G
CREAM TOPICAL
Status: DISCONTINUED | OUTPATIENT
Start: 2020-08-17 | End: 2020-08-17 | Stop reason: HOSPADM

## 2020-08-17 RX ORDER — FLUMAZENIL 0.1 MG/ML
0.2 INJECTION, SOLUTION INTRAVENOUS
Status: DISCONTINUED | OUTPATIENT
Start: 2020-08-17 | End: 2020-08-17 | Stop reason: HOSPADM

## 2020-08-17 RX ADMIN — MIDAZOLAM 1 MG: 1 INJECTION INTRAMUSCULAR; INTRAVENOUS at 10:51

## 2020-08-17 RX ADMIN — FENTANYL CITRATE 50 MCG: 50 INJECTION, SOLUTION INTRAMUSCULAR; INTRAVENOUS at 10:58

## 2020-08-17 RX ADMIN — MIDAZOLAM 1 MG: 1 INJECTION INTRAMUSCULAR; INTRAVENOUS at 10:58

## 2020-08-17 RX ADMIN — FENTANYL CITRATE 50 MCG: 50 INJECTION, SOLUTION INTRAMUSCULAR; INTRAVENOUS at 10:52

## 2020-08-17 RX ADMIN — LIDOCAINE HYDROCHLORIDE 6 ML: 10 INJECTION, SOLUTION EPIDURAL; INFILTRATION; INTRACAUDAL; PERINEURAL at 11:02

## 2020-08-17 RX ADMIN — SODIUM CHLORIDE: 9 INJECTION, SOLUTION INTRAVENOUS at 10:16

## 2020-08-17 ASSESSMENT — MIFFLIN-ST. JEOR: SCORE: 1862.41

## 2020-08-17 NOTE — IP AVS SNAPSHOT
[address] [address]                                    After Visit Summary   8/17/2020    Miguel Angel Piña    MRN: [id number]           After Visit Summary Signature Page    I have received my discharge instructions, and my questions have been answered. I have discussed any challenges I see with this plan with the nurse or doctor.    ..........................................................................................................................................  Patient/Patient Representative Signature      ..........................................................................................................................................  Patient Representative Print Name and Relationship to Patient    ..................................................               ................................................  Date                                   Time    ..........................................................................................................................................  Reviewed by Signature/Title    ...................................................              ..............................................  Date                                               Time          22EPIC Rev 08/18

## 2020-08-17 NOTE — PROGRESS NOTES
Prepped for transplanted renal biopsy.  Denies pain.  Father available for post-procedure transport.  Consent signed.  H & P current.  Labs pending.

## 2020-08-17 NOTE — PROCEDURES
[facility], [place]    Procedure: IR Procedure Note    Date/Time: 8/17/2020 11:40 AM  Performed by: La Nena Ellis MD  Authorized by: La Nena Ellis MD   IR Fellow Physician:  Radiology Resident Physician: LUKASZ Ellis MD  Other(s) attending procedure: TOMMY Dent MD    UNIVERSAL PROTOCOL   Site Marked: Yes  Prior Images Obtained and Reviewed:  Yes  Required items: Required blood products, implants, devices and special equipment available    Patient identity confirmed:  Verbally with patient, arm band, provided demographic data and hospital-assigned identification number  Patient was reevaluated immediately before administering moderate or deep sedation or anesthesia  Confirmation Checklist:  Patient's identity using two indicators, relevant allergies, procedure was appropriate and matched the consent or emergent situation and correct equipment/implants were available  Time out: Immediately prior to the procedure a time out was called    Universal Protocol: the Joint Commission Universal Protocol was followed    Preparation: Patient was prepped and draped in usual sterile fashion           ANESTHESIA    Anesthesia: Local infiltration  Local Anesthetic:  Lidocaine 1% without epinephrine  Anesthetic Total (mL):  6      SEDATION    Patient Sedated: Yes    Sedation Type:  Moderate (conscious) sedation  Vital signs: Vital signs monitored during sedation    See dictated procedure note for full details.  Findings: -LLQ renal transplant with renal cyst    Specimens: core needle biopsy specimens sent for pathological analysis    Complications: None    Condition: Stable    Plan: -Bedrest 2 hr    PROCEDURE   Patient Tolerance:  Patient tolerated the procedure well with no immediate complications  Describe Procedure: 4 core samples were obtained.   Length of time physician/provider present for 1:1 monitoring during sedation: 30

## 2020-08-17 NOTE — PROGRESS NOTES
Patient tolerated recovery stage well. VSS, LLQ site clean/dry/intact, no hematoma, and denies pain. Patient tolerated PO food and fluids. Teaching was done and discharge instructions were given. Patient ambulated, voided clear joe urine, and PIV was removed. Patient discharged from the hospital via ambulatory to home with his father.

## 2020-08-17 NOTE — TELEPHONE ENCOUNTER
This writer was notified by another Post- RNCC that patient had taken fish oil for the last couple weeks. This writer called IR , Mo, who checked in with IR staff and said that it was fine, they would still proceed with the procedure. Patient was instructed by SOT staff to hold fish oil and come to his appointment.

## 2020-08-17 NOTE — TELEPHONE ENCOUNTER
Received pre-procedure lab results CBC and INR. Kidney biopsy labs from transplant nephrology were not drawn. Call placed to IR and spoke with department nurse who will call 2A to have labs drawn.

## 2020-08-17 NOTE — PROGRESS NOTES
Pt back from IR s/p left transplanted kidney biopsy.  VSS.  Pt alert and oriented x4.  Pt denies any pain. Lt abd marcelo F/D/I.

## 2020-08-17 NOTE — PROGRESS NOTES
Patient Name: Miguel Angel Piña  Medical Record Number: [id number]  Today's Date: 8/17/2020    Procedure: Renal Biopsy transplant Left  Proceduralist: Dr. Gurvinder Dent, Dr. Rhonda Deutsch    Patient in room: 1045  Procedure Start: 1100  Procedure end: 1135  Sedation medications administered: 2 mg Versed, 100 mcg Fentanyl.  Patient out of room:  1142    Report given to: .A. R.N.    Other Notes: Pt arrived to IR room 7 from 2.A. Consent reviewed. Pt denies any questions or concerns regarding procedure. Pt positioned supine and monitored per protocol. Pt tolerated procedure without any noted complications. Pt transferred back to 2.A.

## 2020-08-17 NOTE — PRE-PROCEDURE
GENERAL PRE-PROCEDURE:   Procedure:  LLQ renal tx biopsy  Date/Time:  8/17/2020 10:05 AM    Verbal consent obtained?: Yes    Written consent obtained?: Yes    Risks and benefits: Risks, benefits and alternatives were discussed    Consent given by:  Patient  Patient states understanding of procedure being performed: Yes    Patient's understanding of procedure matches consent: Yes    Procedure consent matches procedure scheduled: Yes    Expected level of sedation:  Moderate  Appropriately NPO:  Yes  ASA Class:  Class 2- mild systemic disease, no acute problems, no functional limitations  Mallampati  :  Grade 1- soft palate, uvula, tonsillar pillars, and posterior pharyngeal wall visible  Lungs:  Lungs clear with good breath sounds bilaterally  Heart:  Normal heart sounds and rate  History & Physical reviewed:  History and physical reviewed and no updates needed  Statement of review:  I have reviewed the lab findings, diagnostic data, medications, and the plan for sedation

## 2020-08-17 NOTE — DISCHARGE INSTRUCTIONS
[facility] Following Kidney Biopsy    Physician:    Dr. Dent/Dr. Ellis                                           Date:August 17, 2020    ACTIVITY:      Relax and take it easy, no strenuous activity for 24 hours.    No heavy lifting (>10 lbs.) for 1 week    DIET:            Resume your regular diet and drink plenty of fluids, unless you are fluid restricted                    DRAINAGE:    There should be minimal drainage from the biopsy site. If bleeding soaks the dressing, you should lie down and apply pressure to the site for a minimum of 10 minutes. If the bleeding persists, refer to the emergency contact numbers below; whether the bleeding persists or not, you should report the occurrence to one of the numbers below.    Refer to the emergency numbers below for the following:     Excessive bleeding or drainage    Excessive swelling, redness, or tenderness at the site    Fever above 100.5 degrees orally    Severe pain    Drainage that is green, yellow, thick white, or has a bad odor    Passage of bloody urine or clots after you are discharged.     Emergency Contact Numbers:             [phone]      Ask for the Adult Nephrology Fellow on Call, someone is available 24 hours a day.

## 2020-08-18 LAB
BACTERIA SPEC CULT: NO GROWTH
CMV DNA SPEC NAA+PROBE-ACNC: NORMAL [IU]/ML
CMV DNA SPEC NAA+PROBE-LOG#: NORMAL {LOG_IU}/ML
DONOR IDENTIFICATION: NORMAL
DSA COMMENTS: NORMAL
DSA PRESENT: NO
DSA TEST METHOD: NORMAL
Lab: NORMAL
ORGAN: NORMAL
SA1 CELL: NORMAL
SA1 COMMENTS: NORMAL
SA1 HI RISK ABY: NORMAL
SA1 MOD RISK ABY: NORMAL
SA1 TEST METHOD: NORMAL
SA2 CELL: NORMAL
SA2 COMMENTS: NORMAL
SA2 HI RISK ABY UA: NORMAL
SA2 MOD RISK ABY: NORMAL
SA2 TEST METHOD: NORMAL
SPECIMEN SOURCE: NORMAL
SPECIMEN SOURCE: NORMAL
UNACCEPTABLE ANTIGEN: NORMAL
UNOS CPRA: 90

## 2020-08-19 LAB
BKV DNA # SPEC NAA+PROBE: NORMAL COPIES/ML
BKV DNA SPEC NAA+PROBE-LOG#: NORMAL LOG COPIES/ML
COPATH REPORT: NORMAL
SPECIMEN SOURCE: NORMAL

## 2020-08-20 ENCOUNTER — TELEPHONE (OUTPATIENT)
Dept: TRANSPLANT | Facility: CLINIC | Age: 45
End: 2020-08-20

## 2020-08-20 NOTE — TELEPHONE ENCOUNTER
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  I wouldn't make any changes at this time.     I haven't had a chance to call him.  Would you let him know.      OUTCOME:  Left voicemail message for Miguel Angel. Recommend good adherence to IS medication regimen ensuring doses are on time and not missed. No changes at this time. Will need lab follow up monitoring on time; went from February to July without labs or getting labs maybe 3 times/year.

## 2020-09-17 ENCOUNTER — TELEPHONE (OUTPATIENT)
Dept: TRANSPLANT | Facility: CLINIC | Age: 45
End: 2020-09-17

## 2020-09-17 DIAGNOSIS — Z79.60 LONG-TERM USE OF IMMUNOSUPPRESSANT MEDICATION: ICD-10-CM

## 2020-09-17 DIAGNOSIS — Z94.0 KIDNEY REPLACED BY TRANSPLANT: ICD-10-CM

## 2020-09-17 DIAGNOSIS — Z94.0 KIDNEY TRANSPLANTED: Primary | ICD-10-CM

## 2020-09-17 NOTE — TELEPHONE ENCOUNTER
Returned call to Miguel Angel. Left  message that he could return call letting me know which result he had questions on. MyChart message also sent to Miguel Angel.

## 2020-09-23 ENCOUNTER — TELEPHONE (OUTPATIENT)
Dept: PHARMACY | Facility: CLINIC | Age: 45
End: 2020-09-23

## 2020-09-23 NOTE — TELEPHONE ENCOUNTER
Follow-up with anemia management service:    No message left.  Called only phone  # listed. States it is his work... and that he is out of the office until 9/28/20.     Anemia Latest Ref Rng & Units 3/5/2019 6/12/2019 9/16/2019 2/17/2020 7/29/2020 8/12/2020 8/17/2020   Hemoglobin 13.3 - 17.7 g/dL 10.5(L) 9.6(L) 8.7(L) 9.2(L) 9.2(L) 9.0(L) 9.2(L)   TSAT 15 - 46 % - - - - - 34 -   Ferritin 26 - 388 ng/mL - - - - - 226 -           Follow-up call date: 9/30/20      Janna Louis RN   Anemia Services  [address] [address]   [email or web address]   Office : [phone]  Fax: [phone]

## 2020-09-25 DIAGNOSIS — E87.20 METABOLIC ACIDOSIS: ICD-10-CM

## 2020-09-25 RX ORDER — SODIUM BICARBONATE 650 MG/1
TABLET ORAL
Qty: 360 TABLET | Refills: 0 | Status: SHIPPED | OUTPATIENT
Start: 2020-09-25 | End: 2020-11-19

## 2020-09-25 NOTE — TELEPHONE ENCOUNTER
Miguel Angel returned call. He will call schedulers to set up his annual appointment. He states he is doing well. He doesn't want to return in-person for a 15 min appt because he has to take off work for that. Explained to Miguel Angel he just needs a virtual video visit or if no computer a telephone visit at this time. He was under the impression that the biopsy he had last month would suffice as a visit with the doctor. He will call nephrology appt scheduling line to reach  to help set up. Reminded he would need labs prior to that visit. Asks for new orders for [facility] (as the orders  20). Orders placed. Also discussed that additional refills for prescriptions could be sent when appointment has been scheduled.

## 2020-09-25 NOTE — TELEPHONE ENCOUNTER
Miguel Angel's last transplant nephology appointment was 9/5/19. Due to schedule his annual appointment. Message sent to schedulers to get him set-up:    Appointment Request: Transplant nephrology  New or Return Visit: Return  In Person Visit?: No -video  Reason for Visit/Diagnosis: annual and post-renal biopsy follow up  Orders Placed: N/A   Appointment Timeframe Requested: First available  Patient Aware? Yes  Physician Override Approved? N/A      Thank you,   vIanna Rivera RN

## 2020-09-29 DIAGNOSIS — Z94.0 KIDNEY TRANSPLANTED: Primary | ICD-10-CM

## 2020-09-29 DIAGNOSIS — Z94.0 KIDNEY REPLACED BY TRANSPLANT: ICD-10-CM

## 2020-09-29 RX ORDER — MYCOPHENOLATE MOFETIL 250 MG/1
1000 CAPSULE ORAL 2 TIMES DAILY
Qty: 240 CAPSULE | Refills: 11 | Status: SHIPPED | OUTPATIENT
Start: 2020-09-29 | End: 2021-09-27

## 2020-09-30 ENCOUNTER — TELEPHONE (OUTPATIENT)
Dept: PHARMACY | Facility: CLINIC | Age: 45
End: 2020-09-30

## 2020-09-30 NOTE — TELEPHONE ENCOUNTER
Follow-up with anemia management service:    Spoke with Miguel Angel, he will call and schedule a lab appt in the next week or so.     Anemia Latest Ref Rng & Units 3/5/2019 6/12/2019 9/16/2019 2/17/2020 7/29/2020 8/12/2020 8/17/2020   Hemoglobin 13.3 - 17.7 g/dL 10.5(L) 9.6(L) 8.7(L) 9.2(L) 9.2(L) 9.0(L) 9.2(L)   TSAT 15 - 46 % - - - - - 34 -   Ferritin 26 - 388 ng/mL - - - - - 226 -           Follow-up call date: 10/12/2020  Were labs drawn?    Janna Louis RN   Anemia Services  [address] [address]   [email or web address]   Office : [phone]  Fax: [phone]

## 2020-10-24 ENCOUNTER — MYC MEDICAL ADVICE (OUTPATIENT)
Dept: FAMILY MEDICINE | Facility: CLINIC | Age: 45
End: 2020-10-24

## 2020-10-24 DIAGNOSIS — M25.571 RIGHT ANKLE PAIN, UNSPECIFIED CHRONICITY: ICD-10-CM

## 2020-10-26 ENCOUNTER — TELEPHONE (OUTPATIENT)
Dept: PHARMACY | Facility: CLINIC | Age: 45
End: 2020-10-26

## 2020-10-26 NOTE — TELEPHONE ENCOUNTER
Follow-up with anemia management service:    Labs have not been drawn since Aug 2020.   Labs are scheduled for . Will follow up at this time.     Anemia Latest Ref Rng & Units 3/5/2019 2019 2019 2020 2020 2020 2020   Hemoglobin 13.3 - 17.7 g/dL 10.5(L) 9.6(L) 8.7(L) 9.2(L) 9.2(L) 9.0(L) 9.2(L)   TSAT 15 - 46 % - - - - - 34 -   Ferritin 26 - 388 ng/mL - - - - - 226 -       Orders needed to be renewed (for next follow-up date) in EPIC: None   Med order expires: NA   Lab orders : 2021    Follow-up call date: 2020    Janna Louis RN   Anemia Services  [address] [address]   [email or web address]   Office : [phone]  Fax: [phone]

## 2020-10-27 ENCOUNTER — TELEPHONE (OUTPATIENT)
Dept: FAMILY MEDICINE | Facility: CLINIC | Age: 45
End: 2020-10-27

## 2020-10-27 NOTE — TELEPHONE ENCOUNTER
Pt has febuxostat for gout, he said he does not take that. See mychart message from pt.   Pt would like methylprednisolone, last ordered 7/21/20, ended on 7/27/20 as that is when febuxostate was ordered

## 2020-10-27 NOTE — TELEPHONE ENCOUNTER
Duplicate request, spoke with pt, he did send a mychart. Will close this encounter and send mychart to provider again.

## 2020-10-27 NOTE — TELEPHONE ENCOUNTER
Reason for Call:  Medication or medication refill:    Do you use a [facility]?  Name of the pharmacy and phone number for the current request:  [facility] [address] [facility], [place] - [address]  [phone]    Name of the medication requested: Gout-patient did not remember name    Other request:     Can we leave a detailed message on this number? YES    Phone number patient can be reached at: Cell number on file:    Telephone Information:   Mobile [phone]       Best Time:     Call taken on 10/27/2020 at 2:31 PM by Zabrina Woodward

## 2020-10-30 RX ORDER — METHYLPREDNISOLONE 4 MG
TABLET, DOSE PACK ORAL
Qty: 21 TABLET | Refills: 1 | Status: SHIPPED | OUTPATIENT
Start: 2020-10-30 | End: 2021-03-01

## 2020-10-30 NOTE — TELEPHONE ENCOUNTER
Rx sent.   Patient is still getting frequent gouty flares and would strongly recommend being a gout prophylaxis medication.

## 2020-11-04 ENCOUNTER — TELEPHONE (OUTPATIENT)
Dept: PHARMACY | Facility: CLINIC | Age: 45
End: 2020-11-04

## 2020-11-04 DIAGNOSIS — Z79.60 LONG-TERM USE OF IMMUNOSUPPRESSANT MEDICATION: ICD-10-CM

## 2020-11-04 DIAGNOSIS — Z94.0 KIDNEY TRANSPLANTED: ICD-10-CM

## 2020-11-04 LAB
ANION GAP SERPL CALCULATED.3IONS-SCNC: 9 MMOL/L (ref 3–14)
BUN SERPL-MCNC: 54 MG/DL (ref 7–30)
CALCIUM SERPL-MCNC: 8.1 MG/DL (ref 8.5–10.1)
CHLORIDE SERPL-SCNC: 110 MMOL/L (ref 94–109)
CO2 SERPL-SCNC: 21 MMOL/L (ref 20–32)
CREAT SERPL-MCNC: 2.73 MG/DL (ref 0.66–1.25)
CREAT UR-MCNC: 87 MG/DL
CYCLOSPORINE BLD LC/MS/MS-MCNC: 70 UG/L (ref 50–400)
ERYTHROCYTE [DISTWIDTH] IN BLOOD BY AUTOMATED COUNT: 12.6 % (ref 10–15)
GFR SERPL CREATININE-BSD FRML MDRD: 27 ML/MIN/{1.73_M2}
GLUCOSE SERPL-MCNC: 89 MG/DL (ref 70–99)
HCT VFR BLD AUTO: 28 % (ref 40–53)
HGB BLD-MCNC: 8.9 G/DL (ref 13.3–17.7)
MCH RBC QN AUTO: 29 PG (ref 26.5–33)
MCHC RBC AUTO-ENTMCNC: 31.8 G/DL (ref 31.5–36.5)
MCV RBC AUTO: 91 FL (ref 78–100)
PLATELET # BLD AUTO: 241 10E9/L (ref 150–450)
POTASSIUM SERPL-SCNC: 4.5 MMOL/L (ref 3.4–5.3)
PROT UR-MCNC: 0.32 G/L
PROT/CREAT 24H UR: 0.36 G/G CR (ref 0–0.2)
RBC # BLD AUTO: 3.07 10E12/L (ref 4.4–5.9)
SODIUM SERPL-SCNC: 140 MMOL/L (ref 133–144)
TME LAST DOSE: NORMAL H
WBC # BLD AUTO: 4.5 10E9/L (ref 4–11)

## 2020-11-04 PROCEDURE — 84156 ASSAY OF PROTEIN URINE: CPT | Performed by: INTERNAL MEDICINE

## 2020-11-04 PROCEDURE — 36415 COLL VENOUS BLD VENIPUNCTURE: CPT | Performed by: INTERNAL MEDICINE

## 2020-11-04 PROCEDURE — 80048 BASIC METABOLIC PNL TOTAL CA: CPT | Performed by: INTERNAL MEDICINE

## 2020-11-04 PROCEDURE — 85027 COMPLETE CBC AUTOMATED: CPT | Performed by: INTERNAL MEDICINE

## 2020-11-04 PROCEDURE — 80158 DRUG ASSAY CYCLOSPORINE: CPT | Performed by: INTERNAL MEDICINE

## 2020-11-04 NOTE — TELEPHONE ENCOUNTER
Anemia Management Note  SUBJECTIVE/OBJECTIVE:  Referred by Dr. Lan Patino on 2020  Primary Diagnosis: Anemia in Chronic Kidney Disease (N18.4, D63.1)     Secondary Diagnosis:  Chronic Kidney Disease, Stage 4 (N18.4)   Kidney Tx:  &   Hgb goal range:  9-10  Epo/Darbo: To Be initiated when Hgb <9.   8/3/20; Ok to use  CINDY with Factor V per Dr. Patino  20:Per Miguel Angel, No clot in over 20 years.  Hesitant to start CINDY d/t increased risk of blood cots.  Iron regimen:  Ferrous Gluconate  once daily  Labs : 2021  Recent CINDY use, transfusion, IV iron: NA  RX/TX plans : TBD  No history of stroke and MI or cancers.  Factor V (clotting disorder)     Contact:            No Consent to Communicate on File.     Anemia Latest Ref Rng & Units 2019   Hemoglobin 13.3 - 17.7 g/dL 9.6(L) 8.7(L) 9.2(L) 9.2(L) 9.0(L) 9.2(L) 8.9(L)   TSAT 15 - 46 % - - - - 34 - -   Ferritin 26 - 388 ng/mL - - - - 226 - -     BP Readings from Last 3 Encounters:   20 107/73   20 137/81   20 135/79     Wt Readings from Last 2 Encounters:   20 99.8 kg (220 lb)   20 104.7 kg (230 lb 12.8 oz)           ASSESSMENT:  Hgb:Not at goal/Intentional hold. Declines CINDY  TSat: at goal >30% Ferritin: At goal (>100ng/mL)    PLAN:  RTC for Hgb in 2 week(s)    Orders needed to be renewed (for next follow-up date) in [facility]: None    Iron labs due:  2020    Plan discussed with:  No call, Declines CINDY.  Lab review  Plan provided by:  henrry    NEXT FOLLOW-UP DATE:  2020    Janna Louis RN   Anemia Services  [address] [address]   [email or web address]   Office : [phone]  Fax: [phone]

## 2020-11-05 ENCOUNTER — TELEPHONE (OUTPATIENT)
Dept: TRANSPLANT | Facility: CLINIC | Age: 45
End: 2020-11-05

## 2020-11-05 DIAGNOSIS — Z94.0 KIDNEY TRANSPLANTED: Primary | ICD-10-CM

## 2020-11-05 DIAGNOSIS — Z79.60 LONG-TERM USE OF IMMUNOSUPPRESSANT MEDICATION: ICD-10-CM

## 2020-11-05 DIAGNOSIS — E83.51 HYPOCALCEMIA: ICD-10-CM

## 2020-11-05 NOTE — TELEPHONE ENCOUNTER
ISSUE:   -Cyclosporine level 70 on 11/4/20 0901, goal , dose 75 mg in the morning and 100 mg in the evening. Last dose date and time recorded as 11/3/20 2100.     -Creatinine (2.73) trending down, post-biopsy for elevation up to 3.51.     -Low Calcium 8.1 (ref range 8.5-10.5).     PLAN:   Please call patient and confirm this was an accurate 12-hour trough. Verify Cyclosporine dose 75 mg in the morning and 100 mg in the evening. Confirm no new medications or illness. Confirm no missed doses. If accurate trough and accurate dose, increase Cyclosporine dose to 100 mg BID and repeat labs in 2 weeks.    Encourage good hydration. Trending Creatinine. Will continue to monitor. Repeat BMP in 2 weeks.    For low calcium ensure taking cholecalciferol/Vitamin D supplement. If not, renew prescription to start. Check phosphorus, PTH, Vitamin D Deficiency level in 2 weeks.    Transplant nephrology appt 11/24/20.    OUTCOME:   VM message left for Miguel Angel. Orders placed in [facility] for repeat labs ~11/18. 4tiitoohart will information above sent to patient.

## 2020-11-06 NOTE — TELEPHONE ENCOUNTER
Second message left for Miguel Angel to find out if he had increased his CSA dose to 100 mg BID, if he is taking Vitamin D supplement or needs prescription renewed, and improve hydration for lab repeat in 2 weeks.

## 2020-11-08 ENCOUNTER — HEALTH MAINTENANCE LETTER (OUTPATIENT)
Age: 45
End: 2020-11-08

## 2020-11-19 DIAGNOSIS — E87.20 METABOLIC ACIDOSIS: ICD-10-CM

## 2020-11-19 RX ORDER — SODIUM BICARBONATE 650 MG/1
1300 TABLET ORAL 2 TIMES DAILY
Qty: 120 TABLET | Refills: 0 | Status: SHIPPED | OUTPATIENT
Start: 2020-11-19 | End: 2020-11-24

## 2020-11-19 NOTE — TELEPHONE ENCOUNTER
Lan Patino MD Stechmann, Kathleen, RN   Caller: Unspecified (Today,  7:20 AM)             Okay to increase sodium bicarbonate and schedule patient for follow up visit.

## 2020-11-24 ENCOUNTER — VIRTUAL VISIT (OUTPATIENT)
Dept: NEPHROLOGY | Facility: CLINIC | Age: 45
End: 2020-11-24
Attending: INTERNAL MEDICINE
Payer: COMMERCIAL

## 2020-11-24 DIAGNOSIS — N18.4 CKD (CHRONIC KIDNEY DISEASE) STAGE 4, GFR 15-29 ML/MIN (H): ICD-10-CM

## 2020-11-24 DIAGNOSIS — D84.9 IMMUNOSUPPRESSION (H): ICD-10-CM

## 2020-11-24 DIAGNOSIS — E55.9 VITAMIN D DEFICIENCY: ICD-10-CM

## 2020-11-24 DIAGNOSIS — D63.1 ANEMIA IN STAGE 4 CHRONIC KIDNEY DISEASE (H): ICD-10-CM

## 2020-11-24 DIAGNOSIS — Z94.0 HTN, KIDNEY TRANSPLANT RELATED: ICD-10-CM

## 2020-11-24 DIAGNOSIS — Z48.298 AFTERCARE FOLLOWING ORGAN TRANSPLANT: ICD-10-CM

## 2020-11-24 DIAGNOSIS — N25.81 SECONDARY RENAL HYPERPARATHYROIDISM (H): ICD-10-CM

## 2020-11-24 DIAGNOSIS — I15.1 HTN, KIDNEY TRANSPLANT RELATED: ICD-10-CM

## 2020-11-24 DIAGNOSIS — E87.20 METABOLIC ACIDOSIS: ICD-10-CM

## 2020-11-24 DIAGNOSIS — N18.4 ANEMIA IN STAGE 4 CHRONIC KIDNEY DISEASE (H): ICD-10-CM

## 2020-11-24 DIAGNOSIS — Z94.0 KIDNEY REPLACED BY TRANSPLANT: Primary | ICD-10-CM

## 2020-11-24 PROCEDURE — 99214 OFFICE O/P EST MOD 30 MIN: CPT | Mod: 95

## 2020-11-24 RX ORDER — SODIUM BICARBONATE 650 MG/1
1300 TABLET ORAL 2 TIMES DAILY
Qty: 360 TABLET | Refills: 3 | Status: SHIPPED | OUTPATIENT
Start: 2020-11-24 | End: 2021-08-20

## 2020-11-24 ASSESSMENT — PAIN SCALES - GENERAL: PAINLEVEL: NO PAIN (0)

## 2020-11-24 NOTE — PROGRESS NOTES
"Miguel Angel Piña is a 45 year old male who is being evaluated via a billable video visit.      The patient has been notified of following:     \"This video visit will be conducted via a call between you and your physician/provider. We have found that certain health care needs can be provided without the need for an in-person physical exam.  This service lets us provide the care you need with a video conversation.  If a prescription is necessary we can send it directly to your pharmacy.  If lab work is needed we can place an order for that and you can then stop by our lab to have the test done at a later time.    Video visits are billed at different rates depending on your insurance coverage.  Please reach out to your insurance provider with any questions.    If during the course of the call the physician/provider feels a video visit is not appropriate, you will not be charged for this service.\"    Patient has given verbal consent for Video visit? Yes  How would you like to obtain your AVS? Mail a copy  If you are dropped from the video visit, the video invite should be resent to: Send to e-mail at: [email or web address]  Will anyone else be joining your video visit? No    Video-Visit Details    Type of service:  Video Visit    Video Start Time: 1445  Video End Time: 1505    Originating Location (pt. Location): Home    Distant Location (provider location):  [facility] [place]     Platform used for Video Visit: [employer]    Lan Patino MD      CHRONIC TRANSPLANT NEPHROLOGY VISIT    Assessment & Plan   # DDKT: Trend up in creatinine to just above baseline at ~ 2.7.  Recent kidney transplant biopsy 8/2020 showed severe chronic changes.  Will refer patient for another kidney transplant as he does have a qualifying GFR at 20 ml/min.   - Baseline Creatinine:  ~ 2.3-2.6   - Proteinuria: Minimal (0.2-0.5 grams)   - Date DSA Last Checked: Aug/2020      Latest DSA: No   - BK Viremia: No   - " Kidney Tx Biopsy: Aug 17, 2020; Result: No diagnostic evidence of acute rejection.  Mild glomerulitis with moderate transplant glomerulopathy, but no DSA.  Secondary focal segmental glomerulosclerosis.  Severe interstitial fibrosis and tubular atrophy.  Mild arterio- and severe arteriolosclerosis.             Jul 25, 2011; Result: No diagnostic evidence of acute rejection.  Unremarkable.             May 14, 2010; Result: No diagnostic evidence of acute rejection.  Moderate acute tubular injury.    # Immunosuppression: Cyclosporine (goal ) and Mycophenolate mofetil (dose 1000 mg every 12 hours)   - Changes: No    # Infection Prophylaxis:   Last CD4 Level: 573 (9/2017)  - PJP: None    # Hypertension: Not checked recently;  Goal BP: < 130/80   - Changes: Not at this time, but recommend patient check BP at home.    # Anemia in Chronic Renal Disease: Hgb: Stable to trend down      CINDY: No   - Iron studies: Replete    # Mineral Bone Disorder:   - Secondary renal hyperparathyroidism; PTH level: Not checked recently        On treatment: None  - Vitamin D; level: Not checked recently        On supplement: Yes  - Calcium; level: Low        On supplement: No; Will start calcium carbonate 500 mg daily in between meals.    # Electrolytes:   - Potassium; level: Normal        On supplement: No  - Bicarbonate; level: Low normal        On supplement: Yes    # Gout: Occasional flares and patient is off febuxostat.   - Recommend patient follow up with PCP and consider restarting prophylactic medication.    # Skin Cancer Risk:    - Discussed sun protection and recommend regular follow up with Dermatology.    # Medical Compliance: Yes     # COVID-19 Virus Review: Discussed COVID-19 virus and the potential medical risks.  Reviewed preventative health recommendations, which includes washing hands for 20 seconds, avoid touching your face, and social distancing.  Asked patient to inform the transplant center if they are exposed or  diagnosed with this virus.    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    Return visit: Return in about 1 year (around 11/24/2021).    Lan Patino MD    Chief Complaint   Mr. Piña is a 45 year old here for kidney transplant and immunosuppression management.    History of Present Illness    Mr. Piña reports feeling good overall with some medical complaints.  Since last clinic visit, patient reports no hospitalizations or new medical complaints and has been doing well overall.  He did have a kidney transplant biopsy 8/2020 for elevated serum creatinine above baseline.  The biopsy showed severe chronic changes with no evidence of acute rejection.    His energy level remains good and has been normal.  He is active and getting some exercise.  Denies any chest pain or shortness of breath with exertion.  Appetite is good and weight is unchanged.  No nausea, vomiting or diarrhea.  No fever, sweats or chills.  No leg swelling.    Recent Hospitalizations:  [x] No [] Yes    New Medical Issues: [x] No [] Yes    Decreased energy: [x] No [] Yes    Chest pain or SOB with exertion:  [x] No [] Yes    Appetite change or weight change: [x] No [] Yes    Nausea, vomiting or diarrhea:  [x] No [] Yes    Fever, sweats or chills: [x] No [] Yes    Leg swelling: [x] No [] Yes      Home BP: Not checked    Review of Systems   A comprehensive review of systems was obtained and negative, except as noted in the HPI or PMH.    Problem List   Patient Active Problem List   Diagnosis     Kidney replaced by  transplant     Medullary cystic kidney disease     CARDIOVASCULAR SCREENING; LDL GOAL LESS THAN 100     Conductive hearing loss, unilateral     Aftercare following organ transplant     Immunosuppression (H)     Anemia in chronic renal disease     Secondary renal hyperparathyroidism (H)     Gout     Vitamin D deficiency     Cerebrovascular accident (H)     Dyslipidemia     Factor V Leiden (H)     HTN, kidney transplant related     CKD (chronic kidney disease) stage 4, GFR 15-29 ml/min (H)       Social History   Social History     Tobacco Use     Smoking status: Never Smoker     Smokeless tobacco: Never Used   Substance Use Topics     Alcohol use: No     Drug use: No       Allergies   No Known Allergies    Medications   Current Outpatient Medications   Medication Sig     amLODIPine (NORVASC) 5 MG tablet Take 1 tablet (5 mg) by mouth At Bedtime At night     CELLCEPT (BRAND) 250 MG capsule Take 4 capsules (1,000 mg) by mouth 2 times daily     cholecalciferol 2000 units TABS Take 2,000 Units by mouth daily     cycloSPORINE modified (GENERIC EQUIVALENT) 25 MG capsule Take 3 capsules (75 mg) by mouth every morning AND 4 capsules (100 mg) every evening.     febuxostat (ULORIC) 40 MG TABS tablet Take 1 tablet (40 mg) by mouth daily     Ferrous Gluconate 324 (37.5 Fe) MG TABS Take by mouth daily     losartan (COZAAR) 25 MG tablet Take 1 tablet (25 mg) by mouth daily     methylPREDNISolone (MEDROL DOSEPAK) 4 MG tablet therapy pack Follow Package Directions     RA Krill Oil CAPS Take 90 mg by mouth daily     sodium bicarbonate 650 MG tablet Take 2 tablets (1,300 mg) by mouth 2 times daily     No current facility-administered medications for this visit.      Medications Discontinued During This Encounter   Medication Reason     sodium bicarbonate 650 MG tablet Reorder       Physical Exam   Vital signs were deferred for this telemedicine visit.    GENERAL APPEARANCE: alert and no distress  HENT: no obvious abnormalities on  appearance  RESP: breathing appears unremarkable with normal rate, no audible wheezing or cough and no apparent shortness of breath with conversation  MS: extremities normal - no gross deformities noted  SKIN: no apparent rash and normal skin tone  NEURO: speech is clear with no obvious neurological deficits  PSYCH: mentation appears normal and affect normal    Data     Renal Latest Ref Rng & Units 11/4/2020 8/17/2020 8/12/2020   Na 133 - 144 mmol/L 140 137 -   K 3.4 - 5.3 mmol/L 4.5 5.2 -   Cl 94 - 109 mmol/L 110(H) 112(H) -   CO2 20 - 32 mmol/L 21 19(L) -   BUN 7 - 30 mg/dL 54(H) 49(H) -   Cr 0.66 - 1.25 mg/dL 2.73(H) 2.90(H) 3.51(H)   Glucose 70 - 99 mg/dL 89 91 -   Ca  8.5 - 10.1 mg/dL 8.1(L) 8.6 -   Mg 1.6 - 2.3 mg/dL - - -     Bone Health Latest Ref Rng & Units 9/16/2019 9/4/2018 9/5/2017   Phos 2.5 - 4.5 mg/dL - 3.5 -   PTHi 18 - 80 pg/mL 328(H) 347(H) 259(H)   Vit D Def 20 - 75 ug/L 38 29 24     Heme Latest Ref Rng & Units 11/4/2020 8/17/2020 8/12/2020   WBC 4.0 - 11.0 10e9/L 4.5 4.4 -   Hgb 13.3 - 17.7 g/dL 8.9(L) 9.2(L) 9.0(L)   Plt 150 - 450 10e9/L 241 201 -   ABSOLUTE NEUTROPHIL 1.6 - 8.3 10e9/L - 2.9 -   ABSOLUTE LYMPHOCYTES 0.8 - 5.3 10e9/L - 1.1 -   ABSOLUTE MONOCYTES 0.0 - 1.3 10e9/L - 0.3 -   ABSOLUTE EOSINOPHILS 0.0 - 0.7 10e9/L - 0.1 -   ABSOLUTE BASOPHILS 0.0 - 0.2 10e9/L - 0.0 -   ABS IMMATURE GRANULOCYTES 0 - 0.4 10e9/L - 0.0 -   ABSOLUTE NUCLEATED RBC - - 0.0 -     Liver Latest Ref Rng & Units 2/17/2020 9/4/2018 4/19/2011   AP 40 - 150 U/L 86 - 148   TBili 0.2 - 1.3 mg/dL 0.7 - 1.7(H)   ALT 0 - 70 U/L 18 - 51   AST 0 - 45 U/L 13 - 60(H)   Tot Protein 6.8 - 8.8 g/dL 6.8 - 6.7(L)   Albumin 3.4 - 5.0 g/dL 3.7 3.6 4.1        Iron studies Latest Ref Rng & Units 8/12/2020 9/4/2018 9/5/2017   Iron 35 - 180 ug/dL 96 76 39   Iron sat 15 - 46 % 34 28 14(L)   Ferritin 26 - 388 ng/mL 226 165 279     [facility] Txp Virology Latest Ref Rng & Units 8/17/2020 9/13/2012 12/20/2011   CMV IgG EU/mL - - -   CVM DNA  Quant - Whole blood, EDTA anticoagulant - -   CMV Quant <100 Copies/mL - - -   CMV QT Log <2.0 Log copies/mL - - -   CMV QUANT IU/ML CMVND:CMV DNA Not Detected [IU]/mL CMV DNA Not Detected - -   LOG IU/ML OF CMVQNT <2.1 [Log:IU]/mL Not Calculated - -   BK Spec - Plasma PLASMA Plasma, EDTA anticoagulant   BK Res BKNEG:BK Virus DNA Not Detected copies/mL BK Virus DNA Not Detected <1000 <1000   BK Log <2.7 Log copies/mL Not Calculated <3.0  The lower limit of detection for this assay is 1000 copies/mL.  Real-time TaqMan   PCR was performed using BK primers and probe for the detection of a 90 bp   portion of the  1 gene.  The performance characteristics were validated by   the [facility]   It has not been cleared or approved by the U.S. Food and Drug Administration. <3.0  The lower limit of detection for this assay is 1000 copies/mL.  Real-time TaqMan   PCR was performed using BK primers and probe for the detection of a 90 bp   portion of the  1 gene.  The performance characteristics were validated by   the [facility]   It has not been cleared or approved by the U.S. Food and Drug Administration.   EBV DNA COPIES/ML <1000 Copies/mL - - -   EBV DNA LOG OF COPIES <3.0 Log copies/mL - - -   Hep B Core NEG - - -   Hep B Surf - - - -   HIV 1&2 NEG - - -

## 2020-11-24 NOTE — LETTER
"11/24/2020      RE: Miguel Angel Piña  [address] [facility] [id number]       Miguel Angel Piña is a 45 year old male who is being evaluated via a billable video visit.      The patient has been notified of following:     \"This video visit will be conducted via a call between you and your physician/provider. We have found that certain health care needs can be provided without the need for an in-person physical exam.  This service lets us provide the care you need with a video conversation.  If a prescription is necessary we can send it directly to your pharmacy.  If lab work is needed we can place an order for that and you can then stop by our lab to have the test done at a later time.    Video visits are billed at different rates depending on your insurance coverage.  Please reach out to your insurance provider with any questions.    If during the course of the call the physician/provider feels a video visit is not appropriate, you will not be charged for this service.\"    Patient has given verbal consent for Video visit? Yes  How would you like to obtain your AVS? Mail a copy  If you are dropped from the video visit, the video invite should be resent to: Send to e-mail at: [email or web address]  Will anyone else be joining your video visit? No    Video-Visit Details    Type of service:  Video Visit    Video Start Time: 1445  Video End Time: 1505    Originating Location (pt. Location): Home    Distant Location (provider location):  [facility] [place]     Platform used for Video Visit: Heap    Lan Patino MD      CHRONIC TRANSPLANT NEPHROLOGY VISIT    Assessment & Plan   # DDKT: Trend up in creatinine to just above baseline at ~ 2.7.  Recent kidney transplant biopsy 8/2020 showed severe chronic changes.  Will refer patient for another kidney transplant as he does have a qualifying GFR at 20 ml/min.   - Baseline Creatinine:  ~ 2.3-2.6   - Proteinuria: Minimal (0.2-0.5 " grams)   - Date DSA Last Checked: Aug/2020      Latest DSA: No   - BK Viremia: No   - Kidney Tx Biopsy: Aug 17, 2020; Result: No diagnostic evidence of acute rejection.  Mild glomerulitis with moderate transplant glomerulopathy, but no DSA.  Secondary focal segmental glomerulosclerosis.  Severe interstitial fibrosis and tubular atrophy.  Mild arterio- and severe arteriolosclerosis.             Jul 25, 2011; Result: No diagnostic evidence of acute rejection.  Unremarkable.             May 14, 2010; Result: No diagnostic evidence of acute rejection.  Moderate acute tubular injury.    # Immunosuppression: Cyclosporine (goal ) and Mycophenolate mofetil (dose 1000 mg every 12 hours)   - Changes: No    # Infection Prophylaxis:   Last CD4 Level: 573 (9/2017)  - PJP: None    # Hypertension: Not checked recently;  Goal BP: < 130/80   - Changes: Not at this time, but recommend patient check BP at home.    # Anemia in Chronic Renal Disease: Hgb: Stable to trend down      CINDY: No   - Iron studies: Replete    # Mineral Bone Disorder:   - Secondary renal hyperparathyroidism; PTH level: Not checked recently        On treatment: None  - Vitamin D; level: Not checked recently        On supplement: Yes  - Calcium; level: Low        On supplement: No; Will start calcium carbonate 500 mg daily in between meals.    # Electrolytes:   - Potassium; level: Normal        On supplement: No  - Bicarbonate; level: Low normal        On supplement: Yes    # Gout: Occasional flares and patient is off febuxostat.   - Recommend patient follow up with PCP and consider restarting prophylactic medication.    # Skin Cancer Risk:    - Discussed sun protection and recommend regular follow up with Dermatology.    # Medical Compliance: Yes     # COVID-19 Virus Review: Discussed COVID-19 virus and the potential medical risks.  Reviewed preventative health recommendations, which includes washing hands for 20 seconds, avoid touching your face, and social  distancing.  Asked patient to inform the transplant center if they are exposed or diagnosed with this virus.    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    Return visit: Return in about 1 year (around 11/24/2021).    Lan Patino MD    Chief Complaint   Mr. Piña is a 45 year old here for kidney transplant and immunosuppression management.    History of Present Illness    Mr. Piña reports feeling good overall with some medical complaints.  Since last clinic visit, patient reports no hospitalizations or new medical complaints and has been doing well overall.  He did have a kidney transplant biopsy 8/2020 for elevated serum creatinine above baseline.  The biopsy showed severe chronic changes with no evidence of acute rejection.    His energy level remains good and has been normal.  He is active and getting some exercise.  Denies any chest pain or shortness of breath with exertion.  Appetite is good and weight is unchanged.  No nausea, vomiting or diarrhea.  No fever, sweats or chills.  No leg swelling.    Recent Hospitalizations:  [x] No [] Yes    New Medical Issues: [x] No [] Yes    Decreased energy: [x] No [] Yes    Chest pain or SOB with exertion:  [x] No [] Yes    Appetite change or weight change: [x] No [] Yes    Nausea, vomiting or diarrhea:  [x] No [] Yes    Fever, sweats or chills: [x] No [] Yes    Leg swelling: [x] No [] Yes      Home BP: Not checked    Review of Systems   A comprehensive review of systems was obtained and negative, except as noted in the HPI or  PMH.    Problem List   Patient Active Problem List   Diagnosis     Kidney replaced by transplant     Medullary cystic kidney disease     CARDIOVASCULAR SCREENING; LDL GOAL LESS THAN 100     Conductive hearing loss, unilateral     Aftercare following organ transplant     Immunosuppression (H)     Anemia in chronic renal disease     Secondary renal hyperparathyroidism (H)     Gout     Vitamin D deficiency     Cerebrovascular accident (H)     Dyslipidemia     Factor V Leiden (H)     HTN, kidney transplant related     CKD (chronic kidney disease) stage 4, GFR 15-29 ml/min (H)       Social History   Social History     Tobacco Use     Smoking status: Never Smoker     Smokeless tobacco: Never Used   Substance Use Topics     Alcohol use: No     Drug use: No       Allergies   No Known Allergies    Medications   Current Outpatient Medications   Medication Sig     amLODIPine (NORVASC) 5 MG tablet Take 1 tablet (5 mg) by mouth At Bedtime At night     CELLCEPT (BRAND) 250 MG capsule Take 4 capsules (1,000 mg) by mouth 2 times daily     cholecalciferol 2000 units TABS Take 2,000 Units by mouth daily     cycloSPORINE modified (GENERIC EQUIVALENT) 25 MG capsule Take 3 capsules (75 mg) by mouth every morning AND 4 capsules (100 mg) every evening.     febuxostat (ULORIC) 40 MG TABS tablet Take 1 tablet (40 mg) by mouth daily     Ferrous Gluconate 324 (37.5 Fe) MG TABS Take by mouth daily     losartan (COZAAR) 25 MG tablet Take 1 tablet (25 mg) by mouth daily     methylPREDNISolone (MEDROL DOSEPAK) 4 MG tablet therapy pack Follow Package Directions     RA Krill Oil CAPS Take 90 mg by mouth daily     sodium bicarbonate 650 MG tablet Take 2 tablets (1,300 mg) by mouth 2 times daily     No current facility-administered medications for this visit.      Medications Discontinued During This Encounter   Medication Reason     sodium bicarbonate 650 MG tablet Reorder       Physical Exam   Vital signs were deferred for this telemedicine  visit.    GENERAL APPEARANCE: alert and no distress  HENT: no obvious abnormalities on appearance  RESP: breathing appears unremarkable with normal rate, no audible wheezing or cough and no apparent shortness of breath with conversation  MS: extremities normal - no gross deformities noted  SKIN: no apparent rash and normal skin tone  NEURO: speech is clear with no obvious neurological deficits  PSYCH: mentation appears normal and affect normal    Data     Renal Latest Ref Rng & Units 11/4/2020 8/17/2020 8/12/2020   Na 133 - 144 mmol/L 140 137 -   K 3.4 - 5.3 mmol/L 4.5 5.2 -   Cl 94 - 109 mmol/L 110(H) 112(H) -   CO2 20 - 32 mmol/L 21 19(L) -   BUN 7 - 30 mg/dL 54(H) 49(H) -   Cr 0.66 - 1.25 mg/dL 2.73(H) 2.90(H) 3.51(H)   Glucose 70 - 99 mg/dL 89 91 -   Ca  8.5 - 10.1 mg/dL 8.1(L) 8.6 -   Mg 1.6 - 2.3 mg/dL - - -     Bone Health Latest Ref Rng & Units 9/16/2019 9/4/2018 9/5/2017   Phos 2.5 - 4.5 mg/dL - 3.5 -   PTHi 18 - 80 pg/mL 328(H) 347(H) 259(H)   Vit D Def 20 - 75 ug/L 38 29 24     Heme Latest Ref Rng & Units 11/4/2020 8/17/2020 8/12/2020   WBC 4.0 - 11.0 10e9/L 4.5 4.4 -   Hgb 13.3 - 17.7 g/dL 8.9(L) 9.2(L) 9.0(L)   Plt 150 - 450 10e9/L 241 201 -   ABSOLUTE NEUTROPHIL 1.6 - 8.3 10e9/L - 2.9 -   ABSOLUTE LYMPHOCYTES 0.8 - 5.3 10e9/L - 1.1 -   ABSOLUTE MONOCYTES 0.0 - 1.3 10e9/L - 0.3 -   ABSOLUTE EOSINOPHILS 0.0 - 0.7 10e9/L - 0.1 -   ABSOLUTE BASOPHILS 0.0 - 0.2 10e9/L - 0.0 -   ABS IMMATURE GRANULOCYTES 0 - 0.4 10e9/L - 0.0 -   ABSOLUTE NUCLEATED RBC - - 0.0 -     Liver Latest Ref Rng & Units 2/17/2020 9/4/2018 4/19/2011   AP 40 - 150 U/L 86 - 148   TBili 0.2 - 1.3 mg/dL 0.7 - 1.7(H)   ALT 0 - 70 U/L 18 - 51   AST 0 - 45 U/L 13 - 60(H)   Tot Protein 6.8 - 8.8 g/dL 6.8 - 6.7(L)   Albumin 3.4 - 5.0 g/dL 3.7 3.6 4.1        Iron studies Latest Ref Rng & Units 8/12/2020 9/4/2018 9/5/2017   Iron 35 - 180 ug/dL 96 76 39   Iron sat 15 - 46 % 34 28 14(L)   Ferritin 26 - 388 ng/mL 226 165 279     [facility] Txp Virology  Latest Ref Rng & Units 8/17/2020 9/13/2012 12/20/2011   CMV IgG EU/mL - - -   CVM DNA Quant - Whole blood, EDTA anticoagulant - -   CMV Quant <100 Copies/mL - - -   CMV QT Log <2.0 Log copies/mL - - -   CMV QUANT IU/ML CMVND:CMV DNA Not Detected [IU]/mL CMV DNA Not Detected - -   LOG IU/ML OF CMVQNT <2.1 [Log:IU]/mL Not Calculated - -   BK Spec - Plasma PLASMA Plasma, EDTA anticoagulant   BK Res BKNEG:BK Virus DNA Not Detected copies/mL BK Virus DNA Not Detected <1000 <1000   BK Log <2.7 Log copies/mL Not Calculated <3.0  The lower limit of detection for this assay is 1000 copies/mL.  Real-time TaqMan   PCR was performed using BK primers and probe for the detection of a 90 bp   portion of the  1 gene.  The performance characteristics were validated by   the [facility].   It has not been cleared or approved by the U.S. Food and Drug Administration. <3.0  The lower limit of detection for this assay is 1000 copies/mL.  Real-time TaqMan   PCR was performed using BK primers and probe for the detection of a 90 bp   portion of the  1 gene.  The performance characteristics were validated by   the [facility].   It has not been cleared or approved by the U.S. Food and Drug Administration.   EBV DNA COPIES/ML <1000 Copies/mL - - -   EBV DNA LOG OF COPIES <3.0 Log copies/mL - - -   Hep B Core NEG - - -   Hep B Surf - - - -   HIV 1&2 NEG - - -                  Lan Patino MD

## 2020-11-24 NOTE — LETTER
"11/24/2020       RE: Miguel Angel Piña  [address] [facility] [address]     Dear Colleague,    Thank you for referring your patient, Miguel Angel Piña, to the [facility] [place] at [facility]. Please see a copy of my visit note below.    Miguel Angel Piña is a 45 year old male who is being evaluated via a billable video visit.      The patient has been notified of following:     \"This video visit will be conducted via a call between you and your physician/provider. We have found that certain health care needs can be provided without the need for an in-person physical exam.  This service lets us provide the care you need with a video conversation.  If a prescription is necessary we can send it directly to your pharmacy.  If lab work is needed we can place an order for that and you can then stop by our lab to have the test done at a later time.    Video visits are billed at different rates depending on your insurance coverage.  Please reach out to your insurance provider with any questions.    If during the course of the call the physician/provider feels a video visit is not appropriate, you will not be charged for this service.\"    Patient has given verbal consent for Video visit? Yes  How would you like to obtain your AVS? Mail a copy  If you are dropped from the video visit, the video invite should be resent to: Send to e-mail at: [email or web address]  Will anyone else be joining your video visit? No    Video-Visit Details    Type of service:  Video Visit    Video Start Time: 1445  Video End Time: 1505    Originating Location (pt. Location): Home    Distant Location (provider location):  [facility] [place]     Platform used for Video Visit: Doximity    Lan Patino MD      CHRONIC TRANSPLANT NEPHROLOGY VISIT    Assessment & Plan   # DDKT: Trend up in creatinine to just above baseline at ~ 2.7.  Recent kidney " transplant biopsy 8/2020 showed severe chronic changes.  Will refer patient for another kidney transplant as he does have a qualifying GFR at 20 ml/min.   - Baseline Creatinine:  ~ 2.3-2.6   - Proteinuria: Minimal (0.2-0.5 grams)   - Date DSA Last Checked: Aug/2020      Latest DSA: No   - BK Viremia: No   - Kidney Tx Biopsy: Aug 17, 2020; Result: No diagnostic evidence of acute rejection.  Mild glomerulitis with moderate transplant glomerulopathy, but no DSA.  Secondary focal segmental glomerulosclerosis.  Severe interstitial fibrosis and tubular atrophy.  Mild arterio- and severe arteriolosclerosis.             Jul 25, 2011; Result: No diagnostic evidence of acute rejection.  Unremarkable.             May 14, 2010; Result: No diagnostic evidence of acute rejection.  Moderate acute tubular injury.    # Immunosuppression: Cyclosporine (goal ) and Mycophenolate mofetil (dose 1000 mg every 12 hours)   - Changes: No    # Infection Prophylaxis:   Last CD4 Level: 573 (9/2017)  - PJP: None    # Hypertension: Not checked recently;  Goal BP: < 130/80   - Changes: Not at this time, but recommend patient check BP at home.    # Anemia in Chronic Renal Disease: Hgb: Stable to trend down      CINDY: No   - Iron studies: Replete    # Mineral Bone Disorder:   - Secondary renal hyperparathyroidism; PTH level: Not checked recently        On treatment: None  - Vitamin D; level: Not checked recently        On supplement: Yes  - Calcium; level: Low        On supplement: No; Will start calcium carbonate 500 mg daily in between meals.    # Electrolytes:   - Potassium; level: Normal        On supplement: No  - Bicarbonate; level: Low normal        On supplement: Yes    # Gout: Occasional flares and patient is off febuxostat.   - Recommend patient follow up with PCP and consider restarting prophylactic medication.    # Skin Cancer Risk:    - Discussed sun protection and recommend regular follow up with Dermatology.    # Medical  Compliance: Yes     # COVID-19 Virus Review: Discussed COVID-19 virus and the potential medical risks.  Reviewed preventative health recommendations, which includes washing hands for 20 seconds, avoid touching your face, and social distancing.  Asked patient to inform the transplant center if they are exposed or diagnosed with this virus.    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    Return visit: Return in about 1 year (around 11/24/2021).    Lan Patino MD    Chief Complaint   Mr. Piña is a 45 year old here for kidney transplant and immunosuppression management.    History of Present Illness    Mr. Piña reports feeling good overall with some medical complaints.  Since last clinic visit, patient reports no hospitalizations or new medical complaints and has been doing well overall.  He did have a kidney transplant biopsy 8/2020 for elevated serum creatinine above baseline.  The biopsy showed severe chronic changes with no evidence of acute rejection.    His energy level remains good and has been normal.  He is active and getting some exercise.  Denies any chest pain or shortness of breath with exertion.  Appetite is good and weight is unchanged.  No nausea, vomiting or diarrhea.  No fever, sweats or chills.  No leg swelling.    Recent Hospitalizations:  [x] No [] Yes    New Medical Issues: [x] No [] Yes    Decreased energy: [x] No [] Yes    Chest pain or SOB with exertion:  [x] No [] Yes    Appetite change or weight change: [x] No [] Yes    Nausea, vomiting or diarrhea:  [x] No  [] Yes    Fever, sweats or chills: [x] No [] Yes    Leg swelling: [x] No [] Yes      Home BP: Not checked    Review of Systems   A comprehensive review of systems was obtained and negative, except as noted in the HPI or PMH.    Problem List   Patient Active Problem List   Diagnosis     Kidney replaced by transplant     Medullary cystic kidney disease     CARDIOVASCULAR SCREENING; LDL GOAL LESS THAN 100     Conductive hearing loss, unilateral     Aftercare following organ transplant     Immunosuppression (H)     Anemia in chronic renal disease     Secondary renal hyperparathyroidism (H)     Gout     Vitamin D deficiency     Cerebrovascular accident (H)     Dyslipidemia     Factor V Leiden (H)     HTN, kidney transplant related     CKD (chronic kidney disease) stage 4, GFR 15-29 ml/min (H)       Social History   Social History     Tobacco Use     Smoking status: Never Smoker     Smokeless tobacco: Never Used   Substance Use Topics     Alcohol use: No     Drug use: No       Allergies   No Known Allergies    Medications   Current Outpatient Medications   Medication Sig     amLODIPine (NORVASC) 5 MG tablet Take 1 tablet (5 mg) by mouth At Bedtime At night     CELLCEPT (BRAND) 250 MG capsule Take 4 capsules (1,000 mg) by mouth 2 times daily     cholecalciferol 2000 units TABS Take 2,000 Units by mouth daily     cycloSPORINE modified (GENERIC EQUIVALENT) 25 MG capsule Take 3 capsules (75 mg) by mouth every morning AND 4 capsules (100 mg) every evening.     febuxostat (ULORIC) 40 MG TABS tablet Take 1 tablet (40 mg) by mouth daily     Ferrous Gluconate 324 (37.5 Fe) MG TABS Take by mouth daily     losartan (COZAAR) 25 MG tablet Take 1 tablet (25 mg) by mouth daily     methylPREDNISolone (MEDROL DOSEPAK) 4 MG tablet therapy pack Follow Package Directions     RA Krill Oil CAPS Take 90 mg by mouth daily     sodium bicarbonate 650 MG tablet Take 2 tablets (1,300 mg) by mouth 2 times daily     No current facility-administered  medications for this visit.      Medications Discontinued During This Encounter   Medication Reason     sodium bicarbonate 650 MG tablet Reorder       Physical Exam   Vital signs were deferred for this telemedicine visit.    GENERAL APPEARANCE: alert and no distress  HENT: no obvious abnormalities on appearance  RESP: breathing appears unremarkable with normal rate, no audible wheezing or cough and no apparent shortness of breath with conversation  MS: extremities normal - no gross deformities noted  SKIN: no apparent rash and normal skin tone  NEURO: speech is clear with no obvious neurological deficits  PSYCH: mentation appears normal and affect normal    Data     Renal Latest Ref Rng & Units 11/4/2020 8/17/2020 8/12/2020   Na 133 - 144 mmol/L 140 137 -   K 3.4 - 5.3 mmol/L 4.5 5.2 -   Cl 94 - 109 mmol/L 110(H) 112(H) -   CO2 20 - 32 mmol/L 21 19(L) -   BUN 7 - 30 mg/dL 54(H) 49(H) -   Cr 0.66 - 1.25 mg/dL 2.73(H) 2.90(H) 3.51(H)   Glucose 70 - 99 mg/dL 89 91 -   Ca  8.5 - 10.1 mg/dL 8.1(L) 8.6 -   Mg 1.6 - 2.3 mg/dL - - -     Bone Health Latest Ref Rng & Units 9/16/2019 9/4/2018 9/5/2017   Phos 2.5 - 4.5 mg/dL - 3.5 -   PTHi 18 - 80 pg/mL 328(H) 347(H) 259(H)   Vit D Def 20 - 75 ug/L 38 29 24     Heme Latest Ref Rng & Units 11/4/2020 8/17/2020 8/12/2020   WBC 4.0 - 11.0 10e9/L 4.5 4.4 -   Hgb 13.3 - 17.7 g/dL 8.9(L) 9.2(L) 9.0(L)   Plt 150 - 450 10e9/L 241 201 -   ABSOLUTE NEUTROPHIL 1.6 - 8.3 10e9/L - 2.9 -   ABSOLUTE LYMPHOCYTES 0.8 - 5.3 10e9/L - 1.1 -   ABSOLUTE MONOCYTES 0.0 - 1.3 10e9/L - 0.3 -   ABSOLUTE EOSINOPHILS 0.0 - 0.7 10e9/L - 0.1 -   ABSOLUTE BASOPHILS 0.0 - 0.2 10e9/L - 0.0 -   ABS IMMATURE GRANULOCYTES 0 - 0.4 10e9/L - 0.0 -   ABSOLUTE NUCLEATED RBC - - 0.0 -     Liver Latest Ref Rng & Units 2/17/2020 9/4/2018 4/19/2011   AP 40 - 150 U/L 86 - 148   TBili 0.2 - 1.3 mg/dL 0.7 - 1.7(H)   ALT 0 - 70 U/L 18 - 51   AST 0 - 45 U/L 13 - 60(H)   Tot Protein 6.8 - 8.8 g/dL 6.8 - 6.7(L)   Albumin 3.4 - 5.0  g/dL 3.7 3.6 4.1        Iron studies Latest Ref Rng & Units 8/12/2020 9/4/2018 9/5/2017   Iron 35 - 180 ug/dL 96 76 39   Iron sat 15 - 46 % 34 28 14(L)   Ferritin 26 - 388 ng/mL 226 165 279     [facility] Txp Virology Latest Ref Rng & Units 8/17/2020 9/13/2012 12/20/2011   CMV IgG EU/mL - - -   CVM DNA Quant - Whole blood, EDTA anticoagulant - -   CMV Quant <100 Copies/mL - - -   CMV QT Log <2.0 Log copies/mL - - -   CMV QUANT IU/ML CMVND:CMV DNA Not Detected [IU]/mL CMV DNA Not Detected - -   LOG IU/ML OF CMVQNT <2.1 [Log:IU]/mL Not Calculated - -   BK Spec - Plasma PLASMA Plasma, EDTA anticoagulant   BK Res BKNEG:BK Virus DNA Not Detected copies/mL BK Virus DNA Not Detected <1000 <1000   BK Log <2.7 Log copies/mL Not Calculated <3.0  The lower limit of detection for this assay is 1000 copies/mL.  Real-time TaqMan   PCR was performed using BK primers and probe for the detection of a 90 bp   portion of the  1 gene.  The performance characteristics were validated by   the [facility].   It has not been cleared or approved by the U.S. Food and Drug Administration. <3.0  The lower limit of detection for this assay is 1000 copies/mL.  Real-time TaqMan   PCR was performed using BK primers and probe for the detection of a 90 bp   portion of the  1 gene.  The performance characteristics were validated by   the [facility].   It has not been cleared or approved by the U.S. Food and Drug Administration.   EBV DNA COPIES/ML <1000 Copies/mL - - -   EBV DNA LOG OF COPIES <3.0 Log copies/mL - - -   Hep B Core NEG - - -   Hep B Surf - - - -   HIV 1&2 NEG - - -           Again, thank you for allowing me to participate in the care of your patient.      Sincerely,    Kidney/Pancreas Recipient

## 2020-11-25 ENCOUNTER — TELEPHONE (OUTPATIENT)
Dept: TRANSPLANT | Facility: CLINIC | Age: 45
End: 2020-11-25

## 2020-11-25 NOTE — TELEPHONE ENCOUNTER
TM  Received: Yesterday  Message Contents   Lan Patino MD [place], Ivanna Schafer, MATEO Goncalves,     Patient is due for a PTH and Vitamin D level.     In addition, when I was going through his medications with him, it came out that he was taking his morning medications at 6 am and his evening medications at 9 pm, including his immunosuppression.  I emphasized the importance of taking his medications 12 hours apart.     I would check with him to ensure he is getting accurate cyclosporine trough levels done.     Michael Michelle was expected to get labs done around 11/16 including Cyclosporine level, BMP, PTH, VIT D, & Phos. Orders are good thru 12/5/20    LPN: Please call Miguel Angel and ask he obtain labs ordered by Dr. Patino within the next next. Express importance of CSA 12 hour trough. Thanks.

## 2020-12-08 ENCOUNTER — TELEPHONE (OUTPATIENT)
Dept: TRANSPLANT | Facility: CLINIC | Age: 45
End: 2020-12-08

## 2020-12-08 NOTE — TELEPHONE ENCOUNTER
TM  Received: 1 week ago  Message Contents   Lan Patino MD [place]ais[place]ell, Ivanna Schafer, MATEO; Rose Mary Alfaro, MATEO             Please check PTH and vitamin D level with next labs.     Patient does have a qualifying GFR so I would like to get him referred for another kidney transplant.  I didn't realize he had a GFR of 20 recently, so I didn't discuss this with him, but would be happy to if he has any questions.     Michael      OUTCOME:  message left for patient on his mobile. ( on home/work number was full). Notified patient that he had qualifying GFR for re-transplant and if he would like referral to be placed he should return call to RNCC.

## 2020-12-09 ENCOUNTER — TELEPHONE (OUTPATIENT)
Dept: PHARMACY | Facility: CLINIC | Age: 45
End: 2020-12-09

## 2020-12-09 NOTE — TELEPHONE ENCOUNTER
Follow-up with anemia management service:    Labs were not drawn in the last month. Nephrology LVM for Brain on 11/25/20 to have labs drawn.     Anemia Latest Ref Rng & Units 6/12/2019 9/16/2019 2/17/2020 7/29/2020 8/12/2020 8/17/2020 11/4/2020   Hemoglobin 13.3 - 17.7 g/dL 9.6(L) 8.7(L) 9.2(L) 9.2(L) 9.0(L) 9.2(L) 8.9(L)   TSAT 15 - 46 % - - - - 34 - -   Ferritin 26 - 388 ng/mL - - - - 226 - -           Follow-up call date: 12/30/20    Janna Louis RN   Anemia Services  [address] [address]   [email or web address]   Office : [phone]  Fax: [phone]

## 2020-12-24 ENCOUNTER — REFERRAL (OUTPATIENT)
Dept: TRANSPLANT | Facility: CLINIC | Age: 45
End: 2020-12-24

## 2020-12-24 DIAGNOSIS — N18.9 CHRONIC RENAL FAILURE: ICD-10-CM

## 2020-12-24 DIAGNOSIS — N18.4 CHRONIC KIDNEY DISEASE, STAGE IV (SEVERE) (H): ICD-10-CM

## 2020-12-24 DIAGNOSIS — N18.6 ESRD (END STAGE RENAL DISEASE) (H): Primary | ICD-10-CM

## 2020-12-24 DIAGNOSIS — T86.91 TRANSPLANT FAILURE DUE TO REJECTION: ICD-10-CM

## 2020-12-24 DIAGNOSIS — Z76.82 ORGAN TRANSPLANT CANDIDATE: ICD-10-CM

## 2020-12-24 DIAGNOSIS — I10 ESSENTIAL HYPERTENSION: ICD-10-CM

## 2020-12-24 NOTE — LETTER
Miguel Angel Piña  [address] [facility] [address]    Dear Miguel Angel,    Thank you for your interest in the Transplant Center at [facility]. We look forward to being a part of your care team and assisting you through the transplant process.    As we discussed, your transplant coordinator is Charisse Lizarraga (Kidney).  You may call your coordinator at any time with questions or concerns call [phone].    Please complete the following.    1. Fill out and return the enclosed forms    Authorization for Electronic Communication    Authorization to Discuss Protected Health Information    Authorization for Release of Protected Health Information    Authorization for Care Everywhere Release of Information    2. Sign up for:    [employer], access to your electronic medical record (see enclosed pamphlet)    [email or web address], a transplant education website    You can use these tools to learn more about your transplant, communicate with your care team, and track your medical details      Sincerely,  Solid Organ Transplant  [facility]    cc: PCP

## 2020-12-24 NOTE — LETTER
January 27, 2021        Miguel Angel Piña  [address] [facility] [address]      Dear Miguel Angel,       You have recently expressed interest in our Solid Organ Transplant Program and have requested to begin the evaluation process.  We have made several attempts to get in touch with you but have been unable to reach you.    If you are still interested and/or have any questions, please contact us at  [phone] or [phone]   Monday - Friday, between the hours of 8:30am and 5:00pm central time.    We look forward to hearing from you.      Regards,     Solid Organ Transplant Intake   [facility]'s [address]  [address], [facility] [address]  [place] [address]

## 2020-12-24 NOTE — LETTER
May 18, 2021        Miguel Angel Piña  [address] [facility] [address]      Dear Miguel Angel,       You have recently expressed interest in our Solid Organ Transplant Program and have requested to begin the evaluation process.  We have made several attempts to get in touch with you but have been unable to reach you.    If you are still interested and/or have any questions, please contact us at  [phone] or [phone]   Monday - Friday, between the hours of 8:00am and 5:00pm central time.    We look forward to hearing from you.      Regards,     Solid Organ Transplant Intake   [facility]'s [address]  [address], [facility] [address]  [place] [address]

## 2020-12-24 NOTE — Clinical Note
Please see smart set orders for pre kidney tx eval. Pt is not on dialysis. Pt does not have a STD. Thanks, Charisse

## 2020-12-30 ENCOUNTER — TELEPHONE (OUTPATIENT)
Dept: PHARMACY | Facility: CLINIC | Age: 45
End: 2020-12-30

## 2020-12-30 NOTE — TELEPHONE ENCOUNTER
Follow-up with anemia management service:    Unable to LVM for Miguel Angel to remind him that he is due for labs.  VM Box is full  On work/home #.     Spoke with Miguel Angel on his cell phone, he will call and schedule a lab appt.     Anemia Latest Ref Rng & Units 6/12/2019 9/16/2019 2/17/2020 7/29/2020 8/12/2020 8/17/2020 11/4/2020   Hemoglobin 13.3 - 17.7 g/dL 9.6(L) 8.7(L) 9.2(L) 9.2(L) 9.0(L) 9.2(L) 8.9(L)   TSAT 15 - 46 % - - - - 34 - -   Ferritin 26 - 388 ng/mL - - - - 226 - -           Follow-up call date: 1/20/21    Janna Louis RN   Anemia Services  [address] [address]   [email or web address]   Office : [phone]  Fax: [phone]

## 2021-01-25 ENCOUNTER — TELEPHONE (OUTPATIENT)
Dept: PHARMACY | Facility: CLINIC | Age: 46
End: 2021-01-25

## 2021-01-25 NOTE — TELEPHONE ENCOUNTER
Follow-up with anemia management service:    Labs have not been drawn since 11/4/20 after multiple reminders to schedule lab appts.  Will follow up again in 2 weeks.     Anemia Latest Ref Rng & Units 6/12/2019 9/16/2019 2/17/2020 7/29/2020 8/12/2020 8/17/2020 11/4/2020   Hemoglobin 13.3 - 17.7 g/dL 9.6(L) 8.7(L) 9.2(L) 9.2(L) 9.0(L) 9.2(L) 8.9(L)   TSAT 15 - 46 % - - - - 34 - -   Ferritin 26 - 388 ng/mL - - - - 226 - -         Follow-up call date: 2/8/21    Janna Louis RN   Anemia Services  [address] [address]   [email or web address]   Office : [phone]  Fax: [phone]

## 2021-02-10 DIAGNOSIS — Z94.0 KIDNEY TRANSPLANTED: ICD-10-CM

## 2021-02-10 DIAGNOSIS — Z79.60 LONG-TERM USE OF IMMUNOSUPPRESSANT MEDICATION: ICD-10-CM

## 2021-02-10 LAB
ANION GAP SERPL CALCULATED.3IONS-SCNC: 8 MMOL/L (ref 3–14)
BUN SERPL-MCNC: 51 MG/DL (ref 7–30)
CALCIUM SERPL-MCNC: 8.8 MG/DL (ref 8.5–10.1)
CHLORIDE SERPL-SCNC: 107 MMOL/L (ref 94–109)
CO2 SERPL-SCNC: 20 MMOL/L (ref 20–32)
CREAT SERPL-MCNC: 2.78 MG/DL (ref 0.66–1.25)
CYCLOSPORINE BLD LC/MS/MS-MCNC: 96 UG/L (ref 50–400)
ERYTHROCYTE [DISTWIDTH] IN BLOOD BY AUTOMATED COUNT: 12.6 % (ref 10–15)
GFR SERPL CREATININE-BSD FRML MDRD: 26 ML/MIN/{1.73_M2}
GLUCOSE SERPL-MCNC: 125 MG/DL (ref 70–99)
HCT VFR BLD AUTO: 28.8 % (ref 40–53)
HGB BLD-MCNC: 9.1 G/DL (ref 13.3–17.7)
MCH RBC QN AUTO: 28.6 PG (ref 26.5–33)
MCHC RBC AUTO-ENTMCNC: 31.6 G/DL (ref 31.5–36.5)
MCV RBC AUTO: 91 FL (ref 78–100)
PLATELET # BLD AUTO: 204 10E9/L (ref 150–450)
POTASSIUM SERPL-SCNC: 5.2 MMOL/L (ref 3.4–5.3)
RBC # BLD AUTO: 3.18 10E12/L (ref 4.4–5.9)
SODIUM SERPL-SCNC: 135 MMOL/L (ref 133–144)
TME LAST DOSE: 0 H
WBC # BLD AUTO: 5.6 10E9/L (ref 4–11)

## 2021-02-10 PROCEDURE — 80048 BASIC METABOLIC PNL TOTAL CA: CPT | Performed by: INTERNAL MEDICINE

## 2021-02-10 PROCEDURE — 36415 COLL VENOUS BLD VENIPUNCTURE: CPT | Performed by: INTERNAL MEDICINE

## 2021-02-10 PROCEDURE — 85027 COMPLETE CBC AUTOMATED: CPT | Performed by: INTERNAL MEDICINE

## 2021-02-10 PROCEDURE — 80158 DRUG ASSAY CYCLOSPORINE: CPT | Performed by: INTERNAL MEDICINE

## 2021-02-11 NOTE — TELEPHONE ENCOUNTER
The Intake Pod has attempted to reach patient via phone x 3, with the numbers provided in chart. Trying to reach you letter also mailed to patient over seven days ago, with no response from patient. Per work flow process, patient is being removed from the intake pods reporting work bench. The assigned RN coordinator for this patient will be notified that Intake Pod was unable to reach patient to initiate referral process.      ELVA Alvarez, LPN   Solid Organ Transplant

## 2021-02-22 ENCOUNTER — TELEPHONE (OUTPATIENT)
Dept: PHARMACY | Facility: CLINIC | Age: 46
End: 2021-02-22

## 2021-02-22 NOTE — TELEPHONE ENCOUNTER
Anemia Management Note  SUBJECTIVE/OBJECTIVE:  Referred by Dr. Lan Patino on 2020  Primary Diagnosis: Anemia in Chronic Kidney Disease (N18.4, D63.1)     Secondary Diagnosis:  Chronic Kidney Disease, Stage 4 (N18.4)   Kidney Tx:  &   Hgb goal range:  9-10  Epo/Darbo: To Be initiated when Hgb <9.   8/3/20; Ok to use  CINDY with Factor V per Dr. Patino  20:Per Miguel Angel, No clot in over 20 years.  Hesitant to start CINDY d/t increased risk of blood cots.  Iron regimen:  Ferrous Gluconate  once daily  Labs : 2021  Recent CINDY use, transfusion, IV iron: NA  RX/TX plans : TBD  No history of stroke and MI or cancers.  Factor V (clotting disorder)     Contact:            No Consent to Communicate on File.     Anemia Latest Ref Rng & Units 2019 2020 2020 2020 2020 2020 2/10/2021   Hemoglobin 13.3 - 17.7 g/dL 8.7(L) 9.2(L) 9.2(L) 9.0(L) 9.2(L) 8.9(L) 9.1(L)   TSAT 15 - 46 % - - - 34 - - -   Ferritin 26 - 388 ng/mL - - - 226 - - -     BP Readings from Last 3 Encounters:   20 107/73   20 137/81   20 135/79     Wt Readings from Last 2 Encounters:   20 99.8 kg (220 lb)   20 104.7 kg (230 lb 12.8 oz)           ASSESSMENT:  Hgb:at goal - continue to monitor  TSat: Due for labs Ferritin: Due for labs    PLAN:  RTC for Anemia Labs in 2-4 week(s)    Orders needed to be renewed (for next follow-up date) in EPIC: None    Iron labs due:  Over Due    Plan discussed with:  No call, Lab review  Plan provided by:  henrry    NEXT FOLLOW-UP DATE:  3/22/21    Janna Louis RN   Anemia Services  [address] [address]   [email or web address]   Office : [phone]  Fax: [phone]

## 2021-02-25 ENCOUNTER — TELEPHONE (OUTPATIENT)
Dept: NEPHROLOGY | Facility: CLINIC | Age: 46
End: 2021-02-25

## 2021-02-25 DIAGNOSIS — M25.571 RIGHT ANKLE PAIN, UNSPECIFIED CHRONICITY: ICD-10-CM

## 2021-02-25 NOTE — TELEPHONE ENCOUNTER
Prior Authorization Approval    Authorization Effective Date: 2/24/2021  Authorization Expiration Date: 2/24/2022  Medication: Neoral  Approved Dose/Quantity: 210  Reference #: [id number]   Insurance Company: Other (see comments)  Expected CoPay:       CoPay Card Available:      Foundation Assistance Needed:    Which Pharmacy is filling the prescription (Not needed for infusion/clinic administered): [facility] - [facility] [address]  Pharmacy Notified: Yes  Patient Notified: Yes      
Clothing

## 2021-03-01 RX ORDER — METHYLPREDNISOLONE 4 MG
TABLET, DOSE PACK ORAL
Qty: 21 TABLET | Refills: 0 | Status: SHIPPED | OUTPATIENT
Start: 2021-03-01 | End: 2021-03-28

## 2021-03-25 DIAGNOSIS — M25.571 RIGHT ANKLE PAIN, UNSPECIFIED CHRONICITY: ICD-10-CM

## 2021-03-26 NOTE — TELEPHONE ENCOUNTER
Routing refill request to provider for review/approval because:  Drug not on the [facility] refill protocol   Last written on 3/1/21 for 21 tablets    Jade Perez RN BSN  [facility]

## 2021-03-27 ENCOUNTER — HEALTH MAINTENANCE LETTER (OUTPATIENT)
Age: 46
End: 2021-03-27

## 2021-03-28 RX ORDER — METHYLPREDNISOLONE 4 MG
TABLET, DOSE PACK ORAL
Qty: 21 TABLET | Refills: 0 | Status: SHIPPED | OUTPATIENT
Start: 2021-03-28 | End: 2021-04-29

## 2021-04-19 ENCOUNTER — TELEPHONE (OUTPATIENT)
Dept: PHARMACY | Facility: CLINIC | Age: 46
End: 2021-04-19

## 2021-04-19 NOTE — TELEPHONE ENCOUNTER
Follow-up with anemia management service:    Labs have not been drawn since Feb 2021.  Miguel Angel has declined CINDY.      Anemia Latest Ref Rng & Units 9/16/2019 2/17/2020 7/29/2020 8/12/2020 8/17/2020 11/4/2020 2/10/2021   Hemoglobin 13.3 - 17.7 g/dL 8.7(L) 9.2(L) 9.2(L) 9.0(L) 9.2(L) 8.9(L) 9.1(L)   TSAT 15 - 46 % - - - 34 - - -   Ferritin 26 - 388 ng/mL - - - 226 - - -       Follow-up call date: 05/03/21  Were labs drawn?     Janna Louis RN   Anemia Services  [address] [address]   [email or web address]   Office : [phone]  Fax: [phone]

## 2021-04-28 DIAGNOSIS — M25.571 RIGHT ANKLE PAIN, UNSPECIFIED CHRONICITY: ICD-10-CM

## 2021-04-29 RX ORDER — METHYLPREDNISOLONE 4 MG
TABLET, DOSE PACK ORAL
Qty: 21 TABLET | Refills: 0 | Status: SHIPPED | OUTPATIENT
Start: 2021-04-29 | End: 2021-05-20

## 2021-04-29 NOTE — TELEPHONE ENCOUNTER
Routing refill request to provider for review/approval because:  Drug not on the [facility] refill protocol     Last Written Prescription Date:  3-28-21  Last Fill Quantity: 21,  # refills: 0   Last office visit: 7/27/2020 with prescribing provider:  Dr Hutson   Future Office Visit:

## 2021-05-05 NOTE — PROGRESS NOTES
Assessment & Plan   Problem List Items Addressed This Visit        Circulatory    HTN, kidney transplant related       Other    Kidney replaced by transplant      Other Visit Diagnoses     Left inguinal pain    -  Primary    Relevant Medications    cyclobenzaprine (FLEXERIL) 5 MG tablet    Other Relevant Orders    CBC with platelets (Completed)    Basic metabolic panel  (Ca, Cl, CO2, Creat, Gluc, K, Na, BUN) (Completed)    *UA reflex to Microscopic and Culture ([place] [facility] (except [place] and [place]) (Completed)    CT Abdomen w/o Contrast    GENERAL SURG ADULT REFERRAL    Urine Microscopic (Completed)         Impression is left inguinal pain from unknown etiology. Came on abruptly yesterday and has improved since then. Was raking two days ago and this motion seems to worsen the pain. Benign exam with no tenderness to transplanted kidneys, evidence of incarcerated or strangulated hernia. Will check cbc, chem and UA. CT non-contrast if not improving in the next few days followed by surgery referral for eval. ER/UC in the meantime if changing.    Complete history and physical exam as below. AF with normal VS.    DDx and Dx discussed with and explained to the pt to their satisfaction.  All questions were answered at this time. Pt expressed understanding of and agreement with this dx, tx, and plan. No further workup warranted and standard medication warnings given. I have given the patient a list of pertinent indications for re-evaluation. Will go to the Emergency Department if symptoms worsen or new concerning symptoms arise. Patient left in no apparent distress.     43 minutes spent on the date of the encounter doing chart review, history and exam, documentation and further activities per the note    See Patient Instructions    Return in about 4 days (around 5/10/2021) for CT imaging of your abdomen if not improving, or call 911/go to an ER anytime if worsening.    ALEX Park  "[facility] GEORGI Michelle is a 45 year old who presents for the following health issues:    HPI     Abdominal/Flank Pain  Had second kidney transplant 11 years ago. Pain improved today.  Onset/Duration: began yesterday with acute onset left inguinal pain.   Description:   Character: Sharp  Location: left lower abdomen.  Radiation: None  Intensity: moderate  Progression of Symptoms:  same and intermittent  Accompanying Signs & Symptoms:  Fever/Chills: no  Gas/Bloating: no  Nausea: no  Vomitting: no  Diarrhea: no  Constipation: no  Dysuria or Hematuria: no  History:   Trauma: was raking leaves outside 2 days ago, maybe pulled a muscle as when he replicates the raking motion, he gets pain.  Previous similar pain: no  Previous tests done: none  Precipitating factors:   Does the pain change with:     Food: no    Bowel Movement: no    Urination: no   Other factors:  no  Therapies tried and outcome: Tylenol helped somewhat    Review of Systems   Constitutional, HEENT, cardiovascular, pulmonary, gi and gu systems are negative, except as otherwise noted.      Objective    /78   Pulse 79   Temp 98.8  F (37.1  C) (Tympanic)   Resp 16   Ht 1.746 m (5' 8.74\")   Wt 103.8 kg (228 lb 12.8 oz)   SpO2 100%   BMI 34.04 kg/m    Body mass index is 34.04 kg/m .  Physical Exam  Vitals signs and nursing note reviewed.   Constitutional:       General: He is not in acute distress.     Appearance: He is not ill-appearing or diaphoretic.   HENT:      Head: Normocephalic and atraumatic.      Mouth/Throat:      Mouth: Mucous membranes are moist.   Eyes:      Conjunctiva/sclera: Conjunctivae normal.   Cardiovascular:      Rate and Rhythm: Normal rate and regular rhythm.      Heart sounds: Normal heart sounds. No murmur. No friction rub. No gallop.    Pulmonary:      Effort: Pulmonary effort is normal. No respiratory distress.      Breath sounds: Normal breath sounds. No stridor. No wheezing, rhonchi or " rales.   Abdominal:      General: Bowel sounds are normal. There is no distension.      Palpations: Abdomen is soft. There is no mass.      Tenderness: There is no abdominal tenderness. There is no right CVA tenderness, left CVA tenderness, guarding or rebound.      Hernia: No hernia is present.      Comments: Several surgical scars.   Genitourinary:     Comments: No inguinal hernia or adenopathy.  Skin:     General: Skin is warm and dry.   Neurological:      General: No focal deficit present.      Mental Status: He is alert. Mental status is at baseline.   Psychiatric:         Mood and Affect: Mood normal.         Behavior: Behavior normal.          Results for orders placed or performed in visit on 05/06/21   *UA reflex to Microscopic and Culture ([place] and [facility] (except [place] and [place])     Status: Abnormal    Specimen: Midstream Urine   Result Value Ref Range    Color Urine Yellow     Appearance Urine Clear     Glucose Urine Negative NEG^Negative mg/dL    Bilirubin Urine Negative NEG^Negative    Ketones Urine Negative NEG^Negative mg/dL    Specific Gravity Urine 1.020 1.003 - 1.035    Blood Urine Small (A) NEG^Negative    pH Urine 7.0 5.0 - 7.0 pH    Protein Albumin Urine 30 (A) NEG^Negative mg/dL    Urobilinogen Urine 0.2 0.2 - 1.0 EU/dL    Nitrite Urine Negative NEG^Negative    Leukocyte Esterase Urine Negative NEG^Negative    Source Midstream Urine    Urine Microscopic     Status: Abnormal   Result Value Ref Range    WBC Urine 0 - 5 OTO5^0 - 5 /HPF    RBC Urine 2-5 (A) OTO2^O - 2 /HPF    Squamous Epithelial /LPF Urine Few FEW^Few /LPF    Bacteria Urine Few (A) NEG^Negative /HPF

## 2021-05-06 ENCOUNTER — OFFICE VISIT (OUTPATIENT)
Dept: FAMILY MEDICINE | Facility: CLINIC | Age: 46
End: 2021-05-06
Payer: COMMERCIAL

## 2021-05-06 ENCOUNTER — TELEPHONE (OUTPATIENT)
Dept: TRANSPLANT | Facility: CLINIC | Age: 46
End: 2021-05-06

## 2021-05-06 VITALS
TEMPERATURE: 98.8 F | BODY MASS INDEX: 33.89 KG/M2 | HEIGHT: 69 IN | WEIGHT: 228.8 LBS | SYSTOLIC BLOOD PRESSURE: 123 MMHG | OXYGEN SATURATION: 100 % | HEART RATE: 79 BPM | RESPIRATION RATE: 16 BRPM | DIASTOLIC BLOOD PRESSURE: 78 MMHG

## 2021-05-06 DIAGNOSIS — Z94.0 HTN, KIDNEY TRANSPLANT RELATED: ICD-10-CM

## 2021-05-06 DIAGNOSIS — Z94.0 KIDNEY REPLACED BY TRANSPLANT: ICD-10-CM

## 2021-05-06 DIAGNOSIS — I15.1 HTN, KIDNEY TRANSPLANT RELATED: ICD-10-CM

## 2021-05-06 DIAGNOSIS — R10.32 LEFT INGUINAL PAIN: Primary | ICD-10-CM

## 2021-05-06 LAB
ALBUMIN UR-MCNC: 30 MG/DL
ANION GAP SERPL CALCULATED.3IONS-SCNC: 6 MMOL/L (ref 3–14)
APPEARANCE UR: CLEAR
BACTERIA #/AREA URNS HPF: ABNORMAL /HPF
BILIRUB UR QL STRIP: NEGATIVE
BUN SERPL-MCNC: 54 MG/DL (ref 7–30)
CALCIUM SERPL-MCNC: 8.3 MG/DL (ref 8.5–10.1)
CHLORIDE SERPL-SCNC: 107 MMOL/L (ref 94–109)
CO2 SERPL-SCNC: 23 MMOL/L (ref 20–32)
COLOR UR AUTO: YELLOW
CREAT SERPL-MCNC: 3.1 MG/DL (ref 0.66–1.25)
ERYTHROCYTE [DISTWIDTH] IN BLOOD BY AUTOMATED COUNT: 13.4 % (ref 10–15)
GFR SERPL CREATININE-BSD FRML MDRD: 23 ML/MIN/{1.73_M2}
GLUCOSE SERPL-MCNC: 87 MG/DL (ref 70–99)
GLUCOSE UR STRIP-MCNC: NEGATIVE MG/DL
HCT VFR BLD AUTO: 27.8 % (ref 40–53)
HGB BLD-MCNC: 8.4 G/DL (ref 13.3–17.7)
HGB UR QL STRIP: ABNORMAL
KETONES UR STRIP-MCNC: NEGATIVE MG/DL
LEUKOCYTE ESTERASE UR QL STRIP: NEGATIVE
MCH RBC QN AUTO: 28.9 PG (ref 26.5–33)
MCHC RBC AUTO-ENTMCNC: 30.2 G/DL (ref 31.5–36.5)
MCV RBC AUTO: 96 FL (ref 78–100)
NITRATE UR QL: NEGATIVE
NON-SQ EPI CELLS #/AREA URNS LPF: ABNORMAL /LPF
PH UR STRIP: 7 PH (ref 5–7)
PLATELET # BLD AUTO: 207 10E9/L (ref 150–450)
POTASSIUM SERPL-SCNC: 4.4 MMOL/L (ref 3.4–5.3)
RBC # BLD AUTO: 2.91 10E12/L (ref 4.4–5.9)
RBC #/AREA URNS AUTO: ABNORMAL /HPF
SODIUM SERPL-SCNC: 136 MMOL/L (ref 133–144)
SOURCE: ABNORMAL
SP GR UR STRIP: 1.02 (ref 1–1.03)
UROBILINOGEN UR STRIP-ACNC: 0.2 EU/DL (ref 0.2–1)
WBC # BLD AUTO: 5.4 10E9/L (ref 4–11)
WBC #/AREA URNS AUTO: ABNORMAL /HPF

## 2021-05-06 PROCEDURE — 87086 URINE CULTURE/COLONY COUNT: CPT | Performed by: PHYSICIAN ASSISTANT

## 2021-05-06 PROCEDURE — 85027 COMPLETE CBC AUTOMATED: CPT | Performed by: PHYSICIAN ASSISTANT

## 2021-05-06 PROCEDURE — 36415 COLL VENOUS BLD VENIPUNCTURE: CPT | Performed by: PHYSICIAN ASSISTANT

## 2021-05-06 PROCEDURE — 80048 BASIC METABOLIC PNL TOTAL CA: CPT | Performed by: PHYSICIAN ASSISTANT

## 2021-05-06 PROCEDURE — 99215 OFFICE O/P EST HI 40 MIN: CPT | Performed by: PHYSICIAN ASSISTANT

## 2021-05-06 PROCEDURE — 81001 URINALYSIS AUTO W/SCOPE: CPT | Performed by: PHYSICIAN ASSISTANT

## 2021-05-06 RX ORDER — CYCLOBENZAPRINE HCL 5 MG
2.5-5 TABLET ORAL 3 TIMES DAILY PRN
Qty: 15 TABLET | Refills: 0 | Status: SHIPPED | OUTPATIENT
Start: 2021-05-06 | End: 2021-08-23

## 2021-05-06 ASSESSMENT — MIFFLIN-ST. JEOR: SCORE: 1909.08

## 2021-05-06 NOTE — TELEPHONE ENCOUNTER
"ISSUE:  Creatinine elevated 3.10; baseline 2.3-2.6  Biopsy last year showed chronic changes.     PLAN: UA/UC ordered by PCP. Negative for leukocyte esterase and nitrates; low level WBC. Urine culture in process.     In December, RNCC attempted to contact Miguel Angel regarding referral for another kidney transplant as his GFR was 20. He did not return VM mail at that time. Second VM message left today and referral sent.     Notified of elevated creatinine:  Call to assess:  Any recent illness/fever? Denies  Any new or missed medications?   Are you drinking 2-3L water/day? \"Probably should be drinking more\"  Volume status/weight/edema? \"Nothing like that\"  Changes in UOP? Color? Denies  Pain over graft sites? Miguel Angel reports he developed some pain \"below beltline, below kidney.\" Seen provider today; thought is possible inguinal hernia. CT imaging ordered and surgery referral placed if needed.     OUTCOME:   VM message left regarding low hemoglobin-anemia nurse will be reaching out. Due to qualifying GFR -transplant intake will be in contact. Cr elevated; urine in process. Please return call to RNCC to discuss.     Addendum: Miguel Angel promptly returned call. He will hydrate and repeat labs in 2 weeks. Calcium level low; consider adding supplement to Vitamin D supplement he is taking. Has orders to check PTH, phos, & Vitamin D. Agreed with intake referral. Was given anemia services' nurse number to call regarding hemoglobin.     Ivanna Rivera, RN, BSN  Solid Organ Transplant, Post Kidney and Pancreas  Transplant Care Coordinator  [phone]          "

## 2021-05-06 NOTE — LETTER
May 6, 2021      Miguel Angel Piña  [address] [facility] [address]        To Whom It May Concern:    Miguel Angel Piña was seen in our clinic. He may return to work without restrictions.      Sincerely,        ALEX Park

## 2021-05-06 NOTE — PATIENT INSTRUCTIONS
Lonnie Michelle,    Thank you for allowing [facility] to manage your care.    I am unsure of the cause of your symptoms, but your exam is reassuring. We will see what our workup shows. I ordered a CT, please call diagnostic imaging [phone] to schedule your test if you are not improving in the next 24 hours.    If you develop worsening/changing symptoms at any time, please call 911 or go to the emergency department for evaluation.    I ordered some lab work, please go to the laboratory to get your studies.    I sent your prescriptions to your pharmacy.    For your pain, please use Tylenol 650mg every 4-6 hours as needed.     Max acetaminophen (Tylenol) 4,000mg/24 hours    For severe pain not controlled by over the counter medications, please use cyclobenzaprine as prescribed. Do not use this medication while driving, operating machinery, with other sedating medications, or while drinking alcohol as it will make you drowsy.    I made a surgery referral, they will be calling in approximately 1 week to set up your appointment.  If you do not hear from them, please call the specialty number on your after visit summary.     Please allow 1-2 business days for our office to contact you in regards to your laboratory/radiological studies.  If not done so, I encourage you to login into [employer] ([email or web address] to review your results as well.     If you have any questions or concerns, please feel free to call us at [phone]    Sincerely,    Jayce Ortez PA-C    Did you know?      You can schedule a video visit for follow-up appointments as well as future appointments for certain conditions.  Please see the below link.     [email or web address]    If you have not already done so,  I encourage you to sign up for [email or web address] ([email or web address]).  This will allow you to review your results, securely communicate with a provider, and schedule  virtual visits as well.      Patient Education     Abdominal Pain  Abdominal pain is pain in the stomach or belly area. Everyone has this pain from time to time. In many cases it goes away on its own. But abdominal pain can sometimes be due to a serious problem, such as appendicitis. So it s important to know when to get help.    Causes of abdominal pain  There are many possible causes of abdominal pain. Common causes in adults include:    Constipation, diarrhea, or gas    Stomach acid flowing back up into the esophagus (acid reflux or heartburn)    Severe acid reflux, called GERD (gastroesophageal reflux disease)    A sore in the lining of the stomach or small intestine (peptic ulcer)    Inflammation of the gallbladder, liver, or pancreas    Gallstones or kidney stones    Appendicitis     Intestinal blockage     An internal organ pushing through a muscle or other tissue (hernia)    Urinary tract infections    In women, menstrual cramps, fibroids, ovarian cysts, pelvic inflammatory disease, or endometriosis    Inflammation or infection of the intestines, including Crohn's disease and ulcerative colitis    Irritable bowel syndrome  Diagnosing the cause of abdominal pain  Your healthcare provider will give you a physical exam help find the cause of your pain. If needed, you will have tests. Belly pain has many possible causes. So it can be hard to find the reason for your pain. Giving details about your pain can help. Tell your provider where and when you feel the pain, and what makes it better or worse. Also let your provider know if you have other symptoms such as:    Fever    Tiredness    Upset stomach (nausea)    Vomiting    Changes in bathroom habits    Blood in the stool or black, tarry stool    Weight loss that you can't explain (involuntary weight loss?)  Also report any family history of stomach or intestinal problems, or cancers. Tell your provider about all your alcohol use and drug use. Tell your provider  about all medicines you use, including herbs, vitamins, and supplements.  Treating abdominal pain  Some causes of pain need emergency medical treatment right away. These include appendicitis or a bowel blockage. Other problems can be treated with rest, fluids, or medicines. Your healthcare provider can give you specific instructions for treatment or self-care based on what is causing your pain.     If you have vomiting or diarrhea, sip water or other clear fluids. When you are ready to eat solid foods again, start with small amounts of easy-to-digest, low-fat foods. These include apple sauce, toast, or crackers.  When to get medical care  Call 911 or go to the hospital right away if you:    Can t pass stool and are vomiting    Are vomiting blood or have bloody diarrhea or black, tarry diarrhea    Have chest, neck, or shoulder pain    Feel like you might pass out    Have pain in your shoulder blades with nausea    Have sudden, severe belly pain    Have new, severe pain unlike any you have felt before    Have a belly that is rigid, hard, and hurts to touch  Call your healthcare provider if you have:    Pain for more than 5 days    Bloating for more than 2 days    Diarrhea for more than 5 days    A fever of 100.4 F (38 C) or higher, or as directed by your healthcare provider    Pain that gets worse    Weight loss for no reason    Continued lack of appetite    Blood in your stool  How to prevent abdominal pain  Here are some tips to help prevent abdominal pain:    Eat smaller amounts of food at each meal.    Don't eat greasy, fried, or other high-fat foods.    Don't eat foods that give you gas.    Exercise regularly.    Drink plenty of fluids.  To help prevent GERD symptoms:    Quit smoking.    Reduce alcohol and foods that increase stomach acid.    Don't use aspirin or over-the-counter pain and fever medicines, if possible. This includes nonsteroidal anti-inflammatory drugs (NSAIDs).    Lose excess weight.    Finish  eating at least 2 hours before you go to bed or lie down.    Raise the head of your bed.  [employer] last reviewed this educational content on 4/1/2019 2000-2021 [employer]. All rights reserved. This information is not intended as a substitute for professional medical care. Always follow your healthcare professional's instructions.

## 2021-05-08 LAB
BACTERIA SPEC CULT: NO GROWTH
Lab: NORMAL
SPECIMEN SOURCE: NORMAL

## 2021-05-12 ENCOUNTER — TELEPHONE (OUTPATIENT)
Dept: PHARMACY | Facility: CLINIC | Age: 46
End: 2021-05-12

## 2021-05-12 NOTE — TELEPHONE ENCOUNTER
Anemia Management Note  SUBJECTIVE/OBJECTIVE:  Referred by Dr. Lan Patino on 2020  Primary Diagnosis: Anemia in Chronic Kidney Disease (N18.4, D63.1)     Secondary Diagnosis:  Chronic Kidney Disease, Stage 4 (N18.4)   Kidney Tx:  &   Hgb goal range:  9-10  Epo/Darbo: To Be initiated when Hgb <9.   8/3/20; Ok to use  CINDY with Factor V per Dr. Patino  20:Per Miguel Angel, No clot in over 20 years.  Hesitant to start CINDY d/t increased risk of blood cots.  Iron regimen:  Ferrous Gluconate  once daily  Labs : 2021  Recent CINDY use, transfusion, IV iron: NA  RX/TX plans : TBD  No history of stroke and MI or cancers.  Factor V (clotting disorder)     Contact:            No Consent to Communicate on File.     Anemia Latest Ref Rng & Units 2020 2020 2020 2020 2020 2/10/2021 2021   Hemoglobin 13.3 - 17.7 g/dL 9.2(L) 9.2(L) 9.0(L) 9.2(L) 8.9(L) 9.1(L) 8.4(L)   TSAT 15 - 46 % - - 34 - - - -   Ferritin 26 - 388 ng/mL - - 226 - - - -     BP Readings from Last 3 Encounters:   21 123/78   20 107/73   20 137/81     Wt Readings from Last 2 Encounters:   21 228 lb 12.8 oz (103.8 kg)   20 220 lb (99.8 kg)           ASSESSMENT:  Hgb:Not at goal/Known  TSat: Due for labs Ferritin: Due for labs    PLAN:  LVM for Miguel Angel to discuss starting CINDY. He has bene hesitant in the past. Iron levels were not drawn at 21 lab appt.     Orders needed to be renewed (for next follow-up date) in EPIC: None    Iron labs due:  DUE    Plan discussed with:  LVM for Miguel Angel  Plan provided by:  henrry    NEXT FOLLOW-UP DATE:  21    Janna Louis RN   Anemia Services  [address] [address]   [email or web address]   Office : [phone]  Fax: [phone]

## 2021-05-18 DIAGNOSIS — M25.571 RIGHT ANKLE PAIN, UNSPECIFIED CHRONICITY: ICD-10-CM

## 2021-05-20 ENCOUNTER — TELEPHONE (OUTPATIENT)
Dept: TRANSPLANT | Facility: CLINIC | Age: 46
End: 2021-05-20

## 2021-05-20 RX ORDER — METHYLPREDNISOLONE 4 MG
TABLET, DOSE PACK ORAL
Qty: 21 TABLET | Refills: 0 | Status: SHIPPED | OUTPATIENT
Start: 2021-05-20 | End: 2021-08-10

## 2021-05-26 ENCOUNTER — TELEPHONE (OUTPATIENT)
Dept: PHARMACY | Facility: CLINIC | Age: 46
End: 2021-05-26

## 2021-05-26 NOTE — TELEPHONE ENCOUNTER
Follow-up with anemia management service:    Brain has not returned call re starting CINDY therapy. No future labs are scheduled.     Anemia Latest Ref Rng & Units 2/17/2020 7/29/2020 8/12/2020 8/17/2020 11/4/2020 2/10/2021 5/6/2021   Hemoglobin 13.3 - 17.7 g/dL 9.2(L) 9.2(L) 9.0(L) 9.2(L) 8.9(L) 9.1(L) 8.4(L)   TSAT 15 - 46 % - - 34 - - - -   Ferritin 26 - 388 ng/mL - - 226 - - - -           Follow-up call date: 6/16/21    Janna Louis RN   Anemia Services  [address] [address]   [email or web address]   Office : [phone]  Fax: [phone]

## 2021-05-27 VITALS — BODY MASS INDEX: 31.83 KG/M2 | WEIGHT: 210 LBS | HEIGHT: 68 IN

## 2021-05-27 ASSESSMENT — MIFFLIN-ST. JEOR: SCORE: 1812.05

## 2021-05-27 NOTE — TELEPHONE ENCOUNTER
PCP: Dr. Lucrecia Hutson   Referring Provider: Dr. Lan Patino   Referring Diagnosis: Previous Kidney Transplant/ESRD    Is patient under the age of 65? Yes  Is patient diabetic? No  Is patient on insulin? No  Was patient offered a pancreas transplant referral? No    Is patient in a group home/assisted living? No  Does patient have a guardian? No    Referral intake process completed.  Patient is aware that after financial approval is received, medical records will be requested.   Patient confirmed for a callback from transplant coordinator on 6/25/21. (within 2 weeks)  Tentative evaluation date not able to schedule at this time, due to work schedule. (within 4 weeks)    Confirmed coordinator will discuss evaluation process in more detail at the time of their call.   Patient is aware of the need to arrange age appropriate cancer screening, vaccinations, and dental care.  Reminded patient to complete questionnaire, complete medical records release, and review packet prior to evaluation visit .  Assessed patient for special needs (ie--wheelchair, assistance, guardian, and ):  None  Patient instructed to call [phone] with questions.     Patient gave verbal consent during intake call to obtain medical records and documents outside of [facility]:  Yes     ELVA Alvarez, LPN   Solid Organ Transplant

## 2021-06-16 ENCOUNTER — DOCUMENTATION ONLY (OUTPATIENT)
Dept: TRANSPLANT | Facility: CLINIC | Age: 46
End: 2021-06-16

## 2021-06-25 NOTE — TELEPHONE ENCOUNTER
Called patient to schedule kidney eval; I offered many dates for virtual but he wants to do in person and 1st available is Mon, August 23 and he confirmed for this date.

## 2021-06-25 NOTE — TELEPHONE ENCOUNTER
Reviewed chart for purpose of pre kidney transplant evaluation planning. Pt is s/p kidney tx 11/03/1993 and 04/25/2010. 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  Per Dr. Patino's note 11/24/2020 patient has severe chronic changes  on kidney biopsy 08/2020 - pt was referred for new kidney tx. Labs on 05/06/2021 - crt 3.10 and GFR 23. BMI 32. All okay to proceed with pre kidney tx eval.     Called patient today, reached his VM so LM asking him to call me.     Smart set orders into Epic today for pre kidney transplant evaluation - routed to . No STD.

## 2021-07-13 ENCOUNTER — TELEPHONE (OUTPATIENT)
Dept: PHARMACY | Facility: CLINIC | Age: 46
End: 2021-07-13

## 2021-07-13 NOTE — TELEPHONE ENCOUNTER
Referred by Dr. Lan Patino on 07/30/2020.    Miguel Angel has declined treatment for his Anemia since the initial referral in July 2020.  Has not returned last few phone calls.      Closing Anemia Services.    Anemia Latest Ref Rng & Units 2/17/2020 7/29/2020 8/12/2020 8/17/2020 11/4/2020 2/10/2021 5/6/2021   Hemoglobin 13.3 - 17.7 g/dL 9.2(L) 9.2(L) 9.0(L) 9.2(L) 8.9(L) 9.1(L) 8.4(L)   TSAT 15 - 46 % - - 34 - - - -   Ferritin 26 - 388 ng/mL - - 226 - - - -       Anemia labs discontinued, therapy plans cancelled, removed from active patient list, and referring provider notified.      Janna Louis RN   Anemia Services  [address] [address]   [email or web address]   Office : [phone]  Fax: [phone]

## 2021-07-22 DIAGNOSIS — Z94.0 KIDNEY TRANSPLANTED: Primary | ICD-10-CM

## 2021-07-22 RX ORDER — CYCLOSPORINE 25 MG/1
CAPSULE, LIQUID FILLED ORAL
Qty: 210 CAPSULE | Refills: 0 | Status: SHIPPED | OUTPATIENT
Start: 2021-07-22 | End: 2021-08-22

## 2021-07-29 DIAGNOSIS — M25.571 RIGHT ANKLE PAIN, UNSPECIFIED CHRONICITY: ICD-10-CM

## 2021-07-30 RX ORDER — METHYLPREDNISOLONE 4 MG
TABLET, DOSE PACK ORAL
Qty: 21 TABLET | Refills: 0 | OUTPATIENT
Start: 2021-07-30

## 2021-07-30 NOTE — TELEPHONE ENCOUNTER
Routing refill request to provider for review/approval because:  Drug not on the [facility] refill protocol           Pending Prescriptions:                       Disp   Refills    methylPREDNISolone (MEDROL DOSEPAK) 4 MG t*21 tab*0        Sig: TAKE BY MOUTH AS DIRECTED ON INSIDE OF PACKAGE        Kit MATEO Mays

## 2021-08-08 DIAGNOSIS — I15.1 HTN, KIDNEY TRANSPLANT RELATED: ICD-10-CM

## 2021-08-08 DIAGNOSIS — Z94.0 HTN, KIDNEY TRANSPLANT RELATED: ICD-10-CM

## 2021-08-09 DIAGNOSIS — M25.571 RIGHT ANKLE PAIN, UNSPECIFIED CHRONICITY: ICD-10-CM

## 2021-08-09 RX ORDER — METHYLPREDNISOLONE 4 MG
TABLET, DOSE PACK ORAL
Qty: 21 TABLET | Refills: 0 | OUTPATIENT
Start: 2021-08-09

## 2021-08-09 NOTE — TELEPHONE ENCOUNTER
"Requested Prescriptions   Pending Prescriptions Disp Refills     amLODIPine (NORVASC) 5 MG tablet [Pharmacy Med Name: amLODIPine Besylate 5 MG Oral Tablet] 90 tablet 0     Sig: TAKE 1 TABLET BY MOUTH AT BEDTIME AT NIGHT       Calcium Channel Blockers Protocol  Failed - 8/9/2021  2:08 PM        Failed - Normal serum creatinine on file in past 12 months     Recent Labs   Lab Test 05/06/21  0735   CR 3.10*       Ok to refill medication if creatinine is low          Passed - Blood pressure under 140/90 in past 12 months     BP Readings from Last 3 Encounters:   05/06/21 123/78   08/17/20 107/73   07/27/20 137/81                 Passed - Recent (12 mo) or future (30 days) visit within the authorizing provider's specialty     Patient has had an office visit with the authorizing provider or a provider within the authorizing providers department within the previous 12 mos or has a future within next 30 days. See \"Patient Info\" tab in inbasket, or \"Choose Columns\" in Meds & Orders section of the refill encounter.              Passed - Medication is active on med list        Passed - Patient is age 18 or older           losartan (COZAAR) 25 MG tablet [Pharmacy Med Name: Losartan Potassium 25 MG Oral Tablet] 90 tablet 0     Sig: Take 1 tablet by mouth once daily       Angiotensin-II Receptors Failed - 8/9/2021  2:08 PM        Failed - Normal serum creatinine on file in past 12 months     Recent Labs   Lab Test 05/06/21  0735   CR 3.10*       Ok to refill medication if creatinine is low          Passed - Last blood pressure under 140/90 in past 12 months     BP Readings from Last 3 Encounters:   05/06/21 123/78   08/17/20 107/73   07/27/20 137/81                 Passed - Recent (12 mo) or future (30 days) visit within the authorizing provider's specialty     Patient has had an office visit with the authorizing provider or a provider within the authorizing providers department within the previous 12 mos or has a future within " "next 30 days. See \"Patient Info\" tab in inbasket, or \"Choose Columns\" in Meds & Orders section of the refill encounter.              Passed - Medication is active on med list        Passed - Patient is age 18 or older        Passed - Normal serum potassium on file in past 12 months     Recent Labs   Lab Test 05/06/21  0735   POTASSIUM 4.4                         "

## 2021-08-09 NOTE — TELEPHONE ENCOUNTER
Called [phone] (home)     Did patient answer the phone: No, left a message on voicemail to return call to the [facility] at [phone].    Keturah Childers, RN  Triage Nurse  [facility]: [facility]

## 2021-08-09 NOTE — TELEPHONE ENCOUNTER
Patient called back, says he is need of prednisone for gout flare.   He says he tried to get the prednisone refilled a couple weeks ago to have on hand; he has now developed another gout flare, right ankle.  Started yesterday, is using tylenol PRN but not helping.   I advised he does need to be seen, has not seen Dr. Hutson since 7/2720, just over a year ago.       He will also run out of losartan and amlodipine soon.  I see refill encounter already in Epic for that, routed to Dr. Hutson.    Scheduled for 8/31/21, soonest there is an opening in clinic with Dr. Hutson.    He is seeing transplant team for extensive workup on 8/23/21 for new kidney so only wants to do focused visit with Dr. Hutson (med check, gout).    Jani'd prednisone, routed to Dr. Hutson to address patient request.    Sandy Lim RN  [facility]

## 2021-08-09 NOTE — TELEPHONE ENCOUNTER
Needs to be seen, see other encounter from 7/29/21, is scheduled but no openings until 8/31/21.    Amlodipine and losartan:    Routing refill request to provider for review/approval because:  Labs out of range:  Creatinine      Sandy Lim RN  [facility]

## 2021-08-10 RX ORDER — AMLODIPINE BESYLATE 5 MG/1
TABLET ORAL
Qty: 90 TABLET | Refills: 0 | Status: SHIPPED | OUTPATIENT
Start: 2021-08-10 | End: 2021-11-12

## 2021-08-10 RX ORDER — METHYLPREDNISOLONE 4 MG
TABLET, DOSE PACK ORAL
Qty: 21 TABLET | Refills: 0 | Status: SHIPPED | OUTPATIENT
Start: 2021-08-10 | End: 2021-08-23

## 2021-08-10 RX ORDER — LOSARTAN POTASSIUM 25 MG/1
TABLET ORAL
Qty: 90 TABLET | Refills: 0 | Status: SHIPPED | OUTPATIENT
Start: 2021-08-10 | End: 2021-11-12

## 2021-08-10 NOTE — TELEPHONE ENCOUNTER
"Miguel Angel called back now, says he was contacted by pharmacy, they have the BP meds but he says he \"really needs\" the prednisone for the gout flare.    I advised I re-sent this to PCP.   I did try to verbally consult but her office door is closed right now, maybe on a call.    Re-routed to Dr. Hutson to address request for prednisone.    Sandy Lim RN  [facility]      "

## 2021-08-10 NOTE — TELEPHONE ENCOUNTER
I see Dr. Hutson filled the losartan and amlodipine.    Prednisone not yet addressed by Dr. Galvan since the initial refusal due to needing to be seen.   See notes below, is scheduled, having gout flare, hoping to get Rx prednisone for that.    Re-routed to Dr. Hutson.    Sandy Lim RN  [facility]

## 2021-08-10 NOTE — TELEPHONE ENCOUNTER
Patient calling back to check on this, says pharmacy has not yet received prednisone refill.    I see still pending in this encounter.   See note from yesterday, gout flare, is scheduled for visit end of August.    I also routed refill request for losartan and amlodipine to provider as well.    See other encounter for that.      Patient would like call back to his cell phone when addressed.    Both re-routed to Dr. Hutson.    Sandy Lim RN  [facility]

## 2021-08-10 NOTE — TELEPHONE ENCOUNTER
Noted.   Haven't seen this patient in over a year. Med was previously refused as he's overdue for follow up.  Will refill med this time but please make him aware that we also offer Virtual visits to address acute concerns.   Will need to discuss gout prophylaxis and risk of being on Prednisone therapy frequently/long term at next office visit.

## 2021-08-10 NOTE — TELEPHONE ENCOUNTER
"Attempted to call patient at mobile number, no answer, left message on voicemail; patient was instructed to return call to [facility] at [phone].  I did briefly advise on voicemail identified as Miguel Angel Piña that the requested Rx was sent.    I called the \"home\" number, that is actually his work number, he answered.   Advised him Rx sent.   Keep 8/31 visit.       Patient verbalized understanding of and agreement with plan.      Sandy Lim RN  [facility]    "

## 2021-08-19 ENCOUNTER — LAB (OUTPATIENT)
Dept: LAB | Facility: CLINIC | Age: 46
End: 2021-08-19
Payer: COMMERCIAL

## 2021-08-19 ENCOUNTER — TELEPHONE (OUTPATIENT)
Dept: TRANSPLANT | Facility: CLINIC | Age: 46
End: 2021-08-19

## 2021-08-19 DIAGNOSIS — Z79.60 LONG-TERM USE OF IMMUNOSUPPRESSANT MEDICATION: ICD-10-CM

## 2021-08-19 DIAGNOSIS — Z94.0 KIDNEY TRANSPLANTED: ICD-10-CM

## 2021-08-19 LAB
ANION GAP SERPL CALCULATED.3IONS-SCNC: 7 MMOL/L (ref 3–14)
BUN SERPL-MCNC: 58 MG/DL (ref 7–30)
CALCIUM SERPL-MCNC: 8.6 MG/DL (ref 8.5–10.1)
CHLORIDE BLD-SCNC: 112 MMOL/L (ref 94–109)
CO2 SERPL-SCNC: 19 MMOL/L (ref 20–32)
CREAT SERPL-MCNC: 3.03 MG/DL (ref 0.66–1.25)
CREAT UR-MCNC: 83 MG/DL
ERYTHROCYTE [DISTWIDTH] IN BLOOD BY AUTOMATED COUNT: 13.6 % (ref 10–15)
GFR SERPL CREATININE-BSD FRML MDRD: 24 ML/MIN/1.73M2
GLUCOSE BLD-MCNC: 125 MG/DL (ref 70–99)
HCT VFR BLD AUTO: 26.8 % (ref 40–53)
HGB BLD-MCNC: 8.4 G/DL (ref 13.3–17.7)
MCH RBC QN AUTO: 28.3 PG (ref 26.5–33)
MCHC RBC AUTO-ENTMCNC: 31.3 G/DL (ref 31.5–36.5)
MCV RBC AUTO: 90 FL (ref 78–100)
PLATELET # BLD AUTO: 200 10E3/UL (ref 150–450)
POTASSIUM BLD-SCNC: 5.5 MMOL/L (ref 3.4–5.3)
PROT UR-MCNC: 0.33 G/L
PROT/CREAT 24H UR: 0.4 G/G CR (ref 0–0.2)
RBC # BLD AUTO: 2.97 10E6/UL (ref 4.4–5.9)
SODIUM SERPL-SCNC: 138 MMOL/L (ref 133–144)
WBC # BLD AUTO: 4.6 10E3/UL (ref 4–11)

## 2021-08-19 PROCEDURE — 80158 DRUG ASSAY CYCLOSPORINE: CPT

## 2021-08-19 PROCEDURE — 36415 COLL VENOUS BLD VENIPUNCTURE: CPT

## 2021-08-19 PROCEDURE — 80048 BASIC METABOLIC PNL TOTAL CA: CPT

## 2021-08-19 PROCEDURE — 85027 COMPLETE CBC AUTOMATED: CPT

## 2021-08-19 PROCEDURE — 84156 ASSAY OF PROTEIN URINE: CPT

## 2021-08-19 NOTE — TELEPHONE ENCOUNTER
VM message left reminding patient of upcoming P[facility] appointments at [facility] on 8/23/21 starting at 0730. Instructed patient may eat breakfast, take regularly scheduled medications and be accompanied by 1 adult visitor. Instructed patient to call and reschedule if any Covid-19 symptoms or exposure within 14 days  Contact information given for any further questions/concerns/need to reschedule.

## 2021-08-20 ENCOUNTER — TELEPHONE (OUTPATIENT)
Dept: TRANSPLANT | Facility: CLINIC | Age: 46
End: 2021-08-20

## 2021-08-20 DIAGNOSIS — D64.9 ANEMIA: ICD-10-CM

## 2021-08-20 DIAGNOSIS — R79.89 ELEVATED SERUM CREATININE: ICD-10-CM

## 2021-08-20 DIAGNOSIS — E87.20 METABOLIC ACIDOSIS: ICD-10-CM

## 2021-08-20 DIAGNOSIS — E87.5 HYPERKALEMIA: ICD-10-CM

## 2021-08-20 DIAGNOSIS — Z94.0 KIDNEY TRANSPLANTED: Primary | ICD-10-CM

## 2021-08-20 DIAGNOSIS — Z94.0 KIDNEY TRANSPLANTED: ICD-10-CM

## 2021-08-20 LAB
CYCLOSPORINE BLD LC/MS/MS-MCNC: 77 UG/L (ref 50–400)
TME LAST DOSE: NORMAL H
TME LAST DOSE: NORMAL H

## 2021-08-20 RX ORDER — SODIUM BICARBONATE 650 MG/1
1900 TABLET ORAL 2 TIMES DAILY
Qty: 540 TABLET | Refills: 3 | Status: SHIPPED | OUTPATIENT
Start: 2021-08-20 | End: 2022-04-26

## 2021-08-20 NOTE — PROGRESS NOTES
[facility] Solid Organ Transplant  Outpatient MNT: Kidney Transplant Evaluation    Current BMI: 33.9 (HT 69 in,  lbs/104 kg)  BMI is within recommendation of <35 for kidney transplant    Frailty Assessment -- Not Frail (1/5 points) scored for reduced        Time Spent: 15 minutes  Visit Type: Initial   Referring Physician: Sukhdeep  Pt accompanied by: self     History of previous txp: kidney 1993, 2010  Dialysis: no     Nutrition Assessment  Occasional gout flare ups so really avoids red meat.  Would like to lose some weight; this was also recommended by Dr Patino today    Appetite: good appetite     Vitamins, Supplements, Pertinent Meds: iron, vit D, calcium   Herbal Medicines/Supplements: none     Edema: mild KARMEN     Weight hx: stable     Food Security: any concerns about having enough money to buy food or access to grocery stores? No     Diet Recall  Breakfast 2 taquitos with meat and cheese    Lunch [employer] 6 chicken nuggets   Dinner Either out (1x/week) or at home- breaded chicken   Snacks Hs- fruit    Beverages Water, Root Beer (not daily), Gatorade (not daily)   Alcohol None    Dining out Daily      Physical Activity  Pre covid would go to the gym regularly  No routine activity now/no limiting sx      Labs  8/19 K 5.5   No recent phos on file     Nutrition Diagnosis  No nutrition diagnosis identified at this time     Nutrition Intervention  Nutrition education provided:  Discussed sodium intake (low sodium foods and drinks, seasoning food without salt and tips for low sodium diet). Reviewed sodium intake, KARMEN, and dining out frequency. To reduce sodium intake and aid in weight loss, we discussed reducing frequency of dining out, even by 1 day/week. Consider addition of more fruits/veggies into eating plan. Reviewed borderline high K. Would avoid Gatorade.    Reviewed post txp diet guidelines in brief (will review in further detail post txp):  (1) Review of proper food safety measures d/t  immunosuppressant therapy post-op and increased risk for food-borne illness    (2) Avoid the following post txp d/t risk for rejection, unknown effects on the organs, and/or potential interactions with immunosuppressants:  - Herbal, Chinese, holistic, chiropractic, natural, alternative medicines and supplements  - Detoxes and cleanses  - Weight loss pills  - Protein powders or other products with extracts or herbs (ie green tea extract)    (3) Med regimen and possible side effects    Patient Understanding: Pt verbalized understanding of education provided.  Expected Engagement: Good  Follow-Up Plans: PRN     Nutrition Goals  No nutrition goals identified at this time     Julia Kearney, RD, LD, CCTD

## 2021-08-20 NOTE — TELEPHONE ENCOUNTER
ISSUE: Lab results 8/19/21 1040  Creatinine level 3.03  K 5.5  CO2 19  Hgb 8.4 Stable; declined anemia services per note 7/13/21      PLAN:  Message  Received: Yesterday  Lan Patino MD [place], Ivanna Schafer, RN  Stable, but elevated serum creatinine.  High serum potassium and low bicarbonate levels and recommend increasing sodium bicarbonate to 1900 mg twice daily.     Encourage low potassium diet, about 2000 mg per day. Encourage use of nutrition label reading.     Example foods to avoid include: Avocado, banana, potatoes, orange juice, canned foods.    Kidney evaluation appt on 8/23/21.     Please have pt hydrate repeat BMP and CBC in 2 weeks ~9/3/21. CSA level at goal.    OUTCOME: Spoke with Miguel Angel. Discussed dietary reduction of potassium. He states he hasn't been eating foods high in potassium due to gout he can not eat French fries. He is taking losartan potassium. Will inquire about holding that for now with Dr. Patino-staff message sent. He denies UTI symptoms, denies fever/illness/infection. States he feels great. Discussed bicarb. He has not missed doses of sodium bicarbonate. Verbalized understanding to increase from 2 tabs to 3 tabs twice daily. Prescription sent to [facility].     Ivanna Rivera, RN, BSN  Solid Organ Transplant, Post Kidney and Pancreas  Transplant Care Coordinator  [phone]

## 2021-08-22 RX ORDER — CYCLOSPORINE 25 MG/1
CAPSULE, LIQUID FILLED ORAL
Qty: 210 CAPSULE | Refills: 0 | Status: SHIPPED | OUTPATIENT
Start: 2021-08-22 | End: 2021-09-27

## 2021-08-23 ENCOUNTER — ANCILLARY PROCEDURE (OUTPATIENT)
Dept: CARDIOLOGY | Facility: CLINIC | Age: 46
End: 2021-08-23
Attending: PHYSICIAN ASSISTANT
Payer: COMMERCIAL

## 2021-08-23 ENCOUNTER — ANCILLARY PROCEDURE (OUTPATIENT)
Dept: GENERAL RADIOLOGY | Facility: CLINIC | Age: 46
End: 2021-08-23
Attending: PHYSICIAN ASSISTANT
Payer: COMMERCIAL

## 2021-08-23 ENCOUNTER — ALLIED HEALTH/NURSE VISIT (OUTPATIENT)
Dept: TRANSPLANT | Facility: CLINIC | Age: 46
End: 2021-08-23
Attending: PHYSICIAN ASSISTANT
Payer: COMMERCIAL

## 2021-08-23 ENCOUNTER — LAB (OUTPATIENT)
Dept: LAB | Facility: CLINIC | Age: 46
End: 2021-08-23
Payer: COMMERCIAL

## 2021-08-23 ENCOUNTER — DOCUMENTATION ONLY (OUTPATIENT)
Dept: TRANSPLANT | Facility: CLINIC | Age: 46
End: 2021-08-23

## 2021-08-23 VITALS
WEIGHT: 230 LBS | HEART RATE: 79 BPM | BODY MASS INDEX: 34.07 KG/M2 | DIASTOLIC BLOOD PRESSURE: 82 MMHG | OXYGEN SATURATION: 100 % | SYSTOLIC BLOOD PRESSURE: 128 MMHG | HEIGHT: 69 IN

## 2021-08-23 DIAGNOSIS — N18.6 ESRD (END STAGE RENAL DISEASE) (H): ICD-10-CM

## 2021-08-23 DIAGNOSIS — T86.91 TRANSPLANT FAILURE DUE TO REJECTION: ICD-10-CM

## 2021-08-23 DIAGNOSIS — D84.9 IMMUNOSUPPRESSION (H): ICD-10-CM

## 2021-08-23 DIAGNOSIS — I10 ESSENTIAL HYPERTENSION: ICD-10-CM

## 2021-08-23 DIAGNOSIS — Z87.74 S/P PATENT FORAMEN OVALE CLOSURE: ICD-10-CM

## 2021-08-23 DIAGNOSIS — I15.1 HTN, KIDNEY TRANSPLANT RELATED: ICD-10-CM

## 2021-08-23 DIAGNOSIS — N18.9 CHRONIC RENAL FAILURE: ICD-10-CM

## 2021-08-23 DIAGNOSIS — N18.4 CHRONIC KIDNEY DISEASE, STAGE IV (SEVERE) (H): ICD-10-CM

## 2021-08-23 DIAGNOSIS — Z76.82 ORGAN TRANSPLANT CANDIDATE: ICD-10-CM

## 2021-08-23 DIAGNOSIS — Z01.818 ENCOUNTER FOR PRE-TRANSPLANT EVALUATION FOR KIDNEY TRANSPLANT: Primary | ICD-10-CM

## 2021-08-23 DIAGNOSIS — E87.5 HYPERKALEMIA: ICD-10-CM

## 2021-08-23 DIAGNOSIS — Z94.0 KIDNEY REPLACED BY TRANSPLANT: ICD-10-CM

## 2021-08-23 DIAGNOSIS — E87.20 METABOLIC ACIDOSIS: ICD-10-CM

## 2021-08-23 DIAGNOSIS — Z86.73 HISTORY OF EMBOLIC STROKE: ICD-10-CM

## 2021-08-23 DIAGNOSIS — Z94.0 HTN, KIDNEY TRANSPLANT RELATED: ICD-10-CM

## 2021-08-23 DIAGNOSIS — D68.51 HETEROZYGOUS FACTOR V LEIDEN MUTATION (H): ICD-10-CM

## 2021-08-23 DIAGNOSIS — R79.89 ELEVATED SERUM CREATININE: ICD-10-CM

## 2021-08-23 DIAGNOSIS — Z94.0 KIDNEY TRANSPLANTED: ICD-10-CM

## 2021-08-23 DIAGNOSIS — Z48.298 AFTERCARE FOLLOWING ORGAN TRANSPLANT: ICD-10-CM

## 2021-08-23 DIAGNOSIS — N18.4 CKD (CHRONIC KIDNEY DISEASE) STAGE 4, GFR 15-29 ML/MIN (H): Primary | ICD-10-CM

## 2021-08-23 DIAGNOSIS — D64.9 ANEMIA: ICD-10-CM

## 2021-08-23 LAB
ABO/RH(D): NORMAL
ABO/RH(D): NORMAL
ALBUMIN SERPL-MCNC: 3.7 G/DL (ref 3.4–5)
ALBUMIN UR-MCNC: NEGATIVE MG/DL
ALP SERPL-CCNC: 100 U/L (ref 40–150)
ALT SERPL W P-5'-P-CCNC: 17 U/L (ref 0–70)
ANION GAP SERPL CALCULATED.3IONS-SCNC: 8 MMOL/L (ref 3–14)
ANTIBODY SCREEN: NEGATIVE
APPEARANCE UR: CLEAR
APTT PPP: 29 SECONDS (ref 22–38)
AST SERPL W P-5'-P-CCNC: 9 U/L (ref 0–45)
BASOPHILS # BLD AUTO: 0 10E3/UL (ref 0–0.2)
BASOPHILS NFR BLD AUTO: 0 %
BILIRUB SERPL-MCNC: 0.7 MG/DL (ref 0.2–1.3)
BILIRUB UR QL STRIP: NEGATIVE
BUN SERPL-MCNC: 51 MG/DL (ref 7–30)
CALCIUM SERPL-MCNC: 8.5 MG/DL (ref 8.5–10.1)
CHLORIDE BLD-SCNC: 110 MMOL/L (ref 94–109)
CO2 SERPL-SCNC: 21 MMOL/L (ref 20–32)
COLOR UR AUTO: ABNORMAL
CREAT SERPL-MCNC: 2.95 MG/DL (ref 0.66–1.25)
EOSINOPHIL # BLD AUTO: 0.1 10E3/UL (ref 0–0.7)
EOSINOPHIL NFR BLD AUTO: 3 %
ERYTHROCYTE [DISTWIDTH] IN BLOOD BY AUTOMATED COUNT: 13.8 % (ref 10–15)
FACTOR 2 INTERPRETATION: ABNORMAL
FACTOR V INTERPRETATION: ABNORMAL
GFR SERPL CREATININE-BSD FRML MDRD: 24 ML/MIN/1.73M2
GLUCOSE BLD-MCNC: 90 MG/DL (ref 70–99)
GLUCOSE UR STRIP-MCNC: NEGATIVE MG/DL
HBV CORE AB SERPL QL IA: NONREACTIVE
HBV SURFACE AB SERPL IA-ACNC: 0.17 M[IU]/ML
HBV SURFACE AG SERPL QL IA: NONREACTIVE
HCT VFR BLD AUTO: 26.5 % (ref 40–53)
HCV AB SERPL QL IA: NONREACTIVE
HGB BLD-MCNC: 8.6 G/DL (ref 13.3–17.7)
HGB UR QL STRIP: ABNORMAL
HIV 1+2 AB+HIV1 P24 AG SERPL QL IA: NONREACTIVE
IMM GRANULOCYTES # BLD: 0 10E3/UL
IMM GRANULOCYTES NFR BLD: 0 %
INR PPP: 1.02 (ref 0.85–1.15)
KETONES UR STRIP-MCNC: NEGATIVE MG/DL
LAB DIRECTOR COMMENTS: ABNORMAL
LAB DIRECTOR DISCLAIMER: ABNORMAL
LAB DIRECTOR INTERPRETATION: ABNORMAL
LAB DIRECTOR METHODOLOGY: ABNORMAL
LAB DIRECTOR RESULTS: ABNORMAL
LEUKOCYTE ESTERASE UR QL STRIP: NEGATIVE
LVEF ECHO: NORMAL
LYMPHOCYTES # BLD AUTO: 1.2 10E3/UL (ref 0.8–5.3)
LYMPHOCYTES NFR BLD AUTO: 26 %
MCH RBC QN AUTO: 28.8 PG (ref 26.5–33)
MCHC RBC AUTO-ENTMCNC: 32.5 G/DL (ref 31.5–36.5)
MCV RBC AUTO: 89 FL (ref 78–100)
MDL NUMBER: ABNORMAL
MONOCYTES # BLD AUTO: 0.3 10E3/UL (ref 0–1.3)
MONOCYTES NFR BLD AUTO: 7 %
NEUTROPHILS # BLD AUTO: 2.9 10E3/UL (ref 1.6–8.3)
NEUTROPHILS NFR BLD AUTO: 64 %
NITRATE UR QL: NEGATIVE
NRBC # BLD AUTO: 0 10E3/UL
NRBC BLD AUTO-RTO: 0 /100
PH UR STRIP: 7 [PH] (ref 5–7)
PLATELET # BLD AUTO: 210 10E3/UL (ref 150–450)
POTASSIUM BLD-SCNC: 4.8 MMOL/L (ref 3.4–5.3)
PROT SERPL-MCNC: 6.9 G/DL (ref 6.8–8.8)
RBC # BLD AUTO: 2.99 10E6/UL (ref 4.4–5.9)
RBC URINE: 1 /HPF
SODIUM SERPL-SCNC: 139 MMOL/L (ref 133–144)
SP GR UR STRIP: 1.01 (ref 1–1.03)
SPECIMEN DESCRIPTION: ABNORMAL
SPECIMEN EXPIRATION DATE: NORMAL
SPECIMEN EXPIRATION DATE: NORMAL
T PALLIDUM AB SER QL: NONREACTIVE
UROBILINOGEN UR STRIP-MCNC: NORMAL MG/DL
WBC # BLD AUTO: 4.6 10E3/UL (ref 4–11)
WBC URINE: <1 /HPF

## 2021-08-23 PROCEDURE — 71046 X-RAY EXAM CHEST 2 VIEWS: CPT | Performed by: RADIOLOGY

## 2021-08-23 PROCEDURE — 87340 HEPATITIS B SURFACE AG IA: CPT

## 2021-08-23 PROCEDURE — 86481 TB AG RESPONSE T-CELL SUSP: CPT

## 2021-08-23 PROCEDURE — 93306 TTE W/DOPPLER COMPLETE: CPT | Performed by: INTERNAL MEDICINE

## 2021-08-23 PROCEDURE — 86147 CARDIOLIPIN ANTIBODY EA IG: CPT

## 2021-08-23 PROCEDURE — 86706 HEP B SURFACE ANTIBODY: CPT

## 2021-08-23 PROCEDURE — 86787 VARICELLA-ZOSTER ANTIBODY: CPT

## 2021-08-23 PROCEDURE — 81240 F2 GENE: CPT

## 2021-08-23 PROCEDURE — 85670 THROMBIN TIME PLASMA: CPT

## 2021-08-23 PROCEDURE — 86900 BLOOD TYPING SEROLOGIC ABO: CPT

## 2021-08-23 PROCEDURE — 85730 THROMBOPLASTIN TIME PARTIAL: CPT

## 2021-08-23 PROCEDURE — 85004 AUTOMATED DIFF WBC COUNT: CPT

## 2021-08-23 PROCEDURE — G0452 MOLECULAR PATHOLOGY INTERPR: HCPCS | Performed by: PATHOLOGY

## 2021-08-23 PROCEDURE — 86886 COOMBS TEST INDIRECT TITER: CPT

## 2021-08-23 PROCEDURE — 86803 HEPATITIS C AB TEST: CPT

## 2021-08-23 PROCEDURE — 85613 RUSSELL VIPER VENOM DILUTED: CPT

## 2021-08-23 PROCEDURE — 86780 TREPONEMA PALLIDUM: CPT

## 2021-08-23 PROCEDURE — 85390 FIBRINOLYSINS SCREEN I&R: CPT | Mod: 26 | Performed by: PATHOLOGY

## 2021-08-23 PROCEDURE — 86901 BLOOD TYPING SEROLOGIC RH(D): CPT

## 2021-08-23 PROCEDURE — 86704 HEP B CORE ANTIBODY TOTAL: CPT

## 2021-08-23 PROCEDURE — 81001 URINALYSIS AUTO W/SCOPE: CPT

## 2021-08-23 PROCEDURE — 85610 PROTHROMBIN TIME: CPT

## 2021-08-23 PROCEDURE — 86832 HLA CLASS I HIGH DEFIN QUAL: CPT

## 2021-08-23 PROCEDURE — 99215 OFFICE O/P EST HI 40 MIN: CPT

## 2021-08-23 PROCEDURE — 80053 COMPREHEN METABOLIC PANEL: CPT

## 2021-08-23 PROCEDURE — 86665 EPSTEIN-BARR CAPSID VCA: CPT

## 2021-08-23 PROCEDURE — 86644 CMV ANTIBODY: CPT

## 2021-08-23 PROCEDURE — 36415 COLL VENOUS BLD VENIPUNCTURE: CPT

## 2021-08-23 PROCEDURE — 81378 HLA I & II TYPING HR: CPT

## 2021-08-23 PROCEDURE — 86833 HLA CLASS II HIGH DEFIN QUAL: CPT

## 2021-08-23 PROCEDURE — 99204 OFFICE O/P NEW MOD 45 MIN: CPT | Performed by: TRANSPLANT SURGERY

## 2021-08-23 ASSESSMENT — MIFFLIN-ST. JEOR: SCORE: 1918.65

## 2021-08-23 NOTE — LETTER
"    8/23/2021         RE: Miguel Angel Piña  [address] [address] [address] [facility] [address]        Dear Colleague,    Thank you for referring your patient, Miguel Angel Piña, to the [facility]. Please see a copy of my visit note below.    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  No eval for txp 3.   Surgically ok, with caveats below for retxp.  Not a perfect candidate.    1.  CKD 4.    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  No obvious contraindications to re-Txp, but presents some anatomic and immunologic challenges.  Ideally would get a LDKT, ubt doesn't know of any donors.  2. Prior Kid Txp: left side to ext iliac vessels: seen in MRI (with large fluid collection inferior to graft) in 2010.  The right txp was done in 1993 (JSN) and the internal iliac art is not visible.  Suspect graft is on the IIA and op note is not available.  Would prob need a midline approach to figure out where graft can easily go.  May need txp nephrectomy at time of txp.  Would get another CT (without IV contrast, get with oral) to assess for collections/masses.  Get post-void residual bladder volumes.  3. Chronic immunosuppression: he has many skin lesions on face. Get derm opinion.  Also would check EBV viral load as well as usual screen/  4. Past hx of patent FO with stroke.  Prob related to clot on HD catheter, can't find path or OR report of repair (? Find them).  5. Hx recurrent gout: get rheum notes/opinions.  6 HTN  7. Reasonable duration of grafts, but get assessment of adherence.  8. Check bone density  9. Hx of wound infection of L side kidney txp.  10 No covid vaccine, despite almost 30 yrs of IS.  Should be vaccinated.    45 min eval patient, discussion, review chart and issue.    HPI  Medullary cystic disease, treatment x 2 txp.  Current kidney is failing.  Had cardiac \"hole' fixed about a " decade ago. Now slowly failing.    Chronic immunosuppression current cya and mmf.  Has had multiple gout episodes.  No rx at present.  HTN: no ischemic events, although has had rhythm issues.    Current Outpatient Medications   Medication     amLODIPine (NORVASC) 5 MG tablet     calcium gluconate 500 MG tablet     CELLCEPT (BRAND) 250 MG capsule     cholecalciferol 2000 units TABS     Ferrous Gluconate 324 (37.5 Fe) MG TABS     losartan (COZAAR) 25 MG tablet     NEORAL (BRAND) 25 MG capsule     sodium bicarbonate 650 MG tablet     No current facility-administered medications for this visit.     Results for CARLOS RODRIGUES (MRN [id number]) as of 8/23/2021 15:03   Ref. Range 8/23/2021 12:40   Sodium Latest Ref Range: 133 - 144 mmol/L 139   Potassium Latest Ref Range: 3.4 - 5.3 mmol/L 4.8   Chloride Latest Ref Range: 94 - 109 mmol/L 110 (H)   Carbon Dioxide Latest Ref Range: 20 - 32 mmol/L 21   Urea Nitrogen Latest Ref Range: 7 - 30 mg/dL 51 (H)   Creatinine Latest Ref Range: 0.66 - 1.25 mg/dL 2.95 (H)   Results for CARLOS RODRIGUES (MRN [id number]) as of 8/23/2021 15:03   Ref. Range 8/23/2021 12:40   WBC Latest Ref Range: 4.0 - 11.0 10e3/uL 4.6   Hemoglobin Latest Ref Range: 13.3 - 17.7 g/dL 8.6 (L)   Hematocrit Latest Ref Range: 40.0 - 53.0 % 26.5 (L)   Platelet Count Latest Ref Range: 150 - 450 10e3/uL 210   Results for CARLOS RODRIGUES (MRN [id number]) as of 8/23/2021 15:03   Ref. Range 9/21/2017 00:00   Unacceptable Antigen Unknown A:25 26 43 66 B:37 71 73 75 76 78 DR:4 7 9 10 DRw:53 DQ:2 4 7 8 9   UNOS cPRA Unknown 92     PE  There were no vitals taken for this visit.  Skin: multiple growths on face.  Abd soft and nontender  Wide scar on L txp incision.  Midline scar.  Groin pulses sym and 3+  Min edema/      I had a long discussion with the patient regarding kidney transplantation which included but was not limited to the following points:    Kidney transplant evaluation process to assess whether (s)he can  safely undergo a kidney transplant and take long-term immunosuppression.    The selection committee process was discussed.  The federal rules for cadaveric waiting list and blood type matching of the organ. The availability of living-related donor transplantation.  The types of donors: brain death donors, non-heart beating donors and living donor grafts  Extended criteria  Donors and the increased  risk of primary non-function using these extended criteria donors  [facility] high risk donors, risk of donor transmitted infections and donor transmitted malignancy  The kidney transplant operation and the associated risks and technical complications which can include intraoperative death, post operative death, primary non-function, bleeding requiring re-operations, vascular and ureteral complications, bowel perforations, and intra abdominal abscess. Some of these complications may require a second operation.  The postoperative course and risk of postoperative complications which can include sepsis, MI, stroke, brain injury, pneumonia, pleural effusions, and renal dysfunction.  The current 1 year and 5 year graft and patient survivals.  The need for life long immunosuppressive therapy and the side effects of these medications, including the possibility of toxicity, opportunistic infections, risk of cancer including lymphoma, and the possibility of rejection even if the patient is taking the medication exactly as prescribed.  The need for compliance with medications and follow-up visits in the clinic and thereafter.  The patient and family understand these risks and wish to proceed to transplantation      Again, thank you for allowing me to participate in the care of your patient.        Sincerely,        PATT

## 2021-08-23 NOTE — PROGRESS NOTES
Psychosocial Assessment  Patient Name/ Age: Miguel Angel Piña 45 year old   Medical Record #: [id number]  Duration of Interview:     40  min  Process:   Face-to-Face Interview                (counseling < 50%)   Present at Appointment: Miguel Angel        :LIVAN Ovalle, LICSW Date:  August 23, 2021        Type of transplant: Kidney    Donor type:   Miguel Angel indicated he does not know of any potential donors at this time.   [facility]ver   Prior Transplants:    Yes    1993 LRT Kidney; 2010 DD Kidney Status of Transplant:  2010 Transplant kidney started to fail in 2020       Current Living Situation    Location:   [address] [place] [id number]  With Whom: Wife Lenka       Family/ Social Support:    Miguel Angel has two stepchildren Lorin (27) lives in [place], [place] and Meaghan (25) [place], [place].  Miguel Angel's mother lives in [place], [place] and his father [place], [place].  He has one sister ([place], [place]).   Available, helpful   Committed relationship:  Miguel Angel and Lenka have been  for 20 years.   Stable/supportive   Other supports:   Extended family Available, helpful       Activities/ Functional Ability    Current level: Ambulatory and independent with ADL's     Transportation Drives self       Vocational/Employment/Financial     Employment  [employer]   Full time   Job Description        Income  Lenka is employed full time.   Salary/wages   Insurance  BC/BS through Miguel Angel's employer.    At this time, patient can afford medication costs:  Yes  Private Insurance       Medical Status    Current mode of treatment for ESRD Kidney Transplant, currently not on dialysis   Complications - non diabetes        Behavioral    Tobacco Use No Chemical Dependency No       Psychiatric Impairment No    Reading ability Good  Education Level: High School Recent Legal History No    Coping Style/Strategies: Miguel Angel indicated when under stress he will listen to music or go to the farm.   Ability to  Adhere to Complex Medical Regime: Yes     Adherence History: Miguel Angel indicated he will usually follow his physician's recommendations.        Education  _X_ Medicare  _X_ Rehabilitation  _X_ Donor issues  _X_ Community resources  _X_ Post discharge housing  _X_ Financial resources  _X_ Medical insurance options  _X_ Psych adjustment  _X_ Family adjustment  _X_ Health Care Directive - Provided Education and Declined Completing at this time.  Miguel Angel indicated he is in agreement with his wife making his medical decisions for him if he is unable. Psychosocial Risks of Transplant Reviewed and Discussed:  _X_ Increased stress related to emotional,            family, social, employment or financial           situation  _X_ Affect on work and/or disability benefits  _X_ Affect on future life insurance  _X_ Transplant outcome expectations may           not be met  _X_ Mental Health Risks: anxiety,           depression, PTSD, guilt, grief and           chronic fatigue     Notable Items:   None noted.       Final Evaluation/Assessment   Patient seemed to process information well. Appeared well informed, motivated and able to follow post transplant requirements. Behavior was appropriate during interview. Has adequate income and insurance coverage. Adequate social support. No major contraindications noted for transplant.  At this time patient appears to understand the risks and benefits of transplant.      Recommendation  Acceptable    Selection Criteria Met:  Plan for support Yes   Chemical Dependence Yes   Smoking Yes   Mental Health Yes   Adequate Finances Yes    Signature: LIVAN Ovalle, [facility] CenterSW   Title: Clinical

## 2021-08-23 NOTE — LETTER
8/23/2021         RE: Miguel Angel Piña  [id number] [facility] [id number]        Dear Colleague,    Thank you for referring your patient, Miguel Angel Piña, to the [facility]ANSPL[facility]. Please see a copy of my visit note below.    TRANSPLANT NEPHROLOGY RECIPIENT EVALUATION NOTE    Assessment and Plan:  # Kidney Transplant Evaluation: Patient is a good candidate overall. Benefits of a living donor transplant were discussed.    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  Preferably, a preemptive living donor kidney transplant.    # Immunosuppression: Would likely move to tacrolimus, mycophenolate mofetil and prednisone with new kidney transplant due to increased risk of rejection.    # Cardiac Risk: No known cardiac disease, but several cardiac risk factors.  Patient will require Cardiology evaluation prior to transplant, including cardiac stress test.    # Obesity: Patient meets BMI guidelines, but would recommend weight loss to decrease risk of surgical wound complications and for overall health.  Will defer to Transplant Surgery for specific weight loss goal.    # Gout: Recurrent gout flares and recommend patient follow up with Rheumatology for management.    # Heterozygote Factor V Leiden mutation: No h/o DVT or PE, but did have a blood clot due to tunneled dialysis catheter that led to CVA via PFO.  Previously seen by Hematology and not recommended to need long term anticoagulation and no recent issues.    # Dermatology: No h/o skin cancer, but significant skin changes due to long term immunosuppression use.  Recommend regular follow up with Dermatology.    # Health Maintenance: Colonoscopy: Not indicated and Dermatology: Not up to date    Discussed the risks and benefits of a transplant, including the risk of surgery and  immunosuppression medications.  Patient's overall evaluation will be discussed in the Transplant Program's regular meeting with a final recommendation on the patients suitability for transplant to be made at that time.    Evaluation:  Miguel Angel Piña was seen in consultation at the request of Dr. Jono Rivera for evaluation as a potential kidney transplant recipient.    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    History of Present Illness:  Mr. Piña reports feeling good overall, but with some medical complaints.  He has a h/o ESKD secondary to medullary cystic kidney disease, s/p LDKT 1993 and more recently s/p DDKT 2010 that is failing, but not yet on dialysis.  Since last clinic visit, patient reports no hospitalizations or new medical complaints and has been doing well overall.  His energy level is good and remains pretty normal for him.  He continues to be active and works at [employer] where he is on his feet a lot, but no specific exercise.  Denies any chest pain or shortness of breath with exertion.  He reports slight leg swelling by the end of the day, especially if he has been on his feet all day.    Appetite is good and no recent weight change.  No nausea, vomiting or diarrhea.  No metallic taste in his mouth.  No fever, sweats or chills.  No problems emptying his bladder.         Kidney Disease Hx:        Patient was diagnosed with medullary cystic kidney disease in 1993 at age 18 after being found to have abnormal kidney function and proteinuria during a routine school physical.  Soon after diagnosis, patient underwent LDKT 11/3/93 from his grandfather.  That kidney lasted until ~ 2006 when he was started on dialysis.  It was noted that patient had some medical non-compliance with the first transplant with kidney failure due to  acute and chronic rejection.  Patient was on hemodialysis from 2006 until receiving a DDKT in 2010.  He is s/p DDKT 4/25/10 with post op course c/b incisional wound infection and and baseline creatinine was only ~ 1.9-2.3.  Early kidney transplant biopsy showed ATN and biopsy at ~ 1 year was unremarkable with no evidence of acute rejection.  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       Kidney Disease Dx: Medullary cystic kidney disease       Biopsy Proven: Yes; 1993         On Dialysis: No       Primary Nephrologist: Dr. Patino       H/o Kidney Stones: No       H/o Recurrent/Frequent UTI: No         Diabetic Hx: None           Cardiac/Vascular Disease Risk Factors:        Cardiac Risk Factors: Hypertension, CKD and CVA/TIA       Known CAD: No       Known PAD/Caludication Symptoms: No       Known Heart Failure: No       Arrhythmia: No       Pulmonary Hypertension: No       Valvular Disease: No       Other: H/o PFO, now surgically closed         Viral Serology Status       CMV IgG Antibody: Positive       EBV IgG Antibody: Positive         Volume Status/Weight:        Volume status: Mildly hypervolemic       Weight:  Acceptable BMI       BMI: Body mass index is 33.97 kg/m .         Functional Capacity/Frailty:        Patient is active and works full time and is on his feet most of the day.      Fatigue/Decreased Energy: [x] No [] Yes    Chest Pain or SOB with Exertion: [x] No [] Yes    Significant Weight Change: [x] No [] Yes    Nausea, Vomiting or Diarrhea: [x] No [] Yes    Fever, Sweats or Chills:  [x] No [] Yes    Leg Swelling [] No [x] Yes Slight       History of Cancer: None    Other Significant Medical Issues:   - Gout: Recurrent flares    Potential Living Kidney Donors: No    Review of Systems:  A comprehensive review of systems was obtained and negative, except  as noted in the HPI or PMH.    Past Medical History:   Medical record was reviewed and PMH was discussed with patient and noted below.  Past Medical History:   Diagnosis Date     Anemia in chronic renal disease      Conductive hearing loss, unilateral 10/3/2013    Pt reports insidious onset of apparent hearing loss, that had not bothered him, but his wife feels like he must have hearing loss, given that he cannot hear her talking to him often.  He denies any trauma, past issues with ear infections.  He is s/p two kidney transplants and is on rejection medications chronically (stable of late).    He denies excessive loud noise exposure, hazardous work enviro     CVA (cerebral vascular accident) (H)      Dyslipidemia      Factor V Leiden (H)      Gout      History of blood transfusion      Hypertension secondary to other renal disorders      Immunosuppressed status (H)      Kidney replaced by transplant 4/2010 (2nd)    prior 11/1993     Medullary cystic kidney disease      Secondary renal hyperparathyroidism (H)      Sleep apnea        Past Social History:   Past Surgical History:   Procedure Laterality Date     IR RENAL BIOPSY LEFT  8/17/2020     REPAIR PATENT FORAMEN OVALE       s/p LDKT       TRANSPLANT       Personal history of bleeding or anesthesia problems: No    Family History:  Family History   Problem Relation Age of Onset     Hypertension Father      Kidney Disease Sister         medullary cystic kidney disease, s/p kidney transplant     Hypertension Sister      Lung Cancer Paternal Grandmother      Prostate Cancer No family hx of      Colon Cancer No family hx of      Diabetes No family hx of      Coronary Artery Disease No family hx of        Personal History:   Social History     Socioeconomic History     Marital status:      Spouse name: Not on file     Number of children: 2     Years of education: Not on file     Highest education level: Not on file   Occupational History     Not on file    Tobacco Use     Smoking status: Never Smoker     Smokeless tobacco: Never Used   Substance and Sexual Activity     Alcohol use: No     Drug use: No     Sexual activity: Yes     Partners: Female   Other Topics Concern     Parent/sibling w/ CABG, MI or angioplasty before 65F 55M? Not Asked   Social History Narrative     Not on file     Social Determinants of Health     Financial Resource Strain:      Difficulty of Paying Living Expenses:    Food Insecurity:      Worried About Running Out of Food in the Last Year:      Ran Out of Food in the Last Year:    Transportation Needs:      Lack of Transportation (Medical):      Lack of Transportation (Non-Medical):    Physical Activity:      Days of Exercise per Week:      Minutes of Exercise per Session:    Stress:      Feeling of Stress :    Social Connections:      Frequency of Communication with Friends and Family:      Frequency of Social Gatherings with Friends and Family:      Attends Faith Services:      Active Member of Clubs or Organizations:      Attends Club or Organization Meetings:      Marital Status:    Intimate Partner Violence:      Fear of Current or Ex-Partner:      Emotionally Abused:      Physically Abused:      Sexually Abused:        Allergies:  No Known Allergies    Medications:  Current Outpatient Medications   Medication Sig     amLODIPine (NORVASC) 5 MG tablet TAKE 1 TABLET BY MOUTH AT BEDTIME AT NIGHT     calcium gluconate 500 MG tablet Take 500 mg by mouth 2 times daily 600mg daily     CELLCEPT (BRAND) 250 MG capsule Take 4 capsules (1,000 mg) by mouth 2 times daily     cholecalciferol 2000 units TABS Take 2,000 Units by mouth daily     Ferrous Gluconate 324 (37.5 Fe) MG TABS Take by mouth daily     losartan (COZAAR) 25 MG tablet Take 1 tablet by mouth once daily     NEORAL (BRAND) 25 MG capsule TAKE 3 CAPSULES BY MOUTH EVERY MORNING AND TAKE 4 CAPSULES BY MOUTH EVERY EVENING (TOTAL DAILY DOSE: 75MG IN THE MORNING AND 100MG IN THE EVENING)  "    sodium bicarbonate 650 MG tablet Take 3 tablets (1,950 mg) by mouth 2 times daily     No current facility-administered medications for this visit.       Vitals:  /82   Pulse 79   Ht 1.753 m (5' 9\")   Wt 104.3 kg (230 lb)   SpO2 100%   BMI 33.97 kg/m      Exam:  GENERAL APPEARANCE: alert and no distress  HENT: mouth without ulcers or lesions  LYMPHATICS: no cervical or supraclavicular nodes  RESP: lungs clear to auscultation - no rales, rhonchi or wheezes  CV: regular rhythm, normal rate, no rub, no murmur  EDEMA: trace LE edema bilaterally  ABDOMEN: soft, nondistended, nontender, bowel sounds normal; overweight  MS: extremities normal - no gross deformities noted, no evidence of inflammation in joints, no muscle tenderness  SKIN: no rash, actinic keratoses  TX KIDNEY: normal  DIALYSIS ACCESS:  LUE AV graft with NO thrill    Results:   No results found for this or any previous visit (from the past 336 hour(s)).              Again, thank you for allowing me to participate in the care of your patient.        Sincerely,      Lan Patino MD    "

## 2021-08-23 NOTE — PROGRESS NOTES
Kidney Transplant Referral - 12/24/2020   Patient completed AM appointments with all [facility] providers.  Time and location of PM appointments reviewed with patient.   Instructed patient Transplant Coordinator will next contact patient following Selection Committee.  Patient stated understanding.    Summary    Team s concerns/comments:   1) Cardiac risk assessment  2) PAD assessment  3) Gout flares   4) Skin check  5) BMI/Obesity  6) Facor V Leiden mutation  7) Health maintenance    Candidacy category: Yellow    Action/Plan:   1) EKG, Echo today. Cardiology consult, stress test.  2) A/P CT without contrast  3) F/U Rheumatology/PCP  4) Follow up w Dermatology  5) BMI within criteria but would recommend weight loss. Weight loss goal per Transplant Surgery  6) Seen by Hematology-no long term anticoagulation recommended. No recent issues.  7) Dental due    Expected Selection Meeting Discussion: 9/1/21

## 2021-08-23 NOTE — PROGRESS NOTES
"46 yo man with 2 prior txp for medullary cystic disease (LD 1993, DD 2005).  No eval for txp 3.   Surgically ok, with caveats below for retxp.  Not a perfect candidate.    1.  CKD 4.    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  No obvious contraindications to re-Txp, but presents some anatomic and immunologic challenges.  Ideally would get a LDKT, ubt doesn't know of any donors.  2. Prior Kid Txp: left side to ext iliac vessels: seen in MRI (with large fluid collection inferior to graft) in 2010.  The right txp was done in 1993 (JSN) and the internal iliac art is not visible.  Suspect graft is on the IIA and op note is not available.  Would prob need a midline approach to figure out where graft can easily go.  May need txp nephrectomy at time of txp.  Would get another CT (without IV contrast, get with oral) to assess for collections/masses.  Get post-void residual bladder volumes.  3. Chronic immunosuppression: he has many skin lesions on face. Get derm opinion.  Also would check EBV viral load as well as usual screen/  4. Past hx of patent FO with stroke.  Prob related to clot on HD catheter, can't find path or OR report of repair (? Find them).  5. Hx recurrent gout: get rheum notes/opinions.  6 HTN  7. Reasonable duration of grafts, but get assessment of adherence.  8. Check bone density  9. Hx of wound infection of L side kidney txp.  10 No covid vaccine, despite almost 30 yrs of IS.  Should be vaccinated.    45 min eval patient, discussion, review chart and issue.    HPI  Medullary cystic disease, treatment x 2 txp.  Current kidney is failing.  Had cardiac \"hole' fixed about a decade ago. Now slowly failing.    Chronic immunosuppression current cya and mmf.  Has had multiple gout episodes.  No rx at present.  HTN: no ischemic events, although has had rhythm issues.    Current Outpatient Medications   Medication     amLODIPine " (NORVASC) 5 MG tablet     calcium gluconate 500 MG tablet     CELLCEPT (BRAND) 250 MG capsule     cholecalciferol 2000 units TABS     Ferrous Gluconate 324 (37.5 Fe) MG TABS     losartan (COZAAR) 25 MG tablet     NEORAL (BRAND) 25 MG capsule     sodium bicarbonate 650 MG tablet     No current facility-administered medications for this visit.     Results for CARLOS RODRIGUES (MRN [id number]) as of 8/23/2021 15:03   Ref. Range 8/23/2021 12:40   Sodium Latest Ref Range: 133 - 144 mmol/L 139   Potassium Latest Ref Range: 3.4 - 5.3 mmol/L 4.8   Chloride Latest Ref Range: 94 - 109 mmol/L 110 (H)   Carbon Dioxide Latest Ref Range: 20 - 32 mmol/L 21   Urea Nitrogen Latest Ref Range: 7 - 30 mg/dL 51 (H)   Creatinine Latest Ref Range: 0.66 - 1.25 mg/dL 2.95 (H)   Results for CARLOS RODRIGUES (MRN [id number]) as of 8/23/2021 15:03   Ref. Range 8/23/2021 12:40   WBC Latest Ref Range: 4.0 - 11.0 10e3/uL 4.6   Hemoglobin Latest Ref Range: 13.3 - 17.7 g/dL 8.6 (L)   Hematocrit Latest Ref Range: 40.0 - 53.0 % 26.5 (L)   Platelet Count Latest Ref Range: 150 - 450 10e3/uL 210   Results for CARLOS RODRIGUES (MRN [id number]) as of 8/23/2021 15:03   Ref. Range 9/21/2017 00:00   Unacceptable Antigen Unknown A:25 26 43 66 B:37 71 73 75 76 78 DR:4 7 9 10 DRw:53 DQ:2 4 7 8 9   UNOS cPRA Unknown 92     PE  There were no vitals taken for this visit.  Skin: multiple growths on face.  Abd soft and nontender  Wide scar on L txp incision.  Midline scar.  Groin pulses sym and 3+  Min edema/      I had a long discussion with the patient regarding kidney transplantation which included but was not limited to the following points:    Kidney transplant evaluation process to assess whether (s)he can safely undergo a kidney transplant and take long-term immunosuppression.    The selection committee process was discussed.  The federal rules for cadaveric waiting list and blood type matching of the organ. The availability of living-related donor  transplantation.  The types of donors: brain death donors, non-heart beating donors and living donor grafts  Extended criteria  Donors and the increased  risk of primary non-function using these extended criteria donors  [facility] high risk donors, risk of donor transmitted infections and donor transmitted malignancy  The kidney transplant operation and the associated risks and technical complications which can include intraoperative death, post operative death, primary non-function, bleeding requiring re-operations, vascular and ureteral complications, bowel perforations, and intra abdominal abscess. Some of these complications may require a second operation.  The postoperative course and risk of postoperative complications which can include sepsis, MI, stroke, brain injury, pneumonia, pleural effusions, and renal dysfunction.  The current 1 year and 5 year graft and patient survivals.  The need for life long immunosuppressive therapy and the side effects of these medications, including the possibility of toxicity, opportunistic infections, risk of cancer including lymphoma, and the possibility of rejection even if the patient is taking the medication exactly as prescribed.  The need for compliance with medications and follow-up visits in the clinic and thereafter.  The patient and family understand these risks and wish to proceed to transplantation

## 2021-08-24 LAB
CARDIOLIPIN IGG SER IA-ACNC: <2 GPL-U/ML
CARDIOLIPIN IGG SER IA-ACNC: NEGATIVE
CARDIOLIPIN IGM SER IA-ACNC: <2 MPL-U/ML
CARDIOLIPIN IGM SER IA-ACNC: NEGATIVE
CMV IGG SERPL IA-ACNC: >10 U/ML
CMV IGG SERPL IA-ACNC: ABNORMAL
DRVVT SCREEN RATIO: 0.96
EBV VCA IGG SER IA-ACNC: >750 U/ML
EBV VCA IGG SER IA-ACNC: POSITIVE
LA PPP-IMP: NEGATIVE
LUPUS INTERPRETATION: NORMAL
PTT RATIO: 1
THROMBIN TIME: 15.3 SECONDS (ref 13–19)
VZV IGG SER QL IA: >4000 INDEX
VZV IGG SER QL IA: POSITIVE
XXX BLOOD GROUP AB TITR SERPL: NORMAL {TITER}

## 2021-08-25 LAB
ATRIAL RATE - MUSE: 67 BPM
DIASTOLIC BLOOD PRESSURE - MUSE: NORMAL MMHG
INTERPRETATION ECG - MUSE: NORMAL
P AXIS - MUSE: 8 DEGREES
PR INTERVAL - MUSE: 136 MS
PROTOCOL CUTOFF: NORMAL
QRS DURATION - MUSE: 90 MS
QT - MUSE: 402 MS
QTC - MUSE: 424 MS
QUANTIFERON MITOGEN: 10 IU/ML
QUANTIFERON NIL TUBE: 0.19 IU/ML
QUANTIFERON TB1 TUBE: 0.23 IU/ML
QUANTIFERON TB2 TUBE: 0.2
R AXIS - MUSE: 41 DEGREES
SA 1 CELL: NORMAL
SA 1 TEST METHOD: NORMAL
SA 2 CELL: NORMAL
SA 2 TEST METHOD: NORMAL
SA1 HI RISK ABY: NORMAL
SA1 MOD RISK ABY: NORMAL
SA2 HI RISK ABY: NORMAL
SA2 MOD RISK ABY: NORMAL
SYSTOLIC BLOOD PRESSURE - MUSE: NORMAL MMHG
T AXIS - MUSE: 45 DEGREES
UNACCEPTABLE ANTIGENS: NORMAL
UNOS CPRA: 90
VENTRICULAR RATE- MUSE: 67 BPM
ZZZSA 1  COMMENTS: NORMAL
ZZZSA 2 COMMENTS: NORMAL

## 2021-08-26 LAB
A*: NORMAL
A*LOCUS SEROLOGIC EQUIVALENT: 2
A*LOCUS: NORMAL
A*SEROLOGIC EQUIVALENT: 3
ABTEST METHOD: NORMAL
B*: NORMAL
B*LOCUS SEROLOGIC EQUIVALENT: 7
B*LOCUS: NORMAL
B*SEROLOGIC EQUIVALENT: 55
BW-1: NORMAL
C*: NORMAL
C*LOCUS SEROLOGIC EQUIVALENT: 9
C*LOCUS: NORMAL
C*SEROLOGIC EQUIVALENT: 7
DPA1*: NORMAL
DPA1*LOCUS: NORMAL
DPB1*: NORMAL
DPB1*LOCUS NMDP: NORMAL
DPB1*LOCUS: NORMAL
DPB1*NMDP: NORMAL
DQA1*: NORMAL
DQA1*LOCUS: NORMAL
DQB1*: NORMAL
DQB1*LOCUS SEROLOGIC EQUIVALENT: 5
DQB1*LOCUS: NORMAL
DQB1*SEROLOGIC EQUIVALENT: 6
DRB1*: NORMAL
DRB1*LOCUS SEROLOGIC EQUIVALENT: 14
DRB1*LOCUS: NORMAL
DRB1*SEROLOGIC EQUIVALENT: 15
DRB3*LOCUS SEROLOGIC EQUIVALENT: 52
DRB3*LOCUS: NORMAL
DRB5*: NORMAL
DRB5*SEROLOGIC EQUIVALENT: 51
DRSSO TEST METHOD: NORMAL
GAMMA INTERFERON BACKGROUND BLD IA-ACNC: 0.19 IU/ML
M TB IFN-G BLD-IMP: NEGATIVE
M TB IFN-G CD4+ BCKGRND COR BLD-ACNC: 9.81 IU/ML
MITOGEN IGNF BCKGRD COR BLD-ACNC: 0.01 IU/ML
MITOGEN IGNF BCKGRD COR BLD-ACNC: 0.04 IU/ML

## 2021-08-30 NOTE — TELEPHONE ENCOUNTER
[facility] [place] [address] [facility] [id number] [facility] [id number]            August 30, 2021    To Whom It May Concern:    Fredy Galindo was interviewed and examined at my office on Monday August 30, 2021. Her physical exam was normal. A Quantiferon Gold test for tuberculosis screening was performed and was normal indicating no prior exposure.    Sincerely yours,         Timothy Barr M.D.                 Called patient today on cell # and LM asking him to call me. Also called his home/work # and reached a recording that stated he is on vacation through Feb 22, 2021. I also emailed him a short note asking him to call our Office and speak with Intake POD to be registered as well as to call me with questions.

## 2021-09-11 ENCOUNTER — HEALTH MAINTENANCE LETTER (OUTPATIENT)
Age: 46
End: 2021-09-11

## 2021-09-13 PROBLEM — I63.9 CEREBROVASCULAR ACCIDENT (H): Status: RESOLVED | Noted: 2017-09-08 | Resolved: 2021-09-13

## 2021-09-13 NOTE — PROGRESS NOTES
TRANSPLANT NEPHROLOGY RECIPIENT EVALUATION NOTE    Assessment and Plan:  # Kidney Transplant Evaluation: Patient is a good candidate overall. Benefits of a living donor transplant were discussed.    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  Preferably, a preemptive living donor kidney transplant.    # Immunosuppression: Would likely move to tacrolimus, mycophenolate mofetil and prednisone with new kidney transplant due to increased risk of rejection.    # Cardiac Risk: No known cardiac disease, but several cardiac risk factors.  Patient will require Cardiology evaluation prior to transplant, including cardiac stress test.    # Obesity: Patient meets BMI guidelines, but would recommend weight loss to decrease risk of surgical wound complications and for overall health.  Will defer to Transplant Surgery for specific weight loss goal.    # Gout: Recurrent gout flares and recommend patient follow up with Rheumatology for management.    # Heterozygote Factor V Leiden mutation: No h/o DVT or PE, but did have a blood clot due to tunneled dialysis catheter that led to CVA via PFO.  Previously seen by Hematology and not recommended to need long term anticoagulation and no recent issues.    # Dermatology: No h/o skin cancer, but significant skin changes due to long term immunosuppression use.  Recommend regular follow up with Dermatology.    # Health Maintenance: Colonoscopy: Not indicated and Dermatology: Not up to date    Discussed the risks and benefits of a transplant, including the risk of surgery and immunosuppression medications.  Patient's overall evaluation will be discussed in the Transplant Program's regular meeting with a final recommendation on the patients suitability for transplant to be made at that time.    Evaluation:  Miguel Angel Piña was seen  in consultation at the request of Dr. Jono Rivera for evaluation as a potential kidney transplant recipient.    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    History of Present Illness:  Mr. Piña reports feeling good overall, but with some medical complaints.  He has a h/o ESKD secondary to medullary cystic kidney disease, s/p LDKT 1993 and more recently s/p DDKT 2010 that is failing, but not yet on dialysis.  Since last clinic visit, patient reports no hospitalizations or new medical complaints and has been doing well overall.  His energy level is good and remains pretty normal for him.  He continues to be active and works at [employer] where he is on his feet a lot, but no specific exercise.  Denies any chest pain or shortness of breath with exertion.  He reports slight leg swelling by the end of the day, especially if he has been on his feet all day.    Appetite is good and no recent weight change.  No nausea, vomiting or diarrhea.  No metallic taste in his mouth.  No fever, sweats or chills.  No problems emptying his bladder.         Kidney Disease Hx:        Patient was diagnosed with medullary cystic kidney disease in 1993 at age 18 after being found to have abnormal kidney function and proteinuria during a routine school physical.  Soon after diagnosis, patient underwent LDKT 11/3/93 from his grandfather.  That kidney lasted until ~ 2006 when he was started on dialysis.  It was noted that patient had some medical non-compliance with the first transplant with kidney failure due to acute and chronic rejection.  Patient was on hemodialysis from 2006 until receiving a DDKT in 2010.  He is s/p DDKT 4/25/10 with post op course c/b incisional wound infection and and baseline creatinine was only ~ 1.9-2.3.  Early kidney transplant biopsy showed  ATN and biopsy at ~ 1 year was unremarkable with no evidence of acute rejection.  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       Kidney Disease Dx: Medullary cystic kidney disease       Biopsy Proven: Yes; 1993         On Dialysis: No       Primary Nephrologist: Dr. Patino       H/o Kidney Stones: No       H/o Recurrent/Frequent UTI: No         Diabetic Hx: None           Cardiac/Vascular Disease Risk Factors:        Cardiac Risk Factors: Hypertension, CKD and CVA/TIA       Known CAD: No       Known PAD/Caludication Symptoms: No       Known Heart Failure: No       Arrhythmia: No       Pulmonary Hypertension: No       Valvular Disease: No       Other: H/o PFO, now surgically closed         Viral Serology Status       CMV IgG Antibody: Positive       EBV IgG Antibody: Positive         Volume Status/Weight:        Volume status: Mildly hypervolemic       Weight:  Acceptable BMI       BMI: Body mass index is 33.97 kg/m .         Functional Capacity/Frailty:        Patient is active and works full time and is on his feet most of the day.      Fatigue/Decreased Energy: [x] No [] Yes    Chest Pain or SOB with Exertion: [x] No [] Yes    Significant Weight Change: [x] No [] Yes    Nausea, Vomiting or Diarrhea: [x] No [] Yes    Fever, Sweats or Chills:  [x] No [] Yes    Leg Swelling [] No [x] Yes Slight       History of Cancer: None    Other Significant Medical Issues:   - Gout: Recurrent flares    Potential Living Kidney Donors: No    Review of Systems:  A comprehensive review of systems was obtained and negative, except as noted in the HPI or PMH.    Past Medical History:   Medical record was reviewed and PMH was discussed with patient and noted below.  Past Medical History:   Diagnosis Date     Anemia in chronic renal disease      Conductive hearing loss, unilateral 10/3/2013     Pt reports insidious onset of apparent hearing loss, that had not bothered him, but his wife feels like he must have hearing loss, given that he cannot hear her talking to him often.  He denies any trauma, past issues with ear infections.  He is s/p two kidney transplants and is on rejection medications chronically (stable of late).    He denies excessive loud noise exposure, hazardous work enviro     CVA (cerebral vascular accident) (H)      Dyslipidemia      Factor V Leiden (H)      Gout      History of blood transfusion      Hypertension secondary to other renal disorders      Immunosuppressed status (H)      Kidney replaced by transplant 4/2010 (2nd)    prior 11/1993     Medullary cystic kidney disease      Secondary renal hyperparathyroidism (H)      Sleep apnea        Past Social History:   Past Surgical History:   Procedure Laterality Date     IR RENAL BIOPSY LEFT  8/17/2020     REPAIR PATENT FORAMEN OVALE       s/p LDKT       TRANSPLANT       Personal history of bleeding or anesthesia problems: No    Family History:  Family History   Problem Relation Age of Onset     Hypertension Father      Kidney Disease Sister         medullary cystic kidney disease, s/p kidney transplant     Hypertension Sister      Lung Cancer Paternal Grandmother      Prostate Cancer No family hx of      Colon Cancer No family hx of      Diabetes No family hx of      Coronary Artery Disease No family hx of        Personal History:   Social History     Socioeconomic History     Marital status:      Spouse name: Not on file     Number of children: 2     Years of education: Not on file     Highest education level: Not on file   Occupational History     Not on file   Tobacco Use     Smoking status: Never Smoker     Smokeless tobacco: Never Used   Substance and Sexual Activity     Alcohol use: No     Drug use: No     Sexual activity: Yes     Partners: Female   Other Topics Concern     Parent/sibling w/ CABG, MI or angioplasty  "before 65F 55M? Not Asked   Social History Narrative     Not on file     Social Determinants of Health     Financial Resource Strain:      Difficulty of Paying Living Expenses:    Food Insecurity:      Worried About Running Out of Food in the Last Year:      Ran Out of Food in the Last Year:    Transportation Needs:      Lack of Transportation (Medical):      Lack of Transportation (Non-Medical):    Physical Activity:      Days of Exercise per Week:      Minutes of Exercise per Session:    Stress:      Feeling of Stress :    Social Connections:      Frequency of Communication with Friends and Family:      Frequency of Social Gatherings with Friends and Family:      Attends Restorationism Services:      Active Member of Clubs or Organizations:      Attends Club or Organization Meetings:      Marital Status:    Intimate Partner Violence:      Fear of Current or Ex-Partner:      Emotionally Abused:      Physically Abused:      Sexually Abused:        Allergies:  No Known Allergies    Medications:  Current Outpatient Medications   Medication Sig     amLODIPine (NORVASC) 5 MG tablet TAKE 1 TABLET BY MOUTH AT BEDTIME AT NIGHT     calcium gluconate 500 MG tablet Take 500 mg by mouth 2 times daily 600mg daily     CELLCEPT (BRAND) 250 MG capsule Take 4 capsules (1,000 mg) by mouth 2 times daily     cholecalciferol 2000 units TABS Take 2,000 Units by mouth daily     Ferrous Gluconate 324 (37.5 Fe) MG TABS Take by mouth daily     losartan (COZAAR) 25 MG tablet Take 1 tablet by mouth once daily     NEORAL (BRAND) 25 MG capsule TAKE 3 CAPSULES BY MOUTH EVERY MORNING AND TAKE 4 CAPSULES BY MOUTH EVERY EVENING (TOTAL DAILY DOSE: 75MG IN THE MORNING AND 100MG IN THE EVENING)     sodium bicarbonate 650 MG tablet Take 3 tablets (1,950 mg) by mouth 2 times daily     No current facility-administered medications for this visit.       Vitals:  /82   Pulse 79   Ht 1.753 m (5' 9\")   Wt 104.3 kg (230 lb)   SpO2 100%   BMI 33.97 kg/m  "     Exam:  GENERAL APPEARANCE: alert and no distress  HENT: mouth without ulcers or lesions  LYMPHATICS: no cervical or supraclavicular nodes  RESP: lungs clear to auscultation - no rales, rhonchi or wheezes  CV: regular rhythm, normal rate, no rub, no murmur  EDEMA: trace LE edema bilaterally  ABDOMEN: soft, nondistended, nontender, bowel sounds normal; overweight  MS: extremities normal - no gross deformities noted, no evidence of inflammation in joints, no muscle tenderness  SKIN: no rash, actinic keratoses  TX KIDNEY: normal  DIALYSIS ACCESS:  LUE AV graft with NO thrill    Results:   No results found for this or any previous visit (from the past 336 hour(s)).

## 2021-09-14 ENCOUNTER — TELEPHONE (OUTPATIENT)
Dept: TRANSPLANT | Facility: CLINIC | Age: 46
End: 2021-09-14

## 2021-09-14 DIAGNOSIS — I15.1 HTN, KIDNEY TRANSPLANT RELATED: ICD-10-CM

## 2021-09-14 DIAGNOSIS — N18.4 CHRONIC KIDNEY DISEASE, STAGE IV (SEVERE) (H): Primary | ICD-10-CM

## 2021-09-14 DIAGNOSIS — M10.9 GOUT: ICD-10-CM

## 2021-09-14 DIAGNOSIS — D84.9 IMMUNOSUPPRESSION (H): ICD-10-CM

## 2021-09-14 DIAGNOSIS — Z94.0 HTN, KIDNEY TRANSPLANT RELATED: ICD-10-CM

## 2021-09-14 DIAGNOSIS — Z01.818 ENCOUNTER FOR PRE-TRANSPLANT EVALUATION FOR KIDNEY TRANSPLANT: ICD-10-CM

## 2021-09-14 DIAGNOSIS — I10 ESSENTIAL HYPERTENSION: ICD-10-CM

## 2021-09-14 DIAGNOSIS — Z76.82 ORGAN TRANSPLANT CANDIDATE: ICD-10-CM

## 2021-09-14 DIAGNOSIS — T86.91 TRANSPLANT FAILURE DUE TO REJECTION: ICD-10-CM

## 2021-09-14 DIAGNOSIS — Z79.60 LONG-TERM USE OF IMMUNOSUPPRESSANT MEDICATION: ICD-10-CM

## 2021-09-14 NOTE — TELEPHONE ENCOUNTER
Called patient today and LM on his VM asking him to return my call about outcome of his evaluation and that I am sending a letter today in his My Chart that summarizes outcome.     Generated Transplant Evaluation Summary Letter today in Epic - electronically routed to patient and providers.

## 2021-09-14 NOTE — LETTER
09/14/21        Miguel Angel Piña  [address] [facility] [id number]        Dear Miguel Angel,    It was a pleasure to see you recently for consideration of kidney transplantation. Your pre-transplant evaluation results were reviewed at our Multidisciplinary Selection Committee on 09/01/2021. The Committee is requesting the following items are completed before determining your candidacy:    1. Cardiologist appointment for assessment of your heart status and for recommendation to proceed with a kidney transplant surgery.     2. Dermatologist appointment for routine skin screening due to long term immunosuppression use.     3. Rheumatologist appointment for assessment and management of gout.      4. Abdominal pelvic CT without contrast to assess for any arterial calcifications.     5. Post void residual to assess residual bladder volumes.     6. Request records of patent FO surgical repair report and associated hospital discharge summary due to associated stroke history. Please let me know where to request these from.     7. Hepatitis B and pneumovax vaccinations need to be up to date. Please work with your regular doctors for this.     8. Dental work needs to be up to date. Please make an appointment for a dental check-up if you have not had one in the last 12 months.     9. Consents need to be signed for KDPI > 85% and Receipt of Information. You should have received these in an email from our Office shortly before your evaluation appointments.     10. Review of [facility] patient education. If not already done so, please view pre kidney evaluation parts 1 and 2 videos at [email or web address]    A  from our Office will call you to make appointments for items # 1, 2, 3, 4 and 5.    Upon successful completion of items # 9 and 10, you will be eligible to be listed on the kidney transplant waitlist on INACTIVE status until all other pending items are successfully completed and approved  for transplant.  Please call me as soon as you can so we can complete items # 8 and 9.  Please do call your coordinator as well as soon as all the above items are completed at which point the outcomes will be reviewed at the Multidisciplinary Selection Committee for approval. Once all is approved, you will be eligible to be changed to ACTIVE status on the kidney transplant waitlist as will as to proceed with a live kidney donor transplant in the event of an approved live donor.     Please have all potential live donors register now online with our Program to initiate their evaluations at [email or web address]. Donors can all our Office if they have questions and speak to a live donor coordinator by calling our Main Office Number at [phone].     For any questions, please contact myself at the Transplant Office Main Number at [phone] or at my Direct Number at [phone].      Sincerely,    Charisse Lizarraga, RN, BSN  Pre Kidney Pancreas Transplant Coordinator   [facility]  Solid Organ Transplant Care   [address] [place] [address]  [email or web address]   Office: [phone] Direct Number: [phone]   Fax: [phone]  Employed by [facility]     CC's: Dr. Lucrecia Hutson, Dr. Lan Patino

## 2021-09-14 NOTE — Clinical Note
Please see orders for cardiologist, rheumatologist for gout management, dermatologist for skin screening due to long term immunosuppression, abd pelvic CT wo contrast, post void residual. Pt is not on dialysis. Thanks daily

## 2021-09-15 ENCOUNTER — TELEPHONE (OUTPATIENT)
Dept: TRANSPLANT | Facility: CLINIC | Age: 46
End: 2021-09-15

## 2021-09-15 NOTE — TELEPHONE ENCOUNTER
Left message for patient to schedule 2nd Day PKE  appointments.  Asked patient to call back to schedule.

## 2021-09-20 ENCOUNTER — TELEPHONE (OUTPATIENT)
Dept: FAMILY MEDICINE | Facility: CLINIC | Age: 46
End: 2021-09-20

## 2021-09-20 DIAGNOSIS — M10.9 GOUT, UNSPECIFIED CAUSE, UNSPECIFIED CHRONICITY, UNSPECIFIED SITE: Primary | ICD-10-CM

## 2021-09-20 RX ORDER — METHYLPREDNISOLONE 4 MG
TABLET, DOSE PACK ORAL
Qty: 21 TABLET | Refills: 0 | Status: SHIPPED | OUTPATIENT
Start: 2021-09-20 | End: 2021-10-05

## 2021-09-20 NOTE — TELEPHONE ENCOUNTER
Patient called back. He said he is scheduled to see rheumatology and plans to follow up with them on this.     Cindy Rodriges RN

## 2021-09-20 NOTE — TELEPHONE ENCOUNTER
Noted.   Haven't seen this patient in > 1 year.   I see a recent no show appt on 8/31.   Will refill this time. No further refills until seen.   Did patient ever see Rheumatology regarding recurrent gouty attacks? Strongly recommend follow through.

## 2021-09-20 NOTE — TELEPHONE ENCOUNTER
Left message on voice mail for patient to call clinic.   [phone]  What is the name of med he is requesting?

## 2021-09-20 NOTE — TELEPHONE ENCOUNTER
Reason for Call:  Other     Detailed comments: On license of [facility] stated that the ct/ab pelvis was approved. # [id number]    Phone Number   [facility]  [phone]         Best Time:     Can we leave a detailed message on this number? YES    Call taken on 9/20/2021 at 12:01 PM by Lucrecia Tolbert

## 2021-09-20 NOTE — TELEPHONE ENCOUNTER
Patient Call: General  Route to LPN    Reason for call: Patient states he wants to speak to transplant coordinator to see if she can assist with getting his gout medication (predniSONE) sooner since he is in pain. Would like it sent to [facility] if approved.     Call back needed? Yes    Return Call Needed  Same as documented in contacts section  When to return call?: Same day: Route High Priority

## 2021-09-20 NOTE — TELEPHONE ENCOUNTER
Called patient at [phone] . Left message to return call to the clinic.    Cindy Rodriges RN

## 2021-09-22 ENCOUNTER — ANCILLARY PROCEDURE (OUTPATIENT)
Dept: CT IMAGING | Facility: CLINIC | Age: 46
End: 2021-09-22
Attending: PHYSICIAN ASSISTANT
Payer: COMMERCIAL

## 2021-09-22 DIAGNOSIS — R10.32 LEFT INGUINAL PAIN: ICD-10-CM

## 2021-09-22 PROCEDURE — 74176 CT ABD & PELVIS W/O CONTRAST: CPT | Performed by: STUDENT IN AN ORGANIZED HEALTH CARE EDUCATION/TRAINING PROGRAM

## 2021-09-27 DIAGNOSIS — Z94.0 KIDNEY TRANSPLANTED: Primary | ICD-10-CM

## 2021-09-27 DIAGNOSIS — Z94.0 KIDNEY REPLACED BY TRANSPLANT: ICD-10-CM

## 2021-09-27 RX ORDER — CYCLOSPORINE 25 MG/1
CAPSULE, LIQUID FILLED ORAL
Qty: 210 CAPSULE | Refills: 11 | Status: SHIPPED | OUTPATIENT
Start: 2021-09-27 | End: 2022-08-17

## 2021-09-27 RX ORDER — MYCOPHENOLATE MOFETIL 250 MG/1
1000 CAPSULE ORAL 2 TIMES DAILY
Qty: 240 CAPSULE | Refills: 11 | Status: SHIPPED | OUTPATIENT
Start: 2021-09-27 | End: 2022-01-12

## 2021-09-30 ENCOUNTER — TELEPHONE (OUTPATIENT)
Dept: MULTI SPECIALTY CLINIC | Facility: CLINIC | Age: 46
End: 2021-09-30

## 2021-09-30 DIAGNOSIS — M10.9 GOUT, UNSPECIFIED CAUSE, UNSPECIFIED CHRONICITY, UNSPECIFIED SITE: ICD-10-CM

## 2021-09-30 RX ORDER — METHYLPREDNISOLONE 4 MG
TABLET, DOSE PACK ORAL
Qty: 21 TABLET | Refills: 0 | OUTPATIENT
Start: 2021-09-30

## 2021-09-30 NOTE — TELEPHONE ENCOUNTER
----- Message from Britt Almonte CMA sent at 9/29/2021  1:45 PM CDT -----  Regarding: FW: Needs Rheumatology appt for Gout Management    ----- Message -----  From: Jade Parker  Sent: 9/28/2021   4:25 PM CDT  To: [facility]  Subject: Needs Rheumatology appt for Gout Management      Please schedule this pt for a Rheumatology appt for Gout Management.

## 2021-09-30 NOTE — TELEPHONE ENCOUNTER
Noted to be duplicate by provider on 9/21. Message sent to pharmacy to have patient call clinic if he wants refill.    Julia Argueta, RN, BSN  [facility]

## 2021-10-04 DIAGNOSIS — M10.9 GOUT, UNSPECIFIED CAUSE, UNSPECIFIED CHRONICITY, UNSPECIFIED SITE: ICD-10-CM

## 2021-10-05 ENCOUNTER — VIRTUAL VISIT (OUTPATIENT)
Dept: FAMILY MEDICINE | Facility: CLINIC | Age: 46
End: 2021-10-05
Payer: COMMERCIAL

## 2021-10-05 DIAGNOSIS — M10.9 GOUT, UNSPECIFIED CAUSE, UNSPECIFIED CHRONICITY, UNSPECIFIED SITE: ICD-10-CM

## 2021-10-05 PROCEDURE — 99214 OFFICE O/P EST MOD 30 MIN: CPT | Mod: 95 | Performed by: PHYSICIAN ASSISTANT

## 2021-10-05 RX ORDER — METHYLPREDNISOLONE 4 MG
TABLET, DOSE PACK ORAL
Qty: 21 TABLET | Refills: 0 | Status: SHIPPED | OUTPATIENT
Start: 2021-10-05 | End: 2021-12-09

## 2021-10-05 RX ORDER — METHYLPREDNISOLONE 4 MG
TABLET, DOSE PACK ORAL
Qty: 21 TABLET | Refills: 0 | OUTPATIENT
Start: 2021-10-05

## 2021-10-05 ASSESSMENT — ENCOUNTER SYMPTOMS
SHORTNESS OF BREATH: 0
ARTHRALGIAS: 1
COUGH: 0
NERVOUS/ANXIOUS: 0
FEVER: 0

## 2021-10-05 NOTE — PATIENT INSTRUCTIONS
Mason Michelle,     You have been prescribed a steroid taper for your flareup of gout.  As discussed, if your symptoms are worsening, or not improving with treatment, I would like you to schedule an in person visit with Dr. Hutson or myself for further evaluation.  I also added a handout below regarding gout treatments.  Please reach out with any questions or concerns.    Take Care,  Lou Mays PA-C    Patient Education     Treating Gout Attacks     Raising the joint above the level of your heart can help reduce gout symptoms.     Gout is a disease that affects the joints. It is caused by excess uric acid in your blood that may lead to crystals forming in your joints. Left untreated, it can lead to painful foot and joint deformities and even kidney problems. But, by treating gout early, you can relieve pain and help prevent future problems. Gout can usually be treated with medicine and proper diet. In severe cases, surgery may be needed.  Gout attacks are painful and often happen more than once. Taking medicines may reduce pain and prevent attacks in the future. There are also some things you can do at home to relieve symptoms.  Medicines for gout  Your healthcare provider may prescribe a daily medicine to reduce levels of uric acid. Reducing your uric acid levels may help prevent gout attacks. Allopurinol is one commonly used medicine taken daily to reduce uric acid levels. Other daily medicines used to reduce uric acid levels include febuxostat, lesinurad, and probencid. Other medicines can help relieve pain and swelling during an acute attack. Medicines such as NSAIDs (nonsteroidal anti-inflammatory medicines), steroids, and colchicine may be prescribed for intermittent use to relieve an acute gout attack. Be sure to take your medicine as directed.  What you can do  Below are some things you can do at home to relieve gout symptoms. Your healthcare provider may have other tips.    Rest the painful joint as much as you  can.    Raise the painful joint so it is at a level higher than your heart.    Use ice for 10 minutes every 1 to 2 hours as possible.  How can I prevent gout?  With a little effort, you may be able to prevent gout attacks in the future. Here are some things you can do:    Don't eat foods high in purines  ? Certain meats (red meat, processed meat, turkey)  ? Organ meats (kidney, liver, sweetbread)  ? Shellfish (lobster, crab, shrimp, scallop, mussel)  ? Certain fish (anchovy, sardine, herring, mackerel)    Take any medicines prescribed by your healthcare provider.    Lose weight if you need to.    Reduce high fructose corn syrup in meals and drinks.    Reduce or cut out alcohol, particularly beer, but also red wine and spirits.    Control blood pressure, diabetes, and cholesterol.    Drink plenty of water to help flush uric acid from your body.  [employer] last reviewed this educational content on 4/1/2018 2000-2021 [employer]. All rights reserved. This information is not intended as a substitute for professional medical care. Always follow your healthcare professional's instructions.

## 2021-10-05 NOTE — PROGRESS NOTES
Miguel Angel is a 46 year old who is being evaluated via a billable telephone visit.      What phone number would you like to be contacted at? [phone]  How would you like to obtain your AVS? Mail a copy    Assessment & Plan     Gout, unspecified cause, unspecified chronicity, unspecified site  Patient is a 46-year-old male who presents to telephone visit due to flareup of symptoms consistent with prior episodes of gout including pain, redness, and swelling of right hand and wrist.  Patient notes steroid taper usually works well to treat symptoms.  No systemic symptoms or fever to suggest infection.  Patient prescribed steroid taper.  As this is the second steroid taper in the last 2-3 weeks, recommended in person visit for follow-up if symptoms persist or do not improve with our treatment plan.  Discussed return precautions.    - methylPREDNISolone (MEDROL DOSEPAK) 4 MG tablet therapy pack; Follow Package Directions    See Patient Instructions    Return in about 1 week (around 10/12/2021), or if symptoms worsen or fail to improve.    Lou Mays PA-C  [facility] GEORGI Michelle is a 46 year old who presents for the following health issues     HPI     Medication Followup of gout  Medrol dosepak 4mg    Taking Medication as prescribed: He is out of medication currently and notes a flare up of gout in right wrist and hand    Side Effects:  None    Medication Helping Symptoms:  yes     Symptoms started a few days ago with pain, redness and swelling. Same location as previous flare up of gout. Patient notes similar flare up of gout 2-3 weeks ago that was treated successfully with steroid taper.  Patient also notes that he does not usually have to complete entire steroid taper to alleviate gout flare.      Review of Systems   Constitutional: Negative for fever.   Respiratory: Negative for cough and shortness of breath.    Cardiovascular: Negative for chest pain.   Musculoskeletal: Positive for  arthralgias.   Skin: Negative for rash.   Psychiatric/Behavioral: The patient is not nervous/anxious.             Objective           Vitals:  No vitals were obtained today due to virtual visit.    Physical Exam   healthy, alert and no distress  PSYCH: Alert and oriented times 3; coherent speech, normal   rate and volume, able to articulate logical thoughts, able   to abstract reason, no tangential thoughts, no hallucinations   or delusions  His affect is normal  RESP: No cough, no audible wheezing, able to talk in full sentences  Remainder of exam unable to be completed due to telephone visits            Phone call duration: 5 minutes

## 2021-10-11 ENCOUNTER — VIRTUAL VISIT (OUTPATIENT)
Dept: CARDIOLOGY | Facility: CLINIC | Age: 46
End: 2021-10-11
Attending: INTERNAL MEDICINE
Payer: COMMERCIAL

## 2021-10-11 DIAGNOSIS — Z76.82 ORGAN TRANSPLANT CANDIDATE: ICD-10-CM

## 2021-10-11 DIAGNOSIS — Z94.0 HTN, KIDNEY TRANSPLANT RELATED: ICD-10-CM

## 2021-10-11 DIAGNOSIS — D84.9 IMMUNOSUPPRESSION (H): ICD-10-CM

## 2021-10-11 DIAGNOSIS — I15.1 HTN, KIDNEY TRANSPLANT RELATED: ICD-10-CM

## 2021-10-11 DIAGNOSIS — Z86.73 HISTORY OF CVA (CEREBROVASCULAR ACCIDENT): ICD-10-CM

## 2021-10-11 DIAGNOSIS — Z79.60 LONG-TERM USE OF IMMUNOSUPPRESSANT MEDICATION: ICD-10-CM

## 2021-10-11 DIAGNOSIS — Z01.818 ENCOUNTER FOR PRE-TRANSPLANT EVALUATION FOR KIDNEY TRANSPLANT: Primary | ICD-10-CM

## 2021-10-11 DIAGNOSIS — N18.4 CHRONIC KIDNEY DISEASE, STAGE IV (SEVERE) (H): ICD-10-CM

## 2021-10-11 DIAGNOSIS — T86.91 TRANSPLANT FAILURE DUE TO REJECTION: ICD-10-CM

## 2021-10-11 PROCEDURE — 99205 OFFICE O/P NEW HI 60 MIN: CPT | Mod: 95 | Performed by: INTERNAL MEDICINE

## 2021-10-11 NOTE — PROGRESS NOTES
"who is being evaluated via a billable telephone visit.       The patient has been notified of following:      \"This telephone visit will be conducted via a call between you and your physician/provider. We have found that certain health care needs can be provided without the need for a physical exam.  This service lets us provide the care you need with a short phone conversation.  If a prescription is necessary we can send it directly to your pharmacy.  If lab work is needed we can place an order for that and you can then stop by our lab to have the test done at a later time.     Telephone visits are billed at different rates depending on your insurance coverage. During this emergency period, for some insurers they may be billed the same as an in-person visit.  Please reach out to your insurance provider with any questions.     If during the course of the call the physician/provider feels a telephone visit is not appropriate, you will not be charged for this service.\"     Patient has given verbal consent for Telephone visit?  Yes     How would you like to obtain your AVS? Arcariosalberto     Phone call duration: 12 min (phone call started at 5 PM)    HPI: Mr. Miguel Angel Piña is a 46 year old  male with PMH significant for    -End-stage renal disease from medullary cystic kidney disease status post failing kidney transplant  -Obesity  -Gout  -Heterozygote factor V Leiden mutation  -Hypertension  -Embolic stroke in 2008 (MR angiogram showed moderate sized acute infarct involving left cerebral hemisphere) status post surgical ASD closure on 2/18/2009.  TIA was attributed to possible clot on the HD catheter.  HD catheter was removed during the surgery.    Patient is being seen today for cardiovascular evaluation for kidney transplantation.  He was diagnosed with medullary cystic kidney disease in 1993 at the age of 18.  He has prior history of 2 kidney transplants.  He is status post LDKT t in 1993 which failed and DDKT in 2010 " which is failing.  He is now being considered for new kidney transplantation.  He is currently not on dialysis.    He works at [employer].  He reports being on his feet a lot but no specific exercise.  Denies chest pain, shortness of breath, palpitations, dizziness, lightheadedness or syncope.    Lifetime non-smoker. The patient's risk factor profile is: (+) HTN, (-) diabetes, (-) hyperlipidemia, (-) tobacco use, (-) family Hx CAD.     Patient had echocardiogram 8/23/2021 which showed normal biventricular function with mild dilation of ascending aorta at 4.3 cm.  EKG 8/23/2021 shows sinus rhythm otherwise normal.    Medications, personal, family, and social history reviewed with patient and revised.    PAST MEDICAL HISTORY:  Past Medical History:   Diagnosis Date     Anemia in chronic renal disease      Conductive hearing loss, unilateral 10/3/2013    Pt reports insidious onset of apparent hearing loss, that had not bothered him, but his wife feels like he must have hearing loss, given that he cannot hear her talking to him often.  He denies any trauma, past issues with ear infections.  He is s/p two kidney transplants and is on rejection medications chronically (stable of late).    He denies excessive loud noise exposure, hazardous work enviro     CVA (cerebral vascular accident) (H)      Dyslipidemia      Factor V Leiden (H)      Gout      History of blood transfusion      Hypertension secondary to other renal disorders      Immunosuppressed status (H)      Kidney replaced by transplant 4/2010 (2nd)    prior 11/1993     Medullary cystic kidney disease      Secondary renal hyperparathyroidism (H)      Sleep apnea        CURRENT MEDICATIONS:  Current Outpatient Medications   Medication Sig Dispense Refill     amLODIPine (NORVASC) 5 MG tablet TAKE 1 TABLET BY MOUTH AT BEDTIME AT NIGHT 90 tablet 0     calcium gluconate 500 MG tablet Take 500 mg by mouth 2 times daily 600mg daily       CELLCEPT (BRAND) 250 MG capsule Take  4 capsules (1,000 mg) by mouth 2 times daily 240 capsule 11     cholecalciferol 2000 units TABS Take 2,000 Units by mouth daily 90 tablet 3     Ferrous Gluconate 324 (37.5 Fe) MG TABS Take by mouth daily       losartan (COZAAR) 25 MG tablet Take 1 tablet by mouth once daily 90 tablet 0     methylPREDNISolone (MEDROL DOSEPAK) 4 MG tablet therapy pack Follow Package Directions 21 tablet 0     NEORAL (BRAND) 25 MG capsule TAKE 3 CAPSULES BY MOUTH EVERY MORNING AND TAKE 4 CAPSULES BY MOUTH EVERY EVENING (TOTAL DAILY DOSE: 75MG IN THE MORNING AND 100MG IN THE EVENING) 210 capsule 11     sodium bicarbonate 650 MG tablet Take 3 tablets (1,950 mg) by mouth 2 times daily 540 tablet 3       PAST SURGICAL HISTORY:  Past Surgical History:   Procedure Laterality Date     IR RENAL BIOPSY LEFT  8/17/2020     REPAIR PATENT FORAMEN OVALE       s/p LDKT       TRANSPLANT         ALLERGIES:   No Known Allergies    FAMILY HISTORY:  Family History   Problem Relation Age of Onset     Hypertension Father      Kidney Disease Sister         medullary cystic kidney disease, s/p kidney transplant     Hypertension Sister      Lung Cancer Paternal Grandmother      Prostate Cancer No family hx of      Colon Cancer No family hx of      Diabetes No family hx of      Coronary Artery Disease No family hx of          SOCIAL HISTORY:  Social History     Tobacco Use     Smoking status: Never Smoker     Smokeless tobacco: Never Used   Substance Use Topics     Alcohol use: No     Drug use: No       ROS:   Constitutional: No fever, chills, or sweats. Weight stable.   Cardiovascular: As per HPI.      I have reviewed the labs and personally reviewed the imaging below and made my comment in the assessment and plan.    Labs:  CBC RESULTS:   Lab Results   Component Value Date    WBC 4.6 08/23/2021    WBC 5.4 05/06/2021    RBC 2.99 (L) 08/23/2021    RBC 2.91 (L) 05/06/2021    HGB 8.6 (L) 08/23/2021    HGB 8.4 (L) 05/06/2021    HCT 26.5 (L) 08/23/2021    HCT 27.8  (L) 05/06/2021    MCV 89 08/23/2021    MCV 96 05/06/2021    MCH 28.8 08/23/2021    MCH 28.9 05/06/2021    MCHC 32.5 08/23/2021    MCHC 30.2 (L) 05/06/2021    RDW 13.8 08/23/2021    RDW 13.4 05/06/2021     08/23/2021     05/06/2021       BMP RESULTS:  Lab Results   Component Value Date     08/23/2021     05/06/2021    POTASSIUM 4.8 08/23/2021    POTASSIUM 4.4 05/06/2021    CHLORIDE 110 (H) 08/23/2021    CHLORIDE 107 05/06/2021    CO2 21 08/23/2021    CO2 23 05/06/2021    ANIONGAP 8 08/23/2021    ANIONGAP 6 05/06/2021    GLC 90 08/23/2021    GLC 87 05/06/2021    BUN 51 (H) 08/23/2021    BUN 54 (H) 05/06/2021    CR 2.95 (H) 08/23/2021    CR 3.10 (H) 05/06/2021    GFRESTIMATED 24 (L) 08/23/2021    GFRESTIMATED 23 (L) 05/06/2021    GFRESTBLACK 27 (L) 05/06/2021    SOFÍA 8.5 08/23/2021    SOFÍA 8.3 (L) 05/06/2021        INR RESULTS:  Lab Results   Component Value Date    INR 1.02 08/23/2021    INR 1.00 08/17/2020    INR 1.06 07/25/2011    INR 1.04 08/17/2010    INR 1.9 06/14/2010    INR 1.2 06/10/2010    INR 1.46 (H) 06/03/2010         Echocardiogram 8/23/2021  Global and regional left ventricular function is normal with an EF of 60-65%.  Right ventricular function, chamber size, wall motion, and thickness are  normal.  Pulmonary artery systolic pressure is normal.  Ascending aorta 4.3 cm.  Mild dilatation of the aorta is present.  The inferior vena cava is normal.  No pericardial effusion is present.      EKG 8/23/2021 personally reviewed normal.    Transesophageal echocardiogram 2008 [facility]  Normal LV size with normal LV systolic function, estimated EF=60-65%    Normal wall motion    Valves: a) Aortic--normal , no vegetation, no regurgitation.   b) Mitral--normal , no vegetation, trace mitral regurgitation   c) Tricuspid--normal , no vegetation, cannot assess color, cannot assess PA pressure  d) Pulmonic---no regurg, no veg, grossly seen     RV--size and fxn are normal     Right atrium--  there is a mass (2.1 x 1.9 cm) in the right atrium, with a long stalk (total length likely > 6 cm) leading back into the superior vena cava, likely extends back into the proximal superior vena cava, though we were not able to image that segment of the superior vena cava. The mass is clearly seen in the superior vena cava , attached to the (dialysis) catheter. The atrial portion of the mass has the appearance of a cauliflower floret, there are multiple mobile components of this mass. The mass has bright calcified, chronic appearing components as well as some noncalcified, soft tissue components, some of which are mobile, suggestive of thrombus. On direct comparison with prior transesophogeal echocardiography, the size of this mass appears smaller.     LA--normal , no mass or thrombus noted.   SAI--small, no thrombus   Aorta-- no clots noted, no atherosclerosis noted.     Interatrial septum: tunnel type patent foramen ovale, mobile septum, nonaneurysmal with shunt noted on color doppler    Bubble study: not done today, previously demonstrated right to left shunt at rest.    Rhythm--- sinus rhythm     Assessment and Plan:     #Preoperative cardiovascular evaluation prior to kidney transplant (Prior history of 2 kidney transplant)  #History of CVA secondary to right atrial catheter clot and PFO SP surgical PFO closure in 2008  -Patient reports no cardiac symptoms.  -Recommended exercise stress echocardiogram     #Hypertension  -Well-controlled with amlodipine 5 and losartan 25 mg    No medication changes today.  Return to clinic as needed.    Total time spent 60 minutes including prior charting, telephone visit, review of outside hospital medical records and medical documentation    Please donot hesitate to contact me if you have any questions or concerns. Again, thank you for allowing me to participate in the care of your patient.    Elham DAN MD  [facility] Division of Cardiology  Pager [phone]

## 2021-10-11 NOTE — PATIENT INSTRUCTIONS
Complete an exercise stress echocardiogram - you may wait for our scheduling team to contact you or you may call to schedule (recommended) : [phone]    As soon as results are compiled and reviewed you will be notified.     Follow up based on results

## 2021-10-11 NOTE — NURSING NOTE
"Miguel Angel is a 46 year old who is being evaluated via a billable telephone visit.      What phone number would you like to be contacted at? [phone]  How would you like to obtain your AVS? Mail a copy     Vitals - Patient Reported  Weight (Patient Reported): 106.6 kg (235 lb)  Height (Patient Reported): 177.8 cm (5' 10\")  BMI (Based on Pt Reported Ht/Wt): 33.72  Pain Score: No Pain (0) (No SOB)      Vitals were taken and medications were reconciled.   Tashi Womack, EMT  4:48 PM        "

## 2021-10-11 NOTE — LETTER
"10/11/2021      RE: Miguel Angel Piña  [address] [address] [id number]       Dear Colleague,    Thank you for the opportunity to participate in the care of your patient, Miguel Angel Piña, at the [facility] [place] [facility]. Please see a copy of my visit note below.    who is being evaluated via a billable telephone visit.       The patient has been notified of following:      \"This telephone visit will be conducted via a call between you and your physician/provider. We have found that certain health care needs can be provided without the need for a physical exam.  This service lets us provide the care you need with a short phone conversation.  If a prescription is necessary we can send it directly to your pharmacy.  If lab work is needed we can place an order for that and you can then stop by our lab to have the test done at a later time.     Telephone visits are billed at different rates depending on your insurance coverage. During this emergency period, for some insurers they may be billed the same as an in-person visit.  Please reach out to your insurance provider with any questions.     If during the course of the call the physician/provider feels a telephone visit is not appropriate, you will not be charged for this service.\"     Patient has given verbal consent for Telephone visit?  Yes     How would you like to obtain your AVS? Mitchell     Phone call duration: 12 min (phone call started at 5 PM)    HPI: Mr. Miguel Angel Piña is a 46 year old  male with PMH significant for    -End-stage renal disease from medullary cystic kidney disease status post failing kidney transplant  -Obesity  -Gout  -Heterozygote factor V Leiden mutation  -Hypertension  -Embolic stroke in 2008 (MR angiogram showed moderate sized acute infarct involving left cerebral hemisphere) status post surgical ASD closure on 2/18/2009.  TIA was attributed to possible " clot on the HD catheter.  HD catheter was removed during the surgery.    Patient is being seen today for cardiovascular evaluation for kidney transplantation.  He was diagnosed with medullary cystic kidney disease in 1993 at the age of 18.  He has prior history of 2 kidney transplants.  He is status post LDKT t in 1993 which failed and DDKT in 2010 which is failing.  He is now being considered for new kidney transplantation.  He is currently not on dialysis.    He works at [employer].  He reports being on his feet a lot but no specific exercise.  Denies chest pain, shortness of breath, palpitations, dizziness, lightheadedness or syncope.    Lifetime non-smoker. The patient's risk factor profile is: (+) HTN, (-) diabetes, (-) hyperlipidemia, (-) tobacco use, (-) family Hx CAD.     Patient had echocardiogram 8/23/2021 which showed normal biventricular function with mild dilation of ascending aorta at 4.3 cm.  EKG 8/23/2021 shows sinus rhythm otherwise normal.    Medications, personal, family, and social history reviewed with patient and revised.    PAST MEDICAL HISTORY:  Past Medical History:   Diagnosis Date     Anemia in chronic renal disease      Conductive hearing loss, unilateral 10/3/2013    Pt reports insidious onset of apparent hearing loss, that had not bothered him, but his wife feels like he must have hearing loss, given that he cannot hear her talking to him often.  He denies any trauma, past issues with ear infections.  He is s/p two kidney transplants and is on rejection medications chronically (stable of late).    He denies excessive loud noise exposure, hazardous work enviro     CVA (cerebral vascular accident) (H)      Dyslipidemia      Factor V Leiden (H)      Gout      History of blood transfusion      Hypertension secondary to other renal disorders      Immunosuppressed status (H)      Kidney replaced by transplant 4/2010 (2nd)    prior 11/1993     Medullary cystic kidney disease      Secondary renal  hyperparathyroidism (H)      Sleep apnea        CURRENT MEDICATIONS:  Current Outpatient Medications   Medication Sig Dispense Refill     amLODIPine (NORVASC) 5 MG tablet TAKE 1 TABLET BY MOUTH AT BEDTIME AT NIGHT 90 tablet 0     calcium gluconate 500 MG tablet Take 500 mg by mouth 2 times daily 600mg daily       CELLCEPT (BRAND) 250 MG capsule Take 4 capsules (1,000 mg) by mouth 2 times daily 240 capsule 11     cholecalciferol 2000 units TABS Take 2,000 Units by mouth daily 90 tablet 3     Ferrous Gluconate 324 (37.5 Fe) MG TABS Take by mouth daily       losartan (COZAAR) 25 MG tablet Take 1 tablet by mouth once daily 90 tablet 0     methylPREDNISolone (MEDROL DOSEPAK) 4 MG tablet therapy pack Follow Package Directions 21 tablet 0     NEORAL (BRAND) 25 MG capsule TAKE 3 CAPSULES BY MOUTH EVERY MORNING AND TAKE 4 CAPSULES BY MOUTH EVERY EVENING (TOTAL DAILY DOSE: 75MG IN THE MORNING AND 100MG IN THE EVENING) 210 capsule 11     sodium bicarbonate 650 MG tablet Take 3 tablets (1,950 mg) by mouth 2 times daily 540 tablet 3       PAST SURGICAL HISTORY:  Past Surgical History:   Procedure Laterality Date     IR RENAL BIOPSY LEFT  8/17/2020     REPAIR PATENT FORAMEN OVALE       s/p LDKT       TRANSPLANT         ALLERGIES:   No Known Allergies    FAMILY HISTORY:  Family History   Problem Relation Age of Onset     Hypertension Father      Kidney Disease Sister         medullary cystic kidney disease, s/p kidney transplant     Hypertension Sister      Lung Cancer Paternal Grandmother      Prostate Cancer No family hx of      Colon Cancer No family hx of      Diabetes No family hx of      Coronary Artery Disease No family hx of          SOCIAL HISTORY:  Social History     Tobacco Use     Smoking status: Never Smoker     Smokeless tobacco: Never Used   Substance Use Topics     Alcohol use: No     Drug use: No       ROS:   Constitutional: No fever, chills, or sweats. Weight stable.   Cardiovascular: As per HPI.      I have  reviewed the labs and personally reviewed the imaging below and made my comment in the assessment and plan.    Labs:  CBC RESULTS:   Lab Results   Component Value Date    WBC 4.6 08/23/2021    WBC 5.4 05/06/2021    RBC 2.99 (L) 08/23/2021    RBC 2.91 (L) 05/06/2021    HGB 8.6 (L) 08/23/2021    HGB 8.4 (L) 05/06/2021    HCT 26.5 (L) 08/23/2021    HCT 27.8 (L) 05/06/2021    MCV 89 08/23/2021    MCV 96 05/06/2021    MCH 28.8 08/23/2021    MCH 28.9 05/06/2021    MCHC 32.5 08/23/2021    MCHC 30.2 (L) 05/06/2021    RDW 13.8 08/23/2021    RDW 13.4 05/06/2021     08/23/2021     05/06/2021       BMP RESULTS:  Lab Results   Component Value Date     08/23/2021     05/06/2021    POTASSIUM 4.8 08/23/2021    POTASSIUM 4.4 05/06/2021    CHLORIDE 110 (H) 08/23/2021    CHLORIDE 107 05/06/2021    CO2 21 08/23/2021    CO2 23 05/06/2021    ANIONGAP 8 08/23/2021    ANIONGAP 6 05/06/2021    GLC 90 08/23/2021    GLC 87 05/06/2021    BUN 51 (H) 08/23/2021    BUN 54 (H) 05/06/2021    CR 2.95 (H) 08/23/2021    CR 3.10 (H) 05/06/2021    GFRESTIMATED 24 (L) 08/23/2021    GFRESTIMATED 23 (L) 05/06/2021    GFRESTBLACK 27 (L) 05/06/2021    SOFÍA 8.5 08/23/2021    SOFÍA 8.3 (L) 05/06/2021        INR RESULTS:  Lab Results   Component Value Date    INR 1.02 08/23/2021    INR 1.00 08/17/2020    INR 1.06 07/25/2011    INR 1.04 08/17/2010    INR 1.9 06/14/2010    INR 1.2 06/10/2010    INR 1.46 (H) 06/03/2010         Echocardiogram 8/23/2021  Global and regional left ventricular function is normal with an EF of 60-65%.  Right ventricular function, chamber size, wall motion, and thickness are  normal.  Pulmonary artery systolic pressure is normal.  Ascending aorta 4.3 cm.  Mild dilatation of the aorta is present.  The inferior vena cava is normal.  No pericardial effusion is present.      EKG 8/23/2021 personally reviewed normal.    Transesophageal echocardiogram 2008 [facility]  Normal LV size with normal LV systolic function,  estimated EF=60-65%    Normal wall motion    Valves: a) Aortic--normal , no vegetation, no regurgitation.   b) Mitral--normal , no vegetation, trace mitral regurgitation   c) Tricuspid--normal , no vegetation, cannot assess color, cannot assess PA pressure  d) Pulmonic---no regurg, no veg, grossly seen     RV--size and fxn are normal     Right atrium-- there is a mass (2.1 x 1.9 cm) in the right atrium, with a long stalk (total length likely > 6 cm) leading back into the superior vena cava, likely extends back into the proximal superior vena cava, though we were not able to image that segment of the superior vena cava. The mass is clearly seen in the superior vena cava , attached to the (dialysis) catheter. The atrial portion of the mass has the appearance of a cauliflower floret, there are multiple mobile components of this mass. The mass has bright calcified, chronic appearing components as well as some noncalcified, soft tissue components, some of which are mobile, suggestive of thrombus. On direct comparison with prior transesophogeal echocardiography, the size of this mass appears smaller.     LA--normal , no mass or thrombus noted.   SAI--small, no thrombus   Aorta-- no clots noted, no atherosclerosis noted.     Interatrial septum: tunnel type patent foramen ovale, mobile septum, nonaneurysmal with shunt noted on color doppler    Bubble study: not done today, previously demonstrated right to left shunt at rest.    Rhythm--- sinus rhythm     Assessment and Plan:     #Preoperative cardiovascular evaluation prior to kidney transplant (Prior history of 2 kidney transplant)  #History of CVA secondary to right atrial catheter clot and PFO SP surgical PFO closure in 2008  -Patient reports no cardiac symptoms.  -Recommended exercise stress echocardiogram     #Hypertension  -Well-controlled with amlodipine 5 and losartan 25 mg    No medication changes today.  Return to clinic as needed.    Total time spent 60 minutes  including prior charting, telephone visit, review of outside hospital medical records and medical documentation    Please donot hesitate to contact me if you have any questions or concerns. Again, thank you for allowing me to participate in the care of your patient.    Elham DAN MD  [facility] Division of Cardiology  Pager [phone]

## 2021-10-14 ENCOUNTER — OFFICE VISIT (OUTPATIENT)
Dept: FAMILY MEDICINE | Facility: CLINIC | Age: 46
End: 2021-10-14
Payer: COMMERCIAL

## 2021-10-14 VITALS
BODY MASS INDEX: 33.23 KG/M2 | TEMPERATURE: 98.9 F | SYSTOLIC BLOOD PRESSURE: 110 MMHG | WEIGHT: 225 LBS | HEART RATE: 83 BPM | OXYGEN SATURATION: 100 % | RESPIRATION RATE: 14 BRPM | DIASTOLIC BLOOD PRESSURE: 68 MMHG

## 2021-10-14 DIAGNOSIS — L08.9 FINGER INFECTION: Primary | ICD-10-CM

## 2021-10-14 PROCEDURE — 99214 OFFICE O/P EST MOD 30 MIN: CPT | Performed by: PHYSICIAN ASSISTANT

## 2021-10-14 RX ORDER — SODIUM BICARBONATE 650 MG/1
1900 TABLET ORAL 2 TIMES DAILY
Qty: 540 TABLET | Refills: 3 | Status: CANCELLED | OUTPATIENT
Start: 2021-10-14

## 2021-10-14 ASSESSMENT — ENCOUNTER SYMPTOMS
PARESTHESIAS: 0
WEAKNESS: 0
COUGH: 0
FEVER: 0
SHORTNESS OF BREATH: 0
LIGHT-HEADEDNESS: 0
DIAPHORESIS: 0
NERVOUS/ANXIOUS: 0
NUMBNESS: 0

## 2021-10-14 NOTE — PATIENT INSTRUCTIONS
Your exam is concerning for an infection within the tendon sheath of your right little finger.  This is called flexor tenosynovitis.  It is very important that you go directly to the emergency department for further evaluation and treatment.  I will call and let them know you are on the way.

## 2021-10-14 NOTE — PROGRESS NOTES
Assessment & Plan     Finger infection  Patient is a 46-year-old male with past medical history significant for renal transplant/immunosuppression and gout who presents to clinic due to swelling, pain, and limited range of motion of right fifth digit.  Patient recently treated with steroid taper for gout episode with improvement in gout symptoms in right wrist.  Vital signs normal.    Right fifth digit with positive Kanavel sign and concerning findings for infectious flexor tenosynovitis.  Concerns discussed with patient.  Recommended emergency department follow-up.  Patient agreeable and will proceed to [facility] emergency department. Emergency department charge RN notified.     See Patient Instructions    Return if symptoms worsen or fail to improve.    KHALIDA Page [facility] GEORGI Michelle is a 46 year old who presents for the following health issues     HPI     Musculoskeletal problem/pain  Onset/Duration: Patient notes gout symptoms improved. He was seem originally for this on 10/5. Dx gout in the wrist, he did finish course of Medrol which helped for the wrist. He is now is unable to bend little finger which was swollen during the gout episode and has stayed swollen. No pain unless patient tries to bend finger. No injury to finger. No prior surgeries to finger.   Description  Location: pinky finger - right  Joint Swelling: YES  Redness: no  Pain: YES- throbbing  Warmth: YES  Intensity:  moderate  Progression of Symptoms:  same  Accompanying signs and symptoms:   Fevers: no  Numbness/tingling/weakness: No  History  Trauma to the area: no  Recent illness:  no  Previous similar problem: YES- PMHx gout flares  Previous evaluation:  YES  Precipitating or alleviating factors:  Aggravating factors include: Bending or any usage  Therapies tried and outcome: He finished course of methylprednisolone. Ice packs    Per chart review, patient evaluated 10/5/2021 via virtual visit due to  symptoms consistent with prior episodes of gout.  Prior episodes treated successfully with methylprednisolone taper which was prescribed during visit.    Review of Systems   Constitutional: Negative for diaphoresis and fever.   Respiratory: Negative for cough and shortness of breath.    Cardiovascular: Negative for chest pain.   Skin: Negative for rash.   Neurological: Negative for weakness, light-headedness, numbness and paresthesias.   Psychiatric/Behavioral: The patient is not nervous/anxious.             Objective    /68   Pulse 83   Temp 98.9  F (37.2  C) (Tympanic)   Resp 14   Wt 102.1 kg (225 lb)   SpO2 100%   BMI 33.23 kg/m    Body mass index is 33.23 kg/m .  Physical Exam  Vitals and nursing note reviewed.   Constitutional:       General: He is not in acute distress.     Appearance: Normal appearance.   HENT:      Head: Normocephalic and atraumatic.   Eyes:      Extraocular Movements: Extraocular movements intact.      Pupils: Pupils are equal, round, and reactive to light.   Cardiovascular:      Rate and Rhythm: Normal rate and regular rhythm.      Heart sounds: Normal heart sounds.   Pulmonary:      Effort: Pulmonary effort is normal.      Breath sounds: Normal breath sounds.   Musculoskeletal:         General: Normal range of motion.      Cervical back: Normal range of motion.      Comments: Right hand: Sensation intact.  Radial pulse 2+.  Erythema right fifth digit as well as fifth MCP joint  Right fifth digit:   Positive Kanavel sign. Slight flexion at rest, significant enlargement of digit, significant pain with passive flexion and extension of digit, limited range of motion   Skin:     General: Skin is warm and dry.   Neurological:      General: No focal deficit present.      Mental Status: He is alert.   Psychiatric:         Mood and Affect: Mood normal.         Behavior: Behavior normal.

## 2021-10-15 DIAGNOSIS — M10.9 GOUT, UNSPECIFIED CAUSE, UNSPECIFIED CHRONICITY, UNSPECIFIED SITE: ICD-10-CM

## 2021-10-15 NOTE — TELEPHONE ENCOUNTER
Prednisone dose pack maddy'd.    Routing refill request to provider for review/approval because:  Drug not on the [facility] refill protocol     See note below, not symptomatic but wants to have on hand for next gout flare.    Sandy Lim RN  [facility]

## 2021-10-15 NOTE — TELEPHONE ENCOUNTER
Patient requesting refill of methylPREDNISolone (MEDROL DOSEPAK) 4 MG tablet therapy pack. Patient does not have a flare up right now. He will be going out of town for a few days and would like to have on hand. Please send to [facility] in [place] off [address].

## 2021-10-17 RX ORDER — METHYLPREDNISOLONE 4 MG
TABLET, DOSE PACK ORAL
Qty: 21 TABLET | Refills: 0 | OUTPATIENT
Start: 2021-10-17

## 2021-10-18 NOTE — TELEPHONE ENCOUNTER
is booked out until 11/15/21 for a virtual visit, can he use a Hospital f/u slot? Patient is out of his methylPREDNISolone .

## 2021-10-18 NOTE — TELEPHONE ENCOUNTER
Overdue for follow up. I last saw patient > a year ago and he recently no showed at his appointment. Needs an appointment for ongoing medication management.

## 2021-10-18 NOTE — TELEPHONE ENCOUNTER
Left message on voice mail [phone] for patient to call clinic.   [phone]  States ok to LVM.  Left message stating that he needs to schedule OV with Dr Hutson for med refill.

## 2021-10-19 RX ORDER — METHYLPREDNISOLONE 4 MG
TABLET, DOSE PACK ORAL
Qty: 21 TABLET | Refills: 0 | Status: CANCELLED | OUTPATIENT
Start: 2021-10-19

## 2021-10-19 NOTE — TELEPHONE ENCOUNTER
Patient was seen by Lou Mays 10/14/21, he was referred to the ER for finger infection.    Antibiotic advised and to follow up with [facility] hand doctor.    I called and spoke to patient, he says his right 5th finger is improving, still on antibiotic.   Says he is having another flare of gout to his right ankle so wants prednisone sent today.    No openings with PCP for a few weeks.    Scheduled with Jayce Ortez tomorrow, but patient wants Rx sent today for the prednisone as he is in pain now.    He will also call [facility] to check on his referral.   If they don't have one, provider can address at visit tomorrow.      Route to Dr. Hutson to address request for prednisone before visit tomorrow.  Sandy Lim RN  [facility]

## 2021-10-19 NOTE — TELEPHONE ENCOUNTER
There are no hospital follow up or Same day appointments and patient is out of the medication. Please call patient back.

## 2021-10-20 ENCOUNTER — OFFICE VISIT (OUTPATIENT)
Dept: FAMILY MEDICINE | Facility: CLINIC | Age: 46
End: 2021-10-20
Payer: COMMERCIAL

## 2021-10-20 VITALS
DIASTOLIC BLOOD PRESSURE: 79 MMHG | TEMPERATURE: 97.4 F | WEIGHT: 226.6 LBS | HEART RATE: 91 BPM | RESPIRATION RATE: 14 BRPM | OXYGEN SATURATION: 100 % | BODY MASS INDEX: 33.46 KG/M2 | SYSTOLIC BLOOD PRESSURE: 123 MMHG

## 2021-10-20 DIAGNOSIS — M25.571 PAIN IN JOINT INVOLVING ANKLE AND FOOT, RIGHT: Primary | ICD-10-CM

## 2021-10-20 DIAGNOSIS — M79.644 PAIN OF FINGER OF RIGHT HAND: ICD-10-CM

## 2021-10-20 PROCEDURE — 99214 OFFICE O/P EST MOD 30 MIN: CPT | Performed by: PHYSICIAN ASSISTANT

## 2021-10-20 RX ORDER — CEPHALEXIN 500 MG/1
500 CAPSULE ORAL 2 TIMES DAILY
COMMUNITY
End: 2022-04-25

## 2021-10-20 RX ORDER — METHYLPREDNISOLONE 4 MG
TABLET, DOSE PACK ORAL
Qty: 21 TABLET | Refills: 2 | Status: SHIPPED | OUTPATIENT
Start: 2021-10-20 | End: 2022-03-16

## 2021-10-20 ASSESSMENT — PAIN SCALES - GENERAL: PAINLEVEL: NO PAIN (0)

## 2021-10-20 NOTE — LETTER
October 20, 2021      Miguel Angel Piña  [id number] [facility] [id number]        To Whom It May Concern,      Brain was seen at our clinic today for an appointment.          Sincerely,        ALEX Park

## 2021-10-20 NOTE — TELEPHONE ENCOUNTER
Patient was seen by Jayce JOHNSON today.     Please review and advise.     Jade Perez RN BSN  [facility]

## 2021-10-20 NOTE — TELEPHONE ENCOUNTER
Noted.   Did this patient see Jayce today?   Haven't seen him in a year. Recent no show noted. Has been requesting multiple rounds of steroid refills since then. I have concerns about this  Patient needs to be aware of long term effects of steroid therapy and what needs to be done to prevent this.  No automatic refill at this time. Needs an office visit to address. Ok for a Virtual visit if unable to get in.

## 2021-10-20 NOTE — PATIENT INSTRUCTIONS
Lonnie Michelle,    Thank you for allowing  [facility] to manage your care.    I am unsure of the cause of your symptoms, but your exam is reassuring. If you develop worsening/changing symptoms at any time, please call 911 or go to the emergency department for evaluation.    I sent your prescriptions to your pharmacy.    Talk to your kidney transplant team about the Tart Cherry extract.    I made an orthopedics referral, they will be calling in approximately 1 week to set up your appointment.  If you do not hear from them, please call the specialty number on your after visit summary.     Please allow 1-2 business days for our office to contact you in regards to your laboratory/radiological studies.  If not done so, I encourage you to login into [employer] ([email or web address] to review your results as well.     If you have any questions or concerns, please feel free to call us at [phone]    Sincerely,    Jayce Ortez PA-C    Did you know?      You can schedule a video visit for follow-up appointments as well as future appointments for certain conditions.  Please see the below link.     [email or web address]    If you have not already done so,  I encourage you to sign up for [employer] ([email or web address]).  This will allow you to review your results, securely communicate with a provider, and schedule virtual visits as well.    We are continuing to schedule all people age 12 and older for COVID-19 vaccines (patients age 12-17 can only use the Pfizer vaccine).     We are offering third doses of the Pfizer and Moderna COVID-19 vaccines to moderately and severely immunocompromised patients. Qualified patients can schedule their third dose vaccine appointment via [employer] or by walking in to one of our retail pharmacies to receive the vaccine - no appointment needed.      Beginning Thursday, Sept. 30, [facility] will begin offering booster  doses of the Pfizer-BioNTech COVID-19 vaccine to the following:     People 65 years and older.     Residents in long-term care settings.     People ages 50 to 64 with certain underlying medical conditions (refer to CDC: People with Certain Medical Conditions).     People ages 18 to 49 who are at high risk for severe COVID-19 due to certain underlying medical conditions (refer to CDC: People with Certain Medical Conditions).      People ages 18 to 64 who are at increased risk for COVID-19 exposure and transmission because of where they live or work.      To schedule your 1st dose appointment, please log in to [employer] using this link to see when and where we have openings.      To schedule your additional dose appointment for immunocompromised patients or patients that qualify for boosters, please log in to [employer] using this link to see when and where we have openings.     If you have technical difficulty using [employer], call [phone], option 1 for assistance.     More information about vaccine effectiveness at reducing spread of disease, hospitalizations, and death as well as vaccine safety and answers to other questions can be found on our website: [email or web address].           Patient Education     Gout    Gout is an inflammation of a joint caused by an inflammatory response to gout crystals in the joint fluid. This occurs when there is excess uric acid. Uric acid is a normal waste product in the body. It builds up in the body when the kidneys can't filter enough of it from the blood. This may occur with age. It is also associated with kidney disease. Gout occurs more often in people with obesity, diabetes, high blood pressure, or high levels of fats in the blood. It may run in families. Gout tends to come and go. A flare up of gout is called an attack. Drinking alcohol or eating certain foods (such as shellfish or foods with additives such as high-fructose corn syrup) may  increase uric acid levels in the blood and cause a gout attack.   During a gout attack, the affected joint may become hot, red, swollen, and painful. If you have had one attack of gout, you are likely to have another. An attack of gout can be treated with medicine. If these attacks become frequent, a daily medicine may be prescribed to help the kidneys remove uric acid from the body.   Home care  During a gout attack:    Contact your healthcare provider for advice and therapy options at the early stages of a gout flare. Treating the flare right away can prevent it from getting worse.    Rest painful joints. If gout affects the joints of your foot or leg, you may want to use crutches for the first few days to keep from bearing weight on the affected joint.    When sitting or lying down, raise the painful joint to a level higher than your heart.    Apply an ice pack (ice cubes in a plastic bag wrapped in a thin towel) over the injured area for 20 minutes every 1 to 2 hours the first day for pain relief. Continue this 3 to 4 times a day for swelling and pain.    Avoid alcohol and foods listed below (see Preventing attacks) during a gout attack. Drink extra fluid to help flush the uric acid through your kidneys.    If you were prescribed a medicine to treat gout, take it as your healthcare provider has instructed. Don't skip doses.    Take anti-inflammatory medicine as directed by your healthcare provider.    If pain medicines have been prescribed, take them exactly as directed.    Preventing attacks    Limit or stop  alcohol use. Excess alcohol intake can cause a gout attack.    Limit these foods and beverages:  ? Organ meats, such as kidneys and liver  ? Certain seafoods (anchovies, sardines, shrimp, scallops, herring, mackerel)  ? Wild game, meat extracts and meat gravies  ? Foods and beverages sweetened with high-fructose corn syrup, such as sodas    Eat a healthy diet including low-fat and nonfat dairy, whole  grains, and vegetables.    If you are overweight, talk to your healthcare provider about a weight reduction plan. Avoid fasting or extreme low calorie diets (less than 900 calories per day). This will increase uric acid levels in the body.    If you have diabetes or high blood pressure, work with your doctor to manage these conditions.    Protect the joint from injury. Wear good fitting socks and shoes. Injury can trigger a gout attack.    Follow-up care  Follow up with your healthcare provider, or as advised.   When to seek medical advice  Call your healthcare provider if you have any of the following:    Fever over 100.4 F (38. C) with worsening joint pain    Increasing redness around the joint    Pain developing in another joint    Repeated vomiting, abdominal pain, or blood in the vomit or stool (black or red color)  [employer] last reviewed this educational content on 6/1/2019 2000-2021 [employer]. All rights reserved. This information is not intended as a substitute for professional medical care. Always follow your healthcare professional's instructions.

## 2021-10-20 NOTE — PROGRESS NOTES
Assessment & Plan   Problem List Items Addressed This Visit     None      Visit Diagnoses     Pain in joint involving ankle and foot, right    -  Primary    Relevant Medications    methylPREDNISolone (MEDROL DOSEPAK) 4 MG tablet therapy pack    Other Relevant Orders    Orthopedic  Referral    Pain of finger of right hand        Relevant Orders    Orthopedic  Referral         Impression is arthritis of the right 5th PIP. Could be from gout vs septic vs cellulitis. He feels that he is improving. Now also feels that he has gout in his right ankle, one of the joints he frequently get this in. Requesting Medrol dosepack for this, though he had this just earlier this month. We discussed risks and benefits and he wishes to proceed with the medication. He will finish the Keflex course and call ortho hand for evaluation in the next 2 weeks. Low suspicion for tenosynovitis, septic arthritis or or other worrisome process. XRs reviewed. Calculated CrCl and he can tolerate QID dosing of Keflex.    Complete history and physical exam as below. AF with normal VS.    DDx and Dx discussed with and explained to the pt to their satisfaction.  All questions were answered at this time. Pt expressed understanding of and agreement with this dx, tx, and plan. No further workup warranted and standard medication warnings given. I have given the patient a list of pertinent indications for re-evaluation. Will go to the Emergency Department if symptoms worsen or new concerning symptoms arise. Patient left in no apparent distress.     See Patient Instructions    Return in about 2 weeks (around 11/3/2021) for a recheck of your symptoms if not improving, or call 911/go to an ER anytime if worsening.    ALEX Park  [facility] GEORGI Michelle is a 46 year old who presents for the following health issues     HPI       Hospital Follow-up Visit:    Hospital/Nursing Home/IP Rehab Facility:  [facility]  Date of Admission: 10/14/21  Date of Discharge: 10/14/21  Reason(s) for Admission: Finger      Was your hospitalization related to COVID-19? No   Problems taking medications regularly:  None  Medication changes since discharge: Cephalexin  Problems adhering to non-medication therapy:  None    Summary of hospitalization:  CareEverywhere information obtained and reviewed  Diagnostic Tests/Treatments reviewed.  Follow up needed: none  Other Healthcare Providers Involved in Patient s Care:         Hand surgery  Update since discharge: improved.   Post Discharge Medication Reconciliation: discharge medications reconciled and changed, per note/orders.  Plan of care communicated with patient       Concern - Swelling    Onset: 2 days  Description: right ankle  Intensity: moderate  Progression of Symptoms:  same  Accompanying Signs & Symptoms: painful  Previous history of similar problem: Yes  Precipitating factors:        Worsened by: No  Alleviating factors:        Improved by: brace, tart cherry fruit extract  Therapies tried and outcome: Brace, tart cherry fruit extract, Tylenol      Review of Systems   Constitutional, HEENT, cardiovascular, pulmonary, gi and gu systems are negative, except as otherwise noted.      Objective    /79   Pulse 91   Temp 97.4  F (36.3  C) (Tympanic)   Resp 14   Wt 102.8 kg (226 lb 9.6 oz)   SpO2 100%   BMI 33.46 kg/m    Body mass index is 33.46 kg/m .  Physical Exam  Vitals and nursing note reviewed.   Constitutional:       General: He is not in acute distress.     Appearance: Normal appearance. He is not diaphoretic.   HENT:      Head: Normocephalic and atraumatic.      Nose: Nose normal.   Eyes:      Conjunctiva/sclera: Conjunctivae normal.   Pulmonary:      Effort: Pulmonary effort is normal. No respiratory distress.   Musculoskeletal:      Comments: RUE: mild tenderness, edema and faint erythema to PIP of fifth finger. Distal CMS intact. Remainder of limb non-tender.   Normal ROM in joints of the digit aside from the PIP.    RLE: tenderness to lateral malleolus.  No overlying signs of trauma or infection. Distal CMS intact. Remainder of limb non-tender.    Skin:     General: Skin is dry.      Coloration: Skin is not jaundiced or pale.   Neurological:      General: No focal deficit present.      Mental Status: He is alert. Mental status is at baseline.   Psychiatric:         Mood and Affect: Mood normal.         Behavior: Behavior normal.

## 2021-10-21 NOTE — TELEPHONE ENCOUNTER
I did see the patient today and we discussed his multiple Rx's for steroids and the dangers of this, adrenal insufficiency, etc. He feels that he currently has a gout flare in his right ankle and possibly a resolving one in his right hand, though his ankle exam was benign. I ok'd a Rx for a medrol dosepak today with refills as he often feels that he is able to stave off a full gout attack with one dose of methylprednisolone and then does not need the entire course. That said, I defer to Dr. Hutson on this as she knows the patient better than I and he is complicated.

## 2021-11-09 NOTE — TELEPHONE ENCOUNTER
NOTES Status Details   OFFICE NOTE from referring provider Internal 09.14.2021 Lan Patino MD   OFFICE NOTE from other specialist Internal 10.11.2021 Elham Smart MD    10.05.2021 Lou Mays PA-C   DISCHARGE SUMMARY from hospital N/A    DISCHARGE REPORT from the ER N/A    MEDICATION LIST Internal    LABS (Any and all labs)      Internal    Biopsy/pathology (Anything related to diagnoses I.e. fluid aspirations, lip biopsy, muscle biopsy)      N/A     N/A    Imaging (All imaging related to diagnoses)     Echo N/A    HRCT N/A    CXR Internal 08.23.2021 XR CHEST 2 VW   EMG N/A                   Scleroderma/Dermatomyositis diagnoses     Previous Cardiology notes  N/A    Previous Pulmonary notes N/A    Previous Dermatology notes N/A    Previous GI notes N/A    Lupus diagnoses     Previous Nephrology notes N/A    Previous Dermatology notes N/A    Previous Cardiology notes N/A

## 2021-11-10 DIAGNOSIS — Z94.0 HTN, KIDNEY TRANSPLANT RELATED: ICD-10-CM

## 2021-11-10 DIAGNOSIS — I15.1 HTN, KIDNEY TRANSPLANT RELATED: ICD-10-CM

## 2021-11-11 ENCOUNTER — PRE VISIT (OUTPATIENT)
Dept: RHEUMATOLOGY | Facility: CLINIC | Age: 46
End: 2021-11-11
Payer: COMMERCIAL

## 2021-11-12 RX ORDER — AMLODIPINE BESYLATE 5 MG/1
TABLET ORAL
Qty: 90 TABLET | Refills: 0 | Status: SHIPPED | OUTPATIENT
Start: 2021-11-12 | End: 2022-02-21

## 2021-11-12 RX ORDER — LOSARTAN POTASSIUM 25 MG/1
TABLET ORAL
Qty: 90 TABLET | Refills: 0 | Status: SHIPPED | OUTPATIENT
Start: 2021-11-12 | End: 2022-02-21

## 2021-11-12 NOTE — TELEPHONE ENCOUNTER
Routing refill request to provider for review/approval because:  Labs out of range:    Creatinine   Date Value Ref Range Status   08/23/2021 2.95 (H) 0.66 - 1.25 mg/dL Final   05/06/2021 3.10 (H) 0.66 - 1.25 mg/dL Final     SODIUM 135 - 145 mmol/L 135    POTASSIUM 3.5 - 5.0 mmol/L 4.2    CHLORIDE 98 - 110 mmol/L 110    CO2,TOTAL 21 - 31 mmol/L 14 Low     ANION GAP 5 - 18 11    GLUCOSE 65 - 100 mg/dL 115 High     CALCIUM 8.5 - 10.5 mg/dL 8.8    BUN 8 - 25 mg/dL 65 High     CREATININE 0.72 - 1.25 mg/dL 3.21 High     BUN/CREAT RATIO           10 - 20 20    GFR if African American >60 ml/min/1.73m2 25 Low     GFR if not African American >60 ml/min/1.73m2 21 Low     Resulting Agency  [facility]   Specimen Collected: 10/14/21 10:13 AM Last Resulted: 10/14/21 10:46 AM

## 2021-12-09 ENCOUNTER — VIRTUAL VISIT (OUTPATIENT)
Dept: FAMILY MEDICINE | Facility: CLINIC | Age: 46
End: 2021-12-09
Payer: COMMERCIAL

## 2021-12-09 DIAGNOSIS — R19.7 DIARRHEA, UNSPECIFIED TYPE: Primary | ICD-10-CM

## 2021-12-09 PROCEDURE — 99213 OFFICE O/P EST LOW 20 MIN: CPT | Mod: 95 | Performed by: FAMILY MEDICINE

## 2021-12-09 NOTE — PROGRESS NOTES
Miguel Angel is a 46 year old who is being evaluated via a billable telephone visit.      What phone number would you like to be contacted at? [phone]  How would you like to obtain your AVS? MyChart    Assessment & Plan     Diarrhea, unspecified type  Labs ordered. Patient will be notified of results.  - Symptomatic COVID-19 Virus (Coronavirus) by PCR; Future  - Influenza A/B antigen; Future        FUTURE APPOINTMENTS:       - Follow-up visit in one month or sooner as needed.    No follow-ups on file.    Hannah Garcia MD  [facility]    Sandi Michelle is a 46 year old who presents for the following health issues     HPI     46 yr old male here for diarrhea of 3 days duration. He reports that he has had 4-5 episodes per day. Stools are watery. No associated fevers or chills. No nausea or vomiting. He is not sure what has triggered off the symptoms. He will like a flu test and a COVID test done.  Chief Complaint   Patient presents with     Diarrhea     X 3 days of diarrhea. Patient requesting flu and covid testing.          Concern for COVID-19  About how many days ago did these symptoms start? 3 days  Is this your first visit for this illness? Yes  In the 14 days before your symptoms started, have you had close contact with someone with COVID-19 (Coronavirus)? No  Do you have a fever or chills? No  Are you having new or worsening difficulty breathing? No  Do you have new or worsening cough? No  Have you had any new or unexplained body aches? No    Have you experienced any of the following NEW symptoms?    Headache: No    Sore throat: No    Loss of taste or smell: No    Chest pain: No    Diarrhea: YES    Rash: No  What treatments have you tried? Hot tamalis  Who do you live with? wife  Are you, or a household member, a healthcare worker or a ? YES- wife works in healthcare  Do you live in a nursing home, group home, or shelter? No  Do you have a way to get  food/medications if quarantined? Yes, I have a friend or family member who can help me.          Review of Systems   Constitutional, HEENT, cardiovascular, pulmonary, gi and gu systems are negative, except as otherwise noted.      Objective           Vitals:  No vitals were obtained today due to virtual visit.    Physical Exam   healthy, alert and no distress  PSYCH: Alert and oriented times 3; coherent speech, normal   rate and volume, able to articulate logical thoughts, able   to abstract reason, no tangential thoughts, no hallucinations   or delusions  His affect is normal  RESP: No cough, no audible wheezing, able to talk in full sentences  Remainder of exam unable to be completed due to telephone visits          Phone call duration: 5 minutes

## 2021-12-12 ENCOUNTER — LAB (OUTPATIENT)
Dept: FAMILY MEDICINE | Facility: CLINIC | Age: 46
End: 2021-12-12
Attending: FAMILY MEDICINE
Payer: COMMERCIAL

## 2021-12-12 DIAGNOSIS — R19.7 DIARRHEA, UNSPECIFIED TYPE: ICD-10-CM

## 2021-12-12 LAB
FLUAV AG SPEC QL IA: NEGATIVE
FLUBV AG SPEC QL IA: NEGATIVE

## 2021-12-12 PROCEDURE — 87804 INFLUENZA ASSAY W/OPTIC: CPT

## 2021-12-12 PROCEDURE — U0005 INFEC AGEN DETEC AMPLI PROBE: HCPCS

## 2021-12-12 PROCEDURE — U0003 INFECTIOUS AGENT DETECTION BY NUCLEIC ACID (DNA OR RNA); SEVERE ACUTE RESPIRATORY SYNDROME CORONAVIRUS 2 (SARS-COV-2) (CORONAVIRUS DISEASE [COVID-19]), AMPLIFIED PROBE TECHNIQUE, MAKING USE OF HIGH THROUGHPUT TECHNOLOGIES AS DESCRIBED BY CMS-2020-01-R: HCPCS

## 2021-12-12 PROCEDURE — 99207 PR NO CHARGE LOS: CPT

## 2021-12-13 ENCOUNTER — TELEPHONE (OUTPATIENT)
Dept: FAMILY MEDICINE | Facility: CLINIC | Age: 46
End: 2021-12-13
Payer: COMMERCIAL

## 2021-12-13 ENCOUNTER — TELEPHONE (OUTPATIENT)
Dept: TRANSPLANT | Facility: CLINIC | Age: 46
End: 2021-12-13
Payer: COMMERCIAL

## 2021-12-13 LAB — SARS-COV-2 RNA RESP QL NAA+PROBE: POSITIVE

## 2021-12-13 NOTE — RESULT ENCOUNTER NOTE
Please inform patient that test result showed that patient is positive for COVID.   Thank you.     Hannah Garcia M.D.

## 2021-12-13 NOTE — TELEPHONE ENCOUNTER
Coronavirus (COVID-19) Notification     Reason for call  Patient requesting results     Lab Result    Lab test 2019-nCoV rRt-PCR in process        RN Recommendations/Instructions per [facility]  Continue to quarantine and follow the instructions given at your testing visit until you receive the results.     Please Contact your PCP clinic or return to the Emergency department if your:    Symptoms worsen or other concerning symptom's.     Patient informed that if test for COVID19 is POSITIVE,  you will receive a call typically within 48 hours from the test date (date lab collected).  If NEGATIVE result, you will receive a letter in the mail or MyChart.      Faby Trevino LPN

## 2021-12-13 NOTE — TELEPHONE ENCOUNTER
RNCC received results for positive COVID-19 test taken 12/12/21.     Reached out to Miguel Angel to discuss symptoms of COVID-19:  Fever or chills  Cough  Shortness of breath or difficulty breathing  Fatigue  Muscle or body aches  Headache  New loss of taste or smell  Sore throat  Congestion or runny nose  Nausea or vomiting  Diarrhea    Confirm current immunosuppression medication doses:  Cellcept 1,000 mg BID  Neoral 75 mg (3 caps) AM and 100 mg (4 caps) PM.     Refer for monoclonal antibodies. Provided [facility] URL to fill out application via [employer].    Attempted to reach Miguel Angel. Left  message to call back or reply to [employer] message.     Ivanna Rivera RN, BSN  Solid Organ Transplant, Post Kidney and Pancreas  Transplant Care Coordinator  [phone]

## 2021-12-15 ENCOUNTER — TELEPHONE (OUTPATIENT)
Dept: TRANSPLANT | Facility: CLINIC | Age: 46
End: 2021-12-15
Payer: COMMERCIAL

## 2021-12-15 DIAGNOSIS — Z79.60 LONG-TERM USE OF IMMUNOSUPPRESSANT MEDICATION: Primary | ICD-10-CM

## 2021-12-15 DIAGNOSIS — Z48.298 AFTERCARE FOLLOWING ORGAN TRANSPLANT: ICD-10-CM

## 2021-12-15 DIAGNOSIS — Z94.0 KIDNEY TRANSPLANTED: ICD-10-CM

## 2021-12-15 NOTE — TELEPHONE ENCOUNTER
RNCC attempted to reach out to Miguel Angel to discuss SOT recommendations. Unable to reach patient. Referred to details in [employer] message sent 2 days prior on 2/13/21; requested a call back to discuss.     Ivanna Rivera RN, BSN  Solid Organ Transplant, Post Kidney and Pancreas  Transplant Care Coordinator  [phone]

## 2021-12-20 NOTE — TELEPHONE ENCOUNTER
"RNNEMESIO was able to connect with Miguel Angel. He is \"doing okay; better than a week ago.\" Did not get monoclonal antibodies or apply yet. Provided the website URL over the phone and referenced the [employer] message we had sent 12/13/21. Encouraged to apply immediately given he only had 2 more days in the 10 day window to apply. Reviewed Transplant specific recommendations including 21 day isolation period. Encouraged COVID-19 vaccination as he has not yet been vaccinated.     Discussed symptoms. Denied fever, chills, sore throat, runny nose, shortness of breath, nausea or vomiting. Able to do activities of daily living. Few boday aches, mild cough with chest congestion. Largest complaint is diarrhea but is not overly severe. Current frequency is three times daily. Reports he is trying to hydrate and eat.     Will return call to Miguel Angel if Cellcept dose reduction is recommended.     Ivanna Rivera RN, BSN  Solid Organ Transplant, Post Kidney and Pancreas  Transplant Care Coordinator  [phone]         "

## 2021-12-21 NOTE — TELEPHONE ENCOUNTER
Message  Received: Yesterday  Lan Patino MD Blaisdell, Christin Rebecca, RN  Caller: Unspecified (6 days ago,  1:58 PM)  Yes, would decrease to 750 mg bid and check an MPA level in a week or so before considering whether we should just keep him on a lower dose.             Previous Messages       ----- Message -----   From: vIanna Rivera RN   Sent: 12/20/2021   5:08 PM CST   To: Lan Patino MD, *     ----- Message from Ivanna Rivera RN sent at 12/20/2021  5:08 PM CST -----   Dr. Patino, I tried on few occasions to reach Miguel Angel since positive COVID result on 12/12/21, finally connected today. He mainly has diarrhea and reduced appetite. Cellcept dose is 1,000 mg BID. He is on the upswing but would you still recommend dose reduction at this point.     OUTCOME:   RNCC left detailed VM message for Miguel Angel to reduce his Cellcept to 750 mg (3 caps) every 12 hours and to check MPA level (12 hr trough) similar to CSA in one week with other labs. Asked for call back in return to update prescription. Lab orders placed.    Ivanna Rivera RN, BSN  Solid Organ Transplant, Post Kidney and Pancreas  Transplant Care Coordinator  [phone]

## 2021-12-22 NOTE — TELEPHONE ENCOUNTER
RNCC attempted to call Miguel Angel to see if he had reduced dose of Cellcept/mycophenolate as advised on last VM message to 750 mg BID. Miguel Angel has not returned call to this writer and lab appt has not yet been made to check for MPA and CSA drug levels. Did review [employer] access with Miguel Angel on last call, gave him his username and told him he could update his password if needed.    Ivanna Rivera RN, BSN  Solid Organ Transplant, Post Kidney and Pancreas  Transplant Care Coordinator  [phone]

## 2021-12-27 ENCOUNTER — TELEPHONE (OUTPATIENT)
Dept: TRANSPLANT | Facility: CLINIC | Age: 46
End: 2021-12-27
Payer: COMMERCIAL

## 2021-12-27 DIAGNOSIS — Z79.60 LONG-TERM USE OF IMMUNOSUPPRESSANT MEDICATION: Primary | ICD-10-CM

## 2021-12-27 DIAGNOSIS — Z94.0 KIDNEY REPLACED BY TRANSPLANT: ICD-10-CM

## 2021-12-27 DIAGNOSIS — Z94.0 KIDNEY TRANSPLANTED: ICD-10-CM

## 2021-12-27 RX ORDER — MYCOPHENOLATE MOFETIL 250 MG/1
750 CAPSULE ORAL 2 TIMES DAILY
Qty: 66 CAPSULE | Refills: 0
Start: 2021-12-21 | End: 2022-01-12

## 2021-12-27 NOTE — TELEPHONE ENCOUNTER
Cellcept prescription updated to 750 mg BID. Awaiting MPA level to be drawn on 12/31/21. May stay on reduced dose per Dr. Patino depending on level.     Ivanna Rivera, RN, BSN  Solid Organ Transplant, Post Kidney and Pancreas  Transplant Care Coordinator  [phone]

## 2021-12-27 NOTE — TELEPHONE ENCOUNTER
Patient Call: Miguel Angel called to confirm his MPA dose 750 mg BID and to have his labs drawn this Friday 12/31/2021     Is having Mychart Issues   Call back needed? Yes    Return Call Needed  Same as documented in contacts section  When to return call?: Same day: Route High Priority  '

## 2021-12-31 ENCOUNTER — LAB (OUTPATIENT)
Dept: LAB | Facility: CLINIC | Age: 46
End: 2021-12-31
Payer: COMMERCIAL

## 2021-12-31 DIAGNOSIS — D63.1 ANEMIA OF CHRONIC RENAL FAILURE, STAGE 4 (SEVERE) (H): ICD-10-CM

## 2021-12-31 DIAGNOSIS — N18.4 CHRONIC KIDNEY DISEASE, STAGE IV (SEVERE) (H): ICD-10-CM

## 2021-12-31 DIAGNOSIS — D64.9 ANEMIA: ICD-10-CM

## 2021-12-31 DIAGNOSIS — Z48.298 AFTERCARE FOLLOWING ORGAN TRANSPLANT: ICD-10-CM

## 2021-12-31 DIAGNOSIS — E83.51 HYPOCALCEMIA: ICD-10-CM

## 2021-12-31 DIAGNOSIS — N18.4 ANEMIA OF CHRONIC RENAL FAILURE, STAGE 4 (SEVERE) (H): ICD-10-CM

## 2021-12-31 DIAGNOSIS — Z79.60 LONG-TERM USE OF IMMUNOSUPPRESSANT MEDICATION: ICD-10-CM

## 2021-12-31 DIAGNOSIS — D64.9 ANEMIA: Primary | ICD-10-CM

## 2021-12-31 DIAGNOSIS — Z94.0 KIDNEY TRANSPLANTED: ICD-10-CM

## 2021-12-31 DIAGNOSIS — N18.4 CKD (CHRONIC KIDNEY DISEASE) STAGE 4, GFR 15-29 ML/MIN (H): ICD-10-CM

## 2021-12-31 LAB
ANION GAP SERPL CALCULATED.3IONS-SCNC: 7 MMOL/L (ref 3–14)
BUN SERPL-MCNC: 61 MG/DL (ref 7–30)
CALCIUM SERPL-MCNC: 9.2 MG/DL (ref 8.5–10.1)
CHLORIDE BLD-SCNC: 108 MMOL/L (ref 94–109)
CO2 SERPL-SCNC: 24 MMOL/L (ref 20–32)
CREAT SERPL-MCNC: 3.06 MG/DL (ref 0.66–1.25)
CYCLOSPORINE BLD LC/MS/MS-MCNC: 102 UG/L (ref 50–400)
ERYTHROCYTE [DISTWIDTH] IN BLOOD BY AUTOMATED COUNT: 14 % (ref 10–15)
GFR SERPL CREATININE-BSD FRML MDRD: 25 ML/MIN/1.73M2
GLUCOSE BLD-MCNC: 100 MG/DL (ref 70–99)
HCT VFR BLD AUTO: 26.7 % (ref 40–53)
HGB BLD-MCNC: 8.1 G/DL (ref 13.3–17.7)
IRON SERPL-MCNC: 76 UG/DL (ref 35–180)
MCH RBC QN AUTO: 28.1 PG (ref 26.5–33)
MCHC RBC AUTO-ENTMCNC: 30.3 G/DL (ref 31.5–36.5)
MCV RBC AUTO: 93 FL (ref 78–100)
PHOSPHATE SERPL-MCNC: 4.1 MG/DL (ref 2.5–4.5)
PLATELET # BLD AUTO: 310 10E3/UL (ref 150–450)
POTASSIUM BLD-SCNC: 4.5 MMOL/L (ref 3.4–5.3)
PTH-INTACT SERPL-MCNC: 245 PG/ML (ref 18–80)
RBC # BLD AUTO: 2.88 10E6/UL (ref 4.4–5.9)
SODIUM SERPL-SCNC: 139 MMOL/L (ref 133–144)
TME LAST DOSE: NORMAL H
TME LAST DOSE: NORMAL H
WBC # BLD AUTO: 5.6 10E3/UL (ref 4–11)

## 2021-12-31 PROCEDURE — 83540 ASSAY OF IRON: CPT

## 2021-12-31 PROCEDURE — 36415 COLL VENOUS BLD VENIPUNCTURE: CPT

## 2021-12-31 PROCEDURE — 83550 IRON BINDING TEST: CPT

## 2021-12-31 PROCEDURE — 80180 DRUG SCRN QUAN MYCOPHENOLATE: CPT

## 2021-12-31 PROCEDURE — 84100 ASSAY OF PHOSPHORUS: CPT

## 2021-12-31 PROCEDURE — 80158 DRUG ASSAY CYCLOSPORINE: CPT

## 2021-12-31 PROCEDURE — 85027 COMPLETE CBC AUTOMATED: CPT

## 2021-12-31 PROCEDURE — 82306 VITAMIN D 25 HYDROXY: CPT

## 2021-12-31 PROCEDURE — 80048 BASIC METABOLIC PNL TOTAL CA: CPT

## 2021-12-31 PROCEDURE — 83970 ASSAY OF PARATHORMONE: CPT

## 2021-12-31 PROCEDURE — 82728 ASSAY OF FERRITIN: CPT

## 2021-12-31 NOTE — PROGRESS NOTES
Hgb 8.1 on 12/31/21; trending down with CKD4 and COVID-19 dx. Iron panel added to today's labs. Referral for anemia services was re-sent. Originally referred July 2020. Notification to Dr. Patino.    Ivanna Rivera, RN, BSN  Solid Organ Transplant, Post Kidney and Pancreas  Transplant Care Coordinator  [phone]

## 2022-01-03 ENCOUNTER — TELEPHONE (OUTPATIENT)
Dept: PHARMACY | Facility: CLINIC | Age: 47
End: 2022-01-03
Payer: COMMERCIAL

## 2022-01-03 DIAGNOSIS — N18.4 CKD (CHRONIC KIDNEY DISEASE) STAGE 4, GFR 15-29 ML/MIN (H): Primary | ICD-10-CM

## 2022-01-03 DIAGNOSIS — N18.4 ANEMIA OF CHRONIC RENAL FAILURE, STAGE 4 (SEVERE) (H): ICD-10-CM

## 2022-01-03 DIAGNOSIS — D63.1 ANEMIA OF CHRONIC RENAL FAILURE, STAGE 4 (SEVERE) (H): ICD-10-CM

## 2022-01-03 NOTE — TELEPHONE ENCOUNTER
Anemia Management Note - Enrollment  SUBJECTIVE/OBJECTIVE:    Referred by Dr. Lan Patino on 2021  Primary Diagnosis: Anemia in Chronic Kidney Disease (N18.4, D63.1)     Secondary Diagnosis:  Chronic Kidney Disease, Stage 4 (N18.4)   Hx of Kidney Tx; 2010  Hgb goal range:  9-10  Epo/Darbo: {TBD; COVID + 21. Must hold CINDY until 1/10/22  Iron regimen: TBD;Iron levels are pending  Labs : 1/3/2023  Recent CINDY use, transfusion, IV iron: NA  RX/TX plans : TBD    No history of stroke, MI and blood clots or cancers.    Contact:  No Consent to Communicate on File.     Anemia Latest Ref Rng & Units 2021   Hemoglobin 13.3 - 17.7 g/dL 8.4(L) 8.6(L) 8.1(L)       BP Readings from Last 3 Encounters:   10/20/21 123/79   10/14/21 110/68   21 128/82     Wt Readings from Last 2 Encounters:   10/20/21 226 lb 9.6 oz (102.8 kg)   10/14/21 225 lb (102.1 kg)     Current Outpatient Medications   Medication Sig Dispense Refill     amLODIPine (NORVASC) 5 MG tablet TAKE 1 TABLET BY MOUTH AT BEDTIME AT NIGHT 90 tablet 0     calcium gluconate 500 MG tablet Take 500 mg by mouth 2 times daily 600mg daily       CELLCEPT (BRAND) 250 MG capsule Take 3 capsules (750 mg) by mouth 2 times daily for 12 days For COVID-19 Dx 66 capsule 0     CELLCEPT (BRAND) 250 MG capsule Take 4 capsules (1,000 mg) by mouth 2 times daily 240 capsule 11     cephALEXin (KEFLEX) 500 MG capsule Take 500 mg by mouth 2 times daily       cholecalciferol 2000 units TABS Take 2,000 Units by mouth daily 90 tablet 3     Ferrous Gluconate 324 (37.5 Fe) MG TABS Take by mouth daily       losartan (COZAAR) 25 MG tablet Take 1 tablet by mouth once daily 90 tablet 0     methylPREDNISolone (MEDROL DOSEPAK) 4 MG tablet therapy pack Follow Package Directions 21 tablet 2     NEORAL (BRAND) 25 MG capsule TAKE 3 CAPSULES BY MOUTH EVERY MORNING AND TAKE 4 CAPSULES BY MOUTH EVERY EVENING (TOTAL DAILY DOSE: 75MG IN THE MORNING AND  100MG IN THE EVENING) 210 capsule 11     sodium bicarbonate 650 MG tablet Take 3 tablets (1,950 mg) by mouth 2 times daily 540 tablet 3     ASSESSMENT:  Hgb Not at goal/Initiation of therapy   Ferritin: Pending  TSat: pending  Iron regimen recommended: TBD; Iron levels are pending  Recommended CINDY regimen: TBD; Iron levels are pending, COVID + 12/13/21  Blood Pressure: Stable    PLAN:  1. Patient will be called for enrollment in Anemia Management Service later this week. COVID + 12/13/21.   2. Need to discuss:  anemia overview, monitoring service and goal hemoglobin range and rationale and risks of CINDY blood clots, stroke and increase in blood pressure  3. Dose location: in clinic TBD  4. Labs: [place]  5. Pharmacy: [facility]    COVID + 12/13/22. Iron levels are pending, OK to start CINDY on 1/10/22 if Iron levels WNL    Next call date:  1/6/22    Janna Louis RN   Anemia Services  [facility]  [address] [place] [address]   [email or web address]   Office : [phone]  Fax: [phone]

## 2022-01-06 ENCOUNTER — TELEPHONE (OUTPATIENT)
Dept: NEPHROLOGY | Facility: CLINIC | Age: 47
End: 2022-01-06

## 2022-01-06 ENCOUNTER — TELEPHONE (OUTPATIENT)
Dept: NEPHROLOGY | Facility: CLINIC | Age: 47
End: 2022-01-06
Payer: COMMERCIAL

## 2022-01-06 ENCOUNTER — TELEPHONE (OUTPATIENT)
Dept: PHARMACY | Facility: CLINIC | Age: 47
End: 2022-01-06
Payer: COMMERCIAL

## 2022-01-06 DIAGNOSIS — D63.1 ANEMIA OF CHRONIC RENAL FAILURE, STAGE 4 (SEVERE) (H): ICD-10-CM

## 2022-01-06 DIAGNOSIS — N18.4 ANEMIA OF CHRONIC RENAL FAILURE, STAGE 4 (SEVERE) (H): ICD-10-CM

## 2022-01-06 DIAGNOSIS — N18.4 CKD (CHRONIC KIDNEY DISEASE) STAGE 4, GFR 15-29 ML/MIN (H): Primary | ICD-10-CM

## 2022-01-06 RX ORDER — DIPHENHYDRAMINE HYDROCHLORIDE 50 MG/ML
50 INJECTION INTRAMUSCULAR; INTRAVENOUS
Status: CANCELLED
Start: 2022-01-06

## 2022-01-06 RX ORDER — METHYLPREDNISOLONE SODIUM SUCCINATE 125 MG/2ML
125 INJECTION, POWDER, LYOPHILIZED, FOR SOLUTION INTRAMUSCULAR; INTRAVENOUS
Status: CANCELLED
Start: 2022-01-06

## 2022-01-06 RX ORDER — EPINEPHRINE 1 MG/ML
0.3 INJECTION, SOLUTION, CONCENTRATE INTRAVENOUS EVERY 5 MIN PRN
Status: CANCELLED | OUTPATIENT
Start: 2022-01-06

## 2022-01-06 RX ORDER — ALBUTEROL SULFATE 90 UG/1
1-2 AEROSOL, METERED RESPIRATORY (INHALATION)
Status: CANCELLED
Start: 2022-01-06

## 2022-01-06 RX ORDER — NALOXONE HYDROCHLORIDE 0.4 MG/ML
0.2 INJECTION, SOLUTION INTRAMUSCULAR; INTRAVENOUS; SUBCUTANEOUS
Status: CANCELLED | OUTPATIENT
Start: 2022-01-06

## 2022-01-06 RX ORDER — ALBUTEROL SULFATE 0.83 MG/ML
2.5 SOLUTION RESPIRATORY (INHALATION)
Status: CANCELLED | OUTPATIENT
Start: 2022-01-06

## 2022-01-06 RX ORDER — MEPERIDINE HYDROCHLORIDE 25 MG/ML
25 INJECTION INTRAMUSCULAR; INTRAVENOUS; SUBCUTANEOUS EVERY 30 MIN PRN
Status: CANCELLED | OUTPATIENT
Start: 2022-01-06

## 2022-01-06 NOTE — TELEPHONE ENCOUNTER
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  Symptoms have diminished significantly on Cipro.  She still has left lower quadrant pain which is most likely related to her peritoneal implant in the pelvic area.    REVIEW OF SYSTEMS:   General: As above.  Cardiovascular:  No chest pains or palpitation.  Respiratory:  No cough or shortness of breath.  GI: Appetite good. No nausea or vomiting.   : No urinary complaints.   CNS: No headaches or diplopia.  Musculoskeletal: No joint aches or pains.  Rest of 10 point review of systems unremarkable.    PAIN: No complaints of pain.    CURRENT MEDICATIONS:   Drug Name Dose Route Frequency   Zyrtec [Cetirizine]   Oral As Directed PRN   Prilosec 20 mg Oral Daily   lorazepam 1 Tablet Oral every 6 hours   Decadron [Dexamethasone] 2 Tablet Oral as directed   alprazolam 0.5 - 1 Tablet Oral twice daily       SOCIAL HISTORY:  Unchanged.  SMOKING HISTORY: Nonsmoker.  FAMILY HISTORY: Unchanged from last visit 2019.    PHYSICAL EXAMINATION:  GENERAL: Performance status 0.  VITAL SIGNS: Weight 223.2 pounds; /92 mmHg.  HEENT/NECK: ENT exam is unremarkable. No palpable adenopathy.  BREAST/AXILLAE:  No axillary adenopathy.  CARDIOVASCULAR:  First  Anemia Management Note - Enrollment  SUBJECTIVE/OBJECTIVE:    Referred by Dr. Lan Patino on 2021  Primary Diagnosis: Anemia in Chronic Kidney Disease (N18.4, D63.1)     Secondary Diagnosis:  Chronic Kidney Disease, Stage 4 (N18.4)   Hx of Kidney Tx; 2010  Hgb goal range:  9-10  Epo/Darbo: Retacrit 10,000 units weeky for Hgb <10. AT HOME.  *Must hold CINDY until 1/10/22  Iron regimen: NA  Labs : 1/3/2023  Recent CINDY use, transfusion, IV iron: NA  RX/TX plans : 2023     No history of stroke, MI and blood clots or cancers.     Contact:  No Consent to Communicate on File.   Anemia Latest Ref Rng & Units 2021   Hemoglobin 13.3 - 17.7 g/dL 8.4(L) 8.6(L) 8.1(L)   TSAT 15 - 46 % - - 32   Ferritin 26 - 388 ng/mL - - 654(H)       BP Readings from Last 3 Encounters:   10/20/21 123/79   10/14/21 110/68   21 128/82     Wt Readings from Last 2 Encounters:   10/20/21 226 lb 9.6 oz (102.8 kg)   10/14/21 225 lb (102.1 kg)     Current Outpatient Medications   Medication Sig Dispense Refill     amLODIPine (NORVASC) 5 MG tablet TAKE 1 TABLET BY MOUTH AT BEDTIME AT NIGHT 90 tablet 0     calcium gluconate 500 MG tablet Take 500 mg by mouth 2 times daily 600mg daily       CELLCEPT (BRAND) 250 MG capsule Take 3 capsules (750 mg) by mouth 2 times daily for 12 days For COVID-19 Dx 66 capsule 0     CELLCEPT (BRAND) 250 MG capsule Take 4 capsules (1,000 mg) by mouth 2 times daily 240 capsule 11     cephALEXin (KEFLEX) 500 MG capsule Take 500 mg by mouth 2 times daily       cholecalciferol 2000 units TABS Take 2,000 Units by mouth daily 90 tablet 3     Ferrous Gluconate 324 (37.5 Fe) MG TABS Take by mouth daily       losartan (COZAAR) 25 MG tablet Take 1 tablet by mouth once daily 90 tablet 0     methylPREDNISolone (MEDROL DOSEPAK) 4 MG tablet therapy pack Follow Package Directions 21 tablet 2     NEORAL (BRAND) 25 MG capsule TAKE 3 CAPSULES BY MOUTH EVERY MORNING AND TAKE 4 CAPSULES  BY MOUTH EVERY EVENING (TOTAL DAILY DOSE: 75MG IN THE MORNING AND 100MG IN THE EVENING) 210 capsule 11     sodium bicarbonate 650 MG tablet Take 3 tablets (1,950 mg) by mouth 2 times daily 540 tablet 3     ASSESSMENT:  Hgb Not at goal/Initiation of therapy   Ferritin: At goal (>100ng/mL)  TSat: at goal >30%  Iron regimen recommended: NA  Recommended CINDY regimen: Retacrit    Blood Pressure: Stable    PLAN:  1. Called Miguel Angel today for enrollment in Anemia Management Service.  2.  Discussed:  anemia overview, monitoring service and goal hemoglobin range and rationale and risks of CINDY blood clots, stroke and increase in blood pressure  3. Dose location: at home  4. Labs: [place]  5. Pharmacy: [facility]an to start Aranesp on 1/10/22.   LVM for Miguel Angel    Spoke with Miguel Angel, He preres to dose CINDY at home.  Labs will be drawn at [facility].  [facility]    Next call date:  1/11/22    Janna Louis RN   Anemia Services  [facility]  [address] [place] [address]   [email or web address]   Office : [phone]  Fax: [phone]     and second heart sounds normal. No murmurs.  RESPIRATORY: No rhonchi or crepitations.  ABDOMEN:  Soft and nontender. No hepatosplenomegaly appreciated.  No inguinal lymphadenopathy.  EXTREMITIES: No pedal edema. No calf tenderness.  NEURO: No cranial nerve paralysis or motor deficits.  PAIN ASSESSMENT: 0  MUSCULOSKELETAL: Unremarkable.  DISTRESS SCORE: 0  PSYCH: Does not appear anxious or depressed.     LABORATORY:   Date 2/20/2019   Time  10:09 AM 3:01 PM   CBC           WBC (10^3/uL)   [C] 6.70       NEUT% (%)   [C] 57       NEUT (10^3/uL)   [C] 3.80       HGB (g/dL)   [C] 12.50       HCT (%)   [C] 38.80       PLT (10^3/uL)   [C] 170     Chemistry           SODIUM (mmol/L)   142.00       POTASSIUM (mmol/L)   3.90       GLUCOSE (mg/dL)   148.00 H       UREA NITROGEN (BUN) (mg/dL)   12.00       CREATININE (mg/dL)   0.84       CrCl (C&G) (ml/min)   105.29       CrCl (J) (ml/min)   65.6       AST (Unit/l)   34.00       ALT (Unit/l)   26.00       CALCIUM (mg/dL)   8.80       ALKALINE PHOSPHATASE (Unit/l)   106     Tumor Markers           CEA (ng/ml)         Other Labs           RBC (10^6/uL)   [C] 4.41       LYMPH (10^3/uL)   [C] 2.10       LYMPH% (%)   [C] 30.90       MONO% (%)   [C] 8.80       EOS (10^3/uL)   [C] 0.20       CHLORIDE (mmol/L)   110.00       BILIRUBIN, TOTAL (mg/dL)   0.50       MONO (10^3/uL)   [C] 0.60       EOS% (%)   [C] 2.50       Urine Protein/dipstick     Negative     BASO% (%)   [C] 0.40       BASO (10^3/uL)   [C] 0.00       MCV (fml)   [C] 88.0       MCH (pg)   [C] 28.3       MCHC (g/dL)   [C] 32.2       RDW-SD (fml)   [C] 46.9       RDW-CV (%)   [C] 14.9       MPV (fml)   [C] 9.5       Urine Protein (mg/dL) NEGATIVE         eGFR NON-AFR. AMERICAN (mL/min/1.73m2)   72       eGFR AFRICAN AMERICAN (mL/min/1.73m2)   84       BUN/CREATININE RATIO ((calc))   NOT APPLICABLE       CARBON DIOXIDE (mmol/L)   18 L       PROTEIN, TOTAL (g/dL)   6.8       ALBUMIN (g/dL)   3.7       GLOBULIN (g/dL (calc))    3.1       ALBUMIN/GLOBULIN RATIO ((calc))   1.2       COLOR   [C] YELLOW       APPEARANCE   [C] CLEAR       BILIRUBIN   [C] NEGATIVE       KETONES   [C] NEGATIVE       SPECIFIC GRAVITY   [C] 1.008       OCCULT BLOOD   [C] NEGATIVE       PH   [C] 6.0       PROTEIN   [C] NEGATIVE       NITRITE   [C] NEGATIVE       LEUKOCYTE ESTERASE   [C] NEGATIVE       WBC (/HPF)   [C] NONE SEEN       RBC (/HPF)   [C] NONE SEEN       SQUAMOUS EPITHELIAL CELLS (/HPF)   [C] NONE SEEN       BACTERIA (/HPF)   [C] NONE SEEN       HYALINE CAST (/LPF)   [C] NONE SEEN       GLUCOSE   [C] NEGATIVE       CULTURE     SEE NOTE  \.br\ CULTURE, URINE, ROUTINE \.br\ \.br\ MICRO NUMBER: [id number]\.br\ TEST STATUS: FINAL\.br\ SPECIMEN SOURCE: URINE\.br\ SPECIMEN QUALITY: ADEQUATE\.br\ RESULT: 50,000-100,000 CFU/mL of\.br\ \.br\ The organism has been confirmed as an ESBL \.br\ . Klebsiella pneumoniae\.br\ 50,000-100,000 CFU/mL of Lactobacillus species\.br\ May represent colonizers from external and\.br\ internal genitalia. No further testing (including\.br\ susceptibility) will be performed.\.br\\.br\ K.pneumoniae\.br\ ----------------\.br\ INT SIRI\.br\ AMOX/CLAVULANATE I 16\.br\ AMPICILLIN R >=32 **1\.br\ AMP/SULBACTAM R >=32\.br\ CEFAZOLIN R >=64 **2\.br\ CEFEPIME S 2\.br\ CEFTRIAXONE R >=64\.br\ CIPROFLOXACIN R >=4\.br\ ERTAPENEM S <=0.5\.br\ GENTAMICIN R >=16\.br\ IMIPENEM S <=0.25\.br\ LEVOFLOXACIN R >=8\.br\ NITROFURANTOIN I 64\.br\ PIP/TAZOBACTAM S 16\.br\ TOBRAMYCIN R >=16\.br\ TRIMETHOPRIM/SULFA R >=320\.br\ ESBL RESULT: * **3\.br\\.br\S=Susceptible I=Intermediate R=Resistant * = Not Tested\.br\NR = Not Reported **NN = See Therapy Comments\.br\\.br\\.br\THERAPY COMMENTS\.br\\.br\ Note 1:\.br\ Extended spectrum beta-lactamase (ESBL) producing\.br\ organisms demonstrate decreased activity with\.br\ penicillins, cephalosporins and aztreonam.\.br\\.br\ Note 2:\.br\ For uncomplicated UTI caused by E. coli,\.br\ K. pneumoniae or P.  mirabilis: Cefazolin is\.br\ susceptible if SIRI <32 mcg/mL and predicts\.br\ susceptible to the oral agents cefaclor, cefdinir,\.br\ cefpodoxime, cefprozil, cefuroxime, cephalexin\.br\ and loracarbef.\.br\\.br\ Note 3:\.br\ \.br\ The organism has been confirmed as an ESBL \.br\ .\.br\  CB\.br\[facility]\.br\[address]\.br\[place] [address]\.br\BEBA SIDHU MD  A       ASSESSMENT:    1. 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  2. Urinary tract infection.    PLAN:    1. CBC and CMP  today.  She had a CEA last week.   2. She will start FOLFIRI/Avastin beginning today.  She has had chemo teaching.  Will need adjustment of chemo orders based on her BSA.  3. Chemotherapy 2 weeks from today.   4. Follow-up in 4 weeks.  5. Send a follow-up urine for UA and CNS today.         Electronically signed  MODESTA BRIGGS MD  02/20/19; 10:01 AM

## 2022-01-06 NOTE — TELEPHONE ENCOUNTER
Central Prior Authorization Team   Phone: [phone]      EPA REQUEST:    epoetin beto-epbx (RETACRIT) 51111 UNIT/ML injection

## 2022-01-06 NOTE — TELEPHONE ENCOUNTER
PA Initiation    Medication: retacrit 02408- pa pending  Insurance Company: Other (see comments)  Pharmacy Filling the Rx: [facility] - [place], [place] - [address]  Filling Pharmacy Phone: [phone]  Filling Pharmacy Fax: [phone]  Start Date: 1/6/2022    Waiting on additional question set to come back

## 2022-01-07 NOTE — TELEPHONE ENCOUNTER
Prior Authorization Approval    Authorization Effective Date:    Authorization Expiration Date:    Medication: epoetin beto-epbx (RETACRIT) 29789 UNIT/ML injection- pa approved  Approved Dose/Quantity: UD  Reference #:     Insurance Company: Other (see comments)  Expected CoPay:       CoPay Card Available:      Foundation Assistance Needed:    Which Pharmacy is filling the prescription (Not needed for infusion/clinic administered): [facility] - [place], [place] - [address]  Pharmacy Notified:  yes  Patient Notified:  yes

## 2022-01-07 NOTE — TELEPHONE ENCOUNTER
Prior Authorization Approval    Authorization Effective Date: 01072022   Authorization Expiration Date:  07062022  Medication: retacrit 80568- pa pending  Approved Dose/Quantity: UD  Reference #:     Insurance Company: Other (see comments)  Expected CoPay:       CoPay Card Available:      Foundation Assistance Needed:    Which Pharmacy is filling the prescription (Not needed for infusion/clinic administered): [facility] - [place], [place] - [address]  Pharmacy Notified:    Patient Notified:

## 2022-01-07 NOTE — TELEPHONE ENCOUNTER
MPA level at goal 1.0-3.5 on Cellcept 750 mg BID. Notification to Dr. Patino via Result Notes. Plan was to continue at 750 mg PO BID if at goal.        Ivanna Rivera RN, BSN  Solid Organ Transplant, Post Kidney and Pancreas  Transplant Care Coordinator  [phone]

## 2022-01-10 NOTE — TELEPHONE ENCOUNTER
CINDY was approved.  Delivery scheduled to arrive 1/12/22.    Janna Louis RN   Anemia Services  [address] [address]   [email or web address]   Office : [phone]  Fax: [phone]

## 2022-01-12 ENCOUNTER — TELEPHONE (OUTPATIENT)
Dept: PHARMACY | Facility: CLINIC | Age: 47
End: 2022-01-12
Payer: COMMERCIAL

## 2022-01-12 RX ORDER — MYCOPHENOLATE MOFETIL 250 MG/1
750 CAPSULE ORAL 2 TIMES DAILY
Qty: 180 CAPSULE | Refills: 11 | Status: SHIPPED | OUTPATIENT
Start: 2022-01-07 | End: 2022-01-20

## 2022-01-12 NOTE — TELEPHONE ENCOUNTER
Spoke with Miguel Angel. He confirmed he has continued taking the 750 mg BID as advised since MPA level is at goal. He received his CINDY shots arrived today and wanted to know if he was to go ahead and take today. Will have anemia services nurse call him to confirm.

## 2022-01-12 NOTE — TELEPHONE ENCOUNTER
Anemia Management Note  SUBJECTIVE/OBJECTIVE:  Referred by Dr. Lan Patino on 2021  Primary Diagnosis: Anemia in Chronic Kidney Disease (N18.4, D63.1)     Secondary Diagnosis:  Chronic Kidney Disease, Stage 4 (N18.4)   Hx of Kidney Tx; ,   Hgb goal range:  9-10  Epo/Darbo: Retacrit 10,000 units weeky for Hgb <10. AT HOME.    Iron regimen: NA  Labs : 1/3/2023  Recent CINDY use, transfusion, IV iron: NA  RX/TX plans : 2023     No history of stroke, MI and blood clots or cancers.     Contact:  No Consent to Communicate on File.     Anemia Latest Ref Rng & Units 2021   Hemoglobin 13.3 - 17.7 g/dL 8.4(L) 8.6(L) 8.1(L)   TSAT 15 - 46 % - - 32   Ferritin 26 - 388 ng/mL - - 654(H)     BP Readings from Last 3 Encounters:   10/20/21 123/79   10/14/21 110/68   21 128/82     Wt Readings from Last 2 Encounters:   10/20/21 226 lb 9.6 oz (102.8 kg)   10/14/21 225 lb (102.1 kg)           ASSESSMENT:  Hgb:Not at goal/Initiation of therapy  TSat: at goal >30% Ferritin: At goal (>100ng/mL)    PLAN:  Dose with procrit and RTC for hgb then procrit if needed in 1 week(s)    Orders needed to be renewed (for next follow-up date) in EPIC: None    Iron labs due:  End of 2022    Plan discussed with:  Miguel Angel      NEXT FOLLOW-UP DATE:  22    Janna Louis RN   Anemia Services  [address] [address]   [email or web address]   Office : [phone]  Fax: [phone]

## 2022-01-19 ENCOUNTER — TELEPHONE (OUTPATIENT)
Dept: PHARMACY | Facility: CLINIC | Age: 47
End: 2022-01-19
Payer: COMMERCIAL

## 2022-01-19 ENCOUNTER — TELEPHONE (OUTPATIENT)
Dept: TRANSPLANT | Facility: CLINIC | Age: 47
End: 2022-01-19
Payer: COMMERCIAL

## 2022-01-19 DIAGNOSIS — Z79.60 LONG-TERM USE OF IMMUNOSUPPRESSANT MEDICATION: ICD-10-CM

## 2022-01-19 DIAGNOSIS — Z94.0 KIDNEY REPLACED BY TRANSPLANT: ICD-10-CM

## 2022-01-19 DIAGNOSIS — Z94.0 KIDNEY TRANSPLANTED: Primary | ICD-10-CM

## 2022-01-19 NOTE — TELEPHONE ENCOUNTER
Follow-up with anemia management service:    Spoke with Miguel Angel, he will dose again today.   He will have labs drawn next week.     Anemia Latest Ref Rng & Units 8/19/2021 8/23/2021 12/31/2021 1/12/2022 1/19/2022   CINDY Dose - - - - 10,000 units 10,000 units   Hemoglobin 13.3 - 17.7 g/dL 8.4(L) 8.6(L) 8.1(L) - -   TSAT 15 - 46 % - - 32 - -   Ferritin 26 - 388 ng/mL - - 654(H) - -           Follow-up call date: 1/26/22    Janna Louis RN   Anemia Services  [address] [address]   [email or web address]   Office : [phone]  Fax: [phone]

## 2022-01-19 NOTE — TELEPHONE ENCOUNTER
Patient Call: Medication Refill  Route to LPN  Instruct the patient to first contact their pharmacy. If they have called their pharmacy and require further assistance, route to LPN.    [facility]   Pharmacy Location: mailorder- Rx was sent to [facility] 1/7/22 by mistake needs to be sent to [facility]   Name of Medication: CellceptWhen will the patient be out of this medication?: Greater than 3 days (Route standard priority)

## 2022-01-20 RX ORDER — MYCOPHENOLATE MOFETIL 250 MG/1
750 CAPSULE ORAL 2 TIMES DAILY
Qty: 180 CAPSULE | Refills: 11 | Status: SHIPPED | OUTPATIENT
Start: 2022-01-20 | End: 2023-01-12

## 2022-01-26 ENCOUNTER — TELEPHONE (OUTPATIENT)
Dept: PHARMACY | Facility: CLINIC | Age: 47
End: 2022-01-26

## 2022-01-26 ENCOUNTER — LAB (OUTPATIENT)
Dept: LAB | Facility: CLINIC | Age: 47
End: 2022-01-26
Payer: COMMERCIAL

## 2022-01-26 DIAGNOSIS — N18.4 ANEMIA OF CHRONIC RENAL FAILURE, STAGE 4 (SEVERE) (H): ICD-10-CM

## 2022-01-26 DIAGNOSIS — N18.4 CKD (CHRONIC KIDNEY DISEASE) STAGE 4, GFR 15-29 ML/MIN (H): ICD-10-CM

## 2022-01-26 DIAGNOSIS — D63.1 ANEMIA OF CHRONIC RENAL FAILURE, STAGE 4 (SEVERE) (H): ICD-10-CM

## 2022-01-26 LAB
HCT VFR BLD AUTO: 31.3 % (ref 40–53)
HGB BLD-MCNC: 9.5 G/DL (ref 13.3–17.7)

## 2022-01-26 PROCEDURE — 36415 COLL VENOUS BLD VENIPUNCTURE: CPT

## 2022-01-26 PROCEDURE — 85014 HEMATOCRIT: CPT

## 2022-01-26 PROCEDURE — 85018 HEMOGLOBIN: CPT

## 2022-01-26 NOTE — TELEPHONE ENCOUNTER
Anemia Management Note  SUBJECTIVE/OBJECTIVE:  Referred by Dr. Lan Patino on 2021  Primary Diagnosis: Anemia in Chronic Kidney Disease (N18.4, D63.1)     Secondary Diagnosis:  Chronic Kidney Disease, Stage 4 (N18.4)   Hx of Kidney Tx; 2010  Hgb goal range:  9-10  Epo/Darbo: Retacrit 10,000 units weeky for Hgb <10. AT HOME.    Iron regimen: NA  Labs : 1/3/2023  Recent CINDY use, transfusion, IV iron: NA  RX/TX plans : 2023     No history of stroke, MI and blood clots or cancers.     Contact:  No Consent to Communicate on File.     Anemia Latest Ref Rng & Units 2021   CINDY Dose - - - - 10,000 units 10,000 units 10,000 units   Hemoglobin 13.3 - 17.7 g/dL 8.4(L) 8.6(L) 8.1(L) - - 9.5(L)   TSAT 15 - 46 % - - 32 - - -   Ferritin 26 - 388 ng/mL - - 654(H) - - -     BP Readings from Last 3 Encounters:   10/20/21 123/79   10/14/21 110/68   21 128/82     Wt Readings from Last 2 Encounters:   10/20/21 226 lb 9.6 oz (102.8 kg)   10/14/21 225 lb (102.1 kg)           ASSESSMENT:  Hgb:At goal - recommend dose  TSat: at goal >30% Ferritin: At goal (>100ng/mL)    PLAN:  Dose with procrit and RTC for hgb then procrit if needed in 1 week(s)    Orders needed to be renewed (for next follow-up date) in EPIC: None    Iron labs due:  2022    Plan discussed with:  PEACE for Miguel Angel      NEXT FOLLOW-UP DATE:  22    Janna Louis RN   Anemia Services  [address] [address]   [email or web address]   Office : [phone]  Fax: [phone]

## 2022-02-02 ENCOUNTER — TELEPHONE (OUTPATIENT)
Dept: PHARMACY | Facility: CLINIC | Age: 47
End: 2022-02-02
Payer: COMMERCIAL

## 2022-02-02 NOTE — TELEPHONE ENCOUNTER
Follow-up with anemia management service:    LVM for Miguel Angel to check in. Labs are due. Should have 1 dose of Retacrit left at home.     Anemia Latest Ref Rng & Units 8/19/2021 8/23/2021 12/31/2021 1/12/2022 1/19/2022 1/26/2022 2/2/2022   CINDY Dose - - - - 10,000 units 10,000 units 10,000 units 10,000 units   Hemoglobin 13.3 - 17.7 g/dL 8.4(L) 8.6(L) 8.1(L) - - 9.5(L) -   TSAT 15 - 46 % - - 32 - - - -   Ferritin 26 - 388 ng/mL - - 654(H) - - - -         Follow-up call date: /23/22    Janna Louis RN   Anemia Services  [address] [place] [address]   [email or web address]   Office : [phone]  Fax: [phone]

## 2022-02-03 ENCOUNTER — LAB (OUTPATIENT)
Dept: LAB | Facility: CLINIC | Age: 47
End: 2022-02-03
Payer: COMMERCIAL

## 2022-02-03 DIAGNOSIS — D63.1 ANEMIA OF CHRONIC RENAL FAILURE, STAGE 4 (SEVERE) (H): ICD-10-CM

## 2022-02-03 DIAGNOSIS — N18.4 CKD (CHRONIC KIDNEY DISEASE) STAGE 4, GFR 15-29 ML/MIN (H): ICD-10-CM

## 2022-02-03 DIAGNOSIS — N18.4 ANEMIA OF CHRONIC RENAL FAILURE, STAGE 4 (SEVERE) (H): ICD-10-CM

## 2022-02-03 LAB
FERRITIN SERPL-MCNC: 311 NG/ML (ref 26–388)
HCT VFR BLD AUTO: 30.3 % (ref 40–53)
HGB BLD-MCNC: 9.4 G/DL (ref 13.3–17.7)
IRON SATN MFR SERPL: 38 % (ref 15–46)
IRON SERPL-MCNC: 98 UG/DL (ref 35–180)
TIBC SERPL-MCNC: 260 UG/DL (ref 240–430)

## 2022-02-03 PROCEDURE — 85014 HEMATOCRIT: CPT

## 2022-02-03 PROCEDURE — 82728 ASSAY OF FERRITIN: CPT

## 2022-02-03 PROCEDURE — 36415 COLL VENOUS BLD VENIPUNCTURE: CPT

## 2022-02-03 PROCEDURE — 85018 HEMOGLOBIN: CPT

## 2022-02-03 PROCEDURE — 83550 IRON BINDING TEST: CPT

## 2022-02-03 NOTE — TELEPHONE ENCOUNTER
Miguel Angel called. He will have labs drawn this afternoon.  Dose is due 2/9/22.    Janna Louis RN   Anemia Services  [address] [address]   [email or web address]   Office : [phone]  Fax: [phone]

## 2022-02-03 NOTE — TELEPHONE ENCOUNTER
Spoke with Miguel Angel, he took his last Retacrit dose yesterday.  He will have labs drawn 2/7 or 2/8, and then refill Retacrit if needed.     Janna Louis RN   Anemia Services  [address] [address]   [email or web address]   Office : [phone]  Fax: [phone]

## 2022-02-07 ENCOUNTER — TELEPHONE (OUTPATIENT)
Dept: PHARMACY | Facility: CLINIC | Age: 47
End: 2022-02-07
Payer: COMMERCIAL

## 2022-02-07 NOTE — TELEPHONE ENCOUNTER
Follow-up with anemia management service:    Spoke with Miguel Angel, he will the pharmacy to refill his Retacrit. Dose is due Wednesday.     Anemia Latest Ref Rng & Units 8/23/2021 12/31/2021 1/12/2022 1/19/2022 1/26/2022 2/2/2022 2/3/2022   CINDY Dose - - - 10,000 units 10,000 units 10,000 units 10,000 units -   Hemoglobin 13.3 - 17.7 g/dL 8.6(L) 8.1(L) - - 9.5(L) - 9.4(L)   TSAT 15 - 46 % - 32 - - - - 38   Ferritin 26 - 388 ng/mL - 654(H) - - - - 311         Follow-up call date: 2/10/22    Janna Louis RN   Anemia Services  [address] [address]   [email or web address]   Office : [phone]  Fax: [phone]

## 2022-02-10 ENCOUNTER — TELEPHONE (OUTPATIENT)
Dept: PHARMACY | Facility: CLINIC | Age: 47
End: 2022-02-10
Payer: COMMERCIAL

## 2022-02-10 NOTE — TELEPHONE ENCOUNTER
Anemia Management Note  SUBJECTIVE/OBJECTIVE:  Referred by Dr. Lan Patino on 2021  Primary Diagnosis: Anemia in Chronic Kidney Disease (N18.4, D63.1)     Secondary Diagnosis:  Chronic Kidney Disease, Stage 4 (N18.4)   Hx of Kidney Tx; ,   Hgb goal range:  9-10  Epo/Darbo: Retacrit 10,000 units weeky for Hgb <10. AT HOME.    Iron regimen: NA  Labs : 1/3/2023  Recent CINDY use, transfusion, IV iron: NA  RX/TX plans : 2023     No history of stroke, MI and blood clots or cancers.     Contact:  No Consent to Communicate on File.     Anemia Latest Ref Rng & Units 2021 2022 2022 2022 2022 2/3/2022 2022   CINDY Dose - - 10,000 units 10,000 units 10,000 units 10,000 units - 10,000 units   Hemoglobin 13.3 - 17.7 g/dL 8.1(L) - - 9.5(L) - 9.4(L) -   TSAT 15 - 46 % 32 - - - - 38 -   Ferritin 26 - 388 ng/mL 654(H) - - - - 311 -     BP Readings from Last 3 Encounters:   10/20/21 123/79   10/14/21 110/68   21 128/82     Wt Readings from Last 2 Encounters:   10/20/21 226 lb 9.6 oz (102.8 kg)   10/14/21 225 lb (102.1 kg)           ASSESSMENT:  Hgb:At goal - recommend dose  TSat: at goal >30% Ferritin: At goal (>100ng/mL)    PLAN:  Dose with procrit and RTC for hgb then procrit if needed in 1 week(s)    Orders needed to be renewed (for next follow-up date) in EPIC: None    Iron labs due:  4 weeks    Plan discussed with:  No call today      NEXT FOLLOW-UP DATE:  22    Janna Louis RN   Anemia Services  [address] [address]   [email or web address]   Office : [phone]  Fax: [phone]

## 2022-02-17 ENCOUNTER — TELEPHONE (OUTPATIENT)
Dept: PHARMACY | Facility: CLINIC | Age: 47
End: 2022-02-17
Payer: COMMERCIAL

## 2022-02-17 NOTE — TELEPHONE ENCOUNTER
Follow-up with anemia management service:    LVM for Miguel Angel to check in re Anemia Services.  He is due for labs.   Did he dose his Procrit on 2/16?    Anemia Latest Ref Rng & Units 12/31/2021 1/12/2022 1/19/2022 1/26/2022 2/2/2022 2/3/2022 2/9/2022   CINDY Dose - - 10,000 units 10,000 units 10,000 units 10,000 units - 10,000 units   Hemoglobin 13.3 - 17.7 g/dL 8.1(L) - - 9.5(L) - 9.4(L) -   TSAT 15 - 46 % 32 - - - - 38 -   Ferritin 26 - 388 ng/mL 654(H) - - - - 311 -         Follow-up call date: 2/23/22    Janna Louis RN   Anemia Services  [address] [address]   [email or web address]   Office : [phone]  Fax: [phone]

## 2022-02-18 DIAGNOSIS — I15.1 HTN, KIDNEY TRANSPLANT RELATED: ICD-10-CM

## 2022-02-18 DIAGNOSIS — Z94.0 HTN, KIDNEY TRANSPLANT RELATED: ICD-10-CM

## 2022-02-21 RX ORDER — AMLODIPINE BESYLATE 5 MG/1
TABLET ORAL
Qty: 30 TABLET | Refills: 0 | Status: SHIPPED | OUTPATIENT
Start: 2022-02-21 | End: 2022-04-25

## 2022-02-21 RX ORDER — LOSARTAN POTASSIUM 25 MG/1
TABLET ORAL
Qty: 30 TABLET | Refills: 0 | Status: SHIPPED | OUTPATIENT
Start: 2022-02-21 | End: 2022-04-08

## 2022-02-21 NOTE — TELEPHONE ENCOUNTER
"Routing refill request to provider for review/approval because:  Labs out of range:  cr  Patient needs to be seen because:  Overdue for follow up with pcp    Medication pended for approval, 30 day supply with reminder.     Requested Prescriptions   Pending Prescriptions Disp Refills     losartan (COZAAR) 25 MG tablet [Pharmacy Med Name: Losartan Potassium 25 MG Oral Tablet] 30 tablet 0     Sig: Take 1 tablet by mouth once daily       Angiotensin-II Receptors Failed - 2/18/2022 12:21 PM        Failed - Normal serum creatinine on file in past 12 months     Recent Labs   Lab Test 12/31/21  1020   CR 3.06*       Ok to refill medication if creatinine is low          Passed - Last blood pressure under 140/90 in past 12 months     BP Readings from Last 3 Encounters:   10/20/21 123/79   10/14/21 110/68   08/23/21 128/82                 Passed - Recent (12 mo) or future (30 days) visit within the authorizing provider's specialty     Patient has had an office visit with the authorizing provider or a provider within the authorizing providers department within the previous 12 mos or has a future within next 30 days. See \"Patient Info\" tab in inbasket, or \"Choose Columns\" in Meds & Orders section of the refill encounter.              Passed - Medication is active on med list        Passed - Patient is age 18 or older        Passed - Normal serum potassium on file in past 12 months     Recent Labs   Lab Test 12/31/21  1020   POTASSIUM 4.5                       amLODIPine (NORVASC) 5 MG tablet [Pharmacy Med Name: amLODIPine Besylate 5 MG Oral Tablet] 30 tablet 0     Sig: TAKE 1 TABLET BY MOUTH AT BEDTIME AT NIGHT       Calcium Channel Blockers Protocol  Failed - 2/18/2022 12:21 PM        Failed - Normal serum creatinine on file in past 12 months     Recent Labs   Lab Test 12/31/21  1020   CR 3.06*       Ok to refill medication if creatinine is low          Passed - Blood pressure under 140/90 in past 12 months     BP Readings from " "Last 3 Encounters:   10/20/21 123/79   10/14/21 110/68   08/23/21 128/82                 Passed - Recent (12 mo) or future (30 days) visit within the authorizing provider's specialty     Patient has had an office visit with the authorizing provider or a provider within the authorizing providers department within the previous 12 mos or has a future within next 30 days. See \"Patient Info\" tab in inbasket, or \"Choose Columns\" in Meds & Orders section of the refill encounter.              Passed - Medication is active on med list        Passed - Patient is age 18 or older                   "

## 2022-02-23 ENCOUNTER — TELEPHONE (OUTPATIENT)
Dept: PHARMACY | Facility: CLINIC | Age: 47
End: 2022-02-23
Payer: COMMERCIAL

## 2022-02-23 NOTE — TELEPHONE ENCOUNTER
Anemia Management Note  SUBJECTIVE/OBJECTIVE:  Referred by Dr. Lan Patino on 2021  Primary Diagnosis: Anemia in Chronic Kidney Disease (N18.4, D63.1)     Secondary Diagnosis:  Chronic Kidney Disease, Stage 4 (N18.4)   Hx of Kidney Tx; ,   Hgb goal range:  9-10  Epo/Darbo: Retacrit 10,000 units weeky for Hgb <10. AT HOME.    Iron regimen: NA  Labs : 1/3/2023  Recent CINDY use, transfusion, IV iron: NA  RX/TX plans : 2023     No history of stroke, MI and blood clots or cancers.     Contact:  No Consent to Communicate on File.        Anemia Latest Ref Rng & Units 2022 2022 2022 2/3/2022 2022 2022 2022   CINDY Dose - 10,000 units 10,000 units 10,000 units - 10,000 units 10,000 units 10,000 units   Hemoglobin 13.3 - 17.7 g/dL - 9.5(L) - 9.4(L) - - -   TSAT 15 - 46 % - - - 38 - - -   Ferritin 26 - 388 ng/mL - - - 311 - - -     BP Readings from Last 3 Encounters:   10/20/21 123/79   10/14/21 110/68   21 128/82     Wt Readings from Last 2 Encounters:   10/20/21 226 lb 9.6 oz (102.8 kg)   10/14/21 225 lb (102.1 kg)           PLAN:  Dosed with procrit  and again today.  He has no doses left at home after today's dose.  Instructed Miguel Angel that we need a Hgb drawn before he refills his Procrit.  He will try and get into the [place] clinic this week.      Orders needed to be renewed (for next follow-up date) in EPIC: None      Plan discussed with:  Miguel Angel      NEXT FOLLOW-UP DATE:  22  Was Hgb drawn?  Does ne need to refill Procrit.     Janna Louis RN   Anemia Services  [address] [address]   [email or web address]   Office : [phone]  Fax: [phone]

## 2022-02-23 NOTE — TELEPHONE ENCOUNTER
Follow-up with anemia management service:    2nd message left for Miguel Angel to check in re Anemia Services.  He is due for a Hgb to be drawn.   Has he been dosing his Retacrit?    Anemia Latest Ref Rng & Units 12/31/2021 1/12/2022 1/19/2022 1/26/2022 2/2/2022 2/3/2022 2/9/2022   CINDY Dose - - 10,000 units 10,000 units 10,000 units 10,000 units - 10,000 units   Hemoglobin 13.3 - 17.7 g/dL 8.1(L) - - 9.5(L) - 9.4(L) -   TSAT 15 - 46 % 32 - - - - 38 -   Ferritin 26 - 388 ng/mL 654(H) - - - - 311 -           Follow-up call date: 3/2/22    Janna Louis RN   Anemia Services  [address] [address]   [email or web address]   Office : [phone]  Fax: [phone]

## 2022-02-28 ENCOUNTER — TELEPHONE (OUTPATIENT)
Dept: NEPHROLOGY | Facility: CLINIC | Age: 47
End: 2022-02-28
Payer: COMMERCIAL

## 2022-02-28 ENCOUNTER — TELEPHONE (OUTPATIENT)
Dept: PHARMACY | Facility: CLINIC | Age: 47
End: 2022-02-28
Payer: COMMERCIAL

## 2022-02-28 NOTE — TELEPHONE ENCOUNTER
[facility] Prior Authorization Team   Phone: [phone]  Fax: [phone]    PA Initiation    Medication: Cellcept (Initiated)  Insurance Company: [facility] - Phone [phone] Fax [phone]  Pharmacy Filling the Rx: [facility] - [place], [place] - [address]  Filling Pharmacy Phone: [phone]  Filling Pharmacy Fax: [phone]  Start Date: 2/28/2022

## 2022-02-28 NOTE — TELEPHONE ENCOUNTER
Prior Authorization Approval    Authorization Effective Date: 2/28/2022  Authorization Expiration Date: 2/28/2023  Medication: Neoral (Aapproved)  Approved Dose/Quantity:210 for 30day supply   Reference #: [id number]   Insurance Company: [employer] [place] - Phone [phone] Fax [phone]  Expected CoPay: $0     CoPay Card Available: Yes    Foundation Assistance Needed:    Which Pharmacy is filling the prescription (Not needed for infusion/clinic administered): [facility] - [place], [place] - [address]   Pharmacy Notified: Yes  Patient Notified: Yes

## 2022-02-28 NOTE — TELEPHONE ENCOUNTER
Follow-up with anemia management service:    Labs have not yet been scheduled.  Miguel Angel does not have any doses of Procrit at home. Need updated Hgb level.     Anemia Latest Ref Rng & Units 1/19/2022 1/26/2022 2/2/2022 2/3/2022 2/9/2022 2/16/2022 2/23/2022   CINDY Dose - 10,000 units 10,000 units 10,000 units - 10,000 units 10,000 units 10,000 units   Hemoglobin 13.3 - 17.7 g/dL - 9.5(L) - 9.4(L) - - -   TSAT 15 - 46 % - - - 38 - - -   Ferritin 26 - 388 ng/mL - - - 311 - - -         Follow-up call date: 3/2/22    Janna Louis RN   Anemia Services  [address] [address]   [email or web address]   Office : [phone]  Fax: [phone]'

## 2022-02-28 NOTE — TELEPHONE ENCOUNTER
Prior Authorization Approval    Authorization Effective Date: 2/28/2022  Authorization Expiration Date: 2/28/2023  Medication: Cellcept (Approved)  Approved Dose/Quantity:180 for 30 day supply  Reference #: [id number]   Insurance Company: [employer] [place] - Phone [phone] Fax [phone]  Expected CoPay: $15     CoPay Card Available: Yes    Foundation Assistance Needed:    Which Pharmacy is filling the prescription (Not needed for infusion/clinic administered): [facility] - [place], [place] - [address]  Pharmacy Notified: Yes  Patient Notified: Yes

## 2022-02-28 NOTE — TELEPHONE ENCOUNTER
[facility] Prior Authorization Team   Phone: [phone]  Fax: [phone]    PA Initiation    Medication: Neoral (Initiated)  Insurance Company: [facility] - Phone [phone] Fax [phone]  Pharmacy Filling the Rx: [facility] - [place], [place] - [address]  Filling Pharmacy Phone: [phone]  Filling Pharmacy Fax: [phone]  Start Date: 2/28/2022

## 2022-03-01 ENCOUNTER — LAB (OUTPATIENT)
Dept: LAB | Facility: CLINIC | Age: 47
End: 2022-03-01
Payer: COMMERCIAL

## 2022-03-01 DIAGNOSIS — N18.4 CKD (CHRONIC KIDNEY DISEASE) STAGE 4, GFR 15-29 ML/MIN (H): ICD-10-CM

## 2022-03-01 DIAGNOSIS — D63.1 ANEMIA OF CHRONIC RENAL FAILURE, STAGE 4 (SEVERE) (H): ICD-10-CM

## 2022-03-01 DIAGNOSIS — N18.4 ANEMIA OF CHRONIC RENAL FAILURE, STAGE 4 (SEVERE) (H): ICD-10-CM

## 2022-03-01 LAB
HCT VFR BLD AUTO: 29.4 % (ref 40–53)
HGB BLD-MCNC: 9.2 G/DL (ref 13.3–17.7)

## 2022-03-01 PROCEDURE — 36415 COLL VENOUS BLD VENIPUNCTURE: CPT

## 2022-03-01 PROCEDURE — 85018 HEMOGLOBIN: CPT

## 2022-03-01 PROCEDURE — 85014 HEMATOCRIT: CPT

## 2022-03-02 ENCOUNTER — TELEPHONE (OUTPATIENT)
Dept: PHARMACY | Facility: CLINIC | Age: 47
End: 2022-03-02
Payer: COMMERCIAL

## 2022-03-02 NOTE — TELEPHONE ENCOUNTER
Follow-up with anemia management service:    Spoke with Miguel Angel. Slight drop in Hgb. Miguel Angel needs to call and order the Retacrit. He does not have any doses at home.  He will call today.  The plan is to dose this week, and again next week, and then have labs drawn.     Anemia Latest Ref Rng & Units 1/26/2022 2/2/2022 2/3/2022 2/9/2022 2/16/2022 2/23/2022 3/1/2022   CINDY Dose - 10,000 units 10,000 units - 10,000 units 10,000 units 10,000 units -   Hemoglobin 13.3 - 17.7 g/dL 9.5(L) - 9.4(L) - - - 9.2(L)   TSAT 15 - 46 % - - 38 - - - -   Ferritin 26 - 388 ng/mL - - 311 - - - -           Follow-up call date: 3/9/22  Did he dose last week?    Janna Louis RN   Anemia Services  [address] [address]   [email or web address]   Office : [phone]  Fax: [phone]

## 2022-03-10 ENCOUNTER — TELEPHONE (OUTPATIENT)
Dept: PHARMACY | Facility: CLINIC | Age: 47
End: 2022-03-10
Payer: COMMERCIAL

## 2022-03-10 NOTE — TELEPHONE ENCOUNTER
Anemia Management Note  SUBJECTIVE/OBJECTIVE:  Referred by Dr. Lan Patino on 2021  Primary Diagnosis: Anemia in Chronic Kidney Disease (N18.4, D63.1)     Secondary Diagnosis:  Chronic Kidney Disease, Stage 4 (N18.4)   Hx of Kidney Tx; ,   Hgb goal range:  9-10  Epo/Darbo: Retacrit 10,000 units weeky for Hgb <10. AT HOME.    Iron regimen: NA  Labs : 1/3/2023  Recent CINDY use, transfusion, IV iron: NA  RX/TX plans : 2023     No history of stroke, MI and blood clots or cancers.     Contact:  No Consent to Communicate on File.     Anemia Latest Ref Rng & Units 2/3/2022 2022 2022 2022 3/1/2022 3/3/2022 3/10/2022   CINDY Dose - - 10,000 units 10,000 units 10,000 units - 10,000 units 10,000 units   Hemoglobin 13.3 - 17.7 g/dL 9.4(L) - - - 9.2(L) - -   TSAT 15 - 46 % 38 - - - - - -   Ferritin 26 - 388 ng/mL 311 - - - - - -     BP Readings from Last 3 Encounters:   10/20/21 123/79   10/14/21 110/68   21 128/82     Wt Readings from Last 2 Encounters:   10/20/21 226 lb 9.6 oz (102.8 kg)   10/14/21 225 lb (102.1 kg)           ASSESSMENT:  Hgb:At goal - recommend dose  TSat: at goal >30% Ferritin: At goal (>100ng/mL)    PLAN:  Dose with procrit and RTC for hgb then procrit if needed in 1 week(s). Labs will be drawn on 3/11/22 at 4pm.     Orders needed to be renewed (for next follow-up date) in EPIC: None    Iron labs due:  Due    Plan discussed with:  Miguel Angel      NEXT FOLLOW-UP DATE:  3/17/22    Janna Louis RN   Anemia Services  [address] [address]   [email or web address]   Office : [phone]  Fax: [phone]

## 2022-03-11 ENCOUNTER — TELEPHONE (OUTPATIENT)
Dept: PHARMACY | Facility: CLINIC | Age: 47
End: 2022-03-11
Payer: COMMERCIAL

## 2022-03-11 ENCOUNTER — LAB (OUTPATIENT)
Dept: LAB | Facility: CLINIC | Age: 47
End: 2022-03-11
Payer: COMMERCIAL

## 2022-03-11 DIAGNOSIS — N18.4 CKD (CHRONIC KIDNEY DISEASE) STAGE 4, GFR 15-29 ML/MIN (H): ICD-10-CM

## 2022-03-11 DIAGNOSIS — N18.4 ANEMIA OF CHRONIC RENAL FAILURE, STAGE 4 (SEVERE) (H): ICD-10-CM

## 2022-03-11 DIAGNOSIS — D63.1 ANEMIA OF CHRONIC RENAL FAILURE, STAGE 4 (SEVERE) (H): ICD-10-CM

## 2022-03-11 LAB
FERRITIN SERPL-MCNC: 256 NG/ML (ref 26–388)
HCT VFR BLD AUTO: 30.7 % (ref 40–53)
HGB BLD-MCNC: 9.6 G/DL (ref 13.3–17.7)
IRON SATN MFR SERPL: 26 % (ref 15–46)
IRON SERPL-MCNC: 64 UG/DL (ref 35–180)
TIBC SERPL-MCNC: 249 UG/DL (ref 240–430)

## 2022-03-11 PROCEDURE — 82728 ASSAY OF FERRITIN: CPT

## 2022-03-11 PROCEDURE — 83550 IRON BINDING TEST: CPT

## 2022-03-11 PROCEDURE — 85018 HEMOGLOBIN: CPT

## 2022-03-11 PROCEDURE — 85014 HEMATOCRIT: CPT

## 2022-03-11 PROCEDURE — 36415 COLL VENOUS BLD VENIPUNCTURE: CPT

## 2022-03-11 NOTE — TELEPHONE ENCOUNTER
Follow-up with anemia management service:    Pt called in. Has labs planned this afternoon. He did not get supplies (syringe/needles) with his Retacrit injection earlier this week, so he has not given his dose this week as planned. Order for those supplies was set up this morning, for  before 3:30 today. Pt wondered about cancelling his lab appt. After discussion, pt will still get labs done today and will give dose if appropriate after he's notified re: results via [employer].     Anemia Latest Ref Rng & Units 2/3/2022 2/9/2022 2/16/2022 2/23/2022 3/1/2022 3/3/2022 3/10/2022   CINDY Dose - - 10,000 units 10,000 units 10,000 units - 10,000 units 10,000 units   Hemoglobin 13.3 - 17.7 g/dL 9.4(L) - - - 9.2(L) - -   TSAT 15 - 46 % 38 - - - - - -   Ferritin 26 - 388 ng/mL 311 - - - - - -     Follow-up call date: 031722, f/u on labs/dose    Carrie Leopold, RN   Anemia Services  [address]  [address]  [email or web address]  Office: [phone]  Fax [phone]

## 2022-03-15 DIAGNOSIS — M25.571 PAIN IN JOINT INVOLVING ANKLE AND FOOT, RIGHT: ICD-10-CM

## 2022-03-16 RX ORDER — METHYLPREDNISOLONE 4 MG
TABLET, DOSE PACK ORAL
Qty: 21 TABLET | Refills: 2 | Status: SHIPPED | OUTPATIENT
Start: 2022-03-16 | End: 2022-07-08

## 2022-03-16 NOTE — TELEPHONE ENCOUNTER
Requested Prescriptions   Pending Prescriptions Disp Refills     methylPREDNISolone (MEDROL DOSEPAK) 4 MG tablet therapy pack 21 tablet 2     Sig: Follow Package Directions       There is no refill protocol information for this order        Routing refill request to provider for review/approval because:  Drug not on the [facility] refill protocol

## 2022-03-17 ENCOUNTER — TELEPHONE (OUTPATIENT)
Dept: PHARMACY | Facility: CLINIC | Age: 47
End: 2022-03-17
Payer: COMMERCIAL

## 2022-03-17 NOTE — TELEPHONE ENCOUNTER
Follow-up with anemia management service:    LVM for Brain to check in re Anemia Services.  Did he dose on the 11th? Needs to dose again this week.     Anemia Latest Ref Rng & Units 2/9/2022 2/16/2022 2/23/2022 3/1/2022 3/3/2022 3/10/2022 3/11/2022   CINDY Dose - 10,000 units 10,000 units 10,000 units - 10,000 units 10,000 units -   Hemoglobin 13.3 - 17.7 g/dL - - - 9.2(L) - - 9.6(L)   TSAT 15 - 46 % - - - - - - 26   Ferritin 26 - 388 ng/mL - - - - - - 256         Follow-up call date: 3/21/22    Janna Louis RN   Anemia Services  [address] [address]   [email or web address]   Office : [phone]  Fax: [phone]

## 2022-03-21 ENCOUNTER — TELEPHONE (OUTPATIENT)
Dept: PHARMACY | Facility: CLINIC | Age: 47
End: 2022-03-21
Payer: COMMERCIAL

## 2022-03-21 NOTE — TELEPHONE ENCOUNTER
Anemia Management Note  SUBJECTIVE/OBJECTIVE:  Referred by Dr. Lan Patino on 2021  Primary Diagnosis: Anemia in Chronic Kidney Disease (N18.4, D63.1)     Secondary Diagnosis:  Chronic Kidney Disease, Stage 4 (N18.4)   Hx of Kidney Tx; 2010  Hgb goal range:  9-10  Epo/Darbo: Retacrit 10,000 units weeky for Hgb <10. AT HOME.    Iron regimen: NA  Labs : 1/3/2023  Recent CINDY use, transfusion, IV iron: NA  RX/TX plans : 2023     No history of stroke, MI and blood clots or cancers.     Contact:  No Consent to Communicate on File.        Anemia Latest Ref Rng & Units 2022 2022 3/1/2022 3/3/2022 3/11/2022 3/14/2022 3/21/2022   CINDY Dose - 10,000 units 10,000 units - 10,000 units - 10,000 units 10,000 units   Hemoglobin 13.3 - 17.7 g/dL - - 9.2(L) - 9.6(L) - -   TSAT 15 - 46 % - - - - 26 - -   Ferritin 26 - 388 ng/mL - - - - 256 - -     BP Readings from Last 3 Encounters:   10/20/21 123/79   10/14/21 110/68   21 128/82     Wt Readings from Last 2 Encounters:   10/20/21 226 lb 9.6 oz (102.8 kg)   10/14/21 225 lb (102.1 kg)           ASSESSMENT:  Hgb:At goal - recommend dose  TSat: not at goal of >30% Ferritin: At goal (>100ng/mL)    PLAN:  Dose with procrit and RTC for hgb then procrit if needed in 1 week(s)    Orders needed to be renewed (for next follow-up date) in EPIC: None    Iron labs due:  Mid 2022    Plan discussed with:  Miguel Angel      NEXT FOLLOW-UP DATE:  3/28/22    Janna Louis RN   Anemia Services  [address] [address]   [email or web address]   Office : [phone]  Fax: [phone]

## 2022-03-23 ENCOUNTER — LAB (OUTPATIENT)
Dept: LAB | Facility: CLINIC | Age: 47
End: 2022-03-23
Payer: COMMERCIAL

## 2022-03-23 DIAGNOSIS — N18.4 ANEMIA OF CHRONIC RENAL FAILURE, STAGE 4 (SEVERE) (H): ICD-10-CM

## 2022-03-23 DIAGNOSIS — D63.1 ANEMIA OF CHRONIC RENAL FAILURE, STAGE 4 (SEVERE) (H): ICD-10-CM

## 2022-03-23 DIAGNOSIS — N18.4 CKD (CHRONIC KIDNEY DISEASE) STAGE 4, GFR 15-29 ML/MIN (H): ICD-10-CM

## 2022-03-23 LAB
HCT VFR BLD AUTO: 31.4 % (ref 40–53)
HGB BLD-MCNC: 9.4 G/DL (ref 13.3–17.7)

## 2022-03-23 PROCEDURE — 85018 HEMOGLOBIN: CPT

## 2022-03-23 PROCEDURE — 85014 HEMATOCRIT: CPT

## 2022-03-23 PROCEDURE — 36415 COLL VENOUS BLD VENIPUNCTURE: CPT

## 2022-03-28 ENCOUNTER — TELEPHONE (OUTPATIENT)
Dept: PHARMACY | Facility: CLINIC | Age: 47
End: 2022-03-28
Payer: COMMERCIAL

## 2022-03-28 NOTE — TELEPHONE ENCOUNTER
Anemia Management Note  SUBJECTIVE/OBJECTIVE:  Referred by Dr. Lan Patino on 2021  Primary Diagnosis: Anemia in Chronic Kidney Disease (N18.4, D63.1)     Secondary Diagnosis:  Chronic Kidney Disease, Stage 4 (N18.4)   Hx of Kidney Tx; ,   Hgb goal range:  9-10  Epo/Darbo: Retacrit 10,000 units weeky for Hgb <10. AT HOME.    Iron regimen: NA  Labs : 1/3/2023  Recent CINDY use, transfusion, IV iron: NA  RX/TX plans : 2023     No history of stroke, MI and blood clots or cancers.     Contact:  No Consent to Communicate on File.        Anemia Latest Ref Rng & Units 3/1/2022 3/3/2022 3/11/2022 3/14/2022 3/21/2022 3/23/2022 3/28/2022   CINDY Dose - - 10,000 units - 10,000 units 10,000 units - 10,000 units   Hemoglobin 13.3 - 17.7 g/dL 9.2(L) - 9.6(L) - - 9.4(L) -   TSAT 15 - 46 % - - 26 - - - -   Ferritin 26 - 388 ng/mL - - 256 - - - -     BP Readings from Last 3 Encounters:   10/20/21 123/79   10/14/21 110/68   21 128/82     Wt Readings from Last 2 Encounters:   10/20/21 226 lb 9.6 oz (102.8 kg)   10/14/21 225 lb (102.1 kg)           ASSESSMENT:  Hgb:At goal - recommend dose  TSat: not at goal of >30% Ferritin: At goal (>100ng/mL)    PLAN:  Dose with procrit and RTC for hgb then procrit if needed in 1 week(s)    Orders needed to be renewed (for next follow-up date) in EPIC: None    Iron labs due:  2022    Plan discussed with:  PEACE Michelle      NEXT FOLLOW-UP DATE:  22    Janna Louis RN   Anemia Services  [address] [address]   [email or web address]   Office : [phone]  Fax: [phone]

## 2022-04-04 ENCOUNTER — TELEPHONE (OUTPATIENT)
Dept: PHARMACY | Facility: CLINIC | Age: 47
End: 2022-04-04
Payer: COMMERCIAL

## 2022-04-04 NOTE — TELEPHONE ENCOUNTER
Follow-up with anemia management service:    LVM for Miguel Angel to check in re Anemia Services.  Hgb remains in the mid 9s range.   We may need to increase his Retacrit dose.  No improvement at the 10,000 units weekly.       Anemia Latest Ref Rng & Units 3/1/2022 3/3/2022 3/11/2022 3/14/2022 3/21/2022 3/23/2022 3/28/2022   CINDY Dose - - 10,000 units - 10,000 units 10,000 units - 10,000 units   Hemoglobin 13.3 - 17.7 g/dL 9.2(L) - 9.6(L) - - 9.4(L) -   TSAT 15 - 46 % - - 26 - - - -   Ferritin 26 - 388 ng/mL - - 256 - - - -           Follow-up call date: 4/6/22    Janna Louis RN   Anemia Services  [address] [address]   [email or web address]   Office : [phone]  Fax: [phone]

## 2022-04-05 DIAGNOSIS — I15.1 HTN, KIDNEY TRANSPLANT RELATED: ICD-10-CM

## 2022-04-05 DIAGNOSIS — Z94.0 HTN, KIDNEY TRANSPLANT RELATED: ICD-10-CM

## 2022-04-06 ENCOUNTER — TELEPHONE (OUTPATIENT)
Dept: PHARMACY | Facility: CLINIC | Age: 47
End: 2022-04-06
Payer: COMMERCIAL

## 2022-04-06 ENCOUNTER — LAB (OUTPATIENT)
Dept: LAB | Facility: CLINIC | Age: 47
End: 2022-04-06
Payer: COMMERCIAL

## 2022-04-06 DIAGNOSIS — N18.4 CHRONIC KIDNEY DISEASE, STAGE IV (SEVERE) (H): Primary | ICD-10-CM

## 2022-04-06 DIAGNOSIS — Z94.0 KIDNEY TRANSPLANTED: ICD-10-CM

## 2022-04-06 DIAGNOSIS — N18.4 CKD (CHRONIC KIDNEY DISEASE) STAGE 4, GFR 15-29 ML/MIN (H): ICD-10-CM

## 2022-04-06 DIAGNOSIS — N18.4 ANEMIA OF CHRONIC RENAL FAILURE, STAGE 4 (SEVERE) (H): ICD-10-CM

## 2022-04-06 DIAGNOSIS — D63.1 ANEMIA OF CHRONIC RENAL FAILURE, STAGE 4 (SEVERE) (H): ICD-10-CM

## 2022-04-06 DIAGNOSIS — Z48.298 AFTERCARE FOLLOWING ORGAN TRANSPLANT: ICD-10-CM

## 2022-04-06 LAB
HCT VFR BLD AUTO: 32.4 % (ref 40–53)
HGB BLD-MCNC: 10.2 G/DL (ref 13.3–17.7)

## 2022-04-06 PROCEDURE — 85018 HEMOGLOBIN: CPT

## 2022-04-06 PROCEDURE — 82728 ASSAY OF FERRITIN: CPT

## 2022-04-06 PROCEDURE — 83550 IRON BINDING TEST: CPT

## 2022-04-06 PROCEDURE — 85014 HEMATOCRIT: CPT

## 2022-04-06 PROCEDURE — 36415 COLL VENOUS BLD VENIPUNCTURE: CPT

## 2022-04-06 NOTE — TELEPHONE ENCOUNTER
Follow-up with anemia management service:    2nd message left for Imguel Angel to check in re Anemia Services.     Anemia Latest Ref Rng & Units 3/1/2022 3/3/2022 3/11/2022 3/14/2022 3/21/2022 3/23/2022 3/28/2022   CINDY Dose - - 10,000 units - 10,000 units 10,000 units - 10,000 units   Hemoglobin 13.3 - 17.7 g/dL 9.2(L) - 9.6(L) - - 9.4(L) -   TSAT 15 - 46 % - - 26 - - - -   Ferritin 26 - 388 ng/mL - - 256 - - - -         Follow-up call date: 4/13/22    Janna Louis RN   Anemia Services  [address] [address]   [email or web address]   Office : [phone]  Fax: [phone]

## 2022-04-07 ENCOUNTER — PATIENT OUTREACH (OUTPATIENT)
Dept: NEPHROLOGY | Facility: CLINIC | Age: 47
End: 2022-04-07
Payer: COMMERCIAL

## 2022-04-07 LAB
FERRITIN SERPL-MCNC: 205 NG/ML (ref 26–388)
IRON SATN MFR SERPL: 29 % (ref 15–46)
IRON SERPL-MCNC: 64 UG/DL (ref 35–180)
TIBC SERPL-MCNC: 222 UG/DL (ref 240–430)

## 2022-04-07 NOTE — TELEPHONE ENCOUNTER
Routing refill request to provider for review/approval because:  Fani given x1 and patient did not follow up, please advise  Creatinine   Date Value Ref Range Status   12/31/2021 3.06 (H) 0.66 - 1.25 mg/dL Final   05/06/2021 3.10 (H) 0.66 - 1.25 mg/dL Final

## 2022-04-07 NOTE — PROGRESS NOTES
From: Ivanna Rivera, RN   Sent: 4/6/2022   5:01 PM CDT   To: Lan Patino MD, Nephrology Nurses-   Subject: CKD Miguel Angel Dumont was referred for re-transplant awhile back and is under evaluation for another kidney. His GFR reached 20 in August 2020 but improved to mid 20s. His creatinine is creeping up towards 3s now days. I have ordered monthly post-transplant labs in addition to his labs he does for Anemia services. Would love to have him followed for CKD closely. CKD journey order placed.      Spoke to Miguel Angel by phone. Introduced self and role. He is agreeable to seeing an [facility], he prefers [place] as it is closer to home. He works full time 8-4 so he needs appointment later in day if in-person  (which is very difficult to find) vs a virtual appointment. He declines Kidney Smart class as he has had 2 kidney transplants and feels it is not necessary at this time. He is already established with anemia services.       CKD to ESRD Checklist      CKD Education:   Comments: 4/7- refused- he has had 2 kidney transplants and feels he knows his CKD pretty well.  Definition/Purpose: During the encounter the patient is instructed about one or more of the following: the etiology and prognosis of their CKD; the stability (or lack thereof) of their eGFR or CrCl; medication dosing, IV contrast avoidance, or specific medications to avoid; sodium, potassium, or phosphorus restriction (or other dietary counseling).  Referral to an educator or program that offers any CKD-specific counseling is applicable. Having completed education in the previous 12 months to the encounter date is also applicable.     Modality Education:   Definition/Purpose: During the encounter the patient is directly counseled and/or referred to education regarding the options for dialysis including in-center, home therapies, transplant, and/or hospice.  Patients who are at low risk of  "progression (by various risk calculators) may be deemed \"not a candidate.\" Completion of education in the past is also applicable.     Preferred Modality:   Definition/Purpose: During the encounter the patient indicates that they have chosen a modality of dialysis, irrespective of the estimated time to ESRD.  The goal of this measure is to reflect education and understanding.  This may change visit to visit as the patient s health status and other conditions dictate.  \"Not a Candidate\" should be selected for patients choosing hospice, are at low risk for progression (based on various scoring and/or clinician judgment), or for other medically documented exclusion.     Access Surgeon Referral:   Definition/Purpose: This measure indicates that the patient has been referred for discussion and/or evaluation by a dialysis access surgeon.  \"Not a Candidate\" should be selected for patients choosing hospice, are at low risk for progression (based on various scoring and/or clinician judgment), or for other medically documented exclusion.     Access Placed:   Definition/Purpose: This measure indicates that the patient has undergone surgery for dialysis access placement.   \"Not a Candidate\" should be selected for patients choosing hospice, are at low risk for progression (based on various scoring and/or clinician judgment), or for other medically documented exclusion.     Transplant Listing:   Status: referred    Comments: Referred by transplant in 2021  Definition/Purpose: This measure indicates that the patient has actively engaged in the transplant listing process.   \"Not Eligible\" should be selected for patients choosing hospice, are at low risk for progression (based on various scoring and/or clinician judgment), or for other medically documented exclusion.       "

## 2022-04-08 ENCOUNTER — TELEPHONE (OUTPATIENT)
Dept: PHARMACY | Facility: CLINIC | Age: 47
End: 2022-04-08
Payer: COMMERCIAL

## 2022-04-08 RX ORDER — LOSARTAN POTASSIUM 25 MG/1
TABLET ORAL
Qty: 30 TABLET | Refills: 0 | Status: SHIPPED | OUTPATIENT
Start: 2022-04-08 | End: 2022-04-25

## 2022-04-08 NOTE — TELEPHONE ENCOUNTER
Anemia Management Note  SUBJECTIVE/OBJECTIVE:  Referred by Dr. Lan Patino on 2021  Primary Diagnosis: Anemia in Chronic Kidney Disease (N18.4, D63.1)     Secondary Diagnosis:  Chronic Kidney Disease, Stage 4 (N18.4)   Hx of Kidney Tx; ,   Hgb goal range:  9-10  Epo/Darbo: Retacrit 10,000 units weeky for Hgb <10. AT HOME.    Iron regimen: NA  Labs : 1/3/2023  Recent CINDY use, transfusion, IV iron: NA  RX/TX plans : 2023     No history of stroke, MI and blood clots or cancers.     Contact:  No Consent to Communicate on File.     Anemia Latest Ref Rng & Units 3/11/2022 3/14/2022 3/21/2022 3/23/2022 3/28/2022 2022 2022   CINDY Dose - - 10,000 units 10,000 units - 10,000 units 10,000 units -   Hemoglobin 13.3 - 17.7 g/dL 9.6(L) - - 9.4(L) - - 10.2(L)   TSAT 15 - 46 % 26 - - - - - 29   Ferritin 26 - 388 ng/mL 256 - - - - - 205     BP Readings from Last 3 Encounters:   10/20/21 123/79   10/14/21 110/68   21 128/82     Wt Readings from Last 2 Encounters:   10/20/21 226 lb 9.6 oz (102.8 kg)   10/14/21 225 lb (102.1 kg)         ASSESSMENT:  Hgb:Above goal - recommend hold dose  TSat: not at goal of >30% Ferritin: At goal (>100ng/mL)    PLAN:  Hold Procrit and RTC for hgb then procrit if needed in 1 week(s)    Orders needed to be renewed (for next follow-up date) in EPIC: None    Iron labs due:  4 weeks    Plan discussed with:  Miguel Angel. He will hold dose on  and plan to get labs again on . He has no med in home right now, and he will wait to get more until after next week's lab draw, in case it's decided to increase his Retacrit dose.      NEXT FOLLOW-UP DATE:  413, review labs    Carrie Leopold, RN   Anemia Services  [address]  [address]  [email or web address]  Office: [phone]  Fax [phone]

## 2022-04-14 NOTE — TELEPHONE ENCOUNTER
Labs are scheduled for 4/20/22.     Janna Louis RN   Anemia Services  [address] [address]   [email or web address]   Office : [phone]  Fax: [phone]

## 2022-04-20 ENCOUNTER — LAB (OUTPATIENT)
Dept: LAB | Facility: CLINIC | Age: 47
End: 2022-04-20
Payer: COMMERCIAL

## 2022-04-20 DIAGNOSIS — D63.1 ANEMIA OF CHRONIC RENAL FAILURE, STAGE 4 (SEVERE) (H): ICD-10-CM

## 2022-04-20 DIAGNOSIS — N18.4 ANEMIA OF CHRONIC RENAL FAILURE, STAGE 4 (SEVERE) (H): ICD-10-CM

## 2022-04-20 DIAGNOSIS — N18.4 CKD (CHRONIC KIDNEY DISEASE) STAGE 4, GFR 15-29 ML/MIN (H): ICD-10-CM

## 2022-04-20 LAB
HCT VFR BLD AUTO: 31.2 % (ref 40–53)
HGB BLD-MCNC: 9.7 G/DL (ref 13.3–17.7)

## 2022-04-20 PROCEDURE — 85014 HEMATOCRIT: CPT

## 2022-04-20 PROCEDURE — 36415 COLL VENOUS BLD VENIPUNCTURE: CPT

## 2022-04-20 PROCEDURE — 85018 HEMOGLOBIN: CPT

## 2022-04-21 ENCOUNTER — MYC MEDICAL ADVICE (OUTPATIENT)
Dept: NEPHROLOGY | Facility: CLINIC | Age: 47
End: 2022-04-21
Payer: COMMERCIAL

## 2022-04-21 ENCOUNTER — TELEPHONE (OUTPATIENT)
Dept: NEPHROLOGY | Facility: CLINIC | Age: 47
End: 2022-04-21
Payer: COMMERCIAL

## 2022-04-21 DIAGNOSIS — N18.4 CKD (CHRONIC KIDNEY DISEASE) STAGE 4, GFR 15-29 ML/MIN (H): Primary | ICD-10-CM

## 2022-04-21 NOTE — TELEPHONE ENCOUNTER
Follow-up with anemia management service:    Hgb <10.0. Miguel Angel has no Retacrit doses at home. He needs to call [facility] to get that refilled.    Spoke with Miguel Angel,  He wants to hold off on CINDY for a week or two. He is feeling pretty good.  Hgb 9.7.  Will follow up in 1-2 weeks once labs are available.     Anemia Latest Ref Rng & Units 3/14/2022 3/21/2022 3/23/2022 3/28/2022 4/4/2022 4/6/2022 4/20/2022   CINDY Dose - 10,000 units 10,000 units - 10,000 units 10,000 units - -   Hemoglobin 13.3 - 17.7 g/dL - - 9.4(L) - - 10.2(L) 9.7(L)   TSAT 15 - 46 % - - - - - 29 -   Ferritin 26 - 388 ng/mL - - - - - 205 -           Follow-up call date: 5/3/22  Were labs drawn    Janna Louis RN   Anemia Services  [address] [address]   [email or web address]   Office : [phone]  Fax: [phone]

## 2022-04-22 NOTE — TELEPHONE ENCOUNTER
Writer spoke to Miguel Angel about labs on Monday before appointment with Denisse. He is done with work at 3 and will go to clinic then and go straight to lab first. Labs ordered.

## 2022-04-23 ENCOUNTER — HEALTH MAINTENANCE LETTER (OUTPATIENT)
Age: 47
End: 2022-04-23

## 2022-04-25 ENCOUNTER — OFFICE VISIT (OUTPATIENT)
Dept: FAMILY MEDICINE | Facility: CLINIC | Age: 47
End: 2022-04-25
Payer: COMMERCIAL

## 2022-04-25 VITALS
BODY MASS INDEX: 33.85 KG/M2 | HEART RATE: 82 BPM | DIASTOLIC BLOOD PRESSURE: 86 MMHG | OXYGEN SATURATION: 98 % | SYSTOLIC BLOOD PRESSURE: 133 MMHG | RESPIRATION RATE: 22 BRPM | WEIGHT: 229.2 LBS | TEMPERATURE: 97.5 F

## 2022-04-25 DIAGNOSIS — Z12.11 SCREEN FOR COLON CANCER: ICD-10-CM

## 2022-04-25 DIAGNOSIS — Z13.220 LIPID SCREENING: ICD-10-CM

## 2022-04-25 DIAGNOSIS — I15.1 HTN, KIDNEY TRANSPLANT RELATED: ICD-10-CM

## 2022-04-25 DIAGNOSIS — M1A.09X0 CHRONIC GOUT OF MULTIPLE SITES, UNSPECIFIED CAUSE: Primary | ICD-10-CM

## 2022-04-25 DIAGNOSIS — N25.81 SECONDARY RENAL HYPERPARATHYROIDISM (H): ICD-10-CM

## 2022-04-25 DIAGNOSIS — Z48.298 AFTERCARE FOLLOWING ORGAN TRANSPLANT: ICD-10-CM

## 2022-04-25 DIAGNOSIS — N18.4 CKD (CHRONIC KIDNEY DISEASE) STAGE 4, GFR 15-29 ML/MIN (H): ICD-10-CM

## 2022-04-25 DIAGNOSIS — D84.9 IMMUNOSUPPRESSION (H): ICD-10-CM

## 2022-04-25 DIAGNOSIS — N18.4 CHRONIC KIDNEY DISEASE, STAGE IV (SEVERE) (H): ICD-10-CM

## 2022-04-25 DIAGNOSIS — Z94.0 KIDNEY REPLACED BY TRANSPLANT: ICD-10-CM

## 2022-04-25 DIAGNOSIS — D68.51 HETEROZYGOUS FACTOR V LEIDEN MUTATION (H): ICD-10-CM

## 2022-04-25 DIAGNOSIS — Z94.0 KIDNEY TRANSPLANTED: ICD-10-CM

## 2022-04-25 DIAGNOSIS — Z94.0 HTN, KIDNEY TRANSPLANT RELATED: ICD-10-CM

## 2022-04-25 LAB
ALBUMIN SERPL-MCNC: 3.4 G/DL (ref 3.4–5)
ANION GAP SERPL CALCULATED.3IONS-SCNC: 9 MMOL/L (ref 3–14)
BUN SERPL-MCNC: 58 MG/DL (ref 7–30)
CALCIUM SERPL-MCNC: 9.1 MG/DL (ref 8.5–10.1)
CHLORIDE BLD-SCNC: 107 MMOL/L (ref 94–109)
CHOLEST SERPL-MCNC: 156 MG/DL
CO2 SERPL-SCNC: 19 MMOL/L (ref 20–32)
CREAT SERPL-MCNC: 2.62 MG/DL (ref 0.66–1.25)
CYCLOSPORINE BLD LC/MS/MS-MCNC: 134 UG/L (ref 50–400)
ERYTHROCYTE [DISTWIDTH] IN BLOOD BY AUTOMATED COUNT: 12.5 % (ref 10–15)
FASTING STATUS PATIENT QL REPORTED: NO
GFR SERPL CREATININE-BSD FRML MDRD: 30 ML/MIN/1.73M2
GLUCOSE BLD-MCNC: 122 MG/DL (ref 70–99)
HCT VFR BLD AUTO: 32.3 % (ref 40–53)
HDLC SERPL-MCNC: 42 MG/DL
HGB BLD-MCNC: 10.1 G/DL (ref 13.3–17.7)
LDLC SERPL CALC-MCNC: 92 MG/DL
MCH RBC QN AUTO: 28.9 PG (ref 26.5–33)
MCHC RBC AUTO-ENTMCNC: 31.3 G/DL (ref 31.5–36.5)
MCV RBC AUTO: 92 FL (ref 78–100)
NONHDLC SERPL-MCNC: 114 MG/DL
PHOSPHATE SERPL-MCNC: 3.2 MG/DL (ref 2.5–4.5)
PLATELET # BLD AUTO: 250 10E3/UL (ref 150–450)
POTASSIUM BLD-SCNC: 4.7 MMOL/L (ref 3.4–5.3)
RBC # BLD AUTO: 3.5 10E6/UL (ref 4.4–5.9)
SODIUM SERPL-SCNC: 135 MMOL/L (ref 133–144)
TME LAST DOSE: NORMAL H
TME LAST DOSE: NORMAL H
TRIGL SERPL-MCNC: 111 MG/DL
URATE SERPL-MCNC: 9.4 MG/DL (ref 3.5–7.2)
WBC # BLD AUTO: 8.3 10E3/UL (ref 4–11)

## 2022-04-25 PROCEDURE — 99214 OFFICE O/P EST MOD 30 MIN: CPT | Performed by: FAMILY MEDICINE

## 2022-04-25 PROCEDURE — 85027 COMPLETE CBC AUTOMATED: CPT | Performed by: FAMILY MEDICINE

## 2022-04-25 PROCEDURE — 80061 LIPID PANEL: CPT | Performed by: FAMILY MEDICINE

## 2022-04-25 PROCEDURE — 82043 UR ALBUMIN QUANTITATIVE: CPT | Performed by: FAMILY MEDICINE

## 2022-04-25 PROCEDURE — 36415 COLL VENOUS BLD VENIPUNCTURE: CPT | Performed by: FAMILY MEDICINE

## 2022-04-25 PROCEDURE — 84156 ASSAY OF PROTEIN URINE: CPT | Performed by: FAMILY MEDICINE

## 2022-04-25 PROCEDURE — 84550 ASSAY OF BLOOD/URIC ACID: CPT | Performed by: FAMILY MEDICINE

## 2022-04-25 PROCEDURE — 80158 DRUG ASSAY CYCLOSPORINE: CPT | Performed by: FAMILY MEDICINE

## 2022-04-25 PROCEDURE — 80069 RENAL FUNCTION PANEL: CPT | Performed by: FAMILY MEDICINE

## 2022-04-25 RX ORDER — LOSARTAN POTASSIUM 25 MG/1
25 TABLET ORAL DAILY
Qty: 90 TABLET | Refills: 3 | Status: SHIPPED | OUTPATIENT
Start: 2022-04-25 | End: 2022-05-30

## 2022-04-25 RX ORDER — AMLODIPINE BESYLATE 5 MG/1
TABLET ORAL
Qty: 90 TABLET | Refills: 3 | Status: SHIPPED | OUTPATIENT
Start: 2022-04-25 | End: 2023-03-22

## 2022-04-25 RX ORDER — AMLODIPINE BESYLATE 5 MG/1
TABLET ORAL
Qty: 30 TABLET | Refills: 0 | Status: CANCELLED | OUTPATIENT
Start: 2022-04-25

## 2022-04-25 ASSESSMENT — PAIN SCALES - GENERAL: PAINLEVEL: SEVERE PAIN (6)

## 2022-04-25 NOTE — PROGRESS NOTES
Assessment & Plan     Chronic gout of multiple sites, unspecified cause, with multiple flares throughout the year.  - Uric acid  - Concerned about his frequent use of prednisone for management of his frequent gouty flares.  Will consult with Rheumatology for better treatment options for gout prophylaxis in light of his chronic kidney disease.  - Adult Rheumatology  Referral    HTN, kidney transplant related, controlled  -Refill: losartan (COZAAR) 25 MG tablet  Dispense: 90 tablet; Refill: 3  -Refill: Amlodipine 5 mg daily.  Dispense 90 tablet; refill: 3    Kidney replaced by transplant  -Following with a nephrology/transplant team.    CKD (chronic kidney disease) stage 4, GFR 15-29 ml/min (H)  - Renal panel  - Albumin Random Urine Quantitative with Creat Ratio    Screen for colon cancer  - REVIEW OF HEALTH MAINTENANCE PROTOCOL ORDERS  - Adult Gastro Ref - Procedure Only    Lipid screening  - Lipid panel reflex to direct LDL Non-fasting        Return in about 3 months (around 7/25/2022) for a Physical Exam at your earliest convenience..    Lucrecia Hutson MD  [facility] GEORGI Michelle is a 46 year old who presents for the following health issues     History of Present Illness       Reason for visit:  Left pointer finger/medication  Symptom onset:  3-7 days ago    He eats 0-1 servings of fruits and vegetables daily.He consumes 2 sweetened beverage(s) daily.He exercises with enough effort to increase his heart rate 10 to 19 minutes per day.  He exercises with enough effort to increase his heart rate 5 days per week.   He is taking medications regularly.       ED/UC Followup:    Facility:  [facility]   Date of visit: 4/19/22  Reason for visit: swelling of left index finger  Current Status: symptoms are improving since UC visit.      Recent ER records reviewed.  Patient presented to an outside Urgent care on 4/19 with a left index finger swelling that was present for about 4  days with pain and swelling in PIP joint of his left index finger. No trauma/injury reported.   X-ray of the left hand revealed no fracture.  No dislocation.  No radiographically visible bony erosions of periosteal reconstruction  He was given prednisone 40 mg daily x 5 days. Today he reports significant improvement in his symptoms.    He has had chronic gout with multiple flares on average he has #1-2 gouty attack every 3 months.  Due to his prior history of kidney transplant-with stage IV chronic kidney disease.  He is unable to take take Allopurinol for prophylaxis.   He has been treated with prednisone for each gouty flare  Component      Latest Ref Rng & Units 12/31/2021   Urea Nitrogen      7 - 30 mg/dL 61 (H)   Creatinine      0.66 - 1.25 mg/dL 3.06 (H)     Component      Latest Ref Rng & Units 12/31/2021   GFR Estimate      >60 mL/min/1.73m2 25 (L)       Concerned about the frequency of his prednisone use. Will consider rheumatology consult for other options for management of chronic recurrent gouty flares      Hypertension Follow-up  Currently on amlodipine 5 mg daily and losartan 25 mg daily-tolerating well no side effects reported.  Requesting refills.  BP Readings from Last 3 Encounters:   04/25/22 133/86   10/20/21 123/79   10/14/21 110/68       Do you check your blood pressure regularly outside of the clinic? Yes sometimes    Are you following a low salt diet? Yes    Are your blood pressures ever more than 140 on the top number (systolic) OR more   than 90 on the bottom number (diastolic), for example 140/90? No    HEALTH CARE MAINTENANCE: Due for colon cancer screening and labs.    Requesting to have his nephrology labs done today as well.      Review of Systems   Constitutional, HEENT, cardiovascular, pulmonary, gi and gu systems are negative, except as otherwise noted.      Objective    /86   Pulse 82   Temp 97.5  F (36.4  C) (Tympanic)   Resp 22   Wt 104 kg (229 lb 3.2 oz)   SpO2 98%    BMI 33.85 kg/m    Body mass index is 33.85 kg/m .  Physical Exam   GENERAL: healthy, alert and no distress  RESP: lungs clear to auscultation - no rales, rhonchi or wheezes  CV: regular rate and rhythm, normal S1 S2, no S3 or S4, no murmur, click or rub, no peripheral edema and peripheral pulses strong  MS: Tophi noted on left index finger.  No significant tenderness to palpation.  Limited range of motion.  Radial pulse present.    DATA  Previous labs reviewed.  Labs drawn and in process.

## 2022-04-25 NOTE — LETTER
April 25, 2022      Miguel Angel Piña  [address] [address] [facility] [address]        To Whom It May Concern:    Please excuse Miguel Angel Piña, he  was seen in clinic on 4/25/22.        Sincerely,        Dr. Hutson's Care Team  [facility]

## 2022-04-26 ENCOUNTER — TELEPHONE (OUTPATIENT)
Dept: TRANSPLANT | Facility: CLINIC | Age: 47
End: 2022-04-26
Payer: COMMERCIAL

## 2022-04-26 DIAGNOSIS — E87.20 METABOLIC ACIDOSIS: ICD-10-CM

## 2022-04-26 DIAGNOSIS — E87.8 LOW BICARBONATE LEVEL: Primary | ICD-10-CM

## 2022-04-26 DIAGNOSIS — Z79.60 LONG-TERM USE OF IMMUNOSUPPRESSANT MEDICATION: ICD-10-CM

## 2022-04-26 DIAGNOSIS — Z94.0 KIDNEY TRANSPLANTED: ICD-10-CM

## 2022-04-26 LAB
CREAT UR-MCNC: 74 MG/DL
CREAT UR-MCNC: 74 MG/DL
MICROALBUMIN UR-MCNC: 215 MG/L
MICROALBUMIN/CREAT UR: 290.54 MG/G CR (ref 0–17)
PROT UR-MCNC: 0.4 G/L
PROT/CREAT 24H UR: 0.54 G/G CR (ref 0–0.2)

## 2022-04-26 RX ORDER — SODIUM BICARBONATE 650 MG/1
1900 TABLET ORAL 2 TIMES DAILY
Qty: 540 TABLET | Refills: 3 | Status: SHIPPED | OUTPATIENT
Start: 2022-04-26 | End: 2022-08-05

## 2022-04-26 NOTE — TELEPHONE ENCOUNTER
OUTCOME:   Spoke with patient, they confirm current dose 75 mg AM/100 mg PM. He can not remember what time he took his Neoral the night before labs, therefore, he will stay on same dose and repeat CSA level with next scheduled labs ~2 weeks. He has not been taking 3 tabs of sodium bicarb twice daily; only taking 2 tabs PO BID. He will increase sodium bicarbonate dose as instructed 3 tabs (1,950 mg) PO BID; prescription renewed. Repeat labs for repeat CSA and BMP ~5/10/22 ordered. Patient voiced understanding of plan.     Ivanna Rivera, RN, BSN  Solid Organ Transplant, Post Kidney and Pancreas  Transplant Care Coordinator  [phone]

## 2022-04-26 NOTE — TELEPHONE ENCOUNTER
ISSUE:   1) Cyclosporine level 134 on 4/25/22 10:19 AM, goal , Neoral dose 75 mg (3 caps) every AM/100 mg (4 caps) every evening. Last dose date/time info not provided.    2) Bicarbonate 19 (reference range 20-32).    PLAN:   1) Please call patient and confirm this was an accurate 12-hour trough. Verify Cyclosporine dose 75/100 mg. Confirm no new medications or illness. Confirm no missed doses. If accurate trough and accurate dose, decrease Cyclosporine dose to 75 mg every 12 hours and repeat labs in 2 weeks.    2) Please ensure you are taking sodium bicarbonate 1,950 mg (3 tabs) every morning and every evening; no missed doses. Repeat BMP in 2 weeks.    OUTCOME: Slicethepie message sent to Miguel Angel. Orders placed in Epic.    Ivanna Rivera, RN, BSN  Solid Organ Transplant, Post Kidney and Pancreas  Transplant Care Coordinator  [phone]

## 2022-04-28 ENCOUNTER — TELEPHONE (OUTPATIENT)
Dept: NEPHROLOGY | Facility: CLINIC | Age: 47
End: 2022-04-28
Payer: COMMERCIAL

## 2022-04-28 NOTE — TELEPHONE ENCOUNTER
Miguel Angel [facility]  He had NO Showed for his appt.    Denisse requested to call an follow-up on his symptoms    LMTC(left mess to call) the U- Rn to discuss    Zeina Downey CMA

## 2022-04-29 ENCOUNTER — TELEPHONE (OUTPATIENT)
Dept: NEPHROLOGY | Facility: CLINIC | Age: 47
End: 2022-04-29
Payer: COMMERCIAL

## 2022-04-29 ENCOUNTER — TELEPHONE (OUTPATIENT)
Dept: TRANSPLANT | Facility: CLINIC | Age: 47
End: 2022-04-29
Payer: COMMERCIAL

## 2022-04-29 NOTE — TELEPHONE ENCOUNTER
"4/29- Left VM  5/9- Spoke with Miguel Angel. He states he went to wrong clinic so that is why he missed his appointment with Denisse a couple weeks ago. He would like to reschedule in [place].  Writer noted a 3:30 slot open on June 20th so verified that with Miguel Angel who was agreeable.    Writer later noted that the slot with Denisse's schedule was not meant to be \"open\" at that time as her slots are 40 minutes at a time. Message sent to Denisse to see if she can see Miguel Angel at 3:40 that day or not, otherwise will need to reschedule with Miguel Angel.    "

## 2022-04-29 NOTE — TELEPHONE ENCOUNTER
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  How are blood pressures running?  Not checking consistently. Office visit 4/25/22, BP   04/25/22 133/86     Recommend checking BP daily x 1 week and logging them.  Call BP logs to RNCC in 1 week. Miguel Angel verbalized undertstanding.  Confirm current BP meds:    Diabetic? Not diabetic.    Any swelling? Denies    Denies s/s of UTI.    No hx of DSA.    OUTCOME: Miguel Angel will log BP and HR for possible increase in losartan dose.   Follow up in 1 week.    Ivanna Rivera, RN, BSN  Solid Organ Transplant, Post Kidney and Pancreas  Transplant Care Coordinator  [phone]

## 2022-05-02 ENCOUNTER — TELEPHONE (OUTPATIENT)
Dept: PHARMACY | Facility: CLINIC | Age: 47
End: 2022-05-02
Payer: COMMERCIAL

## 2022-05-02 NOTE — TELEPHONE ENCOUNTER
Anemia Management Note  SUBJECTIVE/OBJECTIVE:  Referred by Dr. Lan Patino on 2021  Primary Diagnosis: Anemia in Chronic Kidney Disease (N18.4, D63.1)     Secondary Diagnosis:  Chronic Kidney Disease, Stage 4 (N18.4)   Hx of Kidney Tx; ,   Hgb goal range:  9-10  Epo/Darbo: Retacrit 10,000 units weeky for Hgb <10. AT HOME.    Iron regimen: NA  Labs : 1/3/2023  Recent CINDY use, transfusion, IV iron: NA  RX/TX plans : 2023     No history of stroke, MI and blood clots or cancers.     Contact:  No Consent to Communicate on File.     Anemia Latest Ref Rng & Units 3/21/2022 3/23/2022 3/28/2022 2022 2022 2022 2022   CINDY Dose - 10,000 units - 10,000 units 10,000 units - - -   Hemoglobin 13.3 - 17.7 g/dL - 9.4(L) - - 10.2(L) 9.7(L) 10.1(L)   TSAT 15 - 46 % - - - - 29 - -   Ferritin 26 - 388 ng/mL - - - - 205 - -     BP Readings from Last 3 Encounters:   22 133/86   10/20/21 123/79   10/14/21 110/68     Wt Readings from Last 2 Encounters:   22 229 lb 3.2 oz (104 kg)   10/20/21 226 lb 9.6 oz (102.8 kg)           ASSESSMENT:  Hgb:Above goal - recommend hold dose  TSat: not at goal of >30% Ferritin: At goal (>100ng/mL)    PLAN:  Hold Retacrit and RTC for hgb then retacrit if needed in 1-2 week(s). Miguel Angel has no doses of Retacrit at home.  Miguel Angel was a No Show for his 22 clinic appt with Denisse Stratton PA-C/. Will follow up in 1-2 weeks.     Orders needed to be renewed (for next follow-up date) in EPIC: None    Iron labs due:  May 2022    Plan discussed with:  No call today, lab review      NEXT FOLLOW-UP DATE:  5/10/22    Janna Louis RN   [facility]  [address] [address]   [email or web address]   Office : [phone]  Fax: [phone]

## 2022-05-10 ENCOUNTER — TELEPHONE (OUTPATIENT)
Dept: PHARMACY | Facility: CLINIC | Age: 47
End: 2022-05-10
Payer: COMMERCIAL

## 2022-05-10 DIAGNOSIS — I15.1 HTN, KIDNEY TRANSPLANT RELATED: ICD-10-CM

## 2022-05-10 DIAGNOSIS — M25.571 PAIN IN JOINT INVOLVING ANKLE AND FOOT, RIGHT: ICD-10-CM

## 2022-05-10 DIAGNOSIS — Z94.0 HTN, KIDNEY TRANSPLANT RELATED: ICD-10-CM

## 2022-05-10 NOTE — TELEPHONE ENCOUNTER
Follow-up with anemia management service:    Spoke with Miguel Angel to remind him he is due for Anemia Labs. He will call to schedule a lab appt in [place].     Anemia Latest Ref Rng & Units 3/21/2022 3/23/2022 3/28/2022 4/4/2022 4/6/2022 4/20/2022 4/25/2022   CINDY Dose - 10,000 units - 10,000 units 10,000 units - - -   Hemoglobin 13.3 - 17.7 g/dL - 9.4(L) - - 10.2(L) 9.7(L) 10.1(L)   TSAT 15 - 46 % - - - - 29 - -   Ferritin 26 - 388 ng/mL - - - - 205 - -       Orders needed to be renewed (for next follow-up date) in EPIC: None       Follow-up call date: 5/17/22    Janna Louis RN   Anemia Services  [address] [address]   [email or web address]   Office : [phone]  Fax: [phone]

## 2022-05-11 ENCOUNTER — OFFICE VISIT (OUTPATIENT)
Dept: RHEUMATOLOGY | Facility: CLINIC | Age: 47
End: 2022-05-11
Payer: COMMERCIAL

## 2022-05-11 VITALS
SYSTOLIC BLOOD PRESSURE: 124 MMHG | WEIGHT: 229 LBS | HEIGHT: 69 IN | BODY MASS INDEX: 33.92 KG/M2 | DIASTOLIC BLOOD PRESSURE: 70 MMHG

## 2022-05-11 DIAGNOSIS — M1A.9XX1 CHRONIC TOPHACEOUS GOUT: Primary | ICD-10-CM

## 2022-05-11 DIAGNOSIS — Z79.899 ON ALLOPURINOL THERAPY: ICD-10-CM

## 2022-05-11 PROCEDURE — 99204 OFFICE O/P NEW MOD 45 MIN: CPT | Performed by: PHYSICIAN ASSISTANT

## 2022-05-11 RX ORDER — METHYLPREDNISOLONE 4 MG/1
4 TABLET ORAL DAILY
Qty: 30 TABLET | Refills: 0 | Status: SHIPPED | OUTPATIENT
Start: 2022-05-11 | End: 2022-06-10

## 2022-05-11 RX ORDER — ALLOPURINOL 100 MG/1
50 TABLET ORAL EVERY OTHER DAY
Qty: 30 TABLET | Refills: 0 | Status: SHIPPED | OUTPATIENT
Start: 2022-05-11 | End: 2022-07-08

## 2022-05-11 NOTE — PATIENT INSTRUCTIONS
After Visit Instructions:     Thank you for coming to [facility] for your care. It is my goal to partner with you to help you reach your optimal state of health.       Plan:     Schedule follow-up with Isela Jean PA-C in 1 months.   Labs: uric acid, CBC and CMP 2-3 days prior to your next visit  Medication recommendations:   Allopurinol 100mg: take 1/2 tablet every other day  Medrol 4mg daily-if you get a flare of gout and need to increase the medrol, let me know      Isela Jean PA-C  [facility]  [place]    Contact information: [facility]  Clinic Number:  [phone]  Please call or send a [email or web address] message with any questions about your care

## 2022-05-11 NOTE — LETTER
May 11, 2022      RE: Miguel Angel Piña  [address] [address] [facility] [address]  MRN: [id number]  : 1975      To Whom It May Concern:    Miguel Angel Piña was seen in the Rheumatology Clinic on May 11, 2022.    Please excuse Miguel Angel Piña from work today, May 11, 2022 for a medical appointment.     Sincerely,    Isela Jean PA-C

## 2022-05-11 NOTE — PROGRESS NOTES
Rheumatology Clinic Visit  [facility] [place]  MARTHA Rodriguez     Miguel Angel Piña MRN# [id number]   YOB: 1975 Age: 46 year old   Date of Visit: 05/11/2022  Primary care provider: Lucrecia Hutson          Assessment and Plan:     1.  Chronic tophaceous gout  2.  On allopurinol therapy    Patient presents today for an evaluation of his chronic gout.  He has been having multiple flares of his gout and has had to take prednisone and more recently been switched to methylprednisolone to control his flares.  On physical examination his left pointer finger has tophi on the PIP and it is quite swollen.  He is unable to flex that finger at the PIP.  The MCP is swollen as well.  Laboratory evaluation from 4/25/2022 showed a creatinine of 2.62, GFR 30, uric acid of 9.4.    Patient's uric acid has been elevated for many years now.  He has been needing to take methylprednisolone at least monthly to control the flares that he has been having from his gout.  Discussed urate lowering therapy with the patient.  With his GFR being 30 we will start him on allopurinol 50 mg every other day.  We reviewed the potential risks of this medication including a gout flare.  We will keep him on methylprednisolone 4 mg daily while initiating allopurinol to prevent a flare.  I will see the patient back in 1 month with labs prior.    Plan:     1. Schedule follow-up with Isela Jean PA-C in 1 months.   2. Labs: uric acid, CBC and CMP 2-3 days prior to your next visit  3. Medication recommendations:   a. Allopurinol 100mg: take 1/2 tablet every other day  b. Medrol 4mg daily-if you get a flare of gout and need to increase the medrol, let me know      MARTHA Rodriguez  Rheumatology         History of Present Illness:   Miguel Angel Piña presents for evaluation of chronic gout.     Has a history of chronic kidney disease and a history of transplant (one at age 18 and one at 35-currently doing the workup to get on the list  for another one). He states that periodically he will get flares in his ankles. He states that last fall his right wrist and arm swelled. He started to take Prednisone and it resolved. He states that once a month he needs prednisone to get rid of his flares. Most recently was about 1 month ago. His left finger started to flare. Xray's were taken, not bone fragments were seen. He started Prednisone and it got better. He prefers to stay off of steroids due to the long term effects of it. He last used medrol yesterday. When he has flares he used Medrol dosepak.          Review of Systems:     Constitutional: negative  Skin: negative  Eyes: negative  Ears/Nose/Throat: negative  Respiratory: No shortness of breath, dyspnea on exertion, cough, or hemoptysis  Cardiovascular: negative  Gastrointestinal: negative  Genitourinary: negative  Musculoskeletal: As above  Neurologic: negative  Psychiatric: negative  Hematologic/Lymphatic/Immunologic: negative  Endocrine: negative         Active Problem List:     Patient Active Problem List    Diagnosis Date Noted     Anemia of chronic renal failure, stage 4 (severe) (H) 01/06/2022     Priority: Medium     CKD (chronic kidney disease) stage 4, GFR 15-29 ml/min (H) 10/31/2018     Priority: Medium     Aftercare following organ transplant 09/08/2017     Priority: Medium     Immunosuppression (H) 09/08/2017     Priority: Medium     Anemia in chronic renal disease 09/08/2017     Priority: Medium     Secondary renal hyperparathyroidism (H) 09/08/2017     Priority: Medium     Gout 09/08/2017     Priority: Medium     Vitamin D deficiency 09/08/2017     Priority: Medium     Dyslipidemia 09/08/2017     Priority: Medium     Heterozygous factor V Leiden mutation (H) 09/08/2017     Priority: Medium     No h/o DVT or PE       HTN, kidney transplant related      Priority: Medium     Conductive hearing loss, unilateral 10/03/2013     Priority: Medium     Pt reports insidious onset of apparent  hearing loss, that had not bothered him, but his wife feels like he must have hearing loss, given that he cannot hear her talking to him often.  He denies any trauma, past issues with ear infections.  He is s/p two kidney transplants and is on rejection medications chronically (stable of late).      He denies excessive loud noise exposure, hazardous work environment (works at [employer]), does drive with window down during summer.  No shooting guns.         CARDIOVASCULAR SCREENING; LDL GOAL LESS THAN 100 10/31/2010     Priority: Medium     Kidney replaced by transplant      Priority: Medium     prior 11/1993       Medullary cystic kidney disease      Priority: Medium            Past Medical History:     Past Medical History:   Diagnosis Date     Anemia in chronic renal disease      Conductive hearing loss, unilateral 10/3/2013    Pt reports insidious onset of apparent hearing loss, that had not bothered him, but his wife feels like he must have hearing loss, given that he cannot hear her talking to him often.  He denies any trauma, past issues with ear infections.  He is s/p two kidney transplants and is on rejection medications chronically (stable of late).    He denies excessive loud noise exposure, hazardous work enviro     CVA (cerebral vascular accident) (H)      Dyslipidemia      Factor V Leiden (H)      Gout      History of blood transfusion      Hypertension secondary to other renal disorders      Immunosuppressed status (H)      Kidney replaced by transplant 4/2010 (2nd)    prior 11/1993     Medullary cystic kidney disease      Secondary renal hyperparathyroidism (H)      Sleep apnea      Past Surgical History:   Procedure Laterality Date     IR RENAL BIOPSY LEFT  8/17/2020     REPAIR PATENT FORAMEN OVALE       s/p LDKT       TRANSPLANT              Social History:     Social History     Socioeconomic History     Marital status:      Spouse name: Not on file     Number of children: 2     Years of  education: Not on file     Highest education level: Not on file   Occupational History     Not on file   Tobacco Use     Smoking status: Never Smoker     Smokeless tobacco: Never Used   Vaping Use     Vaping Use: Never used   Substance and Sexual Activity     Alcohol use: No     Drug use: No     Sexual activity: Not Currently     Partners: Female   Other Topics Concern     Parent/sibling w/ CABG, MI or angioplasty before 65F 55M? Not Asked   Social History Narrative     Not on file     Social Determinants of Health     Financial Resource Strain: Not on file   Food Insecurity: Not on file   Transportation Needs: Not on file   Physical Activity: Not on file   Stress: Not on file   Social Connections: Not on file   Intimate Partner Violence: Not on file   Housing Stability: Not on file          Family History:     Family History   Problem Relation Age of Onset     Hypertension Father      Kidney Disease Sister         medullary cystic kidney disease, s/p kidney transplant     Hypertension Sister      Lung Cancer Paternal Grandmother      Prostate Cancer No family hx of      Colon Cancer No family hx of      Diabetes No family hx of      Coronary Artery Disease No family hx of             Allergies:   No Known Allergies         Medications:     Current Outpatient Medications   Medication Sig Dispense Refill     amLODIPine (NORVASC) 5 MG tablet TAKE 1 TABLET BY MOUTH AT BEDTIME AT NIGHT 90 tablet 3     calcium gluconate 500 MG tablet Take 500 mg by mouth 2 times daily 600mg daily       CELLCEPT (BRAND) 250 MG capsule Take 3 capsules (750 mg) by mouth 2 times daily 180 capsule 11     cholecalciferol 2000 units TABS Take 2,000 Units by mouth daily 90 tablet 3     epoetin beto-epbx (RETACRIT) 02216 UNIT/ML injection Inject 1 mL (10,000 Units) Subcutaneous every 7 days Administer for Hgb <10. HOLD if systolic BP >180. 4 mL 11     Ferrous Gluconate 324 (37.5 Fe) MG TABS Take by mouth daily       losartan (COZAAR) 25 MG tablet  Take 1 tablet (25 mg) by mouth daily 90 tablet 3     methylPREDNISolone (MEDROL DOSEPAK) 4 MG tablet therapy pack Follow Package Directions 21 tablet 2     NEORAL (BRAND) 25 MG capsule TAKE 3 CAPSULES BY MOUTH EVERY MORNING AND TAKE 4 CAPSULES BY MOUTH EVERY EVENING (TOTAL DAILY DOSE: 75MG IN THE MORNING AND 100MG IN THE EVENING) 210 capsule 11     sodium bicarbonate 650 MG tablet Take 3 tablets (1,950 mg) by mouth 2 times daily 540 tablet 3            Physical Exam:   There were no vitals taken for this visit.  Wt Readings from Last 6 Encounters:   04/25/22 104 kg (229 lb 3.2 oz)   10/20/21 102.8 kg (226 lb 9.6 oz)   10/14/21 102.1 kg (225 lb)   08/23/21 104.3 kg (230 lb)   05/06/21 103.8 kg (228 lb 12.8 oz)   05/27/21 95.3 kg (210 lb)     Constitutional: well-developed, appearing stated age; cooperative  Eyes: nl EOM, PERRLA, vision, conjunctiva, sclera  ENT: nl external ears,  hearing,   Resp: lungs clear to auscultation, nl to palpation  CV: RRR, no murmurs, rubs or gallops, no edema  MS: Patient has significant fusiform swelling of the right pointer finger with a tophi on the PIP.  Unable to fully flex at the PIP.  Some tenderness at the right second MCP as well.  Skin: no nail pitting, alopecia, rash, nodules or lesions.   Neuro: nl cranial nerves.   Psych: nl judgement, orientation, memory, affect.           Data:   Imaging:  N/A    Laboratory:  4/25/22  Creatinine 2.62, GFR 30  Uric acid 9.4

## 2022-05-12 RX ORDER — LOSARTAN POTASSIUM 25 MG/1
TABLET ORAL
Qty: 90 TABLET | Refills: 0 | OUTPATIENT
Start: 2022-05-12

## 2022-05-12 RX ORDER — METHYLPREDNISOLONE 4 MG
TABLET, DOSE PACK ORAL
Qty: 21 TABLET | Refills: 0 | OUTPATIENT
Start: 2022-05-12

## 2022-05-13 DIAGNOSIS — N18.4 CKD (CHRONIC KIDNEY DISEASE) STAGE 4, GFR 15-29 ML/MIN (H): Primary | ICD-10-CM

## 2022-05-17 NOTE — TELEPHONE ENCOUNTER
Labs have not been scheduled. Will follow up in 1 week.     Janna Louis RN   Anemia Services  [address] [address]   [email or web address]   Office : [phone]  Fax: [phone]

## 2022-05-18 NOTE — TELEPHONE ENCOUNTER
Mitchell message to Miguel Angel to check in on BP log he was to be keeping. Proteinuria- Room to increase losartan?  Now following with CKD journey/Nephrology team.

## 2022-05-23 ENCOUNTER — TELEPHONE (OUTPATIENT)
Dept: PHARMACY | Facility: CLINIC | Age: 47
End: 2022-05-23
Payer: COMMERCIAL

## 2022-05-23 NOTE — TELEPHONE ENCOUNTER
Follow-up with anemia management service:    WISE s.r.lt message sent to Brain to remind him that he needs to have his Anemia Labs drawn.     Anemia Latest Ref Rng & Units 3/21/2022 3/23/2022 3/28/2022 4/4/2022 4/6/2022 4/20/2022 4/25/2022   CINDY Dose - 10,000 units - 10,000 units 10,000 units - - -   Hemoglobin 13.3 - 17.7 g/dL - 9.4(L) - - 10.2(L) 9.7(L) 10.1(L)   TSAT 15 - 46 % - - - - 29 - -   Ferritin 26 - 388 ng/mL - - - - 205 - -           Follow-up call date: 6/7/22 Were labs drawn?     Janna Louis RN   Anemia Services  [address] [address]   [email or web address]   Office : [phone]  Fax: [phone]

## 2022-06-07 NOTE — TELEPHONE ENCOUNTER
Miguel Angel has not scheduled labs. Has clinic appt on 6/20/22. Will follow up after that appt.     Janna Louis RN   Anemia Services  [address] [address]   [email or web address]   Office : [phone]  Fax: [phone]

## 2022-06-13 ENCOUNTER — PATIENT OUTREACH (OUTPATIENT)
Dept: NEPHROLOGY | Facility: CLINIC | Age: 47
End: 2022-06-13
Payer: COMMERCIAL

## 2022-06-13 NOTE — PROGRESS NOTES
Noted that he had no lab appointment for upcoming in person appointment with Denisse Stratton. Made appointment for 1430 for June 20th (clinic appt at 1520). Left VM asking that he call back to confirm.

## 2022-06-19 NOTE — PROGRESS NOTES
Nephrology Progress Note  6/20/2022    CHRONIC TRANSPLANT NEPHROLOGY VISIT    Assessment & Plan   # DDKT: Trend up in creatinine to just above baseline at ~ 2.7.  Recent kidney transplant biopsy 8/2020 showed severe chronic changes.  Will refer patient for another kidney transplant as he does have a qualifying GFR at 20 ml/min.   - Baseline Creatinine:  ~ 2.3-2.6   - Proteinuria: Minimal (0.2-0.5 grams)   - Date DSA Last Checked: Aug/2020      Latest DSA: No   - BK Viremia: No   - Kidney Tx Biopsy: Aug 17, 2020; Result: No diagnostic evidence of acute rejection.  Mild glomerulitis with moderate transplant glomerulopathy, but no DSA.  Secondary focal segmental glomerulosclerosis.  Severe interstitial fibrosis and tubular atrophy.  Mild arterio- and severe arteriolosclerosis.             Jul 25, 2011; Result: No diagnostic evidence of acute rejection.  Unremarkable.             May 14, 2010; Result: No diagnostic evidence of acute rejection.  Moderate acute tubular injury.  Labs today pending    # Immunosuppression: Cyclosporine (goal ) and Mycophenolate mofetil (dose 1000 mg every 12 hours)   - Changes: No      # Hypertension: Goal BP: < 130/80 current regimen: amlodipine 5 mg daily, losartan 25 mg daily   - /78 in clinic today   - Changes: Not at this time, but recommend patient check BP at home.    # Anemia in Chronic Renal Disease: Hgb: Stable 10.1      CINDY: No   - Iron studies: Replete, follows with anemia clinic    # Mineral Bone Disorder:   - Secondary renal hyperparathyroidism; PTH level: 12/31/21 was 245        On treatment: None  - Vitamin D; level: 40 in 12/2021        On supplement: Yes  - Calcium; level: pending      On supplement: yes, started calcium carbonate 500 mg daily in between meals. Phos and calcium levels pending, will add on PTH and vitamin D    # Electrolytes: pending      # Gout: Occasional flares and patient is off febuxostat.   - Recommend patient follow up with PCP and  consider restarting prophylactic medication.      # Transplant History:  Etiology of Kidney Failure: Medullary cystic kidney disease  Tx: DDKT  Transplant: 4/25/2010 (Kidney), 11/3/1993 (Kidney)  Donor Type: Donation after Brain Death Donor Class: Standard Criteria Donor  Significant changes in immunosuppression: None  Significant transplant-related complications: None    Transplant Office Phone Number: [phone]    Assessment and plan was discussed with the patient and he voiced his understanding and agreement.    Return visit: No follow-ups on file.      Chief Complaint   Mr. Piña is a 46 year old here for kidney transplant and immunosuppression management.    History of Present Illness    Mr. Piña reports feeling good overall with some medical complaints.  Since last clinic visit, patient reports no hospitalizations or new medical complaints and has been doing well overall.  He did have a kidney transplant biopsy 8/2020 for elevated serum creatinine above baseline.  The biopsy showed severe chronic changes with no evidence of acute rejection.    His energy level remains good and has been normal.  He is active and getting some exercise.  Denies any chest pain or shortness of breath with exertion.  Appetite is good and weight is unchanged.  No nausea, vomiting or diarrhea.  No fever, sweats or chills.  No leg swelling.      Home BP: Not checked    Review of Systems   A comprehensive review of systems was obtained and negative, except as noted in the HPI or PMH.    Problem List   Patient Active Problem List   Diagnosis     Kidney replaced by transplant     Medullary cystic kidney disease     CARDIOVASCULAR SCREENING; LDL GOAL LESS THAN 100     Conductive hearing loss, unilateral     Aftercare following organ transplant     Immunosuppression (H)     Anemia in chronic renal disease     Secondary renal hyperparathyroidism (H)     Gout     Vitamin D deficiency     Dyslipidemia     Heterozygous factor V Leiden  mutation (H)     HTN, kidney transplant related     CKD (chronic kidney disease) stage 4, GFR 15-29 ml/min (H)     Anemia of chronic renal failure, stage 4 (severe) (H)       Social History   Social History     Tobacco Use     Smoking status: Never Smoker     Smokeless tobacco: Never Used   Vaping Use     Vaping Use: Never used   Substance Use Topics     Alcohol use: No     Drug use: No       Allergies   No Known Allergies    Medications   Current Outpatient Medications   Medication Sig     allopurinol (ZYLOPRIM) 100 MG tablet Take 0.5 tablets (50 mg) by mouth every other day     amLODIPine (NORVASC) 5 MG tablet TAKE 1 TABLET BY MOUTH AT BEDTIME AT NIGHT     calcium gluconate 500 MG tablet Take 500 mg by mouth 2 times daily 600mg daily     CELLCEPT (BRAND) 250 MG capsule Take 3 capsules (750 mg) by mouth 2 times daily     cholecalciferol 2000 units TABS Take 2,000 Units by mouth daily     epoetin beto-epbx (RETACRIT) 35682 UNIT/ML injection Inject 1 mL (10,000 Units) Subcutaneous every 7 days Administer for Hgb <10. HOLD if systolic BP >180.     Ferrous Gluconate 324 (37.5 Fe) MG TABS Take by mouth daily     losartan (COZAAR) 25 MG tablet Take 1 tablet by mouth once daily     methylPREDNISolone (MEDROL DOSEPAK) 4 MG tablet therapy pack Follow Package Directions     NEORAL (BRAND) 25 MG capsule TAKE 3 CAPSULES BY MOUTH EVERY MORNING AND TAKE 4 CAPSULES BY MOUTH EVERY EVENING (TOTAL DAILY DOSE: 75MG IN THE MORNING AND 100MG IN THE EVENING)     sodium bicarbonate 650 MG tablet Take 3 tablets (1,950 mg) by mouth 2 times daily     No current facility-administered medications for this visit.     There are no discontinued medications.    Physical Exam   Vital signs were deferred for this telemedicine visit.    GENERAL APPEARANCE: alert and no distress  HENT: no obvious abnormalities on appearance  RESP: breathing appears unremarkable with normal rate, no audible wheezing or cough and no apparent shortness of breath with  conversation  MS: extremities normal - no gross deformities noted  SKIN: no apparent rash and normal skin tone  NEURO: speech is clear with no obvious neurological deficits  PSYCH: mentation appears normal and affect normal    Data     Renal Latest Ref Rng & Units 4/25/2022 12/31/2021 8/23/2021   Na 133 - 144 mmol/L 135 139 139   K 3.4 - 5.3 mmol/L 4.7 4.5 4.8   Cl 94 - 109 mmol/L 107 108 110(H)   CO2 20 - 32 mmol/L 19(L) 24 21   BUN 7 - 30 mg/dL 58(H) 61(H) 51(H)   Cr 0.66 - 1.25 mg/dL 2.62(H) 3.06(H) 2.95(H)   Glucose 70 - 99 mg/dL 122(H) 100(H) 90   Ca  8.5 - 10.1 mg/dL 9.1 9.2 8.5   Mg 1.6 - 2.3 mg/dL - - -     Bone Health Latest Ref Rng & Units 4/25/2022 12/31/2021 9/16/2019   Phos 2.5 - 4.5 mg/dL 3.2 4.1 -   PTHi 18 - 80 pg/mL - 245(H) 328(H)   Vit D Def 20 - 75 ug/L - 40 38     Heme Latest Ref Rng & Units 4/25/2022 4/20/2022 4/6/2022   WBC 4.0 - 11.0 10e3/uL 8.3 - -   Hgb 13.3 - 17.7 g/dL 10.1(L) 9.7(L) 10.2(L)   Plt 150 - 450 10e3/uL 250 - -   ABSOLUTE NEUTROPHIL 1.6 - 8.3 10e9/L - - -   ABSOLUTE LYMPHOCYTES 0.8 - 5.3 10e9/L - - -   ABSOLUTE MONOCYTES 0.0 - 1.3 10e9/L - - -   ABSOLUTE EOSINOPHILS 0.0 - 0.7 10e9/L - - -   ABSOLUTE BASOPHILS 0.0 - 0.2 10e9/L - - -   ABS IMMATURE GRANULOCYTES 0 - 0.4 10e9/L - - -   ABSOLUTE NUCLEATED RBC - - - -     Liver Latest Ref Rng & Units 4/25/2022 8/23/2021 2/17/2020   AP 40 - 150 U/L - 100 86   TBili 0.2 - 1.3 mg/dL - 0.7 0.7   ALT 0 - 70 U/L - 17 18   AST 0 - 45 U/L - 9 13   Tot Protein 6.8 - 8.8 g/dL - 6.9 6.8   Albumin 3.4 - 5.0 g/dL 3.4 3.7 3.7        Iron studies Latest Ref Rng & Units 4/6/2022 3/11/2022 2/3/2022   Iron 35 - 180 ug/dL 64 64 98   Iron sat 15 - 46 % 29 26 38   Ferritin 26 - 388 ng/mL 205 256 311     [facility] Txp Virology Latest Ref Rng & Units 8/23/2021 8/17/2020 9/13/2012   CMV IgG EU/mL - - -   CVM DNA Quant - - Whole blood, EDTA anticoagulant -   CMV Quant <100 Copies/mL - - -   CMV QT Log <2.0 Log copies/mL - - -   CMV QUANT IU/ML CMVND:CMV DNA Not  Detected [IU]/mL - CMV DNA Not Detected -   LOG IU/ML OF CMVQNT <2.1 [Log:IU]/mL - Not Calculated -   BK Spec - - Plasma PLASMA   BK Res BKNEG:BK Virus DNA Not Detected copies/mL - BK Virus DNA Not Detected <1000   BK Log <2.7 Log copies/mL - Not Calculated <3.0  The lower limit of detection for this assay is 1000 copies/mL.  Real-time TaqMan   PCR was performed using BK primers and probe for the detection of a 90 bp   portion of the  1 gene.  The performance characteristics were validated by   the [facility], [place].   It has not been cleared or approved by the U.S. Food and Drug Administration.   EBV CAPSID ANTIBODY IGG No detectable antibody. Positive(A) - -   EBV DNA COPIES/ML <1000 Copies/mL - - -   EBV DNA LOG OF COPIES <3.0 Log copies/mL - - -   Hep B Core NEG - - -   Hep B Surf - - - -   HIV 1&2 NEG - - -            Recent Labs   Lab Test 12/31/21  1021   MPACID 2.36   MPAG 186.7*       Denisse Stratton PA-C    Visit length 10 minutes. An additional 10 minutes was spent on date of service in chart review, documentation, and other activities as noted.

## 2022-06-20 ENCOUNTER — LAB (OUTPATIENT)
Dept: LAB | Facility: CLINIC | Age: 47
End: 2022-06-20
Payer: COMMERCIAL

## 2022-06-20 ENCOUNTER — OFFICE VISIT (OUTPATIENT)
Dept: NEPHROLOGY | Facility: CLINIC | Age: 47
End: 2022-06-20

## 2022-06-20 VITALS
SYSTOLIC BLOOD PRESSURE: 124 MMHG | BODY MASS INDEX: 33.92 KG/M2 | OXYGEN SATURATION: 96 % | DIASTOLIC BLOOD PRESSURE: 78 MMHG | HEART RATE: 97 BPM | WEIGHT: 229 LBS | HEIGHT: 69 IN

## 2022-06-20 DIAGNOSIS — N25.81 SECONDARY RENAL HYPERPARATHYROIDISM (H): ICD-10-CM

## 2022-06-20 DIAGNOSIS — Z94.0 KIDNEY REPLACED BY TRANSPLANT: ICD-10-CM

## 2022-06-20 DIAGNOSIS — N18.4 CKD (CHRONIC KIDNEY DISEASE) STAGE 4, GFR 15-29 ML/MIN (H): Primary | ICD-10-CM

## 2022-06-20 DIAGNOSIS — N18.4 CKD (CHRONIC KIDNEY DISEASE) STAGE 4, GFR 15-29 ML/MIN (H): ICD-10-CM

## 2022-06-20 DIAGNOSIS — I10 HYPERTENSION, ESSENTIAL: ICD-10-CM

## 2022-06-20 DIAGNOSIS — E55.9 VITAMIN D DEFICIENCY: ICD-10-CM

## 2022-06-20 DIAGNOSIS — Z79.899 ON ALLOPURINOL THERAPY: ICD-10-CM

## 2022-06-20 DIAGNOSIS — M1A.9XX1 CHRONIC TOPHACEOUS GOUT: ICD-10-CM

## 2022-06-20 DIAGNOSIS — E87.20 METABOLIC ACIDOSIS: ICD-10-CM

## 2022-06-20 LAB
ALBUMIN SERPL-MCNC: 3.5 G/DL (ref 3.4–5)
ALP SERPL-CCNC: 81 U/L (ref 40–150)
ALT SERPL W P-5'-P-CCNC: 21 U/L (ref 0–70)
ANION GAP SERPL CALCULATED.3IONS-SCNC: 10 MMOL/L (ref 3–14)
AST SERPL W P-5'-P-CCNC: 12 U/L (ref 0–45)
BILIRUB SERPL-MCNC: 1 MG/DL (ref 0.2–1.3)
BUN SERPL-MCNC: 50 MG/DL (ref 7–30)
CALCIUM SERPL-MCNC: 8.8 MG/DL (ref 8.5–10.1)
CHLORIDE BLD-SCNC: 109 MMOL/L (ref 94–109)
CO2 SERPL-SCNC: 20 MMOL/L (ref 20–32)
CREAT SERPL-MCNC: 3.28 MG/DL (ref 0.66–1.25)
ERYTHROCYTE [DISTWIDTH] IN BLOOD BY AUTOMATED COUNT: 14.3 % (ref 10–15)
GFR SERPL CREATININE-BSD FRML MDRD: 23 ML/MIN/1.73M2
GLUCOSE BLD-MCNC: 87 MG/DL (ref 70–99)
HCT VFR BLD AUTO: 29.5 % (ref 40–53)
HGB BLD-MCNC: 9.4 G/DL (ref 13.3–17.7)
MCH RBC QN AUTO: 29.7 PG (ref 26.5–33)
MCHC RBC AUTO-ENTMCNC: 31.9 G/DL (ref 31.5–36.5)
MCV RBC AUTO: 93 FL (ref 78–100)
PHOSPHATE SERPL-MCNC: 3.5 MG/DL (ref 2.5–4.5)
PLATELET # BLD AUTO: 225 10E3/UL (ref 150–450)
POTASSIUM BLD-SCNC: 4.6 MMOL/L (ref 3.4–5.3)
PROT SERPL-MCNC: 6.6 G/DL (ref 6.8–8.8)
RBC # BLD AUTO: 3.17 10E6/UL (ref 4.4–5.9)
SODIUM SERPL-SCNC: 139 MMOL/L (ref 133–144)
URATE SERPL-MCNC: 9.9 MG/DL (ref 3.5–7.2)
WBC # BLD AUTO: 5.5 10E3/UL (ref 4–11)

## 2022-06-20 PROCEDURE — 83970 ASSAY OF PARATHORMONE: CPT

## 2022-06-20 PROCEDURE — 80053 COMPREHEN METABOLIC PANEL: CPT

## 2022-06-20 PROCEDURE — 85027 COMPLETE CBC AUTOMATED: CPT

## 2022-06-20 PROCEDURE — 82306 VITAMIN D 25 HYDROXY: CPT

## 2022-06-20 PROCEDURE — 36415 COLL VENOUS BLD VENIPUNCTURE: CPT

## 2022-06-20 PROCEDURE — 82043 UR ALBUMIN QUANTITATIVE: CPT

## 2022-06-20 PROCEDURE — 84100 ASSAY OF PHOSPHORUS: CPT

## 2022-06-20 PROCEDURE — 99214 OFFICE O/P EST MOD 30 MIN: CPT | Performed by: PHYSICIAN ASSISTANT

## 2022-06-20 PROCEDURE — 84550 ASSAY OF BLOOD/URIC ACID: CPT

## 2022-06-20 NOTE — PATIENT INSTRUCTIONS
No medication changes today.   Continue good hydration and low sodium diet.   Check blood pressure and weight at home, keep a log.   Avoid ibuprofen/aleve.   Follow up with Dr. Patino as planned. Call sooner with any questions or concerns.

## 2022-06-20 NOTE — LETTER
6/20/2022       RE: Miguel Angel Piña  [address] [facility] [address]     Dear Colleague,    Thank you for referring your patient, Miguel Angel Piña, to the [facility]. Please see a copy of my visit note below.    Nephrology Progress Note  6/20/2022    CHRONIC TRANSPLANT NEPHROLOGY VISIT    Assessment & Plan   # DDKT: Trend up in creatinine to just above baseline at ~ 2.7.  Recent kidney transplant biopsy 8/2020 showed severe chronic changes.  Will refer patient for another kidney transplant as he does have a qualifying GFR at 20 ml/min.   - Baseline Creatinine:  ~ 2.3-2.6   - Proteinuria: Minimal (0.2-0.5 grams)   - Date DSA Last Checked: Aug/2020      Latest DSA: No   - BK Viremia: No   - Kidney Tx Biopsy: Aug 17, 2020; Result: No diagnostic evidence of acute rejection.  Mild glomerulitis with moderate transplant glomerulopathy, but no DSA.  Secondary focal segmental glomerulosclerosis.  Severe interstitial fibrosis and tubular atrophy.  Mild arterio- and severe arteriolosclerosis.             Jul 25, 2011; Result: No diagnostic evidence of acute rejection.  Unremarkable.             May 14, 2010; Result: No diagnostic evidence of acute rejection.  Moderate acute tubular injury.  Labs today pending    # Immunosuppression: Cyclosporine (goal ) and Mycophenolate mofetil (dose 1000 mg every 12 hours)   - Changes: No      # Hypertension: Goal BP: < 130/80 current regimen: amlodipine 5 mg daily, losartan 25 mg daily   - /78 in clinic today   - Changes: Not at this time, but recommend patient check BP at home.    # Anemia in Chronic Renal Disease: Hgb: Stable 10.1      CINDY: No   - Iron studies: Replete, follows with anemia clinic    # Mineral Bone Disorder:   - Secondary renal hyperparathyroidism; PTH level: 12/31/21 was 245        On treatment: None  - Vitamin D; level: 40 in 12/2021        On supplement: Yes  -  Calcium; level: pending      On supplement: yes, started calcium carbonate 500 mg daily in between meals. Phos and calcium levels pending, will add on PTH and vitamin D    # Electrolytes: pending      # Gout: Occasional flares and patient is off febuxostat.   - Recommend patient follow up with PCP and consider restarting prophylactic medication.      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    Return visit: No follow-ups on file.      Chief Complaint   Mr. Piña is a 46 year old here for kidney transplant and immunosuppression management.    History of Present Illness    Mr. Piña reports feeling good overall with some medical complaints.  Since last clinic visit, patient reports no hospitalizations or new medical complaints and has been doing well overall.  He did have a kidney transplant biopsy 8/2020 for elevated serum creatinine above baseline.  The biopsy showed severe chronic changes with no evidence of acute rejection.    His energy level remains good and has been normal.  He is active and getting some exercise.  Denies any chest pain or shortness of breath with exertion.  Appetite is good and weight is unchanged.  No nausea, vomiting or diarrhea.  No fever, sweats or chills.  No leg swelling.      Home BP: Not checked    Review of Systems   A comprehensive review of systems was obtained and negative, except as noted in the HPI or PMH.    Problem List   Patient Active Problem List   Diagnosis     Kidney replaced by transplant     Medullary cystic kidney disease     CARDIOVASCULAR  SCREENING; LDL GOAL LESS THAN 100     Conductive hearing loss, unilateral     Aftercare following organ transplant     Immunosuppression (H)     Anemia in chronic renal disease     Secondary renal hyperparathyroidism (H)     Gout     Vitamin D deficiency     Dyslipidemia     Heterozygous factor V Leiden mutation (H)     HTN, kidney transplant related     CKD (chronic kidney disease) stage 4, GFR 15-29 ml/min (H)     Anemia of chronic renal failure, stage 4 (severe) (H)       Social History   Social History     Tobacco Use     Smoking status: Never Smoker     Smokeless tobacco: Never Used   Vaping Use     Vaping Use: Never used   Substance Use Topics     Alcohol use: No     Drug use: No       Allergies   No Known Allergies    Medications   Current Outpatient Medications   Medication Sig     allopurinol (ZYLOPRIM) 100 MG tablet Take 0.5 tablets (50 mg) by mouth every other day     amLODIPine (NORVASC) 5 MG tablet TAKE 1 TABLET BY MOUTH AT BEDTIME AT NIGHT     calcium gluconate 500 MG tablet Take 500 mg by mouth 2 times daily 600mg daily     CELLCEPT (BRAND) 250 MG capsule Take 3 capsules (750 mg) by mouth 2 times daily     cholecalciferol 2000 units TABS Take 2,000 Units by mouth daily     epoetin beto-epbx (RETACRIT) 54261 UNIT/ML injection Inject 1 mL (10,000 Units) Subcutaneous every 7 days Administer for Hgb <10. HOLD if systolic BP >180.     Ferrous Gluconate 324 (37.5 Fe) MG TABS Take by mouth daily     losartan (COZAAR) 25 MG tablet Take 1 tablet by mouth once daily     methylPREDNISolone (MEDROL DOSEPAK) 4 MG tablet therapy pack Follow Package Directions     NEORAL (BRAND) 25 MG capsule TAKE 3 CAPSULES BY MOUTH EVERY MORNING AND TAKE 4 CAPSULES BY MOUTH EVERY EVENING (TOTAL DAILY DOSE: 75MG IN THE MORNING AND 100MG IN THE EVENING)     sodium bicarbonate 650 MG tablet Take 3 tablets (1,950 mg) by mouth 2 times daily     No current facility-administered medications for this visit.     There are no discontinued  medications.    Physical Exam   Vital signs were deferred for this telemedicine visit.    GENERAL APPEARANCE: alert and no distress  HENT: no obvious abnormalities on appearance  RESP: breathing appears unremarkable with normal rate, no audible wheezing or cough and no apparent shortness of breath with conversation  MS: extremities normal - no gross deformities noted  SKIN: no apparent rash and normal skin tone  NEURO: speech is clear with no obvious neurological deficits  PSYCH: mentation appears normal and affect normal    Data     Renal Latest Ref Rng & Units 4/25/2022 12/31/2021 8/23/2021   Na 133 - 144 mmol/L 135 139 139   K 3.4 - 5.3 mmol/L 4.7 4.5 4.8   Cl 94 - 109 mmol/L 107 108 110(H)   CO2 20 - 32 mmol/L 19(L) 24 21   BUN 7 - 30 mg/dL 58(H) 61(H) 51(H)   Cr 0.66 - 1.25 mg/dL 2.62(H) 3.06(H) 2.95(H)   Glucose 70 - 99 mg/dL 122(H) 100(H) 90   Ca  8.5 - 10.1 mg/dL 9.1 9.2 8.5   Mg 1.6 - 2.3 mg/dL - - -     Bone Health Latest Ref Rng & Units 4/25/2022 12/31/2021 9/16/2019   Phos 2.5 - 4.5 mg/dL 3.2 4.1 -   PTHi 18 - 80 pg/mL - 245(H) 328(H)   Vit D Def 20 - 75 ug/L - 40 38     Heme Latest Ref Rng & Units 4/25/2022 4/20/2022 4/6/2022   WBC 4.0 - 11.0 10e3/uL 8.3 - -   Hgb 13.3 - 17.7 g/dL 10.1(L) 9.7(L) 10.2(L)   Plt 150 - 450 10e3/uL 250 - -   ABSOLUTE NEUTROPHIL 1.6 - 8.3 10e9/L - - -   ABSOLUTE LYMPHOCYTES 0.8 - 5.3 10e9/L - - -   ABSOLUTE MONOCYTES 0.0 - 1.3 10e9/L - - -   ABSOLUTE EOSINOPHILS 0.0 - 0.7 10e9/L - - -   ABSOLUTE BASOPHILS 0.0 - 0.2 10e9/L - - -   ABS IMMATURE GRANULOCYTES 0 - 0.4 10e9/L - - -   ABSOLUTE NUCLEATED RBC - - - -     Liver Latest Ref Rng & Units 4/25/2022 8/23/2021 2/17/2020   AP 40 - 150 U/L - 100 86   TBili 0.2 - 1.3 mg/dL - 0.7 0.7   ALT 0 - 70 U/L - 17 18   AST 0 - 45 U/L - 9 13   Tot Protein 6.8 - 8.8 g/dL - 6.9 6.8   Albumin 3.4 - 5.0 g/dL 3.4 3.7 3.7        Iron studies Latest Ref Rng & Units 4/6/2022 3/11/2022 2/3/2022   Iron 35 - 180 ug/dL 64 64 98   Iron sat 15 - 46 % 29  26 38   Ferritin 26 - 388 ng/mL 205 256 311     [facility] Virology Latest Ref Rng & Units 8/23/2021 8/17/2020 9/13/2012   CMV IgG EU/mL - - -   CVM DNA Quant - - Whole blood, EDTA anticoagulant -   CMV Quant <100 Copies/mL - - -   CMV QT Log <2.0 Log copies/mL - - -   CMV QUANT IU/ML CMVND:CMV DNA Not Detected [IU]/mL - CMV DNA Not Detected -   LOG IU/ML OF CMVQNT <2.1 [Log:IU]/mL - Not Calculated -   BK Spec - - Plasma PLASMA   BK Res BKNEG:BK Virus DNA Not Detected copies/mL - BK Virus DNA Not Detected <1000   BK Log <2.7 Log copies/mL - Not Calculated <3.0  The lower limit of detection for this assay is 1000 copies/mL.  Real-time TaqMan   PCR was performed using BK primers and probe for the detection of a 90 bp   portion of the  1 gene.  The performance characteristics were validated by   the [facility].   It has not been cleared or approved by the U.S. Food and Drug Administration.   EBV CAPSID ANTIBODY IGG No detectable antibody. Positive(A) - -   EBV DNA COPIES/ML <1000 Copies/mL - - -   EBV DNA LOG OF COPIES <3.0 Log copies/mL - - -   Hep B Core NEG - - -   Hep B Surf - - - -   HIV 1&2 NEG - - -            Recent Labs   Lab Test 12/31/21  1021   MPACID 2.36   MPAG 186.7*       Denisse Stratton PA-C    Visit length 10 minutes. An additional 10 minutes was spent on date of service in chart review, documentation, and other activities as noted.

## 2022-06-21 ENCOUNTER — TELEPHONE (OUTPATIENT)
Dept: PHARMACY | Facility: CLINIC | Age: 47
End: 2022-06-21
Payer: COMMERCIAL

## 2022-06-21 LAB
DEPRECATED CALCIDIOL+CALCIFEROL SERPL-MC: 35 UG/L (ref 20–75)
PTH-INTACT SERPL-MCNC: 417 PG/ML (ref 15–65)

## 2022-06-21 NOTE — TELEPHONE ENCOUNTER
Follow-up with anemia management service:    LVM for Miguel Angel to check in Re Anemia Services.   He has no doses of Retacrit at home.  Hgb has dropped.     Anemia Latest Ref Rng & Units 3/23/2022 3/28/2022 4/4/2022 4/6/2022 4/20/2022 4/25/2022 6/20/2022   CINDY Dose - - 10,000 units 10,000 units - - - -   Hemoglobin 13.3 - 17.7 g/dL 9.4(L) - - 10.2(L) 9.7(L) 10.1(L) 9.4(L)   TSAT 15 - 46 % - - - 29 - - -   Ferritin 26 - 388 ng/mL - - - 205 - - -           Follow-up call date: 6/28/22  Did Miguel Angel call back?     Janna Louis RN   Anemia Services  [address] [address]   [email or web address]   Office : [phone]  Fax: [phone]

## 2022-06-22 ENCOUNTER — PATIENT OUTREACH (OUTPATIENT)
Dept: NEPHROLOGY | Facility: CLINIC | Age: 47
End: 2022-06-22

## 2022-06-22 DIAGNOSIS — N25.81 SECONDARY RENAL HYPERPARATHYROIDISM (H): Primary | ICD-10-CM

## 2022-06-22 PROBLEM — B99.9 INFECTION: Status: ACTIVE | Noted: 2021-10-14

## 2022-06-22 PROBLEM — U07.1 CLINICAL DIAGNOSIS OF COVID-19: Status: ACTIVE | Noted: 2021-12-21

## 2022-06-22 LAB
CREAT UR-MCNC: 179 MG/DL
MICROALBUMIN UR-MCNC: 230 MG/L
MICROALBUMIN/CREAT UR: 128.49 MG/G CR (ref 0–17)

## 2022-06-22 NOTE — PROGRESS NOTES
Denisse Stratton, Hanh Cuadra, RN  Please let him know PTH is elevated, recommend starting calcitriol 0.25 mcg daily. Follow up as planned.     Denisse Young    06/22/22 1101: Pt driving, requested callback in 30min because he was driving.     06/22/22 1152: Left message requesting callback, direct phone number provided.    06/24/22 1142: Left message. Mychart sent.

## 2022-06-24 RX ORDER — CALCITRIOL 0.25 UG/1
0.25 CAPSULE, LIQUID FILLED ORAL DAILY
Qty: 90 CAPSULE | Refills: 3 | Status: SHIPPED | OUTPATIENT
Start: 2022-06-24 | End: 2024-05-14

## 2022-06-24 NOTE — PROGRESS NOTES
Spoke with Rajat regarding starting Calcitrol daily. He had questions about why he was starting this so writer tried to explain this to him. He was worried that he accidentally double-uped on his normal medications on Wednesday before his labs were drawn and thought that could have done something to his labs. Writer explained that that would not have affected his PTH level and that it has been steadily rising over the last year due to progressing of his kidney disease.  He reports he cannot start it until Monday as he is out of town. Writer told him she would send him information on the PTH levels via [employer].

## 2022-06-28 NOTE — TELEPHONE ENCOUNTER
Follow-up with anemia management service:    Spoke to Miguel Angel re: need for labs and Retacrit. He will call to schedule labs at his soonest convenience. Advised that full anemia labs should be drawn.  Retacrit rx at [facility]. Pt was OK with delivery on Thursday 6/30. He did not have time to talk to [facility] call center staff d/t being at work. RN set up order for him. He was OK with the $120 copay, OK for the package to be left at door.    Pt notes that he is taking oral iron tab daily.     Anemia Latest Ref Rng & Units 3/23/2022 3/28/2022 4/4/2022 4/6/2022 4/20/2022 4/25/2022 6/20/2022   CINDY Dose - - 10,000 units 10,000 units - - - -   Hemoglobin 13.3 - 17.7 g/dL 9.4(L) - - 10.2(L) 9.7(L) 10.1(L) 9.4(L)   TSAT 15 - 46 % - - - 29 - - -   Ferritin 26 - 388 ng/mL - - - 205 - - -       Orders needed to be renewed (for next follow-up date) in EPIC: None     Follow-up call date: 070622, review labs, did he dose?    Carrie Leopold, RN   Anemia Services  [address]  [address]  [email or web address]  Office: [phone]  Fax [phone]

## 2022-07-01 ENCOUNTER — TELEPHONE (OUTPATIENT)
Dept: NEPHROLOGY | Facility: CLINIC | Age: 47
End: 2022-07-01

## 2022-07-01 NOTE — TELEPHONE ENCOUNTER
PRIOR AUTHORIZATION DENIED    Medication: calcitRIOL (ROCALTROL) 0.25 MCG capsule - EPA DENIED    Denial Date: 7/1/2022    Denial Rational: ISSUE WITH COMBINATION OF MEDICATION AND VIT D      Appeal Information:

## 2022-07-05 ENCOUNTER — LAB (OUTPATIENT)
Dept: LAB | Facility: CLINIC | Age: 47
End: 2022-07-05
Payer: COMMERCIAL

## 2022-07-05 DIAGNOSIS — D63.1 ANEMIA OF CHRONIC RENAL FAILURE, STAGE 4 (SEVERE) (H): ICD-10-CM

## 2022-07-05 DIAGNOSIS — N18.4 ANEMIA OF CHRONIC RENAL FAILURE, STAGE 4 (SEVERE) (H): ICD-10-CM

## 2022-07-05 DIAGNOSIS — M25.571 PAIN IN JOINT INVOLVING ANKLE AND FOOT, RIGHT: ICD-10-CM

## 2022-07-05 DIAGNOSIS — Z79.60 LONG-TERM USE OF IMMUNOSUPPRESSANT MEDICATION: ICD-10-CM

## 2022-07-05 DIAGNOSIS — E87.8 LOW BICARBONATE LEVEL: ICD-10-CM

## 2022-07-05 DIAGNOSIS — M1A.9XX1 CHRONIC TOPHACEOUS GOUT: ICD-10-CM

## 2022-07-05 DIAGNOSIS — N18.4 CKD (CHRONIC KIDNEY DISEASE) STAGE 4, GFR 15-29 ML/MIN (H): ICD-10-CM

## 2022-07-05 DIAGNOSIS — Z94.0 KIDNEY TRANSPLANTED: ICD-10-CM

## 2022-07-05 DIAGNOSIS — N18.4 CHRONIC KIDNEY DISEASE, STAGE IV (SEVERE) (H): ICD-10-CM

## 2022-07-05 DIAGNOSIS — Z48.298 AFTERCARE FOLLOWING ORGAN TRANSPLANT: ICD-10-CM

## 2022-07-05 LAB
ANION GAP SERPL CALCULATED.3IONS-SCNC: 11 MMOL/L (ref 3–14)
BUN SERPL-MCNC: 52 MG/DL (ref 7–30)
CALCIUM SERPL-MCNC: 9.1 MG/DL (ref 8.5–10.1)
CHLORIDE BLD-SCNC: 109 MMOL/L (ref 94–109)
CO2 SERPL-SCNC: 17 MMOL/L (ref 20–32)
CREAT SERPL-MCNC: 3.4 MG/DL (ref 0.66–1.25)
ERYTHROCYTE [DISTWIDTH] IN BLOOD BY AUTOMATED COUNT: 13.3 % (ref 10–15)
FERRITIN SERPL-MCNC: 300 NG/ML (ref 26–388)
GFR SERPL CREATININE-BSD FRML MDRD: 22 ML/MIN/1.73M2
GLUCOSE BLD-MCNC: 107 MG/DL (ref 70–99)
HCT VFR BLD AUTO: 29.8 % (ref 40–53)
HGB BLD-MCNC: 9.7 G/DL (ref 13.3–17.7)
IRON SATN MFR SERPL: 29 % (ref 15–46)
IRON SERPL-MCNC: 80 UG/DL (ref 35–180)
MCH RBC QN AUTO: 29.7 PG (ref 26.5–33)
MCHC RBC AUTO-ENTMCNC: 32.6 G/DL (ref 31.5–36.5)
MCV RBC AUTO: 91 FL (ref 78–100)
PLATELET # BLD AUTO: 256 10E3/UL (ref 150–450)
POTASSIUM BLD-SCNC: 4.5 MMOL/L (ref 3.4–5.3)
RBC # BLD AUTO: 3.27 10E6/UL (ref 4.4–5.9)
SODIUM SERPL-SCNC: 137 MMOL/L (ref 133–144)
TIBC SERPL-MCNC: 277 UG/DL (ref 240–430)
WBC # BLD AUTO: 7.4 10E3/UL (ref 4–11)

## 2022-07-05 PROCEDURE — 85027 COMPLETE CBC AUTOMATED: CPT

## 2022-07-05 PROCEDURE — 80158 DRUG ASSAY CYCLOSPORINE: CPT

## 2022-07-05 PROCEDURE — 82728 ASSAY OF FERRITIN: CPT

## 2022-07-05 PROCEDURE — 83550 IRON BINDING TEST: CPT

## 2022-07-05 PROCEDURE — 80048 BASIC METABOLIC PNL TOTAL CA: CPT

## 2022-07-05 PROCEDURE — 36415 COLL VENOUS BLD VENIPUNCTURE: CPT

## 2022-07-05 NOTE — TELEPHONE ENCOUNTER
Requested Prescriptions   Pending Prescriptions Disp Refills     methylPREDNISolone (MEDROL DOSEPAK) 4 MG tablet therapy pack 21 tablet 2     Sig: Follow Package Directions       There is no refill protocol information for this order        allopurinol (ZYLOPRIM) 100 MG tablet 30 tablet 0     Sig: Take 0.5 tablets (50 mg) by mouth every other day       There is no refill protocol information for this order        Last office visit: Visit date not found with prescribing provider:  EDUARDO ALFONSO    Future Office Visit:          Nabila Abel  [facility]

## 2022-07-06 ENCOUNTER — TELEPHONE (OUTPATIENT)
Dept: PHARMACY | Facility: CLINIC | Age: 47
End: 2022-07-06

## 2022-07-06 LAB
CYCLOSPORINE BLD LC/MS/MS-MCNC: 54 UG/L (ref 50–400)
TME LAST DOSE: NORMAL H
TME LAST DOSE: NORMAL H

## 2022-07-06 NOTE — TELEPHONE ENCOUNTER
Anemia Management Note  SUBJECTIVE/OBJECTIVE:  Referred by Dr. Lan Patino on 2021  Primary Diagnosis: Anemia in Chronic Kidney Disease (N18.4, D63.1)     Secondary Diagnosis:  Chronic Kidney Disease, Stage 4 (N18.4)   Hx of Kidney Tx; ,   Hgb goal range:  9-10  Epo/Darbo: Retacrit 10,000 units weeky for Hgb <10. AT HOME.    Iron regimen: NA  Labs : 1/3/2023  Recent CINDY use, transfusion, IV iron: NA  RX/TX plans : 2023     No history of stroke, MI and blood clots or cancers.     Contact:  No Consent to Communicate on File.     Anemia Latest Ref Rng & Units 3/28/2022 2022 2022 2022 2022 2022 2022   CINDY Dose - 10,000 units 10,000 units - - - - -   Hemoglobin 13.3 - 17.7 g/dL - - 10.2(L) 9.7(L) 10.1(L) 9.4(L) 9.7(L)   TSAT 15 - 46 % - - 29 - - - 29   Ferritin 26 - 388 ng/mL - - 205 - - - 300     BP Readings from Last 3 Encounters:   22 124/78   22 124/70   22 133/86     Wt Readings from Last 2 Encounters:   22 229 lb (103.9 kg)   22 229 lb (103.9 kg)           ASSESSMENT:  Hgb:at goal - continue to monitor  TSat: not at goal of >30% Ferritin: At goal (>100ng/mL)    PLAN:  Miguel Angel will hold off on the Procrit. He is feeling pretty good and is just getting over a cold.  He has 4 doses at home and will plan on having labs drawn again in 2 weeks.     Orders needed to be renewed (for next follow-up date) in [facility]: None    Iron labs due:  Oct 2022    Plan discussed with:  Miguel Angel      NEXT FOLLOW-UP DATE:  22    Janna Louis RN   Anemia Services  [address] [address]   [email or web address]   Office : [phone]  Fax: [phone]

## 2022-07-07 ENCOUNTER — TELEPHONE (OUTPATIENT)
Dept: TRANSPLANT | Facility: CLINIC | Age: 47
End: 2022-07-07

## 2022-07-07 DIAGNOSIS — M25.571 PAIN IN JOINT INVOLVING ANKLE AND FOOT, RIGHT: ICD-10-CM

## 2022-07-07 DIAGNOSIS — M1A.9XX1 CHRONIC TOPHACEOUS GOUT: ICD-10-CM

## 2022-07-07 RX ORDER — METHYLPREDNISOLONE 4 MG
TABLET, DOSE PACK ORAL
Qty: 21 TABLET | Refills: 2 | Status: CANCELLED | OUTPATIENT
Start: 2022-07-07

## 2022-07-07 RX ORDER — ALLOPURINOL 100 MG/1
50 TABLET ORAL EVERY OTHER DAY
Qty: 30 TABLET | Refills: 0 | Status: CANCELLED | OUTPATIENT
Start: 2022-07-07

## 2022-07-07 NOTE — TELEPHONE ENCOUNTER
Allopurinol      Last Written Prescription Date:  5/11/2022  Last Fill Quantity: 30,   # refills: 0  Last Office Visit: 5/22/2022  Future Office visit:      Katelyn Antonio cma.....7/7/2022 at 9:39 AM

## 2022-07-07 NOTE — TELEPHONE ENCOUNTER
1. Medication recommendations:             a. Allopurinol 100mg: take 1/2 tablet every other day  b. Medrol 4mg daily-if you get a flare of gout and need to increase the medrol, let me know    Patient called to check on status. Having worsening pain. Please advise on refill request.     Thank you,   Aranza DURON RN  Specialty Clin[facility]

## 2022-07-07 NOTE — TELEPHONE ENCOUNTER
"Requested Prescriptions   Pending Prescriptions Disp Refills     allopurinol (ZYLOPRIM) 100 MG tablet 30 tablet 0     Sig: Take 0.5 tablets (50 mg) by mouth every other day       Gout Agents Protocol Failed - 7/7/2022  9:44 AM        Failed - Has Uric Acid on file in past 12 months and value is less than 6     Recent Labs   Lab Test 06/20/22  1451   URIC 9.9*     If level is 6mg/dL or greater, ok to refill one time and refer to provider.           Failed - Normal serum creatinine on file in the past 12 months     Recent Labs   Lab Test 07/05/22  1425   CR 3.40*       Ok to refill medication if creatinine is low          Passed - CBC on file in past 12 months     Recent Labs   Lab Test 07/05/22  1425   WBC 7.4   RBC 3.27*   HGB 9.7*   HCT 29.8*                    Passed - ALT on file in past 12 months     Recent Labs   Lab Test 06/20/22  1451   ALT 21             Passed - Recent (12 mo) or future (30 days) visit within the authorizing provider's specialty     Patient has had an office visit with the authorizing provider or a provider within the authorizing providers department within the previous 12 mos or has a future within next 30 days. See \"Patient Info\" tab in inbasket, or \"Choose Columns\" in Meds & Orders section of the refill encounter.              Passed - Medication is active on med list        Passed - Patient is age 18 or older           methylPREDNISolone (MEDROL DOSEPAK) 4 MG tablet therapy pack 21 tablet 2     Sig: Follow Package Directions       There is no refill protocol information for this order        Last office visit: Visit date not found with prescribing provider:  Isela Jean    Future Office Visit:          Nabila Abel  [facility]      "

## 2022-07-07 NOTE — TELEPHONE ENCOUNTER
Pt recently referred back to Transplant due to worsening renal function.    To provider for refill consideration.    Gabrielle WASHINGTON   [facility]

## 2022-07-07 NOTE — TELEPHONE ENCOUNTER
Routing refill request to provider for review/approval because:   Has Uric Acid on file in past 12 months and value is less than 6    Normal serum creatinine on file in the past 12 months

## 2022-07-08 RX ORDER — METHYLPREDNISOLONE 4 MG
TABLET, DOSE PACK ORAL
Qty: 21 TABLET | Refills: 2 | Status: SHIPPED | OUTPATIENT
Start: 2022-07-08 | End: 2022-09-09

## 2022-07-08 RX ORDER — ALLOPURINOL 100 MG/1
50 TABLET ORAL EVERY OTHER DAY
Qty: 30 TABLET | Refills: 0 | Status: SHIPPED | OUTPATIENT
Start: 2022-07-08 | End: 2022-12-12

## 2022-07-08 NOTE — TELEPHONE ENCOUNTER
GFR is still within same dosage range for allopurinol. Medrol dosepak refilled as well.     Will patient be seeing nephrology soon due to worsening kidney function? If it does continue to worsen, allopurinol dose should be reduced.    Isela Jean PA-C on 7/8/2022 at 8:47 AM

## 2022-07-08 NOTE — TELEPHONE ENCOUNTER
ISSUE:   Cyclosporine level 54 on 7/5/2022, goal , dose 75 mg in the AM and 100 mg in the PM     LPN TAKS/ PLAN:   Please call patient and confirm this was an accurate 12-hour trough. Verify Cyclosporine dose 75 mg in the AM and 100 mg in the PM  . Confirm no new medications or illness. Confirm no missed doses. If accurate trough and accurate dose, increase Cyclosporine dose to 100 mg BID and repeat labs in 1 weeks    Varsha Lane RN   Transplant Coordinator  [phone]

## 2022-07-11 NOTE — TELEPHONE ENCOUNTER
Left message and sent mychart message to patient regarding:  Cyclosporine level 54 on 7/5/2022, goal , dose 75 mg in the AM and 100 mg in the PM      LPN TAKS/ PLAN:   Please call patient and confirm this was an accurate 12-hour trough. Verify Cyclosporine dose 75 mg in the AM and 100 mg in the PM  . Confirm no new medications or illness. Confirm no missed doses. If accurate trough and accurate dose, increase Cyclosporine dose to 100 mg BID and repeat labs in 1 weeks

## 2022-07-18 ENCOUNTER — PATIENT OUTREACH (OUTPATIENT)
Dept: NEPHROLOGY | Facility: CLINIC | Age: 47
End: 2022-07-18

## 2022-07-20 NOTE — TELEPHONE ENCOUNTER
MD appt 8/18/22. Labs have not be drawn or scheduled.     Janna Louis RN   Anemia Services  [address] [address]   [email or web address]   Office : [phone]  Fax: [phone]

## 2022-07-28 ENCOUNTER — TELEPHONE (OUTPATIENT)
Dept: NEPHROLOGY | Facility: CLINIC | Age: 47
End: 2022-07-28

## 2022-07-28 NOTE — TELEPHONE ENCOUNTER
[facility] Prior Authorization Team   Phone: [phone]  Fax: [phone]    PA Initiation    Medication: Retacrit (PA Initiated)  Insurance Company: Other (see comments)  Pharmacy Filling the Rx: [facility] - [place], [place] - [facility] [address]  Filling Pharmacy Phone: [phone]  Filling Pharmacy Fax: [phone]  Start Date: 7/28/2022

## 2022-08-01 NOTE — TELEPHONE ENCOUNTER
Called insurance and was told that if this patient is getting continuation of medication beyond 12 weeks message jen dalton to see if patient needs more than 12 weeks of medication   I am able to submit another PA but wanted to make sure before submitted the PA and it getting denied again    If denied again it will need an appeal or peer to peer

## 2022-08-01 NOTE — TELEPHONE ENCOUNTER
PRIOR AUTHORIZATION DENIED    Medication: Retacrit (PA Denied)    Denial Date: 7/30/2022    Denial Rational: (We did not seee what we need to approve the drug you asked for (Retacrit) We may be able to approve this drug in the certain situation (when the requested drug is not used beyond 12 weeks of continuous treatment at therapeutic doses in the absence od response)    Appeal Information: Vanessa               Phone [phone]               Fax [phone]

## 2022-08-02 NOTE — TELEPHONE ENCOUNTER
PA Initiation    Medication: Retacrit (PA Denied)  Insurance Company: Other (see comments)  Pharmacy Filling the Rx: [facility] - [place], [place] - [address]  Filling Pharmacy Phone: [phone]  Filling Pharmacy Fax: [phone]  Start Date: 7/28/2022    I was able to redo a PA for patient and sent all labs/clinical notes

## 2022-08-04 ENCOUNTER — TELEPHONE (OUTPATIENT)
Dept: PHARMACY | Facility: CLINIC | Age: 47
End: 2022-08-04

## 2022-08-04 ENCOUNTER — LAB (OUTPATIENT)
Dept: LAB | Facility: CLINIC | Age: 47
End: 2022-08-04
Payer: COMMERCIAL

## 2022-08-04 DIAGNOSIS — D63.1 ANEMIA OF CHRONIC RENAL FAILURE, STAGE 4 (SEVERE) (H): ICD-10-CM

## 2022-08-04 DIAGNOSIS — N18.4 CHRONIC KIDNEY DISEASE, STAGE IV (SEVERE) (H): ICD-10-CM

## 2022-08-04 DIAGNOSIS — Z48.298 AFTERCARE FOLLOWING ORGAN TRANSPLANT: ICD-10-CM

## 2022-08-04 DIAGNOSIS — N18.4 ANEMIA OF CHRONIC RENAL FAILURE, STAGE 4 (SEVERE) (H): ICD-10-CM

## 2022-08-04 DIAGNOSIS — Z94.0 KIDNEY TRANSPLANTED: ICD-10-CM

## 2022-08-04 DIAGNOSIS — N18.4 CKD (CHRONIC KIDNEY DISEASE) STAGE 4, GFR 15-29 ML/MIN (H): ICD-10-CM

## 2022-08-04 LAB
ANION GAP SERPL CALCULATED.3IONS-SCNC: 12 MMOL/L (ref 3–14)
BUN SERPL-MCNC: 54 MG/DL (ref 7–30)
CALCIUM SERPL-MCNC: 8.8 MG/DL (ref 8.5–10.1)
CHLORIDE BLD-SCNC: 106 MMOL/L (ref 94–109)
CO2 SERPL-SCNC: 16 MMOL/L (ref 20–32)
CREAT SERPL-MCNC: 3.81 MG/DL (ref 0.66–1.25)
ERYTHROCYTE [DISTWIDTH] IN BLOOD BY AUTOMATED COUNT: 13.4 % (ref 10–15)
FERRITIN SERPL-MCNC: 259 NG/ML (ref 26–388)
GFR SERPL CREATININE-BSD FRML MDRD: 19 ML/MIN/1.73M2
GLUCOSE BLD-MCNC: 94 MG/DL (ref 70–99)
HCT VFR BLD AUTO: 28.3 % (ref 40–53)
HGB BLD-MCNC: 9 G/DL (ref 13.3–17.7)
IRON SATN MFR SERPL: 13 % (ref 15–46)
IRON SERPL-MCNC: 41 UG/DL (ref 35–180)
MCH RBC QN AUTO: 29.7 PG (ref 26.5–33)
MCHC RBC AUTO-ENTMCNC: 31.8 G/DL (ref 31.5–36.5)
MCV RBC AUTO: 93 FL (ref 78–100)
PLATELET # BLD AUTO: 212 10E3/UL (ref 150–450)
POTASSIUM BLD-SCNC: 4.5 MMOL/L (ref 3.4–5.3)
RBC # BLD AUTO: 3.03 10E6/UL (ref 4.4–5.9)
SODIUM SERPL-SCNC: 134 MMOL/L (ref 133–144)
TIBC SERPL-MCNC: 312 UG/DL (ref 240–430)
WBC # BLD AUTO: 5.5 10E3/UL (ref 4–11)

## 2022-08-04 PROCEDURE — 80158 DRUG ASSAY CYCLOSPORINE: CPT

## 2022-08-04 PROCEDURE — 83550 IRON BINDING TEST: CPT

## 2022-08-04 PROCEDURE — 36415 COLL VENOUS BLD VENIPUNCTURE: CPT

## 2022-08-04 PROCEDURE — 82728 ASSAY OF FERRITIN: CPT

## 2022-08-04 PROCEDURE — 80048 BASIC METABOLIC PNL TOTAL CA: CPT

## 2022-08-04 PROCEDURE — 85027 COMPLETE CBC AUTOMATED: CPT

## 2022-08-04 NOTE — TELEPHONE ENCOUNTER
PRIOR AUTHORIZATION DENIED    Medication: Retacrit (PA Denied)    Denial Date: 7/30/2022    Denial Rational:         Appeal Information:

## 2022-08-04 NOTE — TELEPHONE ENCOUNTER
Medication Appeal Initiation    We have initiated an appeal for the requested medication:  Medication: Retacrit - appeal pending  Appeal Start Date:  8/4/2022  Insurance Company:  EMIR RANGEL  Comments:

## 2022-08-05 ENCOUNTER — TELEPHONE (OUTPATIENT)
Dept: TRANSPLANT | Facility: CLINIC | Age: 47
End: 2022-08-05

## 2022-08-05 ENCOUNTER — PATIENT OUTREACH (OUTPATIENT)
Dept: NEPHROLOGY | Facility: CLINIC | Age: 47
End: 2022-08-05

## 2022-08-05 DIAGNOSIS — Z79.60 LONG-TERM USE OF IMMUNOSUPPRESSANT MEDICATION: ICD-10-CM

## 2022-08-05 DIAGNOSIS — E87.20 METABOLIC ACIDOSIS: ICD-10-CM

## 2022-08-05 DIAGNOSIS — R79.89 ELEVATED SERUM CREATININE: ICD-10-CM

## 2022-08-05 DIAGNOSIS — Z94.0 KIDNEY TRANSPLANTED: ICD-10-CM

## 2022-08-05 DIAGNOSIS — E87.8 LOW BICARBONATE LEVEL: Primary | ICD-10-CM

## 2022-08-05 DIAGNOSIS — Z01.818 ENCOUNTER FOR OTHER PREPROCEDURAL EXAMINATION: Primary | ICD-10-CM

## 2022-08-05 LAB
CYCLOSPORINE BLD LC/MS/MS-MCNC: 72 UG/L (ref 50–400)
TME LAST DOSE: NORMAL H
TME LAST DOSE: NORMAL H

## 2022-08-05 RX ORDER — SODIUM BICARBONATE 650 MG/1
1950 TABLET ORAL 3 TIMES DAILY
Qty: 810 TABLET | Refills: 3 | Status: SHIPPED | OUTPATIENT
Start: 2022-08-05 | End: 2023-09-05

## 2022-08-05 NOTE — TELEPHONE ENCOUNTER
ISSUE:   Cyclosporine level low at 72 on 8/4/22 13:40, goal , Neoral dose 75 mg AM/100 mg PM. Was his last dose at 1:40 AM?     Bicarbonate level low at 16. Creatinine 3.81.  Follows with anemia services for hgb (9.0) and iron.    Message  Received: Today  Lan Patino MD [place], Ivanna Schafer, MATEO  Cc: Charisse Lizarraga RN  Trend up in serum creatinine, getting closer to needing renal replacement therapy.  Patient should get his kidney transplant evaluation completed.  Also low bicarbonate level and would ensure patient is taking sodium bicarbonate supplement and if so, would increase the dose to 1950 mg three times daily.     PLAN:   Please call patient and confirm this was an accurate 12-hour trough. Verify Cyclosporine dose 75 mg AM/100 mg PM. Confirm no new medications or illness. Confirm no missed doses. If accurate trough and accurate dose, increase Cyclosporine/Neoral dose to 100 mg BID and repeat labs in 1 week ~8/11/22.    Re-confirm with Miguel Angel, taking sodium bicarbonate 1950 mg BID. If so increase to TID.     Does Miguel Angel have dialysis access? R arm ? Doesn't remember.     OUTCOME: Detailed voicemail message left for Miguel Angel to return call to discuss cyclosporine and sodium bicarbonate doses and to review labs/plan. Orders placed to repeat labs on 8/8/22. Transplant nephrology visit with Dr. Patino 8/18/22.     Pre-transplant coordinator to assist with scheduling kidney transplant evaluation. Expect call to book appt.     Ivanna Rivera RN, BSN  Solid Organ Transplant, Post Kidney and Pancreas  Transplant Care Coordinator  [phone]     Addendum: Spoke with Miguel Angel on call back.   Cyclosporine: Neoral dose was last taken at 11 pm night before. No morning dose for labs at 1:40 pm which explains low level (14 hour level). He will stay on same Neoral dose 75 mg AM/100 mg PM and repeat level next week with good 12 hour trough.     Bicarbonate: He missed a couple sodium bicarbonate doses  the week prior to labs but is agreeable to increasing from 1950 mg BID to 1950 mg TID as he takes as prescribed for the most part. Repeat BMP next week.     Dialysis Access?: He states his fistulas likely do not work. Perhaps in R arm? He can not remember. Not clear if we are discussing same access. He states it has been years. If approaching dialysis need, will route to Leana Herrmann and Bettina Cho, Dialysis Access RN Coordinators.       Message  Received: 3 days ago  Leana Herrmann, Ivanna Quintanilla, RN; Bettina Cho, RN; Charisse Lizarraga RN  Replies will be sent to P Dialysis Access Nurse  Caller: Unspecified (3 days ago, 11:59 AM)  Sounds good!  I'll send a message out to the neph team to confirm the plan.  My recommendation will be vein mapping and fistula consult- the vein mapping will show any previous access and if it's viable- as well as options for future access.     Leana oYung             Previous Messages       ----- Message -----   From: Ivanna Rivera, MATEO   Sent: 8/5/2022   1:02 PM CDT   To: Leana Herrmann RN, Bettina Cho RN, *     ----- Message from Ivanna Rivera RN sent at 8/5/2022  1:02 PM CDT -----   Bettina or Miguel Angel Dueñas is approaching need for dialysis. Please connect with Miguel Angel. He is unsure of current access.   Charisse, Can you follow up on scheduling for eval appt?

## 2022-08-05 NOTE — PROGRESS NOTES
Notified by transplant RNCC that pt is nearing need for dialysis and is unsure laterality of previous AV fistula, but assumes it is not functioning.    Sent message to nephrology team to confirm referral for dialysis access with recommendation to order bilateral upper extremity vein mapping and fistula consult with SOT surgeon.    Will follow up based on response from nephrology team.    CRISTI JEAN RN on 8/5/2022 at 3:21 PM  Dialysis Access Care Coordinator  Phone: [phone] / [phone]  P_Dialysis_Access_Nurse

## 2022-08-08 NOTE — PROGRESS NOTES
Vascular Access Referral     Pt referred for Dialysis Access Surgical Consult.    Referring provider: Dr. Patino    Patient is not ready for access surgery.    Patient prefers HD.    Patient was referred for Transplant.     Request sent to scheduling to schedule with Dr. Rivera.     Vein mapping was ordered, will need scheduling.  Pt has previous vascular access that was used for dialysis.  Pt us unsure of laterality and is confident it is no longer patent.  Requested in US order to note old access vessels to document in pt chart.    Patient IS aware dialysis access referral has been made and IS expecting appointments to be scheduled.    Neph Tracking has been updated.    CRISTI JEAN RN   Dialysis Access Care Coordinator  Phone: [phone] / [phone]  P_Dialysis_Access_Nurse

## 2022-08-09 ENCOUNTER — PATIENT OUTREACH (OUTPATIENT)
Dept: NEPHROLOGY | Facility: CLINIC | Age: 47
End: 2022-08-09

## 2022-08-09 NOTE — PROGRESS NOTES
8/9- Left VM. Checking in on if he is interested in Kidney Smart class and to see how BP are doing.    8/16- Letter in chart for appeal of Calcitrol script. Prior auth/pharmacy notified.

## 2022-08-09 NOTE — TELEPHONE ENCOUNTER
Spoke to Wilton from [facility] was informed they did not get anything   Resent again to [facility] via fax will check on status again tomorrow   [phone] option [phone]     REFERENCE # [id number]

## 2022-08-15 ENCOUNTER — TELEPHONE (OUTPATIENT)
Dept: PHARMACY | Facility: CLINIC | Age: 47
End: 2022-08-15

## 2022-08-15 NOTE — TELEPHONE ENCOUNTER
Anemia Management Note  SUBJECTIVE/OBJECTIVE:  Referred by Dr. Lan Patino on 2021  Primary Diagnosis: Anemia in Chronic Kidney Disease (N18.4, D63.1)     Secondary Diagnosis:  Chronic Kidney Disease, Stage 4 (N18.4)   Hx of Kidney Tx; 2010  Hgb goal range:  9-10  Epo/Darbo: Retacrit 10,000 units weeky for Hgb <10. AT HOME.    8/15/22; Retacrit is being appealed.  Was denied by insurance.   Iron regimen: NA  Labs : 1/3/2023  Recent CINDY use, transfusion, IV iron: NA  RX/TX plans : 2023     No history of stroke, MI and blood clots or cancers.     Contact:  No Consent to Communicate on File.     Anemia Latest Ref Rng & Units 2022   CINDY Dose - 10,000 units - - - - - -   Hemoglobin 13.3 - 17.7 g/dL - 10.2(L) 9.7(L) 10.1(L) 9.4(L) 9.7(L) 9.0(L)   TSAT 15 - 46 % - 29 - - - 29 13(L)   Ferritin 26 - 388 ng/mL - 205 - - - 300 259     BP Readings from Last 3 Encounters:   22 124/78   22 124/70   22 133/86     Wt Readings from Last 2 Encounters:   22 229 lb (103.9 kg)   22 229 lb (103.9 kg)           PLAN:  Retacrit was denied by the insurance.  Currently being appealed.  Per Keturah Cabello; they have the appeal and it can take 30-45 business days.    Continue to monitor Hgb.     Orders needed to be renewed (for next follow-up date) in [facility]: None    Iron labs due:  2022    Plan discussed with:  No call, chart review      NEXT FOLLOW-UP DATE:  22    Janna Louis RN   Anemia Services  [address] [address]   [email or web address]   Office : [phone]  Fax: [phone]

## 2022-08-15 NOTE — TELEPHONE ENCOUNTER
Called insurance to check on status of pa however their system was down informed me to call them back after 2 hours

## 2022-08-16 ENCOUNTER — LAB (OUTPATIENT)
Dept: LAB | Facility: CLINIC | Age: 47
End: 2022-08-16
Payer: COMMERCIAL

## 2022-08-16 DIAGNOSIS — R79.89 ELEVATED SERUM CREATININE: ICD-10-CM

## 2022-08-16 DIAGNOSIS — Z79.60 LONG-TERM USE OF IMMUNOSUPPRESSANT MEDICATION: ICD-10-CM

## 2022-08-16 DIAGNOSIS — E87.8 LOW BICARBONATE LEVEL: ICD-10-CM

## 2022-08-16 DIAGNOSIS — Z94.0 KIDNEY TRANSPLANTED: ICD-10-CM

## 2022-08-16 LAB
ANION GAP SERPL CALCULATED.3IONS-SCNC: 9 MMOL/L (ref 3–14)
BUN SERPL-MCNC: 47 MG/DL (ref 7–30)
CALCIUM SERPL-MCNC: 8.7 MG/DL (ref 8.5–10.1)
CHLORIDE BLD-SCNC: 108 MMOL/L (ref 94–109)
CO2 SERPL-SCNC: 20 MMOL/L (ref 20–32)
CREAT SERPL-MCNC: 3 MG/DL (ref 0.66–1.25)
ERYTHROCYTE [DISTWIDTH] IN BLOOD BY AUTOMATED COUNT: 13.1 % (ref 10–15)
GFR SERPL CREATININE-BSD FRML MDRD: 25 ML/MIN/1.73M2
GLUCOSE BLD-MCNC: 87 MG/DL (ref 70–99)
HCT VFR BLD AUTO: 29.3 % (ref 40–53)
HGB BLD-MCNC: 9.4 G/DL (ref 13.3–17.7)
MCH RBC QN AUTO: 29.6 PG (ref 26.5–33)
MCHC RBC AUTO-ENTMCNC: 32.1 G/DL (ref 31.5–36.5)
MCV RBC AUTO: 92 FL (ref 78–100)
PLATELET # BLD AUTO: 263 10E3/UL (ref 150–450)
POTASSIUM BLD-SCNC: 4.3 MMOL/L (ref 3.4–5.3)
RBC # BLD AUTO: 3.18 10E6/UL (ref 4.4–5.9)
SODIUM SERPL-SCNC: 137 MMOL/L (ref 133–144)
WBC # BLD AUTO: 6.9 10E3/UL (ref 4–11)

## 2022-08-16 PROCEDURE — 80048 BASIC METABOLIC PNL TOTAL CA: CPT

## 2022-08-16 PROCEDURE — 85027 COMPLETE CBC AUTOMATED: CPT

## 2022-08-16 PROCEDURE — 36415 COLL VENOUS BLD VENIPUNCTURE: CPT

## 2022-08-16 PROCEDURE — 80158 DRUG ASSAY CYCLOSPORINE: CPT

## 2022-08-16 NOTE — TELEPHONE ENCOUNTER
DATE OF ADMISSION:  10/09/2019



DATE OF DISCHARGE:  10/10/2019



PRIMARY CARE PHYSICIAN:  Dr. Melody Che.



DISCHARGE DISPOSITION:  Home.



DISCHARGE DIAGNOSES:  

1. Abdominal wall cellulitis.

2. Morbid obesity.

3. Hypertension.



DISCHARGE MEDICATIONS:  

1. Minocin 100 mg p.o. twice daily.

2. Rifampin 150 mg p.o. twice a day.



CODE STATUS:  Full code.



ALLERGIES:  NO KNOWN DRUG ALLERGIES.



HOSPITAL COURSE:  Ms. Arias is a pleasant 23-year-old female who was admitted to the

hospital for abdominal wall cellulitis and abscess.  She had the area I and D'd.

However, due to concerns for worsening infection, she was placed in observation.

She was treated with IV antibiotics including Rocephin and vancomycin.  Wound care

was consulted and she was given instructions on how to manage the area once she has

been discharged and she will be discharged home on Minocin and rifampin and have

early followup with her primary care physician in 1-2 days.  Warning signs on when

to come back to the ER were discussed with the patient prior to discharge. 







Job ID:  [id number] Medication Appeal Initiation    We have initiated an appeal for the requested medication:  Medication: calcitRIOL (ROCALTROL) 0.25 MCG capsule - APPEAL INITIATED  Appeal Start Date:  8/16/2022  Insurance Company: [employer] [place] - Phone [phone] Fax [phone]  Comments:     FAXED TO # [phone]  PHONE: [phone]

## 2022-08-16 NOTE — TELEPHONE ENCOUNTER
Appeal is still pending at this time and can take up to september 3rd for a determination.  Called insurance to see if I can get the appeal expedited I cannot at this time will have to wait for a determination at this time

## 2022-08-17 ENCOUNTER — TELEPHONE (OUTPATIENT)
Dept: TRANSPLANT | Facility: CLINIC | Age: 47
End: 2022-08-17

## 2022-08-17 DIAGNOSIS — Z94.0 KIDNEY TRANSPLANTED: ICD-10-CM

## 2022-08-17 DIAGNOSIS — Z94.0 KIDNEY REPLACED BY TRANSPLANT: ICD-10-CM

## 2022-08-17 LAB
CYCLOSPORINE BLD LC/MS/MS-MCNC: 73 UG/L (ref 50–400)
TME LAST DOSE: NORMAL H
TME LAST DOSE: NORMAL H

## 2022-08-17 RX ORDER — CYCLOSPORINE 25 MG/1
100 CAPSULE, LIQUID FILLED ORAL 2 TIMES DAILY
Qty: 240 CAPSULE | Refills: 11 | Status: SHIPPED | OUTPATIENT
Start: 2022-08-17 | End: 2022-09-27

## 2022-08-17 NOTE — TELEPHONE ENCOUNTER
ISSUE:   Cyclosporine level low at 73 on 8/16/22 13:49, goal , Neoral dose 75 mg AM/100 mg PM. Last dose date/time recorded as 8/16/22 1:45 AM therefore appears to be 12 hour trough on repeat.      Bicarbonate level improved to 20 with sodium bicarbonate dose increase.     PLAN:   Please call patient and confirm this was an accurate 12-hour trough. Verify Cyclosporine dose 75 mg AM/100 mg PM. Confirm no new medications or illness. Confirm no missed doses. If accurate trough and accurate dose, increase Cyclosporine/Neoral dose to 100 mg BID and repeat labs in 1 week.     Ivanna Rivera, RN, BSN  Solid Organ Transplant, Post Kidney and Pancreas  Transplant Care Coordinator  [phone]

## 2022-08-17 NOTE — TELEPHONE ENCOUNTER
Call placed to patient. Patient confirms current dose and accurate trough level. Denies any recent illness, diarrhea or medication changes. Patient v\u to increase CSA dose to 100 mg bid and repeat level in one week. Order sent

## 2022-08-18 ENCOUNTER — OFFICE VISIT (OUTPATIENT)
Dept: NEPHROLOGY | Facility: CLINIC | Age: 47
End: 2022-08-18
Attending: INTERNAL MEDICINE
Payer: COMMERCIAL

## 2022-08-18 VITALS
WEIGHT: 239.4 LBS | HEART RATE: 93 BPM | SYSTOLIC BLOOD PRESSURE: 123 MMHG | BODY MASS INDEX: 35.35 KG/M2 | DIASTOLIC BLOOD PRESSURE: 82 MMHG | OXYGEN SATURATION: 100 %

## 2022-08-18 DIAGNOSIS — Z48.298 AFTERCARE FOLLOWING ORGAN TRANSPLANT: ICD-10-CM

## 2022-08-18 DIAGNOSIS — Z94.0 KIDNEY REPLACED BY TRANSPLANT: Primary | ICD-10-CM

## 2022-08-18 DIAGNOSIS — I15.1 HTN, KIDNEY TRANSPLANT RELATED: ICD-10-CM

## 2022-08-18 DIAGNOSIS — Z94.0 HTN, KIDNEY TRANSPLANT RELATED: ICD-10-CM

## 2022-08-18 DIAGNOSIS — D63.1 ANEMIA IN STAGE 4 CHRONIC KIDNEY DISEASE (H): ICD-10-CM

## 2022-08-18 DIAGNOSIS — E55.9 VITAMIN D DEFICIENCY: ICD-10-CM

## 2022-08-18 DIAGNOSIS — N25.81 SECONDARY RENAL HYPERPARATHYROIDISM (H): ICD-10-CM

## 2022-08-18 DIAGNOSIS — N18.4 ANEMIA IN STAGE 4 CHRONIC KIDNEY DISEASE (H): ICD-10-CM

## 2022-08-18 DIAGNOSIS — D84.9 IMMUNOSUPPRESSION (H): ICD-10-CM

## 2022-08-18 DIAGNOSIS — N18.4 CKD (CHRONIC KIDNEY DISEASE) STAGE 4, GFR 15-29 ML/MIN (H): ICD-10-CM

## 2022-08-18 PROCEDURE — G0463 HOSPITAL OUTPT CLINIC VISIT: HCPCS

## 2022-08-18 PROCEDURE — 99214 OFFICE O/P EST MOD 30 MIN: CPT | Performed by: INTERNAL MEDICINE

## 2022-08-18 ASSESSMENT — PAIN SCALES - GENERAL: PAINLEVEL: NO PAIN (0)

## 2022-08-18 NOTE — PATIENT INSTRUCTIONS
Patient Recommendations:  - No new recommendations at this time.    Transplant Patient Information  Your Post Transplant Coordinator is: Ivanna Rivera  Your Pre Transplant Coordinator is: Charisse Lizarraga  You and your care team can contact your transplant coordinator Monday - Friday, 8am - 5pm at [phone] (Option 2 to reach the coordinator or Option 4 to schedule an appointment).  You can also reach your care team online via "[employer].  After hours for urgent matters, please call [facility] at [phone].

## 2022-08-18 NOTE — NURSING NOTE
Chief Complaint   Patient presents with     RECHECK     Follow up per pt     Blood pressure 123/82, pulse 93, weight 108.6 kg (239 lb 6.4 oz), SpO2 100 %.    Naye Ruffin

## 2022-08-18 NOTE — LETTER
8/18/2022       RE: Miguel Angel Piña  [address] [address] [facility] [address]     Dear Colleague,    Thank you for referring your patient, Miguel Angel Piña, to the [facility] [place] [facility]. Please see a copy of my visit note below.    TRANSPLANT NEPHROLOGY CHRONIC POST TRANSPLANT VISIT    Assessment & Plan   # DDKT: Trend up in creatinine at ~ 3.0, which is just above previous baseline of ~ 2.3-2.6, although better than last creatinine prior that was at ~ 3.8.  Kidney transplant biopsy 8/2020 showed severe chronic changes.  Patient has been referred for another kidney transplant and is in the evaluation process, as well as following for CKD management.   - Baseline Creatinine:  ~ 3.0-3.8   - Proteinuria: Minimal (0.2-0.5 grams)   - Date DSA Last Checked: Aug/2020      Latest DSA: No   - BK Viremia: No   - Kidney Tx Biopsy: Aug 17, 2020; Result: No diagnostic evidence of acute rejection.  Mild glomerulitis with moderate transplant glomerulopathy, but no DSA.  Secondary focal segmental glomerulosclerosis.  Severe interstitial fibrosis and tubular atrophy.  Mild arterio- and severe arteriolosclerosis.             Jul 25, 2011; Result: No diagnostic evidence of acute rejection.  Unremarkable.             May 14, 2010; Result: No diagnostic evidence of acute rejection.  Moderate acute tubular injury.    # Immunosuppression: Cyclosporine (goal ) and Mycophenolate mofetil (dose 1000 mg every 12 hours)   - Changes: No    # Infection Prophylaxis:   Last CD4 Level: 573 (9/2017)  - PJP: None    # Hypertension: Controlled;  Goal BP: < 130/80   - Changes: Not at this time; Recommend patient check blood pressure at home on a regular basis, such as once every week or two, and follow up with Nephrology nurse if above goal.    # Anemia in Chronic Renal Disease: Hgb: Stable      CINDY: No   - Iron studies: Low iron saturation    # Mineral Bone  Disorder:   - Secondary renal hyperparathyroidism; PTH level: Moderately elevated (301-600 pg/ml)        On treatment: None; Insurance has denied calcitriol so far.  - Vitamin D; level: Normal        On supplement: Yes  - Calcium; level: Normal        On supplement: No    # Electrolytes:   - Potassium; level: Normal        On supplement: No  - Bicarbonate; level: Low normal        On supplement: Yes    # Obesity, Class II (BMI = 35.3): Weight is up ~ 10 lbs.   - Recommend weight loss for overall health by increasing exercise and watching caloric intake.    # Gout: Possible occasional flares on allopurinol.  He was treated with steroids with resolution of symptoms.    # Skin Cancer Risk:    - Discussed sun protection and recommend regular follow up with Dermatology.    # Medical Compliance: Yes     # COVID-19 Virus Review: Discussed COVID-19 virus and the potential medical risks.  Reviewed preventative health recommendations, including wearing a mask where appropriate.  Recommended COVID vaccination should be up to date with either an initial vaccination or booster shot when appropriate.  Asked the patient to inform the transplant center if they are exposed or diagnosed with this virus.    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    Return visit: Return in about 1 year (around 8/18/2023).    Lan Patino MD    Chief Complaint   Mr. Piña is a 46 year old here for kidney transplant and immunosuppression management.    History of Present Illness     Mr. Piña reports feeling good overall with some medical complaints.  Since last clinic visit, patient reports no hospitalizations or new medical complaints and has been doing well overall.  His energy level is okay and pretty close to normal still.  He is active, mostly with work and being on his feet all day, but only gets a little exercise.  Denies any chest pain or shortness of breath with exertion.  Some leg swelling when he is on his feet all day.    Appetite is good and he has gained about 10 lbs.  No nausea, vomiting or diarrhea.  No fever, sweats or chills.  No night sweats.    Home BP: Not checked    Problem List   Patient Active Problem List   Diagnosis     Kidney replaced by transplant     Medullary cystic kidney disease     CARDIOVASCULAR SCREENING; LDL GOAL LESS THAN 100     Conductive hearing loss, unilateral     Aftercare following organ transplant     Immunosuppression (H)     Anemia in chronic renal disease     Secondary renal hyperparathyroidism (H)     Gout     Vitamin D deficiency     Dyslipidemia     Heterozygous factor V Leiden mutation (H)     HTN, kidney transplant related     CKD (chronic kidney disease) stage 4, GFR 15-29 ml/min (H)     Infection     Clinical diagnosis of COVID-19       Social History   Social History     Tobacco Use     Smoking status: Never Smoker     Smokeless tobacco: Never Used   Vaping Use     Vaping Use: Never used   Substance Use Topics     Alcohol use: No     Drug use: No       Allergies   No Known Allergies    Medications   Current Outpatient Medications   Medication Sig     allopurinol (ZYLOPRIM) 100 MG tablet Take 0.5 tablets (50 mg) by mouth every other day     amLODIPine (NORVASC) 5 MG tablet TAKE 1 TABLET BY MOUTH AT BEDTIME AT NIGHT     calcitRIOL (ROCALTROL) 0.25 MCG capsule Take 1 capsule (0.25 mcg) by mouth daily     CELLCEPT (BRAND) 250 MG capsule Take 3 capsules (750 mg) by mouth 2 times daily     cholecalciferol 2000 units TABS Take 2,000 Units by  mouth daily     epoetin beto-epbx (RETACRIT) 16944 UNIT/ML injection Inject 1 mL (10,000 Units) Subcutaneous every 7 days Administer for Hgb <10. HOLD if systolic BP >180.     Ferrous Gluconate 324 (37.5 Fe) MG TABS Take by mouth daily     losartan (COZAAR) 25 MG tablet Take 1 tablet by mouth once daily     methylPREDNISolone (MEDROL DOSEPAK) 4 MG tablet therapy pack Follow Package Directions     NEORAL (BRAND) 25 MG capsule Take 4 capsules (100 mg) by mouth 2 times daily     sodium bicarbonate 650 MG tablet Take 3 tablets (1,950 mg) by mouth 3 times daily     calcium gluconate 500 MG tablet Take 500 mg by mouth 2 times daily 600mg daily (Patient not taking: Reported on 8/18/2022)     No current facility-administered medications for this visit.     There are no discontinued medications.    Physical Exam   Vital Signs: /82   Pulse 93   Wt 108.6 kg (239 lb 6.4 oz)   SpO2 100%   BMI 35.35 kg/m      GENERAL APPEARANCE: alert and no distress  HENT: mouth without ulcers or lesions  LYMPHATICS: no cervical or supraclavicular nodes  RESP: lungs clear to auscultation - no rales, rhonchi or wheezes  CV: regular rhythm, normal rate, no rub, no murmur  EDEMA: no LE edema bilaterally  ABDOMEN: soft, nondistended, nontender, bowel sounds normal  MS: extremities normal - no gross deformities noted, no evidence of inflammation in joints, no muscle tenderness  SKIN: no rash, many warts  TX KIDNEY: normal  DIALYSIS ACCESS: none    Data     Renal Latest Ref Rng & Units 8/16/2022 8/4/2022 7/5/2022   Na 133 - 144 mmol/L 137 134 137   K 3.4 - 5.3 mmol/L 4.3 4.5 4.5   Cl 94 - 109 mmol/L 108 106 109   CO2 20 - 32 mmol/L 20 16(L) 17(L)   BUN 7 - 30 mg/dL 47(H) 54(H) 52(H)   Cr 0.66 - 1.25 mg/dL 3.00(H) 3.81(H) 3.40(H)   Glucose 70 - 99 mg/dL 87 94 107(H)   Ca  8.5 - 10.1 mg/dL 8.7 8.8 9.1   Mg 1.6 - 2.3 mg/dL - - -     Bone Health Latest Ref Rng & Units 6/20/2022 4/25/2022 12/31/2021   Phos 2.5 - 4.5 mg/dL 3.5 3.2 4.1   PTHi 15 -  65 pg/mL 417(H) - 245(H)   Vit D Def 20 - 75 ug/L 35 - 40     Heme Latest Ref Rng & Units 8/16/2022 8/4/2022 7/5/2022   WBC 4.0 - 11.0 10e3/uL 6.9 5.5 7.4   Hgb 13.3 - 17.7 g/dL 9.4(L) 9.0(L) 9.7(L)   Plt 150 - 450 10e3/uL 263 212 256   ABSOLUTE NEUTROPHIL 1.6 - 8.3 10e9/L - - -   ABSOLUTE LYMPHOCYTES 0.8 - 5.3 10e9/L - - -   ABSOLUTE MONOCYTES 0.0 - 1.3 10e9/L - - -   ABSOLUTE EOSINOPHILS 0.0 - 0.7 10e9/L - - -   ABSOLUTE BASOPHILS 0.0 - 0.2 10e9/L - - -   ABS IMMATURE GRANULOCYTES 0 - 0.4 10e9/L - - -   ABSOLUTE NUCLEATED RBC - - - -     Liver Latest Ref Rng & Units 6/20/2022 4/25/2022 8/23/2021   AP 40 - 150 U/L 81 - 100   TBili 0.2 - 1.3 mg/dL 1.0 - 0.7   ALT 0 - 70 U/L 21 - 17   AST 0 - 45 U/L 12 - 9   Tot Protein 6.8 - 8.8 g/dL 6.6(L) - 6.9   Albumin 3.4 - 5.0 g/dL 3.5 3.4 3.7        Iron studies Latest Ref Rng & Units 8/4/2022 7/5/2022 4/6/2022   Iron 35 - 180 ug/dL 41 80 64   Iron sat 15 - 46 % 13(L) 29 29   Ferritin 26 - 388 ng/mL 259 300 205     [facility] Txp Virology Latest Ref Rng & Units 8/23/2021 8/17/2020 9/13/2012   CMV IgG EU/mL - - -   CVM DNA Quant - - Whole blood, EDTA anticoagulant -   CMV Quant <100 Copies/mL - - -   CMV QT Log <2.0 Log copies/mL - - -   CMV QUANT IU/ML CMVND:CMV DNA Not Detected [IU]/mL - CMV DNA Not Detected -   LOG IU/ML OF CMVQNT <2.1 [Log:IU]/mL - Not Calculated -   BK Spec - - Plasma PLASMA   BK Res BKNEG:BK Virus DNA Not Detected copies/mL - BK Virus DNA Not Detected <1000   BK Log <2.7 Log copies/mL - Not Calculated <3.0  The lower limit of detection for this assay is 1000 copies/mL.  Real-time TaqMan   PCR was performed using BK primers and probe for the detection of a 90 bp   portion of the  1 gene.  The performance characteristics were validated by   the [facility].   It has not been cleared or approved by the U.S. Food and Drug Administration.   EBV CAPSID ANTIBODY IGG No detectable antibody. Positive(A) - -   EBV DNA COPIES/ML  <1000 Copies/mL - - -   EBV DNA LOG OF COPIES <3.0 Log copies/mL - - -   Hep B Core NEG - - -   Hep B Surf - - - -   HIV 1&2 NEG - - -            Recent Labs   Lab Test 12/31/21  1021   MPACID 2.36   MPAG 186.7*       Again, thank you for allowing me to participate in the care of your patient.      Sincerely,    Lan Patino MD

## 2022-08-18 NOTE — LETTER
8/18/2022      RE: Miguel Angel Piña  [address] [facility] [id number]       TRANSPLANT NEPHROLOGY CHRONIC POST TRANSPLANT VISIT    Assessment & Plan   # DDKT: Trend up in creatinine at ~ 3.0, which is just above previous baseline of ~ 2.3-2.6, although better than last creatinine prior that was at ~ 3.8.  Kidney transplant biopsy 8/2020 showed severe chronic changes.  Patient has been referred for another kidney transplant and is in the evaluation process, as well as following for CKD management.   - Baseline Creatinine:  ~ 3.0-3.8   - Proteinuria: Minimal (0.2-0.5 grams)   - Date DSA Last Checked: Aug/2020      Latest DSA: No   - BK Viremia: No   - Kidney Tx Biopsy: Aug 17, 2020; Result: No diagnostic evidence of acute rejection.  Mild glomerulitis with moderate transplant glomerulopathy, but no DSA.  Secondary focal segmental glomerulosclerosis.  Severe interstitial fibrosis and tubular atrophy.  Mild arterio- and severe arteriolosclerosis.             Jul 25, 2011; Result: No diagnostic evidence of acute rejection.  Unremarkable.             May 14, 2010; Result: No diagnostic evidence of acute rejection.  Moderate acute tubular injury.    # Immunosuppression: Cyclosporine (goal ) and Mycophenolate mofetil (dose 1000 mg every 12 hours)   - Changes: No    # Infection Prophylaxis:   Last CD4 Level: 573 (9/2017)  - PJP: None    # Hypertension: Controlled;  Goal BP: < 130/80   - Changes: Not at this time; Recommend patient check blood pressure at home on a regular basis, such as once every week or two, and follow up with Nephrology nurse if above goal.    # Anemia in Chronic Renal Disease: Hgb: Stable      CINDY: No   - Iron studies: Low iron saturation    # Mineral Bone Disorder:   - Secondary renal hyperparathyroidism; PTH level: Moderately elevated (301-600 pg/ml)        On treatment: None; Insurance has denied calcitriol so far.  - Vitamin D; level: Normal        On supplement: Yes  - Calcium;  level: Normal        On supplement: No    # Electrolytes:   - Potassium; level: Normal        On supplement: No  - Bicarbonate; level: Low normal        On supplement: Yes    # Obesity, Class II (BMI = 35.3): Weight is up ~ 10 lbs.   - Recommend weight loss for overall health by increasing exercise and watching caloric intake.    # Gout: Possible occasional flares on allopurinol.  He was treated with steroids with resolution of symptoms.    # Skin Cancer Risk:    - Discussed sun protection and recommend regular follow up with Dermatology.    # Medical Compliance: Yes     # COVID-19 Virus Review: Discussed COVID-19 virus and the potential medical risks.  Reviewed preventative health recommendations, including wearing a mask where appropriate.  Recommended COVID vaccination should be up to date with either an initial vaccination or booster shot when appropriate.  Asked the patient to inform the transplant center if they are exposed or diagnosed with this virus.    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    Return visit: Return in about 1 year (around 8/18/2023).    Lan Patino MD    Chief Complaint   Mr. Piña is a 46 year old here for kidney transplant and immunosuppression management.    History of Present Illness    Mr. Piña reports feeling good overall with some medical complaints.  Since last clinic visit, patient reports no hospitalizations or new medical complaints and has been doing well overall.  His energy level is okay and pretty  close to normal still.  He is active, mostly with work and being on his feet all day, but only gets a little exercise.  Denies any chest pain or shortness of breath with exertion.  Some leg swelling when he is on his feet all day.    Appetite is good and he has gained about 10 lbs.  No nausea, vomiting or diarrhea.  No fever, sweats or chills.  No night sweats.    Home BP: Not checked    Problem List   Patient Active Problem List   Diagnosis     Kidney replaced by transplant     Medullary cystic kidney disease     CARDIOVASCULAR SCREENING; LDL GOAL LESS THAN 100     Conductive hearing loss, unilateral     Aftercare following organ transplant     Immunosuppression (H)     Anemia in chronic renal disease     Secondary renal hyperparathyroidism (H)     Gout     Vitamin D deficiency     Dyslipidemia     Heterozygous factor V Leiden mutation (H)     HTN, kidney transplant related     CKD (chronic kidney disease) stage 4, GFR 15-29 ml/min (H)     Infection     Clinical diagnosis of COVID-19       Social History   Social History     Tobacco Use     Smoking status: Never Smoker     Smokeless tobacco: Never Used   Vaping Use     Vaping Use: Never used   Substance Use Topics     Alcohol use: No     Drug use: No       Allergies   No Known Allergies    Medications   Current Outpatient Medications   Medication Sig     allopurinol (ZYLOPRIM) 100 MG tablet Take 0.5 tablets (50 mg) by mouth every other day     amLODIPine (NORVASC) 5 MG tablet TAKE 1 TABLET BY MOUTH AT BEDTIME AT NIGHT     calcitRIOL (ROCALTROL) 0.25 MCG capsule Take 1 capsule (0.25 mcg) by mouth daily     CELLCEPT (BRAND) 250 MG capsule Take 3 capsules (750 mg) by mouth 2 times daily     cholecalciferol 2000 units TABS Take 2,000 Units by mouth daily     epoetin beto-epbx (RETACRIT) 33571 UNIT/ML injection Inject 1 mL (10,000 Units) Subcutaneous every 7 days Administer for Hgb <10. HOLD if systolic BP >180.     Ferrous Gluconate 324 (37.5 Fe) MG TABS Take by mouth  daily     losartan (COZAAR) 25 MG tablet Take 1 tablet by mouth once daily     methylPREDNISolone (MEDROL DOSEPAK) 4 MG tablet therapy pack Follow Package Directions     NEORAL (BRAND) 25 MG capsule Take 4 capsules (100 mg) by mouth 2 times daily     sodium bicarbonate 650 MG tablet Take 3 tablets (1,950 mg) by mouth 3 times daily     calcium gluconate 500 MG tablet Take 500 mg by mouth 2 times daily 600mg daily (Patient not taking: Reported on 8/18/2022)     No current facility-administered medications for this visit.     There are no discontinued medications.    Physical Exam   Vital Signs: /82   Pulse 93   Wt 108.6 kg (239 lb 6.4 oz)   SpO2 100%   BMI 35.35 kg/m      GENERAL APPEARANCE: alert and no distress  HENT: mouth without ulcers or lesions  LYMPHATICS: no cervical or supraclavicular nodes  RESP: lungs clear to auscultation - no rales, rhonchi or wheezes  CV: regular rhythm, normal rate, no rub, no murmur  EDEMA: no LE edema bilaterally  ABDOMEN: soft, nondistended, nontender, bowel sounds normal  MS: extremities normal - no gross deformities noted, no evidence of inflammation in joints, no muscle tenderness  SKIN: no rash, many warts  TX KIDNEY: normal  DIALYSIS ACCESS: none    Data     Renal Latest Ref Rng & Units 8/16/2022 8/4/2022 7/5/2022   Na 133 - 144 mmol/L 137 134 137   K 3.4 - 5.3 mmol/L 4.3 4.5 4.5   Cl 94 - 109 mmol/L 108 106 109   CO2 20 - 32 mmol/L 20 16(L) 17(L)   BUN 7 - 30 mg/dL 47(H) 54(H) 52(H)   Cr 0.66 - 1.25 mg/dL 3.00(H) 3.81(H) 3.40(H)   Glucose 70 - 99 mg/dL 87 94 107(H)   Ca  8.5 - 10.1 mg/dL 8.7 8.8 9.1   Mg 1.6 - 2.3 mg/dL - - -     Bone Health Latest Ref Rng & Units 6/20/2022 4/25/2022 12/31/2021   Phos 2.5 - 4.5 mg/dL 3.5 3.2 4.1   PTHi 15 - 65 pg/mL 417(H) - 245(H)   Vit D Def 20 - 75 ug/L 35 - 40     Heme Latest Ref Rng & Units 8/16/2022 8/4/2022 7/5/2022   WBC 4.0 - 11.0 10e3/uL 6.9 5.5 7.4   Hgb 13.3 - 17.7 g/dL 9.4(L) 9.0(L) 9.7(L)   Plt 150 - 450 10e3/uL 263 212  256   ABSOLUTE NEUTROPHIL 1.6 - 8.3 10e9/L - - -   ABSOLUTE LYMPHOCYTES 0.8 - 5.3 10e9/L - - -   ABSOLUTE MONOCYTES 0.0 - 1.3 10e9/L - - -   ABSOLUTE EOSINOPHILS 0.0 - 0.7 10e9/L - - -   ABSOLUTE BASOPHILS 0.0 - 0.2 10e9/L - - -   ABS IMMATURE GRANULOCYTES 0 - 0.4 10e9/L - - -   ABSOLUTE NUCLEATED RBC - - - -     Liver Latest Ref Rng & Units 6/20/2022 4/25/2022 8/23/2021   AP 40 - 150 U/L 81 - 100   TBili 0.2 - 1.3 mg/dL 1.0 - 0.7   ALT 0 - 70 U/L 21 - 17   AST 0 - 45 U/L 12 - 9   Tot Protein 6.8 - 8.8 g/dL 6.6(L) - 6.9   Albumin 3.4 - 5.0 g/dL 3.5 3.4 3.7        Iron studies Latest Ref Rng & Units 8/4/2022 7/5/2022 4/6/2022   Iron 35 - 180 ug/dL 41 80 64   Iron sat 15 - 46 % 13(L) 29 29   Ferritin 26 - 388 ng/mL 259 300 205     [facility] Txp Virology Latest Ref Rng & Units 8/23/2021 8/17/2020 9/13/2012   CMV IgG EU/mL - - -   CVM DNA Quant - - Whole blood, EDTA anticoagulant -   CMV Quant <100 Copies/mL - - -   CMV QT Log <2.0 Log copies/mL - - -   CMV QUANT IU/ML CMVND:CMV DNA Not Detected [IU]/mL - CMV DNA Not Detected -   LOG IU/ML OF CMVQNT <2.1 [Log:IU]/mL - Not Calculated -   BK Spec - - Plasma PLASMA   BK Res BKNEG:BK Virus DNA Not Detected copies/mL - BK Virus DNA Not Detected <1000   BK Log <2.7 Log copies/mL - Not Calculated <3.0  The lower limit of detection for this assay is 1000 copies/mL.  Real-time TaqMan   PCR was performed using BK primers and probe for the detection of a 90 bp   portion of the  1 gene.  The performance characteristics were validated by   the [facility], [place].   It has not been cleared or approved by the U.S. Food and Drug Administration.   EBV CAPSID ANTIBODY IGG No detectable antibody. Positive(A) - -   EBV DNA COPIES/ML <1000 Copies/mL - - -   EBV DNA LOG OF COPIES <3.0 Log copies/mL - - -   Hep B Core NEG - - -   Hep B Surf - - - -   HIV 1&2 NEG - - -            Recent Labs   Lab Test 12/31/21  1021   MPACID 2.36   MPAG 186.7*       Lan Ortiz  MD [place]

## 2022-08-19 NOTE — TELEPHONE ENCOUNTER
Per Rep Miguel Angel stated that they have the appeal and the appeal is still under review at this time case id: [id number]

## 2022-08-21 PROBLEM — D63.1 ANEMIA OF CHRONIC RENAL FAILURE, STAGE 4 (SEVERE) (H): Status: RESOLVED | Noted: 2022-01-06 | Resolved: 2022-08-21

## 2022-08-21 PROBLEM — N18.4 ANEMIA OF CHRONIC RENAL FAILURE, STAGE 4 (SEVERE) (H): Status: RESOLVED | Noted: 2022-01-06 | Resolved: 2022-08-21

## 2022-08-21 NOTE — PROGRESS NOTES
TRANSPLANT NEPHROLOGY CHRONIC POST TRANSPLANT VISIT    Assessment & Plan   # DDKT: Trend up in creatinine at ~ 3.0, which is just above previous baseline of ~ 2.3-2.6, although better than last creatinine prior that was at ~ 3.8.  Kidney transplant biopsy 8/2020 showed severe chronic changes.  Patient has been referred for another kidney transplant and is in the evaluation process, as well as following for CKD management.   - Baseline Creatinine:  ~ 3.0-3.8   - Proteinuria: Minimal (0.2-0.5 grams)   - Date DSA Last Checked: Aug/2020      Latest DSA: No   - BK Viremia: No   - Kidney Tx Biopsy: Aug 17, 2020; Result: No diagnostic evidence of acute rejection.  Mild glomerulitis with moderate transplant glomerulopathy, but no DSA.  Secondary focal segmental glomerulosclerosis.  Severe interstitial fibrosis and tubular atrophy.  Mild arterio- and severe arteriolosclerosis.             Jul 25, 2011; Result: No diagnostic evidence of acute rejection.  Unremarkable.             May 14, 2010; Result: No diagnostic evidence of acute rejection.  Moderate acute tubular injury.    # Immunosuppression: Cyclosporine (goal ) and Mycophenolate mofetil (dose 1000 mg every 12 hours)   - Changes: No    # Infection Prophylaxis:   Last CD4 Level: 573 (9/2017)  - PJP: None    # Hypertension: Controlled;  Goal BP: < 130/80   - Changes: Not at this time; Recommend patient check blood pressure at home on a regular basis, such as once every week or two, and follow up with Nephrology nurse if above goal.    # Anemia in Chronic Renal Disease: Hgb: Stable      CINDY: No   - Iron studies: Low iron saturation    # Mineral Bone Disorder:   - Secondary renal hyperparathyroidism; PTH level: Moderately elevated (301-600 pg/ml)        On treatment: None; Insurance has denied calcitriol so far.  - Vitamin D; level: Normal        On supplement: Yes  - Calcium; level: Normal        On supplement: No    # Electrolytes:   - Potassium; level: Normal         On supplement: No  - Bicarbonate; level: Low normal        On supplement: Yes    # Obesity, Class II (BMI = 35.3): Weight is up ~ 10 lbs.   - Recommend weight loss for overall health by increasing exercise and watching caloric intake.    # Gout: Possible occasional flares on allopurinol.  He was treated with steroids with resolution of symptoms.    # Skin Cancer Risk:    - Discussed sun protection and recommend regular follow up with Dermatology.    # Medical Compliance: Yes     # COVID-19 Virus Review: Discussed COVID-19 virus and the potential medical risks.  Reviewed preventative health recommendations, including wearing a mask where appropriate.  Recommended COVID vaccination should be up to date with either an initial vaccination or booster shot when appropriate.  Asked the patient to inform the transplant center if they are exposed or diagnosed with this virus.    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    Return visit: Return in about 1 year (around 8/18/2023).    Lan Patino MD    Chief Complaint   Mr. Piña is a 46 year old here for kidney transplant and immunosuppression management.    History of Present Illness    Mr. Piña reports feeling good overall with some medical complaints.  Since last clinic visit, patient reports no hospitalizations or new medical complaints and has been doing well overall.  His energy level is okay and pretty close to normal still.  He is active, mostly with work and being on his feet all day, but only  gets a little exercise.  Denies any chest pain or shortness of breath with exertion.  Some leg swelling when he is on his feet all day.    Appetite is good and he has gained about 10 lbs.  No nausea, vomiting or diarrhea.  No fever, sweats or chills.  No night sweats.    Home BP: Not checked    Problem List   Patient Active Problem List   Diagnosis     Kidney replaced by transplant     Medullary cystic kidney disease     CARDIOVASCULAR SCREENING; LDL GOAL LESS THAN 100     Conductive hearing loss, unilateral     Aftercare following organ transplant     Immunosuppression (H)     Anemia in chronic renal disease     Secondary renal hyperparathyroidism (H)     Gout     Vitamin D deficiency     Dyslipidemia     Heterozygous factor V Leiden mutation (H)     HTN, kidney transplant related     CKD (chronic kidney disease) stage 4, GFR 15-29 ml/min (H)     Infection     Clinical diagnosis of COVID-19       Social History   Social History     Tobacco Use     Smoking status: Never Smoker     Smokeless tobacco: Never Used   Vaping Use     Vaping Use: Never used   Substance Use Topics     Alcohol use: No     Drug use: No       Allergies   No Known Allergies    Medications   Current Outpatient Medications   Medication Sig     allopurinol (ZYLOPRIM) 100 MG tablet Take 0.5 tablets (50 mg) by mouth every other day     amLODIPine (NORVASC) 5 MG tablet TAKE 1 TABLET BY MOUTH AT BEDTIME AT NIGHT     calcitRIOL (ROCALTROL) 0.25 MCG capsule Take 1 capsule (0.25 mcg) by mouth daily     CELLCEPT (BRAND) 250 MG capsule Take 3 capsules (750 mg) by mouth 2 times daily     cholecalciferol 2000 units TABS Take 2,000 Units by mouth daily     epoetin beto-epbx (RETACRIT) 87626 UNIT/ML injection Inject 1 mL (10,000 Units) Subcutaneous every 7 days Administer for Hgb <10. HOLD if systolic BP >180.     Ferrous Gluconate 324 (37.5 Fe) MG TABS Take by mouth daily     losartan (COZAAR) 25 MG tablet Take 1 tablet by mouth once daily      methylPREDNISolone (MEDROL DOSEPAK) 4 MG tablet therapy pack Follow Package Directions     NEORAL (BRAND) 25 MG capsule Take 4 capsules (100 mg) by mouth 2 times daily     sodium bicarbonate 650 MG tablet Take 3 tablets (1,950 mg) by mouth 3 times daily     calcium gluconate 500 MG tablet Take 500 mg by mouth 2 times daily 600mg daily (Patient not taking: Reported on 8/18/2022)     No current facility-administered medications for this visit.     There are no discontinued medications.    Physical Exam   Vital Signs: /82   Pulse 93   Wt 108.6 kg (239 lb 6.4 oz)   SpO2 100%   BMI 35.35 kg/m      GENERAL APPEARANCE: alert and no distress  HENT: mouth without ulcers or lesions  LYMPHATICS: no cervical or supraclavicular nodes  RESP: lungs clear to auscultation - no rales, rhonchi or wheezes  CV: regular rhythm, normal rate, no rub, no murmur  EDEMA: no LE edema bilaterally  ABDOMEN: soft, nondistended, nontender, bowel sounds normal  MS: extremities normal - no gross deformities noted, no evidence of inflammation in joints, no muscle tenderness  SKIN: no rash, many warts  TX KIDNEY: normal  DIALYSIS ACCESS: none    Data     Renal Latest Ref Rng & Units 8/16/2022 8/4/2022 7/5/2022   Na 133 - 144 mmol/L 137 134 137   K 3.4 - 5.3 mmol/L 4.3 4.5 4.5   Cl 94 - 109 mmol/L 108 106 109   CO2 20 - 32 mmol/L 20 16(L) 17(L)   BUN 7 - 30 mg/dL 47(H) 54(H) 52(H)   Cr 0.66 - 1.25 mg/dL 3.00(H) 3.81(H) 3.40(H)   Glucose 70 - 99 mg/dL 87 94 107(H)   Ca  8.5 - 10.1 mg/dL 8.7 8.8 9.1   Mg 1.6 - 2.3 mg/dL - - -     Bone Health Latest Ref Rng & Units 6/20/2022 4/25/2022 12/31/2021   Phos 2.5 - 4.5 mg/dL 3.5 3.2 4.1   PTHi 15 - 65 pg/mL 417(H) - 245(H)   Vit D Def 20 - 75 ug/L 35 - 40     Heme Latest Ref Rng & Units 8/16/2022 8/4/2022 7/5/2022   WBC 4.0 - 11.0 10e3/uL 6.9 5.5 7.4   Hgb 13.3 - 17.7 g/dL 9.4(L) 9.0(L) 9.7(L)   Plt 150 - 450 10e3/uL 263 212 256   ABSOLUTE NEUTROPHIL 1.6 - 8.3 10e9/L - - -   ABSOLUTE LYMPHOCYTES 0.8 -  5.3 10e9/L - - -   ABSOLUTE MONOCYTES 0.0 - 1.3 10e9/L - - -   ABSOLUTE EOSINOPHILS 0.0 - 0.7 10e9/L - - -   ABSOLUTE BASOPHILS 0.0 - 0.2 10e9/L - - -   ABS IMMATURE GRANULOCYTES 0 - 0.4 10e9/L - - -   ABSOLUTE NUCLEATED RBC - - - -     Liver Latest Ref Rng & Units 6/20/2022 4/25/2022 8/23/2021   AP 40 - 150 U/L 81 - 100   TBili 0.2 - 1.3 mg/dL 1.0 - 0.7   ALT 0 - 70 U/L 21 - 17   AST 0 - 45 U/L 12 - 9   Tot Protein 6.8 - 8.8 g/dL 6.6(L) - 6.9   Albumin 3.4 - 5.0 g/dL 3.5 3.4 3.7        Iron studies Latest Ref Rng & Units 8/4/2022 7/5/2022 4/6/2022   Iron 35 - 180 ug/dL 41 80 64   Iron sat 15 - 46 % 13(L) 29 29   Ferritin 26 - 388 ng/mL 259 300 205     [facility] Txp Virology Latest Ref Rng & Units 8/23/2021 8/17/2020 9/13/2012   CMV IgG EU/mL - - -   CVM DNA Quant - - Whole blood, EDTA anticoagulant -   CMV Quant <100 Copies/mL - - -   CMV QT Log <2.0 Log copies/mL - - -   CMV QUANT IU/ML CMVND:CMV DNA Not Detected [IU]/mL - CMV DNA Not Detected -   LOG IU/ML OF CMVQNT <2.1 [Log:IU]/mL - Not Calculated -   BK Spec - - Plasma PLASMA   BK Res BKNEG:BK Virus DNA Not Detected copies/mL - BK Virus DNA Not Detected <1000   BK Log <2.7 Log copies/mL - Not Calculated <3.0  The lower limit of detection for this assay is 1000 copies/mL.  Real-time TaqMan   PCR was performed using BK primers and probe for the detection of a 90 bp   portion of the  1 gene.  The performance characteristics were validated by   the [facility], [place].   It has not been cleared or approved by the U.S. Food and Drug Administration.   EBV CAPSID ANTIBODY IGG No detectable antibody. Positive(A) - -   EBV DNA COPIES/ML <1000 Copies/mL - - -   EBV DNA LOG OF COPIES <3.0 Log copies/mL - - -   Hep B Core NEG - - -   Hep B Surf - - - -   HIV 1&2 NEG - - -            Recent Labs   Lab Test 12/31/21  1021   MPACID 2.36   MPAG 186.7*

## 2022-08-22 ENCOUNTER — TELEPHONE (OUTPATIENT)
Dept: TRANSPLANT | Facility: CLINIC | Age: 47
End: 2022-08-22

## 2022-08-22 DIAGNOSIS — M25.571 PAIN IN JOINT INVOLVING ANKLE AND FOOT, RIGHT: ICD-10-CM

## 2022-08-22 NOTE — TELEPHONE ENCOUNTER
Reviewed chart for purpose of pre kidney transplant evaluation status.     Pending items yet to be completed:     1. Receipt of Info and KDPI > 85% consents need to be signed   2. Review of [facility] education needs to be completed   3. Stress echo recommended by Dr. Smart 10/11/2021 needs to be completed.   4. Dermatologist appt needs to be completed. Pt no showed 01/11/2022.   5. Hepatitis B and COVID vaccinations need to be completed.   6. Rheumatologist appt cancelled 11/11/2021 and then no showed on 01/06/2022. Pt did complete 05/11/2022 for gout - recommended RTC in 1 month - this appt has not been done yet. Provider note 05/11/2022 also states pt has had an insidious onset of hearing loss - ?? Needs follow-up for this.   7. Needs post void residual.     Called patient on cell - call did not go through. Called pt on home # and appears to be work # so did not leave message there. Sent My Chart message asking pt to call me to discuss status of pre kidney transplant evaluation.

## 2022-08-22 NOTE — TELEPHONE ENCOUNTER
Requested Prescriptions   Pending Prescriptions Disp Refills     methylPREDNISolone (MEDROL DOSEPAK) 4 MG tablet therapy pack 21 tablet 2     Sig: Follow Package Directions       There is no refill protocol information for this order          Last office visit: Visit date not found with prescribing provider:  Isela Jean PA-C   Future Office Visit:   Next 5 appointments (look out 90 days)    Oct 20, 2022  3:50 PM  (Arrive by 3:35 PM)  Return Visit with Denisse Stratton PA-C  [facility] [place] ([facility] [place] ) [address] [place] [id number] [phone]       [facility]

## 2022-08-26 NOTE — TELEPHONE ENCOUNTER
Prior Authorization Approval    Authorization Effective Date:  08/25/2022  Authorization Expiration Date:  09/03/2022  Medication: Retacrit - appeal Approved  Approved Dose/Quantity:   Reference #: [id number]   Insurance Company: Other (see comments)  Expected CoPay:      CoPay Card Available: No    Foundation Assistance Needed:    Which Pharmacy is filling the prescription (Not needed for infusion/clinic administered): [facility] - [place], [place] - [address]  Pharmacy Notified: Yes  Patient Notified: Yes

## 2022-08-29 ENCOUNTER — TELEPHONE (OUTPATIENT)
Dept: PHARMACY | Facility: CLINIC | Age: 47
End: 2022-08-29

## 2022-08-29 NOTE — TELEPHONE ENCOUNTER
Follow-up with anemia management service:    Retacrit was approved only through 9/3/22. Miguel Angel needs to call and order his Retacrit before the PA window closes.      TSAT has dropped <20%, he will need that level to improve before another PA is approved.     LVM for Miguel Angel.  Is he taking oral Iron as ordered?     Anemia Latest Ref Rng & Units 4/6/2022 4/20/2022 4/25/2022 6/20/2022 7/5/2022 8/4/2022 8/16/2022   CINDY Dose - - - - - - - -   Hemoglobin 13.3 - 17.7 g/dL 10.2(L) 9.7(L) 10.1(L) 9.4(L) 9.7(L) 9.0(L) 9.4(L)   TSAT 15 - 46 % 29 - - - 29 13(L) -   Ferritin 26 - 388 ng/mL 205 - - - 300 259 -           Follow-up call date: 9/1/22  LVM x 1    Janna Louis RN   Anemia Services  [address] [address]   [email or web address]   Office : [phone]  Fax: [phone]

## 2022-08-30 NOTE — TELEPHONE ENCOUNTER
Routing refill request to provider for review/approval because:  Drug not on the [facility] refill protocol   Last OV 5/11/22. 1 month f/u appointment was advised. No appointment scheduled currently. Please advise for request.  Cindy Gutierrez RN on 8/30/2022 at 10:35 AM

## 2022-08-31 ENCOUNTER — PATIENT OUTREACH (OUTPATIENT)
Dept: NEPHROLOGY | Facility: CLINIC | Age: 47
End: 2022-08-31

## 2022-08-31 NOTE — PROGRESS NOTES
Avril Hendricks Kelsey, RN; P Clinic Coordinators [facility]  Cc: Bettina Cho, RN  Patient is not responding.       A message was place on his appt with Dr. Patino for him to get scheduled.  8/18/2022  LG   LVM x2 for patient to call and schedule.  8/16/22  LG    LVM for patient to call and schedule.  8/9/22  LG             Previous Messages       ----- Message -----   From: Crisit Herrmann RN   Sent: 8/8/2022  12:02 PM CDT   To: Bettina Cho RN, Clinic Coordinators [facility]   Subject: fistula consult                                   Please schedule for Fistula access consult with Dr. Rivera.     Pt WILL need vein mapping scheduled prior.     Please schedule within 1 month.     Communicate appointment info with pt.     ThanksCRISTI RN   Dialysis Access Care Coordinator   [phone]   P_Dialysis_Access_Nurse       Will send pt a [email or web address] message requesting to call writer to schedule appointments.    CRISTI HERRMANN RN on 8/31/2022 at 2:09 PM  Dialysis Access Care Coordinator  Phone: [phone]  Please use P_Dialysis_Access_Nurse pool for Epic InBasket communications to ensure timely response.

## 2022-08-31 NOTE — TELEPHONE ENCOUNTER
Patient active in [employer]. Sent [employer] message asking about Medrol use and flares.  Waiting for response.  Cindy Gutierrez RN on 8/31/2022 at 10:18 AM

## 2022-09-01 ENCOUNTER — TELEPHONE (OUTPATIENT)
Dept: PHARMACY | Facility: CLINIC | Age: 47
End: 2022-09-01

## 2022-09-01 NOTE — TELEPHONE ENCOUNTER
Miguel Angel called.   States he has not got any messages from me or the pharmacy.   He says he has 4 doses of Retacrit at home in his refrigerator and that were shipped 2 weeks ago.   He was not at home. He will check when he gets home and call me to let me know where they came from and the date.  He will have labs drawn again in 2 weeks. He will hold off on dosing until he has new labs back.     Janna Louis RN   Anemia Services  [address] [address]   [email or web address]   Office : [phone]  Fax: [phone]

## 2022-09-01 NOTE — TELEPHONE ENCOUNTER
Follow-up with anemia management service:     2nd message left for Miguel Angel today.   Specialty pharmacy has also been trying to reach Miguel Angel.     Retacrit was approved only through 9/3/22. Miguel Angel needs to call and order his Retacrit before the PA window closes.     TSAT has dropped <20%, he will need that level to improve before another PA is approved.     Anemia Latest Ref Rng & Units 4/6/2022 4/20/2022 4/25/2022 6/20/2022 7/5/2022 8/4/2022 8/16/2022   CINDY Dose - - - - - - - -   Hemoglobin 13.3 - 17.7 g/dL 10.2(L) 9.7(L) 10.1(L) 9.4(L) 9.7(L) 9.0(L) 9.4(L)   TSAT 15 - 46 % 29 - - - 29 13(L) -   Ferritin 26 - 388 ng/mL 205 - - - 300 259 -         Follow-up call date: 9/15/22    Janna Louis RN   Anemia Services  [address] [address]   [email or web address]   Office : [phone]  Fax: [phone]

## 2022-09-08 NOTE — TELEPHONE ENCOUNTER
Called and spoke with patient. He states he is taking 4 mg of Medrol daily. Is doing well on this dose currently.    Per last OV note plan:    1. Medication recommendations:             a. Allopurinol 100mg: take 1/2 tablet every other day  b. Medrol 4mg daily-if you get a flare of gout and need to increase the medrol, let me know     States he still has some Medrol on hand but if he gets a flare-up, will need more. Requesting refills.  Thanks,    Cindy Gutierrez RN on 9/8/2022 at 10:42 AM

## 2022-09-09 RX ORDER — METHYLPREDNISOLONE 4 MG
TABLET, DOSE PACK ORAL
Qty: 21 TABLET | Refills: 1 | Status: SHIPPED | OUTPATIENT
Start: 2022-09-09 | End: 2022-11-23

## 2022-09-15 ENCOUNTER — TELEPHONE (OUTPATIENT)
Dept: PHARMACY | Facility: CLINIC | Age: 47
End: 2022-09-15

## 2022-09-15 NOTE — TELEPHONE ENCOUNTER
Follow-up with anemia management service:    LVM for Miguel Angel to call and check in re Anemia Labs    Anemia Latest Ref Rng & Units 4/6/2022 4/20/2022 4/25/2022 6/20/2022 7/5/2022 8/4/2022 8/16/2022   CINDY Dose - - - - - - - -   Hemoglobin 13.3 - 17.7 g/dL 10.2(L) 9.7(L) 10.1(L) 9.4(L) 9.7(L) 9.0(L) 9.4(L)   TSAT 15 - 46 % 29 - - - 29 13(L) -   Ferritin 26 - 388 ng/mL 205 - - - 300 259 -           Follow-up call date: 9/29/22    Janna Louis RN   Anemia Services  [address] [address]   [email or web address]   Office : [phone]  Fax: [phone]

## 2022-09-27 DIAGNOSIS — Z94.0 KIDNEY REPLACED BY TRANSPLANT: ICD-10-CM

## 2022-09-27 DIAGNOSIS — Z94.0 KIDNEY TRANSPLANTED: ICD-10-CM

## 2022-09-27 RX ORDER — CYCLOSPORINE 25 MG/1
100 CAPSULE, LIQUID FILLED ORAL 2 TIMES DAILY
Qty: 240 CAPSULE | Refills: 11 | Status: SHIPPED | OUTPATIENT
Start: 2022-09-27 | End: 2024-01-05

## 2022-09-27 NOTE — TELEPHONE ENCOUNTER
Please send over the most current order for Neoral 25mg caps. Pt will need it by Friday 9/30.     Thank you,    [facility] mail order Services  [phone]

## 2022-09-29 ENCOUNTER — TELEPHONE (OUTPATIENT)
Dept: PHARMACY | Facility: CLINIC | Age: 47
End: 2022-09-29

## 2022-09-29 NOTE — TELEPHONE ENCOUNTER
Follow-up with anemia management service:    2nd message left for Miguel Angel to check in re Anemia Services.     Anemia Latest Ref Rng & Units 4/6/2022 4/20/2022 4/25/2022 6/20/2022 7/5/2022 8/4/2022 8/16/2022   CINDY Dose - - - - - - - -   Hemoglobin 13.3 - 17.7 g/dL 10.2(L) 9.7(L) 10.1(L) 9.4(L) 9.7(L) 9.0(L) 9.4(L)   TSAT 15 - 46 % 29 - - - 29 13(L) -   Ferritin 26 - 388 ng/mL 205 - - - 300 259 -           Follow-up call date: 10/13/22    Janna Louis RN   Anemia Services  [address] [address]   [email or web address]   Office : [phone]  Fax: [phone]

## 2022-10-13 NOTE — TELEPHONE ENCOUNTER
Miguel Angel has not returned call to Anemia Services.  Has clinic appt with Denisse Stratton PA-C on 10/20/22.     Janna Louis RN   Anemia Services  [address] [address]   [email or web address]   Office : [phone]  Fax: [phone]

## 2022-10-17 DIAGNOSIS — N18.4 CKD (CHRONIC KIDNEY DISEASE) STAGE 4, GFR 15-29 ML/MIN (H): Primary | ICD-10-CM

## 2022-10-21 ENCOUNTER — DOCUMENTATION ONLY (OUTPATIENT)
Dept: PHARMACY | Facility: CLINIC | Age: 47
End: 2022-10-21

## 2022-10-21 NOTE — PROGRESS NOTES
Follow-up with anemia management service:    Miguel Angel was a no show for his 10/20/22 clinic appt.  Have left 2 VM for Miguel Angel with no return call.     Anemia Latest Ref Rng & Units 4/6/2022 4/20/2022 4/25/2022 6/20/2022 7/5/2022 8/4/2022 8/16/2022   CINDY Dose - - - - - - - -   Hemoglobin 13.3 - 17.7 g/dL 10.2(L) 9.7(L) 10.1(L) 9.4(L) 9.7(L) 9.0(L) 9.4(L)   TSAT 15 - 46 % 29 - - - 29 13(L) -   Ferritin 26 - 388 ng/mL 205 - - - 300 259 -           Follow-up call date: 11/7/22 Call to check in     Janna Louis RN   Anemia Services  [address] [address]   [email or web address]   Office : [phone]  Fax: [phone]

## 2022-10-29 ENCOUNTER — HEALTH MAINTENANCE LETTER (OUTPATIENT)
Age: 47
End: 2022-10-29

## 2022-11-07 ENCOUNTER — TELEPHONE (OUTPATIENT)
Dept: PHARMACY | Facility: CLINIC | Age: 47
End: 2022-11-07

## 2022-11-07 NOTE — TELEPHONE ENCOUNTER
Follow-up with anemia management service:    Left another message for Miguel Angel to check in re Anemia Services.  His VM states he is on vacation and will return on 11/14/22.  Will check in at that time.     Anemia Latest Ref Rng & Units 4/6/2022 4/20/2022 4/25/2022 6/20/2022 7/5/2022 8/4/2022 8/16/2022   CINDY Dose - - - - - - - -   Hemoglobin 13.3 - 17.7 g/dL 10.2(L) 9.7(L) 10.1(L) 9.4(L) 9.7(L) 9.0(L) 9.4(L)   TSAT 15 - 46 % 29 - - - 29 13(L) -   Ferritin 26 - 388 ng/mL 205 - - - 300 259 -           Follow-up call date: 11/15/22    Janna Louis RN   Anemia Services  [address] [address]   [email or web address]   Office : [phone]  Fax: [phone]

## 2022-11-15 ENCOUNTER — TELEPHONE (OUTPATIENT)
Dept: PHARMACY | Facility: CLINIC | Age: 47
End: 2022-11-15

## 2022-11-15 NOTE — TELEPHONE ENCOUNTER
Follow-up with anemia management service:    LVM on home ph # to check in re Anemia Services.     Anemia Latest Ref Rng & Units 4/6/2022 4/20/2022 4/25/2022 6/20/2022 7/5/2022 8/4/2022 8/16/2022   CINDY Dose - - - - - - - -   Hemoglobin 13.3 - 17.7 g/dL 10.2(L) 9.7(L) 10.1(L) 9.4(L) 9.7(L) 9.0(L) 9.4(L)   TSAT 15 - 46 % 29 - - - 29 13(L) -   Ferritin 26 - 388 ng/mL 205 - - - 300 259 -           Follow-up call date: 12/6/22    Janna Louis RN   Anemia Services  [address] [address]   [email or web address]   Office : [phone]  Fax: [phone]

## 2022-11-23 DIAGNOSIS — M25.571 PAIN IN JOINT INVOLVING ANKLE AND FOOT, RIGHT: ICD-10-CM

## 2022-11-23 RX ORDER — METHYLPREDNISOLONE 4 MG
TABLET, DOSE PACK ORAL
Qty: 21 TABLET | Refills: 1 | Status: SHIPPED | OUTPATIENT
Start: 2022-11-23 | End: 2023-06-15

## 2022-11-23 NOTE — TELEPHONE ENCOUNTER
Patient needs to schedule a follow up visit. I did refill this Medrol request, but no further refills without an office visit.     Isela Jean PA-C on 11/23/2022 at 12:30 PM

## 2022-11-23 NOTE — TELEPHONE ENCOUNTER
[facility] Call Center    Phone Message    May a detailed message be left on voicemail: yes     Reason for Call: Medication Refill Request    Has the patient contacted the pharmacy for the refill? Yes   Name of medication being requested: methylprednisolone  Provider who prescribed the medication: Isela Jean   Pharmacy: [facility] on [address]   Date medication is needed: ASAP, pt is currently out.       Action Taken: Other: [place] [facility]    Travel Screening: Not Applicable

## 2022-12-06 ENCOUNTER — OFFICE VISIT (OUTPATIENT)
Dept: RHEUMATOLOGY | Facility: CLINIC | Age: 47
End: 2022-12-06
Payer: COMMERCIAL

## 2022-12-06 ENCOUNTER — TELEPHONE (OUTPATIENT)
Dept: NEPHROLOGY | Facility: CLINIC | Age: 47
End: 2022-12-06

## 2022-12-06 ENCOUNTER — TELEPHONE (OUTPATIENT)
Dept: PHARMACY | Facility: CLINIC | Age: 47
End: 2022-12-06

## 2022-12-06 VITALS
HEIGHT: 69 IN | SYSTOLIC BLOOD PRESSURE: 136 MMHG | WEIGHT: 242 LBS | BODY MASS INDEX: 35.84 KG/M2 | DIASTOLIC BLOOD PRESSURE: 84 MMHG

## 2022-12-06 DIAGNOSIS — Z79.899 ON ALLOPURINOL THERAPY: ICD-10-CM

## 2022-12-06 DIAGNOSIS — M1A.9XX1 CHRONIC TOPHACEOUS GOUT: Primary | ICD-10-CM

## 2022-12-06 PROBLEM — E66.01 MORBID OBESITY (H): Status: ACTIVE | Noted: 2022-12-06

## 2022-12-06 LAB — URATE SERPL-MCNC: 9.4 MG/DL (ref 3.4–7)

## 2022-12-06 PROCEDURE — 36415 COLL VENOUS BLD VENIPUNCTURE: CPT | Performed by: PHYSICIAN ASSISTANT

## 2022-12-06 PROCEDURE — 84550 ASSAY OF BLOOD/URIC ACID: CPT | Performed by: PHYSICIAN ASSISTANT

## 2022-12-06 PROCEDURE — 99214 OFFICE O/P EST MOD 30 MIN: CPT | Performed by: PHYSICIAN ASSISTANT

## 2022-12-06 RX ORDER — METHYLPREDNISOLONE 4 MG/1
TABLET ORAL
Qty: 42 TABLET | Refills: 2 | Status: SHIPPED | OUTPATIENT
Start: 2022-12-06 | End: 2023-08-14

## 2022-12-06 NOTE — TELEPHONE ENCOUNTER
Patient contacted regarding missed appointment 10/20/22. Patient states he forgot he had an appointment. Writer asked if he would like to reschedule for Dec 2022. Patient stated he preferred a video appointment and is scheduled for 12/12/22 for a virtual appointment at 12:40 pm.   MATEO Nicholas

## 2022-12-06 NOTE — PATIENT INSTRUCTIONS
After Visit Instructions:     Thank you for coming to [facility] for your care. It is my goal to partner with you to help you reach your optimal state of health.       Plan:     Schedule follow-up with Isela Jean PA-C in 4 months.   Labs: uric acid today and prior to next visit  Medication recommendations:   Allopurinol 100mg: take 1/2 tablet every other day-I will send you a Chatterous message after I see your uric acid today with the dose plan  Medrol 4mg-follow taper instructions if you get a flare of gout; we may go daily with this medication if we increase your allopurinol       Isela Jean PA-C  [facility]  [place]    Contact information: [facility]  Clinic Number:  [phone]  Please call or send a [employer] message with any questions about your care

## 2022-12-06 NOTE — LETTER
[facility]  [address] [address]  Phone: [phone]    December 6, 2022        Miguel Angel Piña  [address] [address]          To whom it may concern:    RE: Miguel Angel Piña    Patient was seen and treated today at our clinic.    Please contact me for questions or concerns.      Sincerely,        Isela Jean PA-C

## 2022-12-06 NOTE — PROGRESS NOTES
Rheumatology Clinic Visit  [facility]  MARTHA Rodriguez     Miguel Angel Piña MRN# [id number]   YOB: 1975 Age: 47 year old   Date of Visit: 12/06/2022  Primary care provider: Lucrecia Hutson          Assessment and Plan:     1.  Chronic tophaceous gout  2.  On allopurinol therapy    Patient presents today for follow-up regarding his chronic tophaceous gout.  He has tolerated the allopurinol well.  He has not been taking the methylprednisolone daily.  He does use this as needed when he has a flare.  He has had approximately 4 flares since his last visit.  Physical examination shows tophi on the left fifth PIP as well as around the right fifth MCP.  He is unable to fully flex the left fifth PIP however it is not bothersome.  His last uric acid was 9.9 in June.  His last creatinine was 3.00 with a GFR of 25.    Will continue to work on controlling the patient's gout.  We will plan to recheck a uric acid today.  Depending on the results we may go up to 50 mg daily.  We will need to keep a close eye on his kidneys.  He does work closely with his nephrologist as well.  I did refill the methylprednisolone.  If we do increase allopurinol we will also have him take 4 mg of methylprednisolone daily.  I will contact the patient once I see his uric acid results.  We will plan to have the patient follow-up with me in 4 months with a uric acid prior.      Plan:     1. Schedule follow-up with Isela Jean PA-C in 4 months.   2. Labs: uric acid today and prior to next visit  3. Medication recommendations:   a. Allopurinol 100mg: take 1/2 tablet every other day-I will send you a [place] message after I see your uric acid today with the dose plan  b. Medrol 4mg-follow taper instructions if you get a flare of gout; we may go daily with this medication if we increase your allopurinol       MARTHA Rodriguez  Rheumatology         History of Present Illness:   Miguel Angel Piña presents for evaluation of  chronic gout.     Interval history December 6, 2022:  He continues to work with nephrology. The plan is to continue to monitor his kidney's. He is not currently planned to go on dialysis. He takes 50mg every other day. He continues on medrol. He does not take this daily. He only take the pack when there is a flare.     He states that he has had about 4 flares since his last visit.     HPI from consult on 5/11/2022:  Has a history of chronic kidney disease and a history of transplant (one at age 18 and one at 35-currently doing the workup to get on the list for another one). He states that periodically he will get flares in his ankles. He states that last fall his right wrist and arm swelled. He started to take Prednisone and it resolved. He states that once a month he needs prednisone to get rid of his flares. Most recently was about 1 month ago. His left finger started to flare. Xray's were taken, not bone fragments were seen. He started Prednisone and it got better. He prefers to stay off of steroids due to the long term effects of it. He last used medrol yesterday. When he has flares he used Medrol dosepak.          Review of Systems:     Constitutional: negative  Skin: negative  Eyes: negative  Ears/Nose/Throat: negative  Respiratory: No shortness of breath, dyspnea on exertion, cough, or hemoptysis  Cardiovascular: negative  Gastrointestinal: negative  Genitourinary: negative  Musculoskeletal: As above  Neurologic: negative  Psychiatric: negative  Hematologic/Lymphatic/Immunologic: negative  Endocrine: negative         Active Problem List:     Patient Active Problem List    Diagnosis Date Noted     Clinical diagnosis of COVID-19 12/21/2021     Priority: Medium     Infection 10/14/2021     Priority: Medium     Right Small Finger        CKD (chronic kidney disease) stage 4, GFR 15-29 ml/min (H) 10/31/2018     Priority: Medium     Aftercare following organ transplant 09/08/2017     Priority: Medium      Immunosuppression (H) 09/08/2017     Priority: Medium     Anemia in chronic renal disease 09/08/2017     Priority: Medium     Secondary renal hyperparathyroidism (H) 09/08/2017     Priority: Medium     Gout 09/08/2017     Priority: Medium     Vitamin D deficiency 09/08/2017     Priority: Medium     Dyslipidemia 09/08/2017     Priority: Medium     Heterozygous factor V Leiden mutation (H) 09/08/2017     Priority: Medium     No h/o DVT or PE       HTN, kidney transplant related      Priority: Medium     Conductive hearing loss, unilateral 10/03/2013     Priority: Medium     Pt reports insidious onset of apparent hearing loss, that had not bothered him, but his wife feels like he must have hearing loss, given that he cannot hear her talking to him often.  He denies any trauma, past issues with ear infections.  He is s/p two kidney transplants and is on rejection medications chronically (stable of late).      He denies excessive loud noise exposure, hazardous work environment (works at [employer]), does drive with window down during summer.  No shooting guns.         CARDIOVASCULAR SCREENING; LDL GOAL LESS THAN 100 10/31/2010     Priority: Medium     Kidney replaced by transplant      Priority: Medium     prior 11/1993       Medullary cystic kidney disease      Priority: Medium            Past Medical History:     Past Medical History:   Diagnosis Date     Anemia in chronic renal disease      Clinical diagnosis of COVID-19 12/21/2021     Conductive hearing loss, unilateral 10/3/2013    Pt reports insidious onset of apparent hearing loss, that had not bothered him, but his wife feels like he must have hearing loss, given that he cannot hear her talking to him often.  He denies any trauma, past issues with ear infections.  He is s/p two kidney transplants and is on rejection medications chronically (stable of late).    He denies excessive loud noise exposure, hazardous work enviro     CVA (cerebral vascular accident) (H)       Dyslipidemia      Factor V Leiden (H)      Gout      History of blood transfusion      Hypertension secondary to other renal disorders      Immunosuppressed status (H)      Infection 10/14/2021    Right Small Finger      Kidney replaced by transplant 4/2010 (2nd)    prior 11/1993     Medullary cystic kidney disease      Secondary renal hyperparathyroidism (H)      Sleep apnea      Past Surgical History:   Procedure Laterality Date     IR RENAL BIOPSY LEFT  8/17/2020     REPAIR PATENT FORAMEN OVALE       s/p LDKT       TRANSPLANT              Social History:     Social History     Socioeconomic History     Marital status:      Spouse name: Not on file     Number of children: 2     Years of education: Not on file     Highest education level: Not on file   Occupational History     Not on file   Tobacco Use     Smoking status: Never     Smokeless tobacco: Never   Vaping Use     Vaping Use: Never used   Substance and Sexual Activity     Alcohol use: No     Drug use: No     Sexual activity: Not Currently     Partners: Female   Other Topics Concern     Parent/sibling w/ CABG, MI or angioplasty before 65F 55M? Not Asked   Social History Narrative     Not on file     Social Determinants of Health     Financial Resource Strain: Not on file   Food Insecurity: Not on file   Transportation Needs: Not on file   Physical Activity: Not on file   Stress: Not on file   Social Connections: Not on file   Intimate Partner Violence: Not on file   Housing Stability: Not on file          Family History:     Family History   Problem Relation Age of Onset     Hypertension Father      Kidney Disease Sister         medullary cystic kidney disease, s/p kidney transplant     Hypertension Sister      Lung Cancer Paternal Grandmother      Prostate Cancer No family hx of      Colon Cancer No family hx of      Diabetes No family hx of      Coronary Artery Disease No family hx of             Allergies:   No Known Allergies          Medications:     Current Outpatient Medications   Medication Sig Dispense Refill     allopurinol (ZYLOPRIM) 100 MG tablet Take 0.5 tablets (50 mg) by mouth every other day 30 tablet 0     amLODIPine (NORVASC) 5 MG tablet TAKE 1 TABLET BY MOUTH AT BEDTIME AT NIGHT 90 tablet 3     calcitRIOL (ROCALTROL) 0.25 MCG capsule Take 1 capsule (0.25 mcg) by mouth daily 90 capsule 3     CELLCEPT (BRAND) 250 MG capsule Take 3 capsules (750 mg) by mouth 2 times daily 180 capsule 11     cholecalciferol 2000 units TABS Take 2,000 Units by mouth daily 90 tablet 3     epoetin beto-epbx (RETACRIT) 45140 UNIT/ML injection Inject 1 mL (10,000 Units) Subcutaneous every 7 days Administer for Hgb <10. HOLD if systolic BP >180. 4 mL 11     Ferrous Gluconate 324 (37.5 Fe) MG TABS Take by mouth daily       losartan (COZAAR) 25 MG tablet Take 1 tablet by mouth once daily 90 tablet 3     methylPREDNISolone (MEDROL DOSEPAK) 4 MG tablet therapy pack Follow Package Directions 21 tablet 1     NEORAL (BRAND) 25 MG capsule Take 4 capsules (100 mg) by mouth 2 times daily 240 capsule 11     sodium bicarbonate 650 MG tablet Take 3 tablets (1,950 mg) by mouth 3 times daily 810 tablet 3            Physical Exam:   There were no vitals taken for this visit.  Wt Readings from Last 6 Encounters:   08/18/22 108.6 kg (239 lb 6.4 oz)   06/20/22 103.9 kg (229 lb)   05/11/22 103.9 kg (229 lb)   04/25/22 104 kg (229 lb 3.2 oz)   10/20/21 102.8 kg (226 lb 9.6 oz)   10/14/21 102.1 kg (225 lb)     Constitutional: well-developed, appearing stated age; cooperative  Eyes: nlconjunctiva, sclera  ENT: nl external ears,  hearing,   Resp: No shortness of breath with normal conversation  MS: Patient has tophi of the right pointer finger PIP.  He also has a tophi on the left fifth PIP.  Psych: nl judgement, orientation, memory, affect.           Data:   Imaging:  N/A    Laboratory:  4/25/22  Creatinine 2.62, GFR 30  Uric acid 9.4    6/20/2022   uric acid  9.9    8/16/2022  Creatinine 3.00, GFR 25  Hemoglobin 9.4

## 2022-12-06 NOTE — TELEPHONE ENCOUNTER
Referred by Dr. Lan Patino on 12/31/2021    Have left multiple messages for Miguel Angel to check in re Anemia Services.  Miguel Angel has not had labs drawn since 8/16/22 and has not ordered retacrit since 6/29/22.  Deactivated Procrit Rx. Closing Anemia Services.     Anemia Latest Ref Rng & Units 4/6/2022 4/20/2022 4/25/2022 6/20/2022 7/5/2022 8/4/2022 8/16/2022   CINDY Dose - - - - - - - -   Hemoglobin 13.3 - 17.7 g/dL 10.2(L) 9.7(L) 10.1(L) 9.4(L) 9.7(L) 9.0(L) 9.4(L)   TSAT 15 - 46 % 29 - - - 29 13(L) -   Ferritin 26 - 388 ng/mL 205 - - - 300 259 -       Anemia labs discontinued, therapy plans cancelled, removed from active patient list, and referring provider notified.    Janna Louis RN   Anemia Services  [address] [address]   [email or web address]   Office : [phone]  Fax: [phone]

## 2022-12-07 ENCOUNTER — TELEPHONE (OUTPATIENT)
Dept: TRANSPLANT | Facility: CLINIC | Age: 47
End: 2022-12-07

## 2022-12-07 ENCOUNTER — TELEPHONE (OUTPATIENT)
Dept: RHEUMATOLOGY | Facility: CLINIC | Age: 47
End: 2022-12-07

## 2022-12-07 DIAGNOSIS — Z79.899 ON ALLOPURINOL THERAPY: ICD-10-CM

## 2022-12-07 DIAGNOSIS — M1A.9XX1 CHRONIC TOPHACEOUS GOUT: Primary | ICD-10-CM

## 2022-12-07 NOTE — TELEPHONE ENCOUNTER
RNCC alerted to lab result drawn 12/6/22 for uric acid level. However, post-kidney transplant labs were not completed for past 4 months. Orders are for monthly labs. Patient also appears to have missed appt in October with nephrology JOHNNY and this visit was rescheduled for 12/12/22. This writer called Miguel Angel and left VM message reminding Miguel Angel to obtain lab work prior to this visit for review including updated cyclosporine level.     Ivanna Rivera, RN, BSN  Solid Organ Transplant, Post Kidney and Pancreas  Transplant Care Coordinator  [phone]

## 2022-12-07 NOTE — PROGRESS NOTES
NEPHROLOGY PROGRESS NOTE  12/12/2022    Logged in to complete visit, unfortunately pt had already left. Will place lab orders and have visit rescheduled.     Assessment & Plan   # DDKT: Trend up in creatinine at ~ 3.0, which is just above previous baseline of ~ 2.3-2.6, although better than last creatinine prior that was at ~ 3.8.  Kidney transplant biopsy 8/2020 showed severe chronic changes.    - Patient has been referred for another kidney transplant and is in the evaluation process, as well as following for CKD management.   - Baseline Creatinine:  ~ 3.0-3.8   - Proteinuria: Minimal (0.2-0.5 grams)   - Date DSA Last Checked: Aug/2020      Latest DSA: No   - BK Viremia: No   - Kidney Tx Biopsy: Aug 17, 2020; Result: No diagnostic evidence of acute rejection.  Mild glomerulitis with moderate transplant glomerulopathy, but no DSA.  Secondary focal segmental glomerulosclerosis.  Severe interstitial fibrosis and tubular atrophy.  Mild arterio- and severe arteriolosclerosis.             Jul 25, 2011; Result: No diagnostic evidence of acute rejection.  Unremarkable.             May 14, 2010; Result: No diagnostic evidence of acute rejection.  Moderate acute tubular injury.    # Immunosuppression: Cyclosporine (goal ) and Mycophenolate mofetil (dose 1000 mg every 12 hours)   - Changes: No    # Infection Prophylaxis:   Last CD4 Level: 573 (9/2017)  - PJP: None    # Hypertension: Controlled;  Goal BP: < 130/80    - current regimen: amlodipine 5 mg daily, losartan 25 mg daily,    - Changes: Not at this time; Recommend patient check blood pressure at home on a regular basis, such as once every week or two, and follow up with Nephrology nurse if above goal.    # Anemia in Chronic Renal Disease: Hgb: Stable      CINDY: No   - Iron studies: Low iron saturation    # Mineral Bone Disorder:   - Secondary renal hyperparathyroidism; PTH level: Moderately elevated (301-600 pg/ml)        On treatment: None; Insurance has denied  calcitriol so far.  - Vitamin D; level: Normal        On supplement: Yes  - Calcium; level: Normal        On supplement: No    # Electrolytes:   - Potassium; level: Normal        On supplement: No  - Bicarbonate; level: Low normal        On supplement: Yes sodium bicarb 3 tabs TID    # Obesity, Class II (BMI = 35.3): Weight is up ~ 10 lbs.   - Recommend weight loss for overall health by increasing exercise and watching caloric intake.    # Gout: Possible occasional flares on allopurinol.  He was treated with steroids with resolution of symptoms.    # Skin Cancer Risk:    - Discussed sun protection and recommend regular follow up with Dermatology.    # Medical Compliance: Yes     # COVID-19 Virus Review: Discussed COVID-19 virus and the potential medical risks.  Reviewed preventative health recommendations, including wearing a mask where appropriate.  Recommended COVID vaccination should be up to date with either an initial vaccination or booster shot when appropriate.  Asked the patient to inform the transplant center if they are exposed or diagnosed with this virus.    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    Return visit: No follow-ups on file.    MARISSA UNGERC    Chief Complaint   Mr. Piña is a 47 year old here for kidney transplant and immunosuppression management.    History of Present Illness    Mr. Piña reports feeling good overall with some medical complaints.  Since last clinic visit, patient reports no hospitalizations or new  medical complaints and has been doing well overall.  His energy level is okay and pretty close to normal still.  He is active, mostly with work and being on his feet all day, but only gets a little exercise.  Denies any chest pain or shortness of breath with exertion.  Some leg swelling when he is on his feet all day.    Appetite is good and he has gained about 10 lbs.  No nausea, vomiting or diarrhea.  No fever, sweats or chills.  No night sweats.    Home BP: Not checked    Problem List   Patient Active Problem List   Diagnosis     Kidney replaced by transplant     Medullary cystic kidney disease     CARDIOVASCULAR SCREENING; LDL GOAL LESS THAN 100     Conductive hearing loss, unilateral     Aftercare following organ transplant     Immunosuppression (H)     Anemia in chronic renal disease     Secondary renal hyperparathyroidism (H)     Gout     Vitamin D deficiency     Dyslipidemia     Heterozygous factor V Leiden mutation (H)     HTN, kidney transplant related     CKD (chronic kidney disease) stage 4, GFR 15-29 ml/min (H)     Infection     Clinical diagnosis of COVID-19     Morbid obesity (H)       Social History   Social History     Tobacco Use     Smoking status: Never     Smokeless tobacco: Never   Vaping Use     Vaping Use: Never used   Substance Use Topics     Alcohol use: No     Drug use: No       Allergies   No Known Allergies    Medications   Current Outpatient Medications   Medication Sig     allopurinol (ZYLOPRIM) 100 MG tablet Take 0.5 tablets (50 mg) by mouth every other day     amLODIPine (NORVASC) 5 MG tablet TAKE 1 TABLET BY MOUTH AT BEDTIME AT NIGHT     calcitRIOL (ROCALTROL) 0.25 MCG capsule Take 1 capsule (0.25 mcg) by mouth daily     CELLCEPT (BRAND) 250 MG capsule Take 3 capsules (750 mg) by mouth 2 times daily     cholecalciferol 2000 units TABS Take 2,000 Units by mouth daily     Ferrous Gluconate 324 (37.5 Fe) MG TABS Take by mouth daily     losartan (COZAAR) 25 MG tablet Take 1 tablet by  mouth once daily     methylPREDNISolone (MEDROL DOSEPAK) 4 MG tablet therapy pack Follow Package Directions     methylPREDNISolone (MEDROL) 4 MG tablet Take 6 tablets for 1day, then taper by 1 tablet daily until off     NEORAL (BRAND) 25 MG capsule Take 4 capsules (100 mg) by mouth 2 times daily     sodium bicarbonate 650 MG tablet Take 3 tablets (1,950 mg) by mouth 3 times daily     No current facility-administered medications for this visit.     There are no discontinued medications.    Physical Exam   Vital Signs: There were no vitals taken for this visit.    GENERAL APPEARANCE: alert and no distress  HENT: mouth without ulcers or lesions  LYMPHATICS: no cervical or supraclavicular nodes  RESP: lungs clear to auscultation - no rales, rhonchi or wheezes  CV: regular rhythm, normal rate, no rub, no murmur  EDEMA: no LE edema bilaterally  ABDOMEN: soft, nondistended, nontender, bowel sounds normal  MS: extremities normal - no gross deformities noted, no evidence of inflammation in joints, no muscle tenderness  SKIN: no rash, many warts  TX KIDNEY: normal  DIALYSIS ACCESS: none    Data     Renal Latest Ref Rng & Units 8/16/2022 8/4/2022 7/5/2022   Na 133 - 144 mmol/L 137 134 137   K 3.4 - 5.3 mmol/L 4.3 4.5 4.5   Cl 94 - 109 mmol/L 108 106 109   CO2 20 - 32 mmol/L 20 16(L) 17(L)   BUN 7 - 30 mg/dL 47(H) 54(H) 52(H)   Cr 0.66 - 1.25 mg/dL 3.00(H) 3.81(H) 3.40(H)   Glucose 70 - 99 mg/dL 87 94 107(H)   Ca  8.5 - 10.1 mg/dL 8.7 8.8 9.1   Mg 1.6 - 2.3 mg/dL - - -     Bone Health Latest Ref Rng & Units 6/20/2022 4/25/2022 12/31/2021   Phos 2.5 - 4.5 mg/dL 3.5 3.2 4.1   PTHi 15 - 65 pg/mL 417(H) - 245(H)   Vit D Def 20 - 75 ug/L 35 - 40     Heme Latest Ref Rng & Units 8/16/2022 8/4/2022 7/5/2022   WBC 4.0 - 11.0 10e3/uL 6.9 5.5 7.4   Hgb 13.3 - 17.7 g/dL 9.4(L) 9.0(L) 9.7(L)   Plt 150 - 450 10e3/uL 263 212 256   ABSOLUTE NEUTROPHIL 1.6 - 8.3 10e9/L - - -   ABSOLUTE LYMPHOCYTES 0.8 - 5.3 10e9/L - - -   ABSOLUTE MONOCYTES 0.0  - 1.3 10e9/L - - -   ABSOLUTE EOSINOPHILS 0.0 - 0.7 10e9/L - - -   ABSOLUTE BASOPHILS 0.0 - 0.2 10e9/L - - -   ABS IMMATURE GRANULOCYTES 0 - 0.4 10e9/L - - -   ABSOLUTE NUCLEATED RBC - - - -     Liver Latest Ref Rng & Units 6/20/2022 4/25/2022 8/23/2021   AP 40 - 150 U/L 81 - 100   TBili 0.2 - 1.3 mg/dL 1.0 - 0.7   ALT 0 - 70 U/L 21 - 17   AST 0 - 45 U/L 12 - 9   Tot Protein 6.8 - 8.8 g/dL 6.6(L) - 6.9   Albumin 3.4 - 5.0 g/dL 3.5 3.4 3.7        Iron studies Latest Ref Rng & Units 8/4/2022 7/5/2022 4/6/2022   Iron 35 - 180 ug/dL 41 80 64   Iron sat 15 - 46 % 13(L) 29 29   Ferritin 26 - 388 ng/mL 259 300 205     [facility] Txp Virology Latest Ref Rng & Units 8/23/2021 8/17/2020 9/13/2012   CMV IgG EU/mL - - -   CVM DNA Quant - - Whole blood, EDTA anticoagulant -   CMV Quant <100 Copies/mL - - -   CMV QT Log <2.0 Log copies/mL - - -   CMV QUANT IU/ML CMVND:CMV DNA Not Detected [IU]/mL - CMV DNA Not Detected -   LOG IU/ML OF CMVQNT <2.1 [Log:IU]/mL - Not Calculated -   BK Spec - - Plasma PLASMA   BK Res BKNEG:BK Virus DNA Not Detected copies/mL - BK Virus DNA Not Detected <1000   BK Log <2.7 Log copies/mL - Not Calculated <3.0  The lower limit of detection for this assay is 1000 copies/mL.  Real-time TaqMan   PCR was performed using BK primers and probe for the detection of a 90 bp   portion of the  1 gene.  The performance characteristics were validated by   the [facility], [place].   It has not been cleared or approved by the U.S. Food and Drug Administration.   EBV CAPSID ANTIBODY IGG No detectable antibody. Positive(A) - -   EBV DNA COPIES/ML <1000 Copies/mL - - -   EBV DNA LOG OF COPIES <3.0 Log copies/mL - - -   Hep B Core NEG - - -   Hep B Surf - - - -   HIV 1&2 NEG - - -            Recent Labs   Lab Test 12/31/21  1021   MPACID 2.36   MPAG 186.7*       Denisse Stratton PA-C    Visit length minutes. An additional minutes were spent on date of service in chart review, documentation, and  other activities as noted.

## 2022-12-12 ENCOUNTER — TELEPHONE (OUTPATIENT)
Dept: FAMILY MEDICINE | Facility: OTHER | Age: 47
End: 2022-12-12

## 2022-12-12 ENCOUNTER — VIRTUAL VISIT (OUTPATIENT)
Dept: NEPHROLOGY | Facility: CLINIC | Age: 47
End: 2022-12-12
Payer: COMMERCIAL

## 2022-12-12 DIAGNOSIS — N18.4 CKD (CHRONIC KIDNEY DISEASE) STAGE 4, GFR 15-29 ML/MIN (H): Primary | ICD-10-CM

## 2022-12-12 DIAGNOSIS — M1A.9XX1 CHRONIC TOPHACEOUS GOUT: ICD-10-CM

## 2022-12-12 RX ORDER — ALLOPURINOL 100 MG/1
50 TABLET ORAL EVERY OTHER DAY
Qty: 23 TABLET | Refills: 1 | Status: SHIPPED | OUTPATIENT
Start: 2022-12-12 | End: 2023-06-15

## 2022-12-12 NOTE — TELEPHONE ENCOUNTER
Called pt because no record of filling the Allopurinol since last July.  Per patient he has not requested filling the Medrol lizzie.  He did state that he has not been taking the allopurinol for some time now.    Advised patient I would let provider know =  Has not taken Allopurinol for some time now.   and await direction on how to proceed going forward from today. ( 50mg Every OTHER day  Like originally prescribed and not increase?)    Gabrielle WASHINGTON   [facility]

## 2022-12-12 NOTE — TELEPHONE ENCOUNTER
Received fax from:  MANDA Jensen   Fax:  [phone]    MEDROL 4MG PACK TAB     SIG:  TAKE BY MOUTH DIRECTED ON INSIDE OF PACKAGE     Notes to prescriber:  PLEASE ADVISE IS PATIENT TREATING GOUT FLARE UPS?  WE DON'T SEE HIM GETTING ANY MAINTENANCE GOUT MEDS AND VERY FREQUENT MEDROL DOSE PAKS. CONCERNED ABOUT OVERUSE.     Nabila Abel  [facility]

## 2022-12-12 NOTE — TELEPHONE ENCOUNTER
Patient should not be using this medication daily.  He should be taking essentially a Medrol Dosepak when he has a flare.  He did just recently have an increase of his allopurinol to 50 mg daily.  Is he currently having a flare?    Isela Jean PA-C on 12/12/2022 at 11:36 AM

## 2022-12-12 NOTE — LETTER
12/12/2022       RE: Miguel Angel Piña  [id number] [address] [id number]     Dear Colleague,    Thank you for referring your patient, Miguel Angel Piña, to the [facility]. Please see a copy of my visit note below.    NEPHROLOGY PROGRESS NOTE  12/12/2022    Logged in to complete visit, unfortunately pt had already left. Will place lab orders and have visit rescheduled.     Assessment & Plan   # DDKT: Trend up in creatinine at ~ 3.0, which is just above previous baseline of ~ 2.3-2.6, although better than last creatinine prior that was at ~ 3.8.  Kidney transplant biopsy 8/2020 showed severe chronic changes.    - Patient has been referred for another kidney transplant and is in the evaluation process, as well as following for CKD management.   - Baseline Creatinine:  ~ 3.0-3.8   - Proteinuria: Minimal (0.2-0.5 grams)   - Date DSA Last Checked: Aug/2020      Latest DSA: No   - BK Viremia: No   - Kidney Tx Biopsy: Aug 17, 2020; Result: No diagnostic evidence of acute rejection.  Mild glomerulitis with moderate transplant glomerulopathy, but no DSA.  Secondary focal segmental glomerulosclerosis.  Severe interstitial fibrosis and tubular atrophy.  Mild arterio- and severe arteriolosclerosis.             Jul 25, 2011; Result: No diagnostic evidence of acute rejection.  Unremarkable.             May 14, 2010; Result: No diagnostic evidence of acute rejection.  Moderate acute tubular injury.    # Immunosuppression: Cyclosporine (goal ) and Mycophenolate mofetil (dose 1000 mg every 12 hours)   - Changes: No    # Infection Prophylaxis:   Last CD4 Level: 573 (9/2017)  - PJP: None    # Hypertension: Controlled;  Goal BP: < 130/80    - current regimen: amlodipine 5 mg daily, losartan 25 mg daily,    - Changes: Not at this time; Recommend patient check blood pressure at home on a regular basis, such as once every week or two, and follow  up with Nephrology nurse if above goal.    # Anemia in Chronic Renal Disease: Hgb: Stable      CINDY: No   - Iron studies: Low iron saturation    # Mineral Bone Disorder:   - Secondary renal hyperparathyroidism; PTH level: Moderately elevated (301-600 pg/ml)        On treatment: None; Insurance has denied calcitriol so far.  - Vitamin D; level: Normal        On supplement: Yes  - Calcium; level: Normal        On supplement: No    # Electrolytes:   - Potassium; level: Normal        On supplement: No  - Bicarbonate; level: Low normal        On supplement: Yes sodium bicarb 3 tabs TID    # Obesity, Class II (BMI = 35.3): Weight is up ~ 10 lbs.   - Recommend weight loss for overall health by increasing exercise and watching caloric intake.    # Gout: Possible occasional flares on allopurinol.  He was treated with steroids with resolution of symptoms.    # Skin Cancer Risk:    - Discussed sun protection and recommend regular follow up with Dermatology.    # Medical Compliance: Yes     # COVID-19 Virus Review: Discussed COVID-19 virus and the potential medical risks.  Reviewed preventative health recommendations, including wearing a mask where appropriate.  Recommended COVID vaccination should be up to date with either an initial vaccination or booster shot when appropriate.  Asked the patient to inform the transplant center if they are exposed or diagnosed with this virus.    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    Return visit: No follow-ups on  file.    BHAVIK BARRON PA-C    Chief Complaint   Mr. Piña is a 47 year old here for kidney transplant and immunosuppression management.    History of Present Illness    Mr. Piña reports feeling good overall with some medical complaints.  Since last clinic visit, patient reports no hospitalizations or new medical complaints and has been doing well overall.  His energy level is okay and pretty close to normal still.  He is active, mostly with work and being on his feet all day, but only gets a little exercise.  Denies any chest pain or shortness of breath with exertion.  Some leg swelling when he is on his feet all day.    Appetite is good and he has gained about 10 lbs.  No nausea, vomiting or diarrhea.  No fever, sweats or chills.  No night sweats.    Home BP: Not checked    Problem List   Patient Active Problem List   Diagnosis     Kidney replaced by transplant     Medullary cystic kidney disease     CARDIOVASCULAR SCREENING; LDL GOAL LESS THAN 100     Conductive hearing loss, unilateral     Aftercare following organ transplant     Immunosuppression (H)     Anemia in chronic renal disease     Secondary renal hyperparathyroidism (H)     Gout     Vitamin D deficiency     Dyslipidemia     Heterozygous factor V Leiden mutation (H)     HTN, kidney transplant related     CKD (chronic kidney disease) stage 4, GFR 15-29 ml/min (H)     Infection     Clinical diagnosis of COVID-19     Morbid obesity (H)       Social History   Social History     Tobacco Use     Smoking status: Never     Smokeless tobacco: Never   Vaping Use     Vaping Use: Never used   Substance Use Topics     Alcohol use: No     Drug use: No       Allergies   No Known Allergies    Medications   Current Outpatient Medications   Medication Sig     allopurinol (ZYLOPRIM) 100 MG tablet Take 0.5 tablets (50 mg) by mouth every other day     amLODIPine (NORVASC) 5 MG tablet TAKE 1 TABLET BY MOUTH AT BEDTIME AT NIGHT     calcitRIOL (ROCALTROL) 0.25 MCG  capsule Take 1 capsule (0.25 mcg) by mouth daily     CELLCEPT (BRAND) 250 MG capsule Take 3 capsules (750 mg) by mouth 2 times daily     cholecalciferol 2000 units TABS Take 2,000 Units by mouth daily     Ferrous Gluconate 324 (37.5 Fe) MG TABS Take by mouth daily     losartan (COZAAR) 25 MG tablet Take 1 tablet by mouth once daily     methylPREDNISolone (MEDROL DOSEPAK) 4 MG tablet therapy pack Follow Package Directions     methylPREDNISolone (MEDROL) 4 MG tablet Take 6 tablets for 1day, then taper by 1 tablet daily until off     NEORAL (BRAND) 25 MG capsule Take 4 capsules (100 mg) by mouth 2 times daily     sodium bicarbonate 650 MG tablet Take 3 tablets (1,950 mg) by mouth 3 times daily     No current facility-administered medications for this visit.     There are no discontinued medications.    Physical Exam   Vital Signs: There were no vitals taken for this visit.    GENERAL APPEARANCE: alert and no distress  HENT: mouth without ulcers or lesions  LYMPHATICS: no cervical or supraclavicular nodes  RESP: lungs clear to auscultation - no rales, rhonchi or wheezes  CV: regular rhythm, normal rate, no rub, no murmur  EDEMA: no LE edema bilaterally  ABDOMEN: soft, nondistended, nontender, bowel sounds normal  MS: extremities normal - no gross deformities noted, no evidence of inflammation in joints, no muscle tenderness  SKIN: no rash, many warts  TX KIDNEY: normal  DIALYSIS ACCESS: none    Data     Renal Latest Ref Rng & Units 8/16/2022 8/4/2022 7/5/2022   Na 133 - 144 mmol/L 137 134 137   K 3.4 - 5.3 mmol/L 4.3 4.5 4.5   Cl 94 - 109 mmol/L 108 106 109   CO2 20 - 32 mmol/L 20 16(L) 17(L)   BUN 7 - 30 mg/dL 47(H) 54(H) 52(H)   Cr 0.66 - 1.25 mg/dL 3.00(H) 3.81(H) 3.40(H)   Glucose 70 - 99 mg/dL 87 94 107(H)   Ca  8.5 - 10.1 mg/dL 8.7 8.8 9.1   Mg 1.6 - 2.3 mg/dL - - -     Bone Health Latest Ref Rng & Units 6/20/2022 4/25/2022 12/31/2021   Phos 2.5 - 4.5 mg/dL 3.5 3.2 4.1   PTHi 15 - 65 pg/mL 417(H) - 245(H)   Vit D  Def 20 - 75 ug/L 35 - 40     Heme Latest Ref Rng & Units 8/16/2022 8/4/2022 7/5/2022   WBC 4.0 - 11.0 10e3/uL 6.9 5.5 7.4   Hgb 13.3 - 17.7 g/dL 9.4(L) 9.0(L) 9.7(L)   Plt 150 - 450 10e3/uL 263 212 256   ABSOLUTE NEUTROPHIL 1.6 - 8.3 10e9/L - - -   ABSOLUTE LYMPHOCYTES 0.8 - 5.3 10e9/L - - -   ABSOLUTE MONOCYTES 0.0 - 1.3 10e9/L - - -   ABSOLUTE EOSINOPHILS 0.0 - 0.7 10e9/L - - -   ABSOLUTE BASOPHILS 0.0 - 0.2 10e9/L - - -   ABS IMMATURE GRANULOCYTES 0 - 0.4 10e9/L - - -   ABSOLUTE NUCLEATED RBC - - - -     Liver Latest Ref Rng & Units 6/20/2022 4/25/2022 8/23/2021   AP 40 - 150 U/L 81 - 100   TBili 0.2 - 1.3 mg/dL 1.0 - 0.7   ALT 0 - 70 U/L 21 - 17   AST 0 - 45 U/L 12 - 9   Tot Protein 6.8 - 8.8 g/dL 6.6(L) - 6.9   Albumin 3.4 - 5.0 g/dL 3.5 3.4 3.7        Iron studies Latest Ref Rng & Units 8/4/2022 7/5/2022 4/6/2022   Iron 35 - 180 ug/dL 41 80 64   Iron sat 15 - 46 % 13(L) 29 29   Ferritin 26 - 388 ng/mL 259 300 205     [facility] Txp Virology Latest Ref Rng & Units 8/23/2021 8/17/2020 9/13/2012   CMV IgG EU/mL - - -   CVM DNA Quant - - Whole blood, EDTA anticoagulant -   CMV Quant <100 Copies/mL - - -   CMV QT Log <2.0 Log copies/mL - - -   CMV QUANT IU/ML CMVND:CMV DNA Not Detected [IU]/mL - CMV DNA Not Detected -   LOG IU/ML OF CMVQNT <2.1 [Log:IU]/mL - Not Calculated -   BK Spec - - Plasma PLASMA   BK Res BKNEG:BK Virus DNA Not Detected copies/mL - BK Virus DNA Not Detected <1000   BK Log <2.7 Log copies/mL - Not Calculated <3.0  The lower limit of detection for this assay is 1000 copies/mL.  Real-time TaqMan   PCR was performed using BK primers and probe for the detection of a 90 bp   portion of the  1 gene.  The performance characteristics were validated by   the [facility], [place].   It has not been cleared or approved by the U.S. Food and Drug Administration.   EBV CAPSID ANTIBODY IGG No detectable antibody. Positive(A) - -   EBV DNA COPIES/ML <1000 Copies/mL - - -   EBV DNA LOG  OF COPIES <3.0 Log copies/mL - - -   Hep B Core NEG - - -   Hep B Surf - - - -   HIV 1&2 NEG - - -            Recent Labs   Lab Test 12/31/21  1021   MPACID 2.36   MPAG 186.7*         Visit length minutes. An additional minutes were spent on date of service in chart review, documentation, and other activities as noted.           Again, thank you for allowing me to participate in the care of your patient.      Sincerely,    BHAVIK BARRON PA-C

## 2023-01-12 DIAGNOSIS — Z79.60 LONG-TERM USE OF IMMUNOSUPPRESSANT MEDICATION: ICD-10-CM

## 2023-01-12 DIAGNOSIS — Z94.0 KIDNEY TRANSPLANTED: ICD-10-CM

## 2023-01-12 DIAGNOSIS — Z94.0 KIDNEY REPLACED BY TRANSPLANT: ICD-10-CM

## 2023-01-13 RX ORDER — MYCOPHENOLATE MOFETIL 250 MG/1
750 CAPSULE ORAL 2 TIMES DAILY
Qty: 540 CAPSULE | Refills: 3 | Status: SHIPPED | OUTPATIENT
Start: 2023-01-13 | End: 2024-01-29

## 2023-01-18 ENCOUNTER — LAB (OUTPATIENT)
Dept: LAB | Facility: CLINIC | Age: 48
End: 2023-01-18
Payer: COMMERCIAL

## 2023-01-18 DIAGNOSIS — Z79.899 ON ALLOPURINOL THERAPY: ICD-10-CM

## 2023-01-18 DIAGNOSIS — Z48.298 AFTERCARE FOLLOWING ORGAN TRANSPLANT: ICD-10-CM

## 2023-01-18 DIAGNOSIS — Z94.0 KIDNEY TRANSPLANTED: ICD-10-CM

## 2023-01-18 DIAGNOSIS — M1A.9XX1 CHRONIC TOPHACEOUS GOUT: ICD-10-CM

## 2023-01-18 DIAGNOSIS — N18.4 CHRONIC KIDNEY DISEASE, STAGE IV (SEVERE) (H): ICD-10-CM

## 2023-01-18 DIAGNOSIS — N18.4 CKD (CHRONIC KIDNEY DISEASE) STAGE 4, GFR 15-29 ML/MIN (H): Primary | ICD-10-CM

## 2023-01-18 LAB
CYCLOSPORINE BLD LC/MS/MS-MCNC: 121 UG/L (ref 50–400)
ERYTHROCYTE [DISTWIDTH] IN BLOOD BY AUTOMATED COUNT: 13.5 % (ref 10–15)
HCT VFR BLD AUTO: 26.2 % (ref 40–53)
HGB BLD-MCNC: 8.8 G/DL (ref 13.3–17.7)
MCH RBC QN AUTO: 29.5 PG (ref 26.5–33)
MCHC RBC AUTO-ENTMCNC: 33.6 G/DL (ref 31.5–36.5)
MCV RBC AUTO: 88 FL (ref 78–100)
PLATELET # BLD AUTO: 212 10E3/UL (ref 150–450)
PTH-INTACT SERPL-MCNC: 488 PG/ML (ref 15–65)
RBC # BLD AUTO: 2.98 10E6/UL (ref 4.4–5.9)
TME LAST DOSE: NORMAL H
TME LAST DOSE: NORMAL H
WBC # BLD AUTO: 5.1 10E3/UL (ref 4–11)

## 2023-01-18 PROCEDURE — 84550 ASSAY OF BLOOD/URIC ACID: CPT

## 2023-01-18 PROCEDURE — 80158 DRUG ASSAY CYCLOSPORINE: CPT

## 2023-01-18 PROCEDURE — 82570 ASSAY OF URINE CREATININE: CPT

## 2023-01-18 PROCEDURE — 82043 UR ALBUMIN QUANTITATIVE: CPT

## 2023-01-18 PROCEDURE — 85027 COMPLETE CBC AUTOMATED: CPT

## 2023-01-18 PROCEDURE — 82306 VITAMIN D 25 HYDROXY: CPT

## 2023-01-18 PROCEDURE — 83970 ASSAY OF PARATHORMONE: CPT

## 2023-01-18 PROCEDURE — 36415 COLL VENOUS BLD VENIPUNCTURE: CPT

## 2023-01-18 PROCEDURE — 80069 RENAL FUNCTION PANEL: CPT

## 2023-01-19 ENCOUNTER — TELEPHONE (OUTPATIENT)
Dept: TRANSPLANT | Facility: CLINIC | Age: 48
End: 2023-01-19
Payer: COMMERCIAL

## 2023-01-19 DIAGNOSIS — Z94.0 KIDNEY TRANSPLANTED: Primary | ICD-10-CM

## 2023-01-19 LAB
ALBUMIN SERPL-MCNC: 3.6 G/DL (ref 3.4–5)
ANION GAP SERPL CALCULATED.3IONS-SCNC: 14 MMOL/L (ref 3–14)
BUN SERPL-MCNC: 53 MG/DL (ref 7–30)
CALCIUM SERPL-MCNC: 8.5 MG/DL (ref 8.5–10.1)
CHLORIDE BLD-SCNC: 107 MMOL/L (ref 94–109)
CO2 SERPL-SCNC: 19 MMOL/L (ref 20–32)
CREAT SERPL-MCNC: 3.17 MG/DL (ref 0.66–1.25)
CREAT UR-MCNC: 119 MG/DL
DEPRECATED CALCIDIOL+CALCIFEROL SERPL-MC: 35 UG/L (ref 20–75)
GFR SERPL CREATININE-BSD FRML MDRD: 23 ML/MIN/1.73M2
GLUCOSE BLD-MCNC: 90 MG/DL (ref 70–99)
MICROALBUMIN UR-MCNC: 275 MG/L
MICROALBUMIN/CREAT UR: 231.09 MG/G CR (ref 0–17)
PHOSPHATE SERPL-MCNC: 5 MG/DL (ref 2.5–4.5)
POTASSIUM BLD-SCNC: 4.2 MMOL/L (ref 3.4–5.3)
SODIUM SERPL-SCNC: 140 MMOL/L (ref 133–144)
URATE SERPL-MCNC: 9.4 MG/DL (ref 3.5–7.2)

## 2023-01-19 NOTE — TELEPHONE ENCOUNTER
ISSUE:CSA drawn with likely inaccurate trough. Result 121. Goal     PLAN/LPN TASK: please call pt to ensure this trough was inaccurate 12 hours, if so, please order repeat CSA level for 1-2 weeks.

## 2023-01-23 NOTE — TELEPHONE ENCOUNTER
[employer] message sent to patient regarding:  CSA drawn with likely inaccurate trough. Result 121. Goal      PLAN/LPN TASK: please call pt to ensure this trough was inaccurate 12 hours, if so, please order repeat CSA level for 1-2 weeks

## 2023-01-31 DIAGNOSIS — Z94.0 KIDNEY TRANSPLANTED: Primary | ICD-10-CM

## 2023-01-31 RX ORDER — CYCLOSPORINE 100 MG/1
100 CAPSULE, LIQUID FILLED ORAL EVERY 12 HOURS
Qty: 60 CAPSULE | Refills: 11 | Status: SHIPPED | OUTPATIENT
Start: 2023-01-31 | End: 2024-01-05

## 2023-01-31 NOTE — PROGRESS NOTES
[facility] contacted by  Specialty Pharmacist, Janey Lal for refill on Neoral 25 mg (brand); Current dose 4 capsules BID. New prescription for 100 mg capsules (1 cap) BID requested. Patient using Neoral Co-pay card.    Prescription sent. Staff message to pharmacist: Please note dose strength change from 25 mg capsules, dose 4 capsules every 12 hours to 100 mg dose 1 capsule every 12 hours.

## 2023-03-03 ENCOUNTER — TELEPHONE (OUTPATIENT)
Dept: NEPHROLOGY | Facility: CLINIC | Age: 48
End: 2023-03-03
Payer: COMMERCIAL

## 2023-03-03 ENCOUNTER — TELEPHONE (OUTPATIENT)
Dept: NEPHROLOGY | Facility: CLINIC | Age: 48
End: 2023-03-03

## 2023-03-03 NOTE — TELEPHONE ENCOUNTER
[facility] Prior Authorization Team   Phone: [phone]  Fax: [phone]    PA Initiation    Medication: Neoral (PA Initiated)  Insurance Company: [employer] [place] - Phone [phone] Fax [phone]  Pharmacy Filling the Rx: [facility] - [place], [place] - [address]  Filling Pharmacy Phone: [phone]  Filling Pharmacy Fax: [phone]  Start Date: 3/3/2023

## 2023-03-03 NOTE — TELEPHONE ENCOUNTER
[facility] Prior Authorization Team   Phone: [phone]  Fax: [phone]    PA Initiation    Medication: Cellcept (PA Initiated)  Insurance Company: [employer] [place] - Phone [phone] Fax [phone]  Pharmacy Filling the Rx: [facility] - [place], [place] - [address]  Filling Pharmacy Phone: [phone]  Filling Pharmacy Fax: [phone]  Start Date: 3/3/2023

## 2023-03-10 ENCOUNTER — TELEPHONE (OUTPATIENT)
Dept: TRANSPLANT | Facility: CLINIC | Age: 48
End: 2023-03-10

## 2023-03-10 NOTE — TELEPHONE ENCOUNTER
Patient Call: General  Route to LPN    Reason for call: called in regards of patient would like to get a refill request for Neoral 100MG & 25MG but their issurance is not covering it. Patient is not sure what to do and would like assistance. Patient can provide more details about current situation with call back.  Call back number is [phone].    Call back needed? Yes    Return Call Needed  Same as documented in contacts section  When to return call?: Same day: Route High Priority

## 2023-03-10 NOTE — ORAL ONC MGMT
PRIOR AUTHORIZATION DENIED    Medication: Neoral (PA Denied)    Denial Date: 3/10/2023    Denial Rational: Medical Neccessity    Appeal Information:  [facility]     [address]     [place] [address]     Fax:[phone]

## 2023-03-10 NOTE — TELEPHONE ENCOUNTER
PRIOR AUTHORIZATION DENIED    Medication: Neoral (PA Denied)    Denial Date: 3/10/2023    Denial Rational:     Appeal Information: ***

## 2023-03-10 NOTE — TELEPHONE ENCOUNTER
Medication Appeal Initiation    We have initiated an appeal for the requested medication:  Medication: Cellcept (Appeal Pending )  Appeal Start Date:  3/10/2023  Insurance Company:    Comments:   Call [phone]

## 2023-03-10 NOTE — TELEPHONE ENCOUNTER
PRIOR AUTHORIZATION DENIED    Medication: Cellcept (PA Denied)    Denial Date: 3/10/2023    Denial Rational: Medical Necessity    Appeal Information:  [employer]     [address]     [facility] [address]     Fax: [phone]

## 2023-03-10 NOTE — TELEPHONE ENCOUNTER
Medication Appeal Initiation    We have initiated an appeal for the requested medication:  Medication: Neoral (Appeal Pending)  Appeal Start Date:  3/10/2023  Insurance Company: [employer] [place] - Phone [phone] Fax [phone]  Comments:

## 2023-03-13 ENCOUNTER — TELEPHONE (OUTPATIENT)
Dept: TRANSPLANT | Facility: CLINIC | Age: 48
End: 2023-03-13
Payer: COMMERCIAL

## 2023-03-13 NOTE — TELEPHONE ENCOUNTER
MEDICATION APPEAL APPROVED    Medication: Cellcept (Appeal approved)  Authorization Effective Date: 3/10/2023  Authorization Expiration Date: 3/9/2024  Approved Dose/Quantity: UD  Reference #: [id number]   Insurance Company:    Expected CoPay:       CoPay Card Available:      Foundation Assistance Needed:    Which Pharmacy is filling the prescription (Not needed for infusion/clinic administered): [facility] - [place], [place] - [address]

## 2023-03-13 NOTE — TELEPHONE ENCOUNTER
Patient Call: General  Route to LPN    Reason for call: called in regards of to return missed call. Call back number is [phone].     Call back needed? Yes    Return Call Needed  Same as documented in contacts section  When to return call?: Same day: Route High Priority

## 2023-03-13 NOTE — TELEPHONE ENCOUNTER
Spoke to patient who confirms information below and will contact  specialty pharmacy for 1 time voucher for neoral and generic cellcept is $33.07

## 2023-03-13 NOTE — TELEPHONE ENCOUNTER
MEDICATION APPEAL DENIED    Medication: Neoral (Appeal denied)    Denial Date: 3/13/2023    Denial Rational:         Second Level Appeal Information:                         Second level appeals will be managed by the clinic staff and provider. Please contact the [facility] Prior Authorization Team if additional information about the denial is needed.

## 2023-03-13 NOTE — TELEPHONE ENCOUNTER
Left message for patient stating that I spoke to  specialty pharmacy who states that patients insurance denied both brand neoral and cellcept.   specialty pharmacy states that they are filing an appeal.  Informed patient that there is a 1 time voucher for neoral and generic cellcept is $33.07.  Instructed patient to return call to  specialty pharmacy or call the txp office.

## 2023-03-15 NOTE — TELEPHONE ENCOUNTER
Post Kidney and Pancreas Transplant Team Conference  Date: 3/15/2023  Transplant Coordinator: Charisse Lizarraga, Ivanna Rivera     Attendees:  [x]  Dr. Patino [] Katlin Mann, RN [] Avril Ortiz LPN     []  Dr. Terry [] Shawnee Bahena RN [x] Claudia Waddell LPN   [x]  Dr. Archibald [x] Sanjuana Valentino RN    [x]  Dr. Nickerson [] Letitia Brandon RN [] Sim Clark, PharmD   [] Dr. Mccurdy [x] Varsha Lane RN    [] Dr. Rivera [x] Shahid Currie RN    [] Dr. Schrader [x] Ivanna Rivera RN [x] Ivis Rey RN   [] Dr. Bourgeois [] Maite Wharton RN    [x]  Dr. Sumner [] Janell Puga RN    [x] Dr. Nuñez [x] Cindy Rawls RN    [] Bettina Negrete, CARLOS [x] Julia Brown RN        Verbal Plan Read Back: Insurance BCBS denying coverage for   Change Cyclosporine/Neoral 100 mg (goal ) to generic tacrolimus 1 mg (goal 4-6) PO BID.     Call placed to Miguel Angel to discuss immunosuppression change and labs weekly x4. No answer;  msg left asking for call back.     Ivanna Rivera, RN, BSN  Solid Organ Transplant, Post Kidney and Pancreas  Transplant Care Coordinator  [phone]     Addendum 3/21/23: Second level appeal to BCBS insurance. Neoral coverage approved. No changes made to tacrolimus at this time.

## 2023-03-16 NOTE — TELEPHONE ENCOUNTER
Medication Appeal Initiation    We have initiated an appeal for the requested medication:  Medication: Neoral (Appeal denied)  Appeal Start Date:  3/10/2023  Insurance Company: [employer] [place] - Phone [phone] Fax [phone]  Comments:

## 2023-03-19 DIAGNOSIS — I15.1 HTN, KIDNEY TRANSPLANT RELATED: ICD-10-CM

## 2023-03-19 DIAGNOSIS — Z94.0 HTN, KIDNEY TRANSPLANT RELATED: ICD-10-CM

## 2023-03-21 NOTE — TELEPHONE ENCOUNTER
Prior Authorization Approval    Authorization Effective Date: 3/12/2023  Authorization Expiration Date: 3/12/2024  Medication: Neoral (Appeal Approved)  Approved Dose/Quantity: UD  Reference #: [id number]   Insurance Company: [employer] [place] - Phone [phone] Fax [phone]  Expected CoPay:       CoPay Card Available:      Foundation Assistance Needed:    Which Pharmacy is filling the prescription (Not needed for infusion/clinic administered): [facility] - [place], [place] - [address]  Pharmacy Notified: Yes  Patient Notified: No    Patient has already filled this medication through [facility]

## 2023-03-22 RX ORDER — AMLODIPINE BESYLATE 5 MG/1
TABLET ORAL
Qty: 90 TABLET | Refills: 0 | Status: SHIPPED | OUTPATIENT
Start: 2023-03-22 | End: 2023-07-18

## 2023-04-20 ENCOUNTER — PATIENT OUTREACH (OUTPATIENT)
Dept: NEPHROLOGY | Facility: CLINIC | Age: 48
End: 2023-04-20
Payer: COMMERCIAL

## 2023-05-03 ENCOUNTER — LAB (OUTPATIENT)
Dept: LAB | Facility: CLINIC | Age: 48
End: 2023-05-03
Payer: COMMERCIAL

## 2023-05-03 DIAGNOSIS — Z79.899 ON ALLOPURINOL THERAPY: ICD-10-CM

## 2023-05-03 DIAGNOSIS — M1A.9XX1 CHRONIC TOPHACEOUS GOUT: ICD-10-CM

## 2023-05-03 DIAGNOSIS — Z94.0 KIDNEY TRANSPLANTED: ICD-10-CM

## 2023-05-03 DIAGNOSIS — Z48.298 AFTERCARE FOLLOWING ORGAN TRANSPLANT: ICD-10-CM

## 2023-05-03 DIAGNOSIS — N18.4 CHRONIC KIDNEY DISEASE, STAGE IV (SEVERE) (H): ICD-10-CM

## 2023-05-03 LAB
ERYTHROCYTE [DISTWIDTH] IN BLOOD BY AUTOMATED COUNT: 12.9 % (ref 10–15)
HCT VFR BLD AUTO: 27.9 % (ref 40–53)
HGB BLD-MCNC: 9.3 G/DL (ref 13.3–17.7)
MCH RBC QN AUTO: 30.2 PG (ref 26.5–33)
MCHC RBC AUTO-ENTMCNC: 33.3 G/DL (ref 31.5–36.5)
MCV RBC AUTO: 91 FL (ref 78–100)
PLATELET # BLD AUTO: 208 10E3/UL (ref 150–450)
RBC # BLD AUTO: 3.08 10E6/UL (ref 4.4–5.9)
WBC # BLD AUTO: 6.3 10E3/UL (ref 4–11)

## 2023-05-03 PROCEDURE — 80048 BASIC METABOLIC PNL TOTAL CA: CPT | Performed by: PATHOLOGY

## 2023-05-03 PROCEDURE — 85027 COMPLETE CBC AUTOMATED: CPT | Performed by: PATHOLOGY

## 2023-05-03 PROCEDURE — 36415 COLL VENOUS BLD VENIPUNCTURE: CPT | Performed by: PATHOLOGY

## 2023-05-03 PROCEDURE — 80158 DRUG ASSAY CYCLOSPORINE: CPT

## 2023-05-03 PROCEDURE — 84550 ASSAY OF BLOOD/URIC ACID: CPT

## 2023-05-04 ENCOUNTER — TELEPHONE (OUTPATIENT)
Dept: TRANSPLANT | Facility: CLINIC | Age: 48
End: 2023-05-04
Payer: COMMERCIAL

## 2023-05-04 DIAGNOSIS — Z94.0 KIDNEY TRANSPLANTED: Primary | ICD-10-CM

## 2023-05-04 LAB
ANION GAP SERPL CALCULATED.3IONS-SCNC: 9 MMOL/L (ref 3–14)
BUN SERPL-MCNC: 53 MG/DL (ref 7–30)
CALCIUM SERPL-MCNC: 8.8 MG/DL (ref 8.5–10.1)
CHLORIDE BLD-SCNC: 109 MMOL/L (ref 94–109)
CO2 SERPL-SCNC: 19 MMOL/L (ref 20–32)
CREAT SERPL-MCNC: 3.01 MG/DL (ref 0.66–1.25)
CYCLOSPORINE BLD LC/MS/MS-MCNC: 65 UG/L (ref 50–400)
GFR SERPL CREATININE-BSD FRML MDRD: 25 ML/MIN/1.73M2
GLUCOSE BLD-MCNC: 89 MG/DL (ref 70–99)
POTASSIUM BLD-SCNC: 4.3 MMOL/L (ref 3.4–5.3)
SODIUM SERPL-SCNC: 137 MMOL/L (ref 133–144)
TME LAST DOSE: NORMAL H
TME LAST DOSE: NORMAL H
URATE SERPL-MCNC: 9.2 MG/DL (ref 3.5–7.2)

## 2023-05-04 NOTE — TELEPHONE ENCOUNTER
ISSUE:   Cyclosporine level 65 on 5/3/23 14:43, goal , dose 100 mg BID. Last dose date/time recorded as 5/2/23 9 PM.     PLAN:   Please call patient and confirm this was an accurate 12-hour trough. Verify Cyclosporine dose 100 mg BID. Confirm no new medications or illness. Confirm no missed doses. If accurate trough and accurate dose, stay on the same dose Cyclosporine dose 100 mg by mouth every 12 hours and repeat labs in 2 weeks.    Without a good 12 hour level, we are unable to accurately dose the Neoral medication properly. It is important to ensure 12 hours between last dose and blood draw 1) to eliminate necessary blood draws/repeat testing 2) to prevent rejection of transplanted organ if under-suppressed 3) to prevent over-suppression leading to cancer 4) to demonstrate compliance for consideration of another transplant. Labs are ordered monthly with nephrology visits and transplant follow up; please schedule upcoming visits with intention to attend appointments as scheduled.   - lab appt in 2 weeks Due 5/18/23 then monthly (standing orders renewed)  - nephrology clinic visit for chronic kidney disease follow up with provider, Denisse Stratton (several no-shows) Due first available.  - Transplant visit for immunosuppression management. Due 8/24/23    OUTCOME:   RNCC left detailed voicemail message from MiguelA ngel and sent above plan to him via [employer]. Past attempts to reach patient also have also been unsuccessful. Post-SOT coordinator has concerns with re-transplant and would advise a compliance contract be in place before considering another kidney transplant.    Dose not changed due to inaccurate 12 hour trough level.     Ivanna Rivera, RN, BSN  Solid Organ Transplant, Post Kidney and Pancreas  Transplant Care Coordinator  [phone]

## 2023-05-11 ENCOUNTER — TELEPHONE (OUTPATIENT)
Dept: RHEUMATOLOGY | Facility: CLINIC | Age: 48
End: 2023-05-11
Payer: COMMERCIAL

## 2023-05-11 NOTE — TELEPHONE ENCOUNTER
RE: Notification of Unviewed Test Results  Received: Today  Isela Jean PA-C Porteous, Jessica, RN Hi Jess,     Please have him increase his Allopurinol to 50mg daily. Recheck uric acid in 1 month.     Isela Jean PA-C on 5/11/2023 at 10:20 AM           Previous Messages       ----- Message -----   From: Cindy Gutierrez RN   Sent: 5/10/2023   2:33 PM CDT   To: Isela Jean PA-C   Subject: RE: Notification of Unviewed Test Results         Mason Weiss,     I spoke with the patient and he confirmed that he's taking a half tab (50 mg) every other day. States he's consistent and doesn't miss doses.     Let me know if anything further is needed? Thanks,     China BRODY RN   ----- Message -----   From: Isela Jean PA-C   Sent: 5/10/2023   2:02 PM CDT   To: Hany De Los Santos   Subject: FW: Notification of Unviewed Test Results         Received notification that the patient has not reviewed these results.  Can we verify that he has seen the note regarding his uric acid and question about allopurinol please.     Isela Jean PA-C on 5/10/2023 at 2:02 PM

## 2023-05-11 NOTE — TELEPHONE ENCOUNTER
Left detailed message for patient letting him know to increase allopurinol to 50 mg DAILY. Informed that he should schedule a lab appointment to have his uric aced rechecked 1 month for now.    Told him to call if he has any question.    Cindy Gutierrez RN on 5/11/2023 at 10:47 AM

## 2023-06-01 ENCOUNTER — HEALTH MAINTENANCE LETTER (OUTPATIENT)
Age: 48
End: 2023-06-01

## 2023-06-08 DIAGNOSIS — M25.571 PAIN IN JOINT INVOLVING ANKLE AND FOOT, RIGHT: ICD-10-CM

## 2023-06-08 DIAGNOSIS — I15.1 HTN, KIDNEY TRANSPLANT RELATED: ICD-10-CM

## 2023-06-08 DIAGNOSIS — Z94.0 HTN, KIDNEY TRANSPLANT RELATED: ICD-10-CM

## 2023-06-09 RX ORDER — METHYLPREDNISOLONE 4 MG
TABLET, DOSE PACK ORAL
Qty: 21 TABLET | Refills: 1 | OUTPATIENT
Start: 2023-06-09

## 2023-06-09 NOTE — TELEPHONE ENCOUNTER
Patient calling back. Patient does not mention gout flare. Patient states he spoke with care team last week and was to up medication dose. Patient upped dose and had a reaction so he went back to original dose-pt states this is why he ran out of medication. Writer also mentioned to patient that appt. was needed and would update care team on information given. Please reach out out to patient.     Nabila Abel  [facility]

## 2023-06-09 NOTE — TELEPHONE ENCOUNTER
Patient needs an appointment. Is he having a gout flare?    Isela Jean PA-C on 6/9/2023 at 8:54 AM

## 2023-06-12 DIAGNOSIS — M25.571 PAIN IN JOINT INVOLVING ANKLE AND FOOT, RIGHT: ICD-10-CM

## 2023-06-12 RX ORDER — METHYLPREDNISOLONE 4 MG
TABLET, DOSE PACK ORAL
Qty: 21 TABLET | Refills: 1 | Status: CANCELLED | OUTPATIENT
Start: 2023-06-12

## 2023-06-12 NOTE — TELEPHONE ENCOUNTER
What medication did the patient run out of? What reaction did he have? Please get more information regarding patients message. By original dose, does he mean the Allopurinol?     Isela Jean, PAC

## 2023-06-12 NOTE — TELEPHONE ENCOUNTER
In combining msgs and the times pt indicated-  Medication he was 'finally contacted about last week'  Was the Allopurinol increase recommendation.    However the medication was prescribed as 100mg tabs and was to always take 1/2 tab every other day.    And to   (add Medrol for burst and taper off at times of flare only)

## 2023-06-12 NOTE — TELEPHONE ENCOUNTER
Requested Prescriptions   Pending Prescriptions Disp Refills     methylPREDNISolone (MEDROL DOSEPAK) 4 MG tablet therapy pack 21 tablet 1     Sig: Follow Package Directions       There is no refill protocol information for this order          Last office visit: 12/6/2022 ; last virtual visit: Visit date not found with prescribing provider:  Isela Jean  Future Office Visit:      Thank you,  Jade Wharton  Specialty Clinic -   [facility]

## 2023-06-13 RX ORDER — LOSARTAN POTASSIUM 25 MG/1
TABLET ORAL
Qty: 90 TABLET | Refills: 0 | Status: SHIPPED | OUTPATIENT
Start: 2023-06-13 | End: 2023-10-02

## 2023-06-14 ENCOUNTER — LAB (OUTPATIENT)
Dept: LAB | Facility: CLINIC | Age: 48
End: 2023-06-14
Payer: COMMERCIAL

## 2023-06-14 DIAGNOSIS — Z79.899 ON ALLOPURINOL THERAPY: ICD-10-CM

## 2023-06-14 DIAGNOSIS — M1A.9XX1 CHRONIC TOPHACEOUS GOUT: ICD-10-CM

## 2023-06-14 DIAGNOSIS — Z94.0 KIDNEY TRANSPLANTED: ICD-10-CM

## 2023-06-14 LAB
ALBUMIN MFR UR ELPH: 103 MG/DL
ANION GAP SERPL CALCULATED.3IONS-SCNC: 15 MMOL/L (ref 7–15)
BUN SERPL-MCNC: 44.1 MG/DL (ref 6–20)
CALCIUM SERPL-MCNC: 8.6 MG/DL (ref 8.6–10)
CHLORIDE SERPL-SCNC: 107 MMOL/L (ref 98–107)
CREAT SERPL-MCNC: 2.86 MG/DL (ref 0.67–1.17)
CREAT UR-MCNC: 127 MG/DL
DEPRECATED HCO3 PLAS-SCNC: 19 MMOL/L (ref 22–29)
ERYTHROCYTE [DISTWIDTH] IN BLOOD BY AUTOMATED COUNT: 14.1 % (ref 10–15)
GFR SERPL CREATININE-BSD FRML MDRD: 26 ML/MIN/1.73M2
GLUCOSE SERPL-MCNC: 83 MG/DL (ref 70–99)
HCT VFR BLD AUTO: 30.9 % (ref 40–53)
HGB BLD-MCNC: 9.8 G/DL (ref 13.3–17.7)
MCH RBC QN AUTO: 30.1 PG (ref 26.5–33)
MCHC RBC AUTO-ENTMCNC: 31.7 G/DL (ref 31.5–36.5)
MCV RBC AUTO: 95 FL (ref 78–100)
PLATELET # BLD AUTO: 230 10E3/UL (ref 150–450)
POTASSIUM SERPL-SCNC: 4.4 MMOL/L (ref 3.4–5.3)
PROT/CREAT 24H UR: 0.81 MG/MG CR (ref 0–0.2)
RBC # BLD AUTO: 3.26 10E6/UL (ref 4.4–5.9)
SODIUM SERPL-SCNC: 141 MMOL/L (ref 136–145)
URATE SERPL-MCNC: 8.8 MG/DL (ref 3.4–7)
WBC # BLD AUTO: 7.1 10E3/UL (ref 4–11)

## 2023-06-14 PROCEDURE — 85027 COMPLETE CBC AUTOMATED: CPT

## 2023-06-14 PROCEDURE — 84156 ASSAY OF PROTEIN URINE: CPT

## 2023-06-14 PROCEDURE — 84550 ASSAY OF BLOOD/URIC ACID: CPT

## 2023-06-14 PROCEDURE — 80158 DRUG ASSAY CYCLOSPORINE: CPT

## 2023-06-14 PROCEDURE — 80048 BASIC METABOLIC PNL TOTAL CA: CPT

## 2023-06-14 PROCEDURE — 36415 COLL VENOUS BLD VENIPUNCTURE: CPT

## 2023-06-20 NOTE — TELEPHONE ENCOUNTER
Received fax from:  [facility] [place], [place]   Ph:  [phone]  Fax:  [phone]    Notes to prescriber:  Pt still looking for refills, please authorize or deny! Thanks!    Nabila Abel  [facility]

## 2023-06-21 ENCOUNTER — MYC MEDICAL ADVICE (OUTPATIENT)
Dept: RHEUMATOLOGY | Facility: CLINIC | Age: 48
End: 2023-06-21
Payer: COMMERCIAL

## 2023-06-23 NOTE — TELEPHONE ENCOUNTER
Refills authorized on 6/21. See [employer] message.  Closing note.  Cindy Gutierrez RN on 6/23/2023 at 1:56 PM

## 2023-08-14 DIAGNOSIS — Z79.899 ON ALLOPURINOL THERAPY: ICD-10-CM

## 2023-08-14 DIAGNOSIS — M1A.9XX1 CHRONIC TOPHACEOUS GOUT: ICD-10-CM

## 2023-08-14 RX ORDER — METHYLPREDNISOLONE 4 MG/1
TABLET ORAL
Qty: 42 TABLET | Refills: 2 | Status: SHIPPED | OUTPATIENT
Start: 2023-08-14 | End: 2024-01-05

## 2023-08-14 NOTE — TELEPHONE ENCOUNTER
Requested Prescriptions   Pending Prescriptions Disp Refills    methylPREDNISolone (MEDROL) 4 MG tablet 42 tablet 2     Sig: Take 6 tablets for 1day, then taper by 1 tablet daily until off       There is no refill protocol information for this order          Last office visit: 12/6/2022 ; last virtual visit: Visit date not found with prescribing provider:  Isela Jean   Future Office Visit:      Thank you,  Jade Wharton

## 2023-08-14 NOTE — TELEPHONE ENCOUNTER
"Request for methylprednisolone 4mg tab taper refill.  LOV with Miranda 12/6/22  Medication last filled 12/6/22 for 1 taper and two refills.  From 12/6/22 office visit:  \"Plan:      Schedule follow-up with Isela Jean PA-C in 4 months.   Labs: uric acid today and prior to next visit  Medication recommendations:             Allopurinol 100mg: take 1/2 tablet every other day-I will send you a [employer] message after I see your uric acid today with the dose plan  Medrol 4mg-follow taper instructions if you get a flare of gout; we may go daily with this medication if we increase your allopurinol \"  No [facility] refill protocol      Routed to ALEX Jean:  Can patient have refill as pended?    Javier GALINDO [facility]        "

## 2023-08-14 NOTE — TELEPHONE ENCOUNTER
Writer called patient and informed him that the refill was sent to the pharmacy.  Patient stated that he would call scheduling and get a follow up appt scheduled.    Javier GALINDO [facility]

## 2023-08-14 NOTE — TELEPHONE ENCOUNTER
Medrol refilled. Please remind patient to schedule a follow up with me.     Isela Jean PA-C on 8/14/2023 at 11:45 AM

## 2023-09-01 DIAGNOSIS — E87.20 METABOLIC ACIDOSIS: ICD-10-CM

## 2023-09-01 DIAGNOSIS — Z94.0 KIDNEY TRANSPLANTED: ICD-10-CM

## 2023-09-01 DIAGNOSIS — E87.8 LOW BICARBONATE LEVEL: Primary | ICD-10-CM

## 2023-09-05 DIAGNOSIS — M25.571 PAIN IN JOINT INVOLVING ANKLE AND FOOT, RIGHT: ICD-10-CM

## 2023-09-05 DIAGNOSIS — M1A.9XX1 CHRONIC TOPHACEOUS GOUT: ICD-10-CM

## 2023-09-05 RX ORDER — SODIUM BICARBONATE 650 MG/1
1950 TABLET ORAL 3 TIMES DAILY
Qty: 810 TABLET | Refills: 0 | Status: SHIPPED | OUTPATIENT
Start: 2023-09-05 | End: 2024-05-30

## 2023-09-05 NOTE — TELEPHONE ENCOUNTER
Refill request from [id number]    Medication: Allopurinol 100mg  Last Refill: 6/21/23  Last OV:  12/6/22  Last lab: 5/4/23  No future appts

## 2023-09-06 RX ORDER — ALLOPURINOL 100 MG/1
50 TABLET ORAL EVERY OTHER DAY
Qty: 23 TABLET | Refills: 0 | Status: SHIPPED | OUTPATIENT
Start: 2023-09-06 | End: 2023-11-27

## 2023-09-06 NOTE — TELEPHONE ENCOUNTER
"Request to refill allopurinol 100mg tab: 0.5 tab e/o day  Last filled by Miranda 6/21/23 for approximately 90 day supply  Last Office Visit with Miranda 12/6/22  Fails [facility] refill protocol  From 12/6/22 office visit:  \"Plan:      Schedule follow-up with Isela Jean PA-C in 4 months.   Labs: uric acid today and prior to next visit  Medication recommendations:             Allopurinol 100mg: take 1/2 tablet every other day-I will send you a [email or web address] message after I see your uric acid today with the dose plan  Medrol 4mg-follow taper instructions if you get a flare of gout; we may go daily with this medication if we increase your allopurinol \"    In 8/14/23 Refill Encounter patient requested refill of medrol, this was filled by Len and patient was notified at that time that he would need an appt with [facility] for further refills of medrol    Patient has not yet scheduled a follow up visit    Today's request is for a refill of the allopurinol which would accompany the medrol  Two attempts to contact patient were made in today's request prior to sending to [facility]  If patient has been taking as prescribed he will be running out of medication soon    Routed to [facility]:  Can patient have a refill of allopurinol as pended?    Javier GALINDO [facility]          "

## 2023-09-07 NOTE — TELEPHONE ENCOUNTER
Home phone number not in service.   First atttempt-LMTCB X 1 to schedule appt with Isela Jean PA-C at [facility].

## 2023-09-29 DIAGNOSIS — I15.1 HTN, KIDNEY TRANSPLANT RELATED: ICD-10-CM

## 2023-09-29 DIAGNOSIS — Z94.0 HTN, KIDNEY TRANSPLANT RELATED: ICD-10-CM

## 2023-10-02 RX ORDER — LOSARTAN POTASSIUM 25 MG/1
TABLET ORAL
Qty: 90 TABLET | Refills: 0 | Status: SHIPPED | OUTPATIENT
Start: 2023-10-02 | End: 2024-02-02

## 2023-10-10 ENCOUNTER — PATIENT OUTREACH (OUTPATIENT)
Dept: NEPHROLOGY | Facility: CLINIC | Age: 48
End: 2023-10-10
Payer: COMMERCIAL

## 2023-10-10 NOTE — PROGRESS NOTES
Nephrology Note: RN CC Chart Review    REASON FOR ENCOUNTER:     Chart reviewed by nephrology RN CC                          SITUATION/BACKROUND:     Last nephrology visit:    Last Renal Panel:  Sodium   Date Value Ref Range Status   06/14/2023 141 136 - 145 mmol/L Final   05/06/2021 136 133 - 144 mmol/L Final     Potassium   Date Value Ref Range Status   06/14/2023 4.4 3.4 - 5.3 mmol/L Final   05/03/2023 4.3 3.4 - 5.3 mmol/L Final   05/06/2021 4.4 3.4 - 5.3 mmol/L Final     Chloride   Date Value Ref Range Status   06/14/2023 107 98 - 107 mmol/L Final   05/03/2023 109 94 - 109 mmol/L Final   05/06/2021 107 94 - 109 mmol/L Final     Carbon Dioxide   Date Value Ref Range Status   05/06/2021 23 20 - 32 mmol/L Final     Carbon Dioxide (CO2)   Date Value Ref Range Status   06/14/2023 19 (L) 22 - 29 mmol/L Final   05/03/2023 19 (L) 20 - 32 mmol/L Final     Anion Gap   Date Value Ref Range Status   06/14/2023 15 7 - 15 mmol/L Final   05/03/2023 9 3 - 14 mmol/L Final   05/06/2021 6 3 - 14 mmol/L Final     Glucose   Date Value Ref Range Status   06/14/2023 83 70 - 99 mg/dL Final   05/03/2023 89 70 - 99 mg/dL Final   05/06/2021 87 70 - 99 mg/dL Final     Urea Nitrogen   Date Value Ref Range Status   06/14/2023 44.1 (H) 6.0 - 20.0 mg/dL Final   05/03/2023 53 (H) 7 - 30 mg/dL Final   05/06/2021 54 (H) 7 - 30 mg/dL Final     Creatinine   Date Value Ref Range Status   06/14/2023 2.86 (H) 0.67 - 1.17 mg/dL Final   05/06/2021 3.10 (H) 0.66 - 1.25 mg/dL Final     GFR Estimate   Date Value Ref Range Status   06/14/2023 26 (L) >60 mL/min/1.73m2 Final     Comment:     eGFR calculated using 2021 CKD-EPI equation.   05/06/2021 23 (L) >60 mL/min/[1.73_m2] Final     Comment:     Non  GFR Calc  Starting 12/18/2018, serum creatinine based estimated GFR (eGFR) will be   calculated using the Chronic Kidney Disease Epidemiology Collaboration   (CKD-EPI) equation.       Calcium   Date Value Ref Range Status   06/14/2023 8.6 8.6 -  10.0 mg/dL Final   05/06/2021 8.3 (L) 8.5 - 10.1 mg/dL Final     Phosphorus   Date Value Ref Range Status   01/18/2023 5.0 (H) 2.5 - 4.5 mg/dL Final   09/04/2018 3.5 2.5 - 4.5 mg/dL Final     Albumin   Date Value Ref Range Status   01/18/2023 3.6 3.4 - 5.0 g/dL Final   02/17/2020 3.7 3.4 - 5.0 g/dL Final         Neph Tracking Status:  Neph Tracking Flowsheet Last Filled Values       CKD Education Status Refused    Patient's Referral Dates Auto Populate Patient's Referral Dates    Anemia Services Referral 12/31/21    Dietician Referral 8/23/21    Journey Referral 4/6/22    Vein Mapping/US Order 8/8/22    Access Surgeon Referral Status  Referred    Dialysis Access Referral 08/08/22  referred to Dr. Rivera    Access Surgical Consult --   8/31/22 has yet to call back to schedule US and consult appointments    Diaylsis Access Type AV Fistula  previous fistula, unsure laterality    Transplant Evaluation Referral 5/27/21    Transplant Status  Referred              ASSESSMENT/PLAN     Patient to call/[employer] message with updates  My Chart messages sent out to patient. No response. Patient does not have a follow up visit scheduled    Upcoming Appointments:  Appointments in Next Year      Oct 12, 2023  2:30 PM  LAB with BE LAB  [facility] [place] [facility] ([facility] ) [phone]              MUNIR CALIX RN

## 2023-10-12 ENCOUNTER — LAB (OUTPATIENT)
Dept: LAB | Facility: CLINIC | Age: 48
End: 2023-10-12
Payer: COMMERCIAL

## 2023-10-12 ENCOUNTER — TELEPHONE (OUTPATIENT)
Dept: TRANSPLANT | Facility: CLINIC | Age: 48
End: 2023-10-12

## 2023-10-12 DIAGNOSIS — N18.30 STAGE 3 CHRONIC KIDNEY DISEASE, UNSPECIFIED WHETHER STAGE 3A OR 3B CKD (H): ICD-10-CM

## 2023-10-12 DIAGNOSIS — N18.9 ANEMIA IN CHRONIC RENAL DISEASE: ICD-10-CM

## 2023-10-12 DIAGNOSIS — D64.9 ANEMIA: Primary | ICD-10-CM

## 2023-10-12 DIAGNOSIS — Z94.0 KIDNEY TRANSPLANTED: ICD-10-CM

## 2023-10-12 DIAGNOSIS — T86.10 COMPLICATIONS, KIDNEY TRANSPLANT: ICD-10-CM

## 2023-10-12 DIAGNOSIS — Z94.0 KIDNEY REPLACED BY TRANSPLANT: ICD-10-CM

## 2023-10-12 DIAGNOSIS — D63.1 ANEMIA IN CHRONIC RENAL DISEASE: ICD-10-CM

## 2023-10-12 DIAGNOSIS — Z48.298 AFTERCARE FOLLOWING ORGAN TRANSPLANT: ICD-10-CM

## 2023-10-12 LAB
ANION GAP SERPL CALCULATED.3IONS-SCNC: 13 MMOL/L (ref 7–15)
BUN SERPL-MCNC: 53.9 MG/DL (ref 6–20)
CALCIUM SERPL-MCNC: 8.5 MG/DL (ref 8.6–10)
CHLORIDE SERPL-SCNC: 106 MMOL/L (ref 98–107)
CREAT SERPL-MCNC: 3.12 MG/DL (ref 0.67–1.17)
CYCLOSPORINE BLD LC/MS/MS-MCNC: 104 UG/L (ref 50–400)
DEPRECATED HCO3 PLAS-SCNC: 19 MMOL/L (ref 22–29)
EGFRCR SERPLBLD CKD-EPI 2021: 24 ML/MIN/1.73M2
ERYTHROCYTE [DISTWIDTH] IN BLOOD BY AUTOMATED COUNT: 13.2 % (ref 10–15)
GLUCOSE SERPL-MCNC: 85 MG/DL (ref 70–99)
HCT VFR BLD AUTO: 26 % (ref 40–53)
HGB BLD-MCNC: 8.5 G/DL (ref 13.3–17.7)
MCH RBC QN AUTO: 29.8 PG (ref 26.5–33)
MCHC RBC AUTO-ENTMCNC: 32.7 G/DL (ref 31.5–36.5)
MCV RBC AUTO: 91 FL (ref 78–100)
PLATELET # BLD AUTO: 208 10E3/UL (ref 150–450)
POTASSIUM SERPL-SCNC: 4.2 MMOL/L (ref 3.4–5.3)
RBC # BLD AUTO: 2.85 10E6/UL (ref 4.4–5.9)
SODIUM SERPL-SCNC: 138 MMOL/L (ref 135–145)
TME LAST DOSE: NORMAL H
TME LAST DOSE: NORMAL H
WBC # BLD AUTO: 5.9 10E3/UL (ref 4–11)

## 2023-10-12 PROCEDURE — 82728 ASSAY OF FERRITIN: CPT

## 2023-10-12 PROCEDURE — 85027 COMPLETE CBC AUTOMATED: CPT

## 2023-10-12 PROCEDURE — 83550 IRON BINDING TEST: CPT

## 2023-10-12 PROCEDURE — 84156 ASSAY OF PROTEIN URINE: CPT

## 2023-10-12 PROCEDURE — 83540 ASSAY OF IRON: CPT

## 2023-10-12 PROCEDURE — 80048 BASIC METABOLIC PNL TOTAL CA: CPT

## 2023-10-12 PROCEDURE — 80158 DRUG ASSAY CYCLOSPORINE: CPT

## 2023-10-12 PROCEDURE — 36415 COLL VENOUS BLD VENIPUNCTURE: CPT

## 2023-10-12 NOTE — TELEPHONE ENCOUNTER
Hemoglobin = 8.5 (10/12/23)    Anemia Management referral placed.    Called Miguel Angel and made aware to expect a call from Anemia Management Services.

## 2023-10-13 DIAGNOSIS — N18.9 ANEMIA IN CHRONIC RENAL DISEASE: ICD-10-CM

## 2023-10-13 DIAGNOSIS — N18.30 STAGE 3 CHRONIC KIDNEY DISEASE, UNSPECIFIED WHETHER STAGE 3A OR 3B CKD (H): Primary | ICD-10-CM

## 2023-10-13 DIAGNOSIS — Z94.0 KIDNEY REPLACED BY TRANSPLANT: ICD-10-CM

## 2023-10-13 DIAGNOSIS — D63.1 ANEMIA IN CHRONIC RENAL DISEASE: ICD-10-CM

## 2023-10-13 LAB
ALBUMIN MFR UR ELPH: 54.4 MG/DL
CREAT UR-MCNC: 124 MG/DL
FERRITIN SERPL-MCNC: 302 NG/ML (ref 31–409)
IRON BINDING CAPACITY (ROCHE): 250 UG/DL (ref 240–430)
IRON SATN MFR SERPL: 23 % (ref 15–46)
IRON SERPL-MCNC: 57 UG/DL (ref 61–157)
PROT/CREAT 24H UR: 0.44 MG/MG CR (ref 0–0.2)

## 2023-10-16 ENCOUNTER — PATIENT OUTREACH (OUTPATIENT)
Dept: CARE COORDINATION | Facility: CLINIC | Age: 48
End: 2023-10-16
Payer: COMMERCIAL

## 2023-10-16 ENCOUNTER — TELEPHONE (OUTPATIENT)
Dept: NEPHROLOGY | Facility: CLINIC | Age: 48
End: 2023-10-16
Payer: COMMERCIAL

## 2023-10-16 DIAGNOSIS — Z94.0 KIDNEY REPLACED BY TRANSPLANT: ICD-10-CM

## 2023-10-16 DIAGNOSIS — N18.30 STAGE 3 CHRONIC KIDNEY DISEASE, UNSPECIFIED WHETHER STAGE 3A OR 3B CKD (H): Primary | ICD-10-CM

## 2023-10-16 NOTE — TELEPHONE ENCOUNTER
[facility] Prior Authorization Team   Phone: [phone]  Fax: [phone]    PA Initiation    Medication: RETACRIT 54026 UNIT/ML IJ SOLN  Insurance Company: Other (see comments)  Pharmacy Filling the Rx: [facility] - [place], [place] - [facility] [address]  Filling Pharmacy Phone: [phone]  Filling Pharmacy Fax: [phone]  Start Date: 10/16/2023

## 2023-10-16 NOTE — PROGRESS NOTES
Anemia Management Note - Enrollment  SUBJECTIVE/OBJECTIVE:    Referred by Dr. 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 He will try to BID dosing  Labs : 384220  Recent CINDY use, transfusion, IV iron: None    RX/TX plans : 632712  No MI and blood clots or cancers. 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    Contact:  No consent to communicate on file        Latest Ref Rng & Units 2022     2:25 PM 2022     1:40 PM 2022     1:49 PM 2023     2:57 PM 5/3/2023     2:43 PM 2023     2:16 PM 10/12/2023     2:35 PM   Anemia   Hemoglobin 13.3 - 17.7 g/dL 9.7  9.0  9.4  8.8  9.3  9.8  8.5    TSAT 15 - 46 % 29  13         Ferritin 31 - 409 ng/mL 300  259      302        BP Readings from Last 3 Encounters:   22 136/84   22 123/82   22 124/78     Wt Readings from Last 2 Encounters:   22 109.8 kg (242 lb)   22 108.6 kg (239 lb 6.4 oz)     Current Outpatient Medications   Medication Sig Dispense Refill    allopurinol (ZYLOPRIM) 100 MG tablet Take 0.5 tablets (50 mg) by mouth every other day Ongoing for prevent gout  (add Medrol for burst and taper off at times of flare only) 23 tablet 0    amLODIPine (NORVASC) 5 MG tablet TAKE 1 TABLET BY MOUTH AT BEDTIME AT NIGHT 30 tablet 0    calcitRIOL (ROCALTROL) 0.25 MCG capsule Take 1 capsule (0.25 mcg) by mouth daily 90 capsule 3    CELLCEPT (BRAND) 250 MG capsule Take 3 capsules (750 mg) by mouth 2 times daily 540 capsule 3    cholecalciferol 2000 units TABS Take 2,000 Units by mouth daily 90 tablet 3    Ferrous Gluconate 324 (37.5 Fe) MG TABS Take by mouth daily      losartan (COZAAR) 25 MG tablet Take 1 tablet by mouth once daily 90 tablet 0     methylPREDNISolone (MEDROL DOSEPAK) 4 MG tablet therapy pack Follow Package Directions 21 tablet 0    methylPREDNISolone (MEDROL) 4 MG tablet Take 6 tablets for 1day, then taper by 1 tablet daily until off 42 tablet 2    NEORAL (BRAND) 100 MG capsule Take 1 capsule (100 mg) by mouth every 12 hours 60 capsule 11    NEORAL (BRAND) 25 MG capsule Take 4 capsules (100 mg) by mouth 2 times daily 240 capsule 11    sodium bicarbonate 650 MG tablet Take 3 tablets (1,950 mg) by mouth 3 times daily 810 tablet 0     ASSESSMENT:  Hgb Not at goal/Initiation of therapy   Ferritin: At goal (>100ng/mL)  TSat: not at goal of >30%  Iron regimen recommended: Oral iron   Recommended CINDY regimen: TBD- Need current weight. Does pt want to dose at home or in clinic  Blood Pressure: Stable    PLAN:  1. Patient to be called today for enrollment in Anemia Management Service.  2. Discussed:  anemia overview, monitoring service, and goal hemoglobin range and rationale and risks of CINDY blood clots, stroke, and increase in blood pressure  3. Dose location: AT HOME  4. Labs: Jber  5. Pharmacy: TBD    Spoke to Miguel Angel, advised understanding of restarting CINDY tx. He says he still has some at home, and knows to check expiration before using it. Advised that [facility] will get the rx today, and will call him to see if he needs more now after they do their insurance investigation.  Instructed him to give a dose today if he can or as soon as he has usable supply.   Instructed him to plan for weekly labs later in the week, and to dose weekly based on the most recent labs.  Advised him that Anemia Services will be unavailable 10/17-10/24 and to reach out to his coordinator Shahid if any concerns come up during that time.  Writer told him that [employer] message will be sent to recap our conversation as he is currently at work.   Patient verbalized understanding of the plan.     Next call date:  102423    Carrie Leopold, RN BSN  Anemia Services  [facility]  [email or web address]  Office: [phone]  Fax [phone]  **Please Note: Anemia Services will be short staffed during the week of October 16 through October 23. Please reach out to your nurse coordinator for any questions or concerns during this time.

## 2023-10-18 NOTE — TELEPHONE ENCOUNTER
PRIOR AUTHORIZATION DENIED    Medication: RETACRIT 35455 UNIT/ML IJ SOLN  Insurance Company: Other (see comments)  Denial Date: 10/17/2023  Denial Rational: We denied your request because we did not see what we needed to approve the drug you asked for, (Retacrit Vial). We may be able to approve this drug in a certain situation (when the requested drug is not being used beyond 12 weeks of continuous treatment.  Appeal Information: Phone: [phone]  Fax: [phone]  Patient Notified: Yes

## 2023-10-19 NOTE — TELEPHONE ENCOUNTER
Medication Appeal Initiation    Medication: RETACRIT 10232 UNIT/ML IJ SOLN  Appeal Start Date:  10/19/2023  Insurance Company: julio murcia  Insurance Phone: [phone]  Insurance Fax: [phone]  Comments:  [employer] [phone]

## 2023-10-23 NOTE — TELEPHONE ENCOUNTER
Called [employer] [phone] to check on status of appeal, was informed that the pa is in process.  The PA can take up to 11/18/2023 to hear back on a determination

## 2023-10-24 ENCOUNTER — PATIENT OUTREACH (OUTPATIENT)
Dept: CARE COORDINATION | Facility: CLINIC | Age: 48
End: 2023-10-24
Payer: COMMERCIAL

## 2023-10-30 NOTE — TELEPHONE ENCOUNTER
Called [facility] [phone] to check on status of appeal, was informed that the pa is in process.   I was told that it can still take up to 11/18/2023 for a determination

## 2023-10-30 NOTE — TELEPHONE ENCOUNTER
[facility] Call Center    Phone Message    May a detailed message be left on voicemail: yes     Reason for Call: Other: Janna is calling regarding message below. Per Janna medication was denied. Care team can call appeals number at  [phone] and use case number [id number]. Please advise. Thank you    Action Taken: Message routed to:  [facility] Uro    Travel Screening: Not Applicable

## 2023-10-31 ENCOUNTER — PATIENT OUTREACH (OUTPATIENT)
Dept: CARE COORDINATION | Facility: CLINIC | Age: 48
End: 2023-10-31
Payer: COMMERCIAL

## 2023-10-31 NOTE — PROGRESS NOTES
Follow-up with anemia management service:    Spoke to Miguel Angel. He has not heard any updates on Retacrit delivery.   Rx was sent to [facility] [phone].  Found notes in system that Finance is aware of the need for the PA. Determination can take until 11/18/23. Advised pt reach out to me or I will connect with him as needed.    After ending call with miguel angel, found note that PA was denied. Writer will follow up with  re: appeal        Latest Ref Rng & Units 7/5/2022     2:25 PM 8/4/2022     1:40 PM 8/16/2022     1:49 PM 1/18/2023     2:57 PM 5/3/2023     2:43 PM 6/14/2023     2:16 PM 10/12/2023     2:35 PM   Anemia   Hemoglobin 13.3 - 17.7 g/dL 9.7  9.0  9.4  8.8  9.3  9.8  8.5    TSAT 15 - 46 % 29  13         Ferritin 31 - 409 ng/mL 300  259      302        Follow-up call date: 110123    Carrie Leopold, RN BSN  Anemia Services  [facility]  [email or web address]  Office: [phone]  Fax [phone]

## 2023-11-02 NOTE — TELEPHONE ENCOUNTER
Spoke with rep named [email or web address] from the appeals department was informed that this was still open and still pending  Appeal does not have a determination as of yet. Can take up to 11/18/2023 to have a determination. Call ref# [id number]  Appeal Case# [id number]

## 2023-11-03 NOTE — PROGRESS NOTES
Will continue to monitor for appeal determination.    Carrie Leopold, RN BSN  Anemia Services  [facility]  [email or web address]  Office: [phone]  Fax [phone]

## 2023-11-06 NOTE — PROGRESS NOTES
Follow-up with anemia management service:    PA has been approved for Retacrit.     Unable to reach Miguel Angel with update, call disconnected. However Keturah,  did leave him a message re: the approval          Latest Ref Rng & Units 7/5/2022     2:25 PM 8/4/2022     1:40 PM 8/16/2022     1:49 PM 1/18/2023     2:57 PM 5/3/2023     2:43 PM 6/14/2023     2:16 PM 10/12/2023     2:35 PM   Anemia   Hemoglobin 13.3 - 17.7 g/dL 9.7  9.0  9.4  8.8  9.3  9.8  8.5    TSAT 15 - 46 % 29  13         Ferritin 31 - 409 ng/mL 300  259      302        Follow-up call date: 110923 did he get delivery?    Carrie Leopold, RN BSN  Anemia Services  [facility]  [email or web address]  Office: [phone]  Fax [phone]

## 2023-11-10 NOTE — PROGRESS NOTES
Follow-up with anemia management service:    Called Miguel Angel to check in re; Retacrit delivery and remind him to get labs done prior to dosing.  LVM and sent [employer] message.        Latest Ref Rng & Units 7/5/2022     2:25 PM 8/4/2022     1:40 PM 8/16/2022     1:49 PM 1/18/2023     2:57 PM 5/3/2023     2:43 PM 6/14/2023     2:16 PM 10/12/2023     2:35 PM   Anemia   Hemoglobin 13.3 - 17.7 g/dL 9.7  9.0  9.4  8.8  9.3  9.8  8.5    TSAT 15 - 46 % 29  13         Ferritin 31 - 409 ng/mL 300  259      302        Follow-up call date: 111423    Carrie Leopold, RN BSN  Anemia Services  [facility]  [email or web address]  Office: [phone]  Fax [phone]  **NOTE: Anemia Management Services staff will be out of the office on Thanksgiving, 11/23 and Friday, 11/24.   Please reach out to your nurse care coordinator for any questions or concerns during this time.

## 2023-11-14 NOTE — PROGRESS NOTES
Miguel Angel has not called back.   Unknown if he received Retacrit from pharmacy.  He does not have any labs scheduled. Last labs found were done on 101223    Will continue to monitor    Carrie Leopold, RN BSN  Anemia Services  [facility]  [email or web address]  Office: [phone]  Fax [phone]  **NOTE: Anemia Management Services staff will be out of the office on Thanksgiving, 11/23 and Friday, 11/24.   Please reach out to your nurse care coordinator for any questions or concerns during this time.

## 2023-11-17 DIAGNOSIS — M1A.9XX1 CHRONIC TOPHACEOUS GOUT: ICD-10-CM

## 2023-11-17 DIAGNOSIS — M25.571 PAIN IN JOINT INVOLVING ANKLE AND FOOT, RIGHT: ICD-10-CM

## 2023-11-17 RX ORDER — ALLOPURINOL 100 MG/1
50 TABLET ORAL EVERY OTHER DAY
Qty: 23 TABLET | Refills: 0 | Status: CANCELLED | OUTPATIENT
Start: 2023-11-17

## 2023-11-20 ENCOUNTER — LAB (OUTPATIENT)
Dept: LAB | Facility: CLINIC | Age: 48
End: 2023-11-20
Payer: COMMERCIAL

## 2023-11-20 DIAGNOSIS — N18.9 ANEMIA IN CHRONIC RENAL DISEASE: ICD-10-CM

## 2023-11-20 DIAGNOSIS — Z94.0 KIDNEY TRANSPLANTED: ICD-10-CM

## 2023-11-20 DIAGNOSIS — Z94.0 KIDNEY REPLACED BY TRANSPLANT: ICD-10-CM

## 2023-11-20 DIAGNOSIS — M1A.9XX1 CHRONIC TOPHACEOUS GOUT: ICD-10-CM

## 2023-11-20 DIAGNOSIS — N18.30 STAGE 3 CHRONIC KIDNEY DISEASE, UNSPECIFIED WHETHER STAGE 3A OR 3B CKD (H): ICD-10-CM

## 2023-11-20 DIAGNOSIS — D63.1 ANEMIA IN CHRONIC RENAL DISEASE: ICD-10-CM

## 2023-11-20 LAB
ERYTHROCYTE [DISTWIDTH] IN BLOOD BY AUTOMATED COUNT: 12.9 % (ref 10–15)
HCT VFR BLD AUTO: 31 % (ref 40–53)
HGB BLD-MCNC: 9.7 G/DL (ref 13.3–17.7)
MCH RBC QN AUTO: 29.4 PG (ref 26.5–33)
MCHC RBC AUTO-ENTMCNC: 31.3 G/DL (ref 31.5–36.5)
MCV RBC AUTO: 94 FL (ref 78–100)
PLATELET # BLD AUTO: 283 10E3/UL (ref 150–450)
RBC # BLD AUTO: 3.3 10E6/UL (ref 4.4–5.9)
WBC # BLD AUTO: 7.8 10E3/UL (ref 4–11)

## 2023-11-20 PROCEDURE — 83540 ASSAY OF IRON: CPT

## 2023-11-20 PROCEDURE — 83550 IRON BINDING TEST: CPT

## 2023-11-20 PROCEDURE — 36415 COLL VENOUS BLD VENIPUNCTURE: CPT

## 2023-11-20 PROCEDURE — 80048 BASIC METABOLIC PNL TOTAL CA: CPT

## 2023-11-20 PROCEDURE — 85027 COMPLETE CBC AUTOMATED: CPT

## 2023-11-20 PROCEDURE — 84550 ASSAY OF BLOOD/URIC ACID: CPT

## 2023-11-20 PROCEDURE — 80158 DRUG ASSAY CYCLOSPORINE: CPT

## 2023-11-20 PROCEDURE — 82728 ASSAY OF FERRITIN: CPT

## 2023-11-21 ENCOUNTER — TELEPHONE (OUTPATIENT)
Dept: TRANSPLANT | Facility: CLINIC | Age: 48
End: 2023-11-21
Payer: COMMERCIAL

## 2023-11-21 DIAGNOSIS — Z48.298 AFTERCARE FOLLOWING ORGAN TRANSPLANT: Primary | ICD-10-CM

## 2023-11-21 LAB
ANION GAP SERPL CALCULATED.3IONS-SCNC: 14 MMOL/L (ref 7–15)
BUN SERPL-MCNC: 47.2 MG/DL (ref 6–20)
CALCIUM SERPL-MCNC: 9.1 MG/DL (ref 8.6–10)
CHLORIDE SERPL-SCNC: 104 MMOL/L (ref 98–107)
CREAT SERPL-MCNC: 3.07 MG/DL (ref 0.67–1.17)
CYCLOSPORINE BLD LC/MS/MS-MCNC: 192 UG/L (ref 50–400)
DEPRECATED HCO3 PLAS-SCNC: 19 MMOL/L (ref 22–29)
EGFRCR SERPLBLD CKD-EPI 2021: 24 ML/MIN/1.73M2
FERRITIN SERPL-MCNC: 424 NG/ML (ref 31–409)
GLUCOSE SERPL-MCNC: 104 MG/DL (ref 70–99)
IRON BINDING CAPACITY (ROCHE): 263 UG/DL (ref 240–430)
IRON SATN MFR SERPL: 40 % (ref 15–46)
IRON SERPL-MCNC: 105 UG/DL (ref 61–157)
POTASSIUM SERPL-SCNC: 4.3 MMOL/L (ref 3.4–5.3)
SODIUM SERPL-SCNC: 137 MMOL/L (ref 135–145)
TME LAST DOSE: NORMAL H
TME LAST DOSE: NORMAL H
URATE SERPL-MCNC: 10 MG/DL (ref 3.4–7)

## 2023-11-21 NOTE — TELEPHONE ENCOUNTER
ISSUE:   Cyclosporine level 192 on 11/20/23 14:34, goal , Neoral dose 100 mg BID. Last dose date/time not recorded.    PLAN:   Please call patient and confirm this was an accurate 12-hour trough. Verify Cyclosporine dose 100 mg BID. Confirm no new medications or illness. Confirm no missed doses. If accurate trough and accurate dose, decrease Cyclosporine dose to 75 mg BID and repeat labs in 2 weeks.    OUTCOME:   LPN Task: call pt per plan above and send orders. Re-educate on 12 hr trough timing if appropriate    Ivanna Rivera, RN, BSN  Solid Organ Transplant, Post Kidney and Pancreas  Transplant Care Coordinator  [phone]

## 2023-11-22 DIAGNOSIS — M1A.9XX1 CHRONIC TOPHACEOUS GOUT: ICD-10-CM

## 2023-11-22 DIAGNOSIS — M25.571 PAIN IN JOINT INVOLVING ANKLE AND FOOT, RIGHT: ICD-10-CM

## 2023-11-22 NOTE — LETTER
[facility]  [address] [facility] [address]  [phone]    November 29, 2023    Miguel Angel Piña  [address] [address] [address]      Dear Miguel Angel Piña:    Your health is important to us and we missed you at your recent appointment with Isela Jean on 11/29/23.   Any time you are unable to keep your appointment we kindly ask that you call us at least two hours in advance to let us know. This will allow us to offer the time to another patient.  Please call us to reschedule your appointment for another date and time. Follow up appointments are needed to reassess your health, labs, and medications. Please call Rheumatology scheduling at [phone].    Sincerely,        Your Rheumatology care team  [facility]

## 2023-11-22 NOTE — TELEPHONE ENCOUNTER
Call placed to patient. No answer. Detailed voice message left with instructions listed below.     ISSUE:   Cyclosporine level 192 on 11/20/23 14:34, goal , Neoral dose 100 mg BID. Last dose date/time not recorded.     PLAN:   Please call patient and confirm this was an accurate 12-hour trough. Verify Cyclosporine dose 100 mg BID. Confirm no new medications or illness. Confirm no missed doses. If accurate trough and accurate dose, decrease Cyclosporine dose to 75 mg BID and repeat labs in 2 weeks.     OUTCOME:   LPN Task: call pt per plan above and send orders. Re-educate on 12 hr trough timing if appropriate

## 2023-11-28 ENCOUNTER — PATIENT OUTREACH (OUTPATIENT)
Dept: CARE COORDINATION | Facility: CLINIC | Age: 48
End: 2023-11-28
Payer: COMMERCIAL

## 2023-11-28 NOTE — PROGRESS NOTES
Anemia Management Note  SUBJECTIVE/OBJECTIVE:  Referred by Dr. 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 He will try to BID dosing  Labs : 537922  Recent CINDY use, transfusion, IV iron: None     RX/TX plans : 063622  No MI and blood clots or cancers. 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     Contact:  No consent to communicate on file        Latest Ref Rng & Units 2022     1:40 PM 2022     1:49 PM 2023     2:57 PM 5/3/2023     2:43 PM 2023     2:16 PM 10/12/2023     2:35 PM 2023     2:34 PM   Anemia   Hemoglobin 13.3 - 17.7 g/dL 9.0  9.4  8.8  9.3  9.8  8.5  9.7    TSAT 15 - 46 % 13          Ferritin 31 - 409 ng/mL 259      302  424      BP Readings from Last 3 Encounters:   22 136/84   22 123/82   22 124/78     Wt Readings from Last 2 Encounters:   22 109.8 kg (242 lb)   22 108.6 kg (239 lb 6.4 oz)         ASSESSMENT:  Hgb:At goal - recommend dose  TSat: 40-at goal >30% Ferritin: At goal (>100ng/mL)    PLAN:  Dose with procrit and RTC for hgb then procrit if needed in 1-2* week(s)    Orders needed to be renewed (for next follow-up date) in EPIC: None    Iron labs due:  every 4 weeks due later Dec 2023    Plan discussed with:  Lynn to call back  Did he dose last week? Review frequency of needed labs and importance of returning calls/MyChart messages    NEXT FOLLOW-UP DATE: 652443     Carrie Leopold, RN BSN  Anemia Services  [facility]  [email or web address]  Office: [phone]  Fax [phone]

## 2023-11-29 RX ORDER — ALLOPURINOL 100 MG/1
50 TABLET ORAL EVERY OTHER DAY
Qty: 10 TABLET | Refills: 0 | Status: SHIPPED | OUTPATIENT
Start: 2023-11-29 | End: 2024-01-29

## 2023-11-29 NOTE — TELEPHONE ENCOUNTER
Patient now showed appointment today, 11/29, with Isela Jean. Mailed letter to patient notifying him to reschedule rheumatology appointment.    Routing to provider to advise regarding refill.    Cindy Gutierrez RN on 11/29/2023 at 11:57 AM

## 2023-12-01 NOTE — PROGRESS NOTES
Follow-up with anemia management service:    Spoke to Miguel Angel. He tested positive for COVID this week, feels like a bad cold he says. Is feeling better today.  He does not recall getting any calls in November re: retacrit delivery.  RN will call [facility] on [id number] for status update.        Latest Ref Rng & Units 8/4/2022     1:40 PM 8/16/2022     1:49 PM 1/18/2023     2:57 PM 5/3/2023     2:43 PM 6/14/2023     2:16 PM 10/12/2023     2:35 PM 11/20/2023     2:34 PM   Anemia   Hemoglobin 13.3 - 17.7 g/dL 9.0  9.4  8.8  9.3  9.8  8.5  9.7    TSAT 15 - 46 % 13          Ferritin 31 - 409 ng/mL 259      302  424        Follow-up call date: 120423    Carrie Leopold, RN BSN  Anemia Services  [facility]  [email or web address]  Office: [phone]  Fax [phone]

## 2023-12-04 NOTE — PROGRESS NOTES
Follow-up with anemia management service:    RN spoke to dani Chi service with [facility] to check on Retacrit rx. The pharmacy was not notified that PA was appealed and approved, so they did not know to call pt to set up delivery. Miguel Angel should call the pharmacy: [phone] is the best number to contact pharmacy.    RN spoke to Miguel Angel, gave update and phone number to call to schedule Retacrit delivery, advising him to make sure they send the needed supplies for the doses as well.   He is feeling better, still has some congestion and tested Neg for COVID. Still has off work today. He should have time to go to lab on Friday, 12/8.   RN instructed him to hold off on Retacrit dosing until Hgb is resulted. He voiced understanding.        Latest Ref Rng & Units 8/4/2022     1:40 PM 8/16/2022     1:49 PM 1/18/2023     2:57 PM 5/3/2023     2:43 PM 6/14/2023     2:16 PM 10/12/2023     2:35 PM 11/20/2023     2:34 PM   Anemia   Hemoglobin 13.3 - 17.7 g/dL 9.0  9.4  8.8  9.3  9.8  8.5  9.7    TSAT 15 - 46 % 13          Ferritin 31 - 409 ng/mL 259      302  424        Follow-up call date: 120823, labs done?    Carrie Leopold, RN BSN  Anemia Services  [facility]  [email or web address]  Office: [phone]  Fax [phone]

## 2023-12-05 ENCOUNTER — PATIENT OUTREACH (OUTPATIENT)
Dept: CARE COORDINATION | Facility: CLINIC | Age: 48
End: 2023-12-05
Payer: COMMERCIAL

## 2023-12-05 NOTE — PROGRESS NOTES
Follow-up with anemia management service:    Miguel Angel called in, saying he spoke to Lucy re: Retacrit and set up delivery for 12/8 however they have been trying to reach provider without success re: Retacrit. He's not sure what their question is.  RN will call pharmacy to check on it.   Called [facility] at [phone]. Spoke to dani Metz service, who notes that clarification was needed on the rx. However, while waiting for pharmacist the rx was cleared and should go through for planned delivery on [id number].  Updated Miguel Angel.        Latest Ref Rng & Units 8/4/2022     1:40 PM 8/16/2022     1:49 PM 1/18/2023     2:57 PM 5/3/2023     2:43 PM 6/14/2023     2:16 PM 10/12/2023     2:35 PM 11/20/2023     2:34 PM   Anemia   Hemoglobin 13.3 - 17.7 g/dL 9.0  9.4  8.8  9.3  9.8  8.5  9.7    TSAT 15 - 46 % 13          Ferritin 31 - 409 ng/mL 259      302  424        Carrie Leopold, RN BSN  Anemia Services  [facility]  [email or web address]  Office: [phone]  Fax [phone]

## 2023-12-07 ENCOUNTER — TELEPHONE (OUTPATIENT)
Dept: RHEUMATOLOGY | Facility: CLINIC | Age: 48
End: 2023-12-07
Payer: COMMERCIAL

## 2023-12-07 ENCOUNTER — PATIENT OUTREACH (OUTPATIENT)
Dept: CARE COORDINATION | Facility: CLINIC | Age: 48
End: 2023-12-07
Payer: COMMERCIAL

## 2023-12-07 NOTE — PROGRESS NOTES
Follow-up with anemia management service:    Lucy called.  Retacrit is on back order.  Spoke with [facility], Called in Verbal for Procrit 10,000 unit weekly.  They will contact pt and get delivyer sent out.  PA has been approved.         Latest Ref Rng & Units 8/4/2022     1:40 PM 8/16/2022     1:49 PM 1/18/2023     2:57 PM 5/3/2023     2:43 PM 6/14/2023     2:16 PM 10/12/2023     2:35 PM 11/20/2023     2:34 PM   Anemia   Hemoglobin 13.3 - 17.7 g/dL 9.0  9.4  8.8  9.3  9.8  8.5  9.7    TSAT 15 - 46 % 13          Ferritin 31 - 409 ng/mL 259      302  424          Janna Louis RN   Anemia Services  [facility]  [email or web address]   Office : [phone]  Fax: [phone]

## 2023-12-07 NOTE — TELEPHONE ENCOUNTER
[facility] Call Center    Phone Message    May a detailed message be left on voicemail: yes     Reason for Call: Medication Question or concern regarding medication   Prescription Clarification    Name of Medication:   Retacrit     Prescribing Provider: Miranda     Pharmacy:      What on the order needs clarification?  [facility]  States the medication is not in stock and that they are wondering if Eddie Weiss had an alternative for patient to take and would like to get a call back to discuss. Please advise.       Action Taken: Message routed to:  [facility] Rheum    Travel Screening: Not Applicable

## 2023-12-08 NOTE — PROGRESS NOTES
LVM for Brain on cell with an update.     Janna Louis RN   [facility]  [email or web address]   Office : [phone]  Fax: [phone]

## 2023-12-11 ENCOUNTER — LAB (OUTPATIENT)
Dept: LAB | Facility: CLINIC | Age: 48
End: 2023-12-11
Payer: COMMERCIAL

## 2023-12-11 DIAGNOSIS — Z48.298 AFTERCARE FOLLOWING ORGAN TRANSPLANT: ICD-10-CM

## 2023-12-11 DIAGNOSIS — D63.1 ANEMIA IN CHRONIC RENAL DISEASE: ICD-10-CM

## 2023-12-11 DIAGNOSIS — Z94.0 KIDNEY REPLACED BY TRANSPLANT: ICD-10-CM

## 2023-12-11 DIAGNOSIS — N18.9 ANEMIA IN CHRONIC RENAL DISEASE: ICD-10-CM

## 2023-12-11 DIAGNOSIS — Z94.0 KIDNEY TRANSPLANTED: ICD-10-CM

## 2023-12-11 DIAGNOSIS — N18.30 STAGE 3 CHRONIC KIDNEY DISEASE, UNSPECIFIED WHETHER STAGE 3A OR 3B CKD (H): ICD-10-CM

## 2023-12-11 LAB
ANION GAP SERPL CALCULATED.3IONS-SCNC: 12 MMOL/L (ref 7–15)
BUN SERPL-MCNC: 45 MG/DL (ref 6–20)
CALCIUM SERPL-MCNC: 9 MG/DL (ref 8.6–10)
CHLORIDE SERPL-SCNC: 107 MMOL/L (ref 98–107)
CREAT SERPL-MCNC: 2.94 MG/DL (ref 0.67–1.17)
CYCLOSPORINE BLD LC/MS/MS-MCNC: 82 UG/L (ref 50–400)
DEPRECATED HCO3 PLAS-SCNC: 18 MMOL/L (ref 22–29)
EGFRCR SERPLBLD CKD-EPI 2021: 25 ML/MIN/1.73M2
ERYTHROCYTE [DISTWIDTH] IN BLOOD BY AUTOMATED COUNT: 12.9 % (ref 10–15)
FERRITIN SERPL-MCNC: 480 NG/ML (ref 31–409)
GLUCOSE SERPL-MCNC: 113 MG/DL (ref 70–99)
HCT VFR BLD AUTO: 27.7 % (ref 40–53)
HGB BLD-MCNC: 8.7 G/DL (ref 13.3–17.7)
IRON BINDING CAPACITY (ROCHE): 242 UG/DL (ref 240–430)
IRON SATN MFR SERPL: 34 % (ref 15–46)
IRON SERPL-MCNC: 82 UG/DL (ref 61–157)
MCH RBC QN AUTO: 29.5 PG (ref 26.5–33)
MCHC RBC AUTO-ENTMCNC: 31.4 G/DL (ref 31.5–36.5)
MCV RBC AUTO: 94 FL (ref 78–100)
PLATELET # BLD AUTO: 266 10E3/UL (ref 150–450)
POTASSIUM SERPL-SCNC: 5.2 MMOL/L (ref 3.4–5.3)
RBC # BLD AUTO: 2.95 10E6/UL (ref 4.4–5.9)
SODIUM SERPL-SCNC: 137 MMOL/L (ref 135–145)
TME LAST DOSE: NORMAL H
TME LAST DOSE: NORMAL H
WBC # BLD AUTO: 8.2 10E3/UL (ref 4–11)

## 2023-12-11 PROCEDURE — 36415 COLL VENOUS BLD VENIPUNCTURE: CPT

## 2023-12-11 PROCEDURE — 80048 BASIC METABOLIC PNL TOTAL CA: CPT

## 2023-12-11 PROCEDURE — 83540 ASSAY OF IRON: CPT

## 2023-12-11 PROCEDURE — 80158 DRUG ASSAY CYCLOSPORINE: CPT

## 2023-12-11 PROCEDURE — 85027 COMPLETE CBC AUTOMATED: CPT

## 2023-12-11 PROCEDURE — 82728 ASSAY OF FERRITIN: CPT

## 2023-12-11 PROCEDURE — 83550 IRON BINDING TEST: CPT

## 2023-12-11 NOTE — TELEPHONE ENCOUNTER
Called and notified to call again to reach Neph office.  Gabrielle WASHINGTON   [facility]

## 2023-12-13 ENCOUNTER — PATIENT OUTREACH (OUTPATIENT)
Dept: CARE COORDINATION | Facility: CLINIC | Age: 48
End: 2023-12-13
Payer: COMMERCIAL

## 2023-12-13 NOTE — PROGRESS NOTES
Follow-up with anemia management service:    Spoke with Miguel Angel, He has not heard from the pharmacy re the Procrit.  Called [facility] at [phone]    They had called last week stating Retacrit was on back order.   Gave verbal for Procrit.   Spoke with Dagmar at the pharmacy. They are stating the drug needs a Prior Auth.  This has been going on since Oct 2023 with this pharmacy.      Transferred Lindsey in Prior Auth Dept. States Procrit needs another PA to be done. Anemia Services ans/or Provider were not notified.         Procrit approved 12/13/23-6/10/2023.  Ref # [id number].     Called Miguel Angel.  He needs to call the pharmacy to set up his delivery.         Latest Ref Rng & Units 8/16/2022     1:49 PM 1/18/2023     2:57 PM 5/3/2023     2:43 PM 6/14/2023     2:16 PM 10/12/2023     2:35 PM 11/20/2023     2:34 PM 12/11/2023     8:17 AM   Anemia   Hemoglobin 13.3 - 17.7 g/dL 9.4  8.8  9.3  9.8  8.5  9.7  8.7    Ferritin 31 - 409 ng/mL     302  424  480        Follow-up call date: 12/19/23    Janna Louis RN   Anemia Services  [facility]  [email or web address]   Office : [phone]  Fax: [phone]         [Time Spent: ___ minutes] : I have spent [unfilled] minutes of time on the encounter.

## 2023-12-19 ENCOUNTER — PATIENT OUTREACH (OUTPATIENT)
Dept: CARE COORDINATION | Facility: CLINIC | Age: 48
End: 2023-12-19
Payer: COMMERCIAL

## 2023-12-19 NOTE — PROGRESS NOTES
Follow-up with anemia management service:    Spoke with Miguel Angel.  He is expecting his Procrit delivery tomorrow.  He will dose tomorrow and have labs drawn next week.         Latest Ref Rng & Units 8/16/2022     1:49 PM 1/18/2023     2:57 PM 5/3/2023     2:43 PM 6/14/2023     2:16 PM 10/12/2023     2:35 PM 11/20/2023     2:34 PM 12/11/2023     8:17 AM   Anemia   Hemoglobin 13.3 - 17.7 g/dL 9.4  8.8  9.3  9.8  8.5  9.7  8.7    Ferritin 31 - 409 ng/mL     302  424  480          Follow-up call date: 12/29/23    Janna Louis RN   Anemia Services  [facility]  [email or web address]   Office : [phone]  Fax: [phone]

## 2023-12-20 ENCOUNTER — TELEPHONE (OUTPATIENT)
Dept: FAMILY MEDICINE | Facility: CLINIC | Age: 48
End: 2023-12-20
Payer: COMMERCIAL

## 2023-12-20 NOTE — TELEPHONE ENCOUNTER
FYI - Status Update    Who is Calling: patient    Update: He would like to get in next  wee  fr labs I have nothing next week for him     Does caller want a call/response back: Yes     Could we send this information to you in [employer] or would you prefer to receive a phone call?:   Patient would prefer a phone call   Okay to leave a detailed message?: Yes at Cell number on file:    Telephone Information:   Mobile [phone]

## 2023-12-29 ENCOUNTER — PATIENT OUTREACH (OUTPATIENT)
Dept: CARE COORDINATION | Facility: CLINIC | Age: 48
End: 2023-12-29

## 2023-12-29 ENCOUNTER — LAB (OUTPATIENT)
Dept: LAB | Facility: CLINIC | Age: 48
End: 2023-12-29
Payer: COMMERCIAL

## 2023-12-29 DIAGNOSIS — Z94.0 KIDNEY TRANSPLANTED: ICD-10-CM

## 2023-12-29 DIAGNOSIS — N18.30 STAGE 3 CHRONIC KIDNEY DISEASE, UNSPECIFIED WHETHER STAGE 3A OR 3B CKD (H): ICD-10-CM

## 2023-12-29 DIAGNOSIS — N18.9 ANEMIA IN CHRONIC RENAL DISEASE: ICD-10-CM

## 2023-12-29 DIAGNOSIS — D63.1 ANEMIA IN CHRONIC RENAL DISEASE: ICD-10-CM

## 2023-12-29 DIAGNOSIS — Z94.0 KIDNEY REPLACED BY TRANSPLANT: ICD-10-CM

## 2023-12-29 LAB
HCT VFR BLD AUTO: 27.7 % (ref 40–53)
HGB BLD-MCNC: 8.5 G/DL (ref 13.3–17.7)

## 2023-12-29 PROCEDURE — 85014 HEMATOCRIT: CPT

## 2023-12-29 PROCEDURE — 80158 DRUG ASSAY CYCLOSPORINE: CPT

## 2023-12-29 PROCEDURE — 85018 HEMOGLOBIN: CPT

## 2023-12-29 PROCEDURE — 36415 COLL VENOUS BLD VENIPUNCTURE: CPT

## 2023-12-29 NOTE — PROGRESS NOTES
Anemia Management Note  SUBJECTIVE/OBJECTIVE:  Referred by Dr. 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 He will try to BID dosing  Labs : 728440  Recent CINDY use, transfusion, IV iron: None     RX/TX plans : [id number]  No MI and blood clots or cancers. 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     Contact:  No consent to communicate on file        Latest Ref Rng & Units 5/3/2023     2:43 PM 2023     2:16 PM 10/12/2023     2:35 PM 2023     2:34 PM 2023     8:17 AM 2023     1:33 PM 2023    12:45 PM   Anemia   CINDY Dose       10,000 units    Hemoglobin 13.3 - 17.7 g/dL 9.3  9.8  8.5  9.7  8.7   8.5    Ferritin 31 - 409 ng/mL   302  424  480        BP Readings from Last 3 Encounters:   22 136/84   22 123/82   22 124/78     Wt Readings from Last 2 Encounters:   22 109.8 kg (242 lb)   22 108.6 kg (239 lb 6.4 oz)           ASSESSMENT:  Hgb:Not at goal/Known  TSat: at goal >30% Ferritin: At goal (>100ng/mL)    PLAN:  LVM for Miguel Angel to discuss Procrit dosing schedule.  Based on Hgb, ok to dose today (if he hasn't dosed this week, and then dose again next week. Labs in 2 weeks.  Requested that Miguel Angel return call to Anemia Services.       NEXT FOLLOW-UP DATE:  24    Janna Louis RN   Anemia Services  [facility]  [email or web address]   Office : [phone]  Fax: [phone]

## 2023-12-30 LAB
CYCLOSPORINE BLD LC/MS/MS-MCNC: 65 UG/L (ref 50–400)
TME LAST DOSE: NORMAL H
TME LAST DOSE: NORMAL H

## 2024-01-02 ENCOUNTER — PATIENT OUTREACH (OUTPATIENT)
Dept: CARE COORDINATION | Facility: CLINIC | Age: 49
End: 2024-01-02
Payer: COMMERCIAL

## 2024-01-02 DIAGNOSIS — Z94.0 KIDNEY TRANSPLANTED: ICD-10-CM

## 2024-01-02 DIAGNOSIS — Z48.298 AFTERCARE FOLLOWING ORGAN TRANSPLANT: ICD-10-CM

## 2024-01-02 DIAGNOSIS — Z79.899 ENCOUNTER FOR LONG-TERM CURRENT USE OF MEDICATION: ICD-10-CM

## 2024-01-02 DIAGNOSIS — Z94.0 KIDNEY REPLACED BY TRANSPLANT: Primary | ICD-10-CM

## 2024-01-02 DIAGNOSIS — Z98.890 OTHER SPECIFIED POSTPROCEDURAL STATES: ICD-10-CM

## 2024-01-02 NOTE — PROGRESS NOTES
Anemia Management Note  SUBJECTIVE/OBJECTIVE:  Referred by Dr. 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 He will try to BID dosing  Labs : [id number]  Recent CINDY use, transfusion, IV iron: None     RX/TX plans : 629949  No MI and blood clots or cancers. 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     Contact:  No consent to communicate on file        Latest Ref Rng & Units 2023     2:16 PM 10/12/2023     2:35 PM 2023     2:34 PM 2023     8:17 AM 2023     1:33 PM 2023     8:37 AM 2023    12:45 PM   Anemia   CINDY Dose      10,000 units 10,000 units    Hemoglobin 13.3 - 17.7 g/dL 9.8  8.5  9.7  8.7    8.5    Ferritin 31 - 409 ng/mL  302  424  480         BP Readings from Last 3 Encounters:   22 136/84   22 123/82   22 124/78     Wt Readings from Last 2 Encounters:   22 109.8 kg (242 lb)   22 108.6 kg (239 lb 6.4 oz)           ASSESSMENT:  Hgb:Not at goal/Known  TSat: at goal >30% Ferritin: At goal (>100ng/mL)    PLAN:  Spoke with Miguel Angel, he will dose his Procrit on  and  and have labs drawn the week of 1/15.  Miguel Angel has a bad cough and just not feeling well x 2 weeks.  He is going to try and get into be seen.  Maybe get a chest x-ray.   His cough is not improving and he felt lightheaded on .     Orders needed to be renewed (for next follow-up date) in EPIC: None    Iron labs due:  Mid 2024    Plan discussed with:  Miguel Angel      NEXT FOLLOW-UP DATE:  24    Janna Louis RN   Anemia Services  [facility]  [email or web address]   Office : [phone]  Fax: [phone]

## 2024-01-02 NOTE — TELEPHONE ENCOUNTER
ISSUE:   Cyclosporine level 65 on 12/29/23 12:45 PM, goal , Neoral dose 100 mg BID. Last dose date/time documented as 12/29/23 1 AM.    PLAN:   Call Patient and confirm this was an accurate 12-hour trough.   Verify Cyclosporine dose 100 mg BID.   Confirm no new medications or illness.   Confirm no missed doses.   If accurate trough and accurate dose, increase Cyclosporine dose to 125 mg every morning and stay on 100 mg every evening.     Is this more than a 50% increase or decrease in current IS dose: No  If YES, justification: N/A    Repeat labs in 2 weeks.    OUTCOME:   LPN Task: Please call per plan above and send orders. Remind Miguel Angel best to take medication every 12 hours and do drug level on 12 hour trough timing.    Ivanna Rivera, RN, BSN  Solid Organ Transplant, Post Kidney and Pancreas  Transplant Care Coordinator  [phone]

## 2024-01-05 ENCOUNTER — OFFICE VISIT (OUTPATIENT)
Dept: FAMILY MEDICINE | Facility: CLINIC | Age: 49
End: 2024-01-05
Payer: COMMERCIAL

## 2024-01-05 VITALS
SYSTOLIC BLOOD PRESSURE: 144 MMHG | BODY MASS INDEX: 35.52 KG/M2 | TEMPERATURE: 98.8 F | DIASTOLIC BLOOD PRESSURE: 80 MMHG | RESPIRATION RATE: 20 BRPM | HEIGHT: 69 IN | OXYGEN SATURATION: 98 % | HEART RATE: 95 BPM | WEIGHT: 239.8 LBS

## 2024-01-05 DIAGNOSIS — R05.8 UPPER AIRWAY COUGH SYNDROME: Primary | ICD-10-CM

## 2024-01-05 DIAGNOSIS — Z94.0 KIDNEY REPLACED BY TRANSPLANT: ICD-10-CM

## 2024-01-05 PROCEDURE — 99214 OFFICE O/P EST MOD 30 MIN: CPT | Performed by: NURSE PRACTITIONER

## 2024-01-05 RX ORDER — IPRATROPIUM BROMIDE 21 UG/1
2 SPRAY, METERED NASAL EVERY 12 HOURS
Qty: 30 ML | Refills: 0 | Status: SHIPPED | OUTPATIENT
Start: 2024-01-05 | End: 2024-05-14

## 2024-01-05 RX ORDER — CYCLOSPORINE 25 MG/1
100 CAPSULE, LIQUID FILLED ORAL 2 TIMES DAILY
Start: 2024-01-05 | End: 2024-01-05

## 2024-01-05 RX ORDER — CYCLOSPORINE 100 MG/1
100 CAPSULE, LIQUID FILLED ORAL 2 TIMES DAILY
Qty: 60 CAPSULE | Refills: 11 | Status: SHIPPED | OUTPATIENT
Start: 2024-01-05 | End: 2024-07-24

## 2024-01-05 RX ORDER — BENZONATATE 200 MG/1
200 CAPSULE ORAL 3 TIMES DAILY PRN
Qty: 21 CAPSULE | Refills: 0 | Status: SHIPPED | OUTPATIENT
Start: 2024-01-05 | End: 2024-01-12

## 2024-01-05 RX ORDER — CYCLOSPORINE 25 MG/1
25 CAPSULE, LIQUID FILLED ORAL EVERY MORNING
Qty: 30 CAPSULE | Refills: 11 | Status: SHIPPED | OUTPATIENT
Start: 2024-01-05 | End: 2024-05-14

## 2024-01-05 RX ORDER — ALBUTEROL SULFATE 90 UG/1
2 AEROSOL, METERED RESPIRATORY (INHALATION) EVERY 6 HOURS PRN
Qty: 18 G | Refills: 0 | Status: SHIPPED | OUTPATIENT
Start: 2024-01-05 | End: 2024-05-14

## 2024-01-05 ASSESSMENT — ENCOUNTER SYMPTOMS
CHEST TIGHTNESS: 0
FEVER: 0
FATIGUE: 0
RHINORRHEA: 1
COUGH: 1
WHEEZING: 0

## 2024-01-05 ASSESSMENT — PAIN SCALES - GENERAL: PAINLEVEL: NO PAIN (0)

## 2024-01-05 NOTE — TELEPHONE ENCOUNTER
Patient confirmed this was an accurate 12-hour trough.   Verified Cyclosporine dose 100 mg BID.   Confirmed no new medications or illness.   Confirmed no missed doses.   Patient confirms increase Cyclosporine dose to 125 mg every morning and stay on 100 mg every evening and repeat labs in 2 weeks.

## 2024-01-05 NOTE — LETTER
January 5, 2024      Miguel Angel Piña  [address] [place] [address]        To Whom It May Concern:    Miguel Angel Piña  was seen on 01/05/24.  Please excuse him from missed work this morning. He may return to work this afternoon as long as he is not having a fever and his symptoms are improving.         Sincerely,        DANIEL Aviles CNP

## 2024-01-05 NOTE — PROGRESS NOTES
"  Assessment & Plan     Upper airway cough syndrome  -Lungs are clear, discussed with patient he will need to rest his voice in order for the hoarseness to go away. Continue increased fluids and honey intake.   -The cough is likely being triggered by the continuous postnasal drip he is experiencing and cold drafty air. We can try intranasal corticosteroid spray and benzonatate to relax the cough stretch receptors, along with albuterol inhaler if his cough is feeling very tight or wheezy.   - benzonatate (TESSALON) 200 MG capsule  Dispense: 21 capsule; Refill: 0  - albuterol (PROAIR HFA/PROVENTIL HFA/VENTOLIN HFA) 108 (90 Base) MCG/ACT inhaler  Dispense: 18 g; Refill: 0  - ipratropium (ATROVENT) 0.03 % nasal spray  Dispense: 30 mL; Refill: 0  pray; Spray 2 sprays into both nostrils every 12 hours  Dispense: 30 mL; Refill: 0     Kidney replaced by transplant  -communicated with patient instructions from his transplant team this morning that they want him to start 125 mg cyclosporine in the morning and 100 mg at night and to schedule a lab recheck in 2 weeks. Patient verified he was taking 100 mg twice a day and the lab draw was a 12 hour trough. He verified understanding of dosing change and when to recheck labs.       Review of the result(s) of each unique test - BMP, cyclosporine, hemoglobin, cbc  Prescription drug management  I spent a total of 30 minutes on the day of the visit.   Time spent by me doing chart review, history and exam, documentation and further activities per the note       BMI:   Estimated body mass index is 35.41 kg/m  as calculated from the following:    Height as of this encounter: 1.753 m (5' 9\").    Weight as of this encounter: 108.8 kg (239 lb 12.8 oz).           DANIEL Aviles CNP  [facility]   Miguel Angel is a 48 year old, presenting for the following health issues:  URI        1/5/2024     7:22 AM   Additional Questions   Roomed by Niki DELATORRE " "  Accompanied by self       Lingering cough since Thanksgiving 2023. Works at [employer] so talks a lot to people throughout the day. Yesterday could hardly talk due to hoarse voice. Improved by drinking water and drinking hot tea, lmeon, and honey. Has also been 2 tabs of Delsym per day which helps loosen up the chest.     Cough has becoming less and less. No difficulty breathing. 6 days ago was coughing so hard he felt like he was going to pass out.     Had Covid19 4 weeks ago.     Blowing out a lot of sinus drainage. No nasal spray us.     History of Present Illness       Reason for visit:  Respitory cough    He eats 0-1 servings of fruits and vegetables daily.He consumes 6 sweetened beverage(s) daily.He exercises with enough effort to increase his heart rate 10 to 19 minutes per day.  He exercises with enough effort to increase his heart rate 3 or less days per week. He is missing 1 dose(s) of medications per week.             Review of Systems   Constitutional:  Negative for fatigue and fever.   HENT:  Positive for congestion and rhinorrhea.    Respiratory:  Positive for cough. Negative for chest tightness and wheezing.    Cardiovascular:  Negative for chest pain and peripheral edema.   All other systems reviewed and are negative.           Objective    BP (!) 144/80   Pulse 95   Temp 98.8  F (37.1  C) (Tympanic)   Resp 20   Ht 1.753 m (5' 9\")   Wt 108.8 kg (239 lb 12.8 oz)   SpO2 98%   BMI 35.41 kg/m    Body mass index is 35.41 kg/m .  Physical Exam  Constitutional:       Appearance: Normal appearance. He is not ill-appearing.   HENT:      Head: Normocephalic.      Right Ear: A middle ear effusion is present. No hemotympanum. Tympanic membrane is not injected, scarred, perforated, erythematous, retracted or bulging.      Left Ear: A middle ear effusion is present. No hemotympanum. Tympanic membrane is not injected, scarred, perforated, erythematous, retracted or bulging.      Nose: Congestion present. No " rhinorrhea.      Right Turbinates: Swollen.      Left Turbinates: Swollen.      Right Sinus: No maxillary sinus tenderness or frontal sinus tenderness.      Left Sinus: No maxillary sinus tenderness or frontal sinus tenderness.      Mouth/Throat:      Mouth: Mucous membranes are moist.      Pharynx: Oropharynx is clear. No pharyngeal swelling, oropharyngeal exudate or posterior oropharyngeal erythema.      Tonsils: No tonsillar exudate. 3+ on the right. 3+ on the left.      Comments: Postnasal drip present  Eyes:      Pupils: Pupils are equal, round, and reactive to light.   Cardiovascular:      Rate and Rhythm: Normal rate and regular rhythm.      Pulses: Normal pulses.      Heart sounds: Normal heart sounds. No murmur heard.     No friction rub. No gallop.   Pulmonary:      Effort: Pulmonary effort is normal. No accessory muscle usage, prolonged expiration or respiratory distress.      Breath sounds: Normal breath sounds. No decreased breath sounds, wheezing, rhonchi or rales.      Comments: Infrequent harsh cough  Musculoskeletal:      Cervical back: Normal range of motion and neck supple.   Lymphadenopathy:      Cervical: No cervical adenopathy.   Skin:     General: Skin is warm and dry.   Neurological:      General: No focal deficit present.      Mental Status: He is alert and oriented to person, place, and time.   Psychiatric:         Mood and Affect: Mood normal.         Behavior: Behavior normal.         Thought Content: Thought content normal.         Judgment: Judgment normal.

## 2024-01-05 NOTE — TELEPHONE ENCOUNTER
[facility] message sent to patient regarding:  ISSUE:   Cyclosporine level 65 on 12/29/23 12:45 PM, goal , Neoral dose 100 mg BID. Last dose date/time documented as 12/29/23 1 AM.     PLAN:   Call Patient and confirm this was an accurate 12-hour trough.   Verify Cyclosporine dose 100 mg BID.   Confirm no new medications or illness.   Confirm no missed doses.   If accurate trough and accurate dose, increase Cyclosporine dose to 125 mg every morning and stay on 100 mg every evening.      Is this more than a 50% increase or decrease in current IS dose: No  If YES, justification: N/A     Repeat labs in 2 weeks.

## 2024-01-16 ENCOUNTER — PATIENT OUTREACH (OUTPATIENT)
Dept: CARE COORDINATION | Facility: CLINIC | Age: 49
End: 2024-01-16
Payer: COMMERCIAL

## 2024-01-16 ENCOUNTER — LAB (OUTPATIENT)
Dept: LAB | Facility: CLINIC | Age: 49
End: 2024-01-16
Payer: COMMERCIAL

## 2024-01-16 DIAGNOSIS — D63.1 ANEMIA IN CHRONIC RENAL DISEASE: ICD-10-CM

## 2024-01-16 DIAGNOSIS — N18.30 STAGE 3 CHRONIC KIDNEY DISEASE, UNSPECIFIED WHETHER STAGE 3A OR 3B CKD (H): ICD-10-CM

## 2024-01-16 DIAGNOSIS — Z94.0 KIDNEY REPLACED BY TRANSPLANT: ICD-10-CM

## 2024-01-16 DIAGNOSIS — N18.9 ANEMIA IN CHRONIC RENAL DISEASE: ICD-10-CM

## 2024-01-16 LAB
HCT VFR BLD AUTO: 32.3 % (ref 40–53)
HGB BLD-MCNC: 10 G/DL (ref 13.3–17.7)

## 2024-01-16 PROCEDURE — 82728 ASSAY OF FERRITIN: CPT

## 2024-01-16 PROCEDURE — 85018 HEMOGLOBIN: CPT

## 2024-01-16 PROCEDURE — 83540 ASSAY OF IRON: CPT

## 2024-01-16 PROCEDURE — 36415 COLL VENOUS BLD VENIPUNCTURE: CPT

## 2024-01-16 PROCEDURE — 85014 HEMATOCRIT: CPT

## 2024-01-16 PROCEDURE — 83550 IRON BINDING TEST: CPT

## 2024-01-16 NOTE — PROGRESS NOTES
Follow-up with anemia management service:    LVM for Brain.  Due for labs.  Next Procrit dose is due 1/18/24        Latest Ref Rng & Units 11/20/2023     2:34 PM 12/11/2023     8:17 AM 12/20/2023     1:33 PM 12/28/2023     8:37 AM 12/29/2023    12:45 PM 1/4/2024     9:16 AM 1/11/2024     9:16 AM   Anemia   CINDY Dose    10,000 units 10,000 units  10,000 units 10,000 units   Hemoglobin 13.3 - 17.7 g/dL 9.7  8.7    8.5      Ferritin 31 - 409 ng/mL 424  480           Follow-up call date: 1/18/24    Janna Louis RN   Anemia Services  [facility]  [email or web address]   Office : [phone]  Fax: [phone]

## 2024-01-17 LAB
FERRITIN SERPL-MCNC: 244 NG/ML (ref 31–409)
IRON BINDING CAPACITY (ROCHE): 242 UG/DL (ref 240–430)
IRON SATN MFR SERPL: 24 % (ref 15–46)
IRON SERPL-MCNC: 57 UG/DL (ref 61–157)

## 2024-01-18 ENCOUNTER — PATIENT OUTREACH (OUTPATIENT)
Dept: CARE COORDINATION | Facility: CLINIC | Age: 49
End: 2024-01-18
Payer: COMMERCIAL

## 2024-01-18 NOTE — PROGRESS NOTES
Anemia Management Note  SUBJECTIVE/OBJECTIVE:  Referred by Dr. 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 He will try to BID dosing  Labs : [id number]  Recent CINDY use, transfusion, IV iron: None     RX/TX plans : [id number]  No MI and blood clots or cancers. 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     Contact:  No consent to communicate on file        Latest Ref Rng & Units 2023     1:33 PM 2023     8:37 AM 2023    12:45 PM 2024     9:16 AM 2024     9:16 AM 2024     4:38 PM 2024     4:39 PM   Anemia   CINDY Dose  10,000 units 10,000 units  10,000 units 10,000 units     Hemoglobin 13.3 - 17.7 g/dL   8.5     10.0    Ferritin 31 - 409 ng/mL      244       BP Readings from Last 3 Encounters:   24 (!) 144/80   22 136/84   22 123/82     Wt Readings from Last 2 Encounters:   24 108.8 kg (239 lb 12.8 oz)   22 109.8 kg (242 lb)         ASSESSMENT:  Hgb:Above goal - recommend hold dose  TSat: not at goal of >30% Ferritin: At goal (>100ng/mL)    PLAN:  Hold Procrit and RTC for hgb then procrit if needed in 2 week(s)    Orders needed to be renewed (for next follow-up date) in EPIC: None    Iron labs due:  Mid 2024    Plan discussed with:  Miguel Angel      NEXT FOLLOW-UP DATE:  24    Janna Louis RN   Anemia Services  [facility]  [email or web address]   Office : [phone]  Fax: [phone]

## 2024-01-19 ENCOUNTER — LAB (OUTPATIENT)
Dept: LAB | Facility: CLINIC | Age: 49
End: 2024-01-19
Payer: COMMERCIAL

## 2024-01-19 DIAGNOSIS — N18.9 ANEMIA IN CHRONIC RENAL DISEASE: ICD-10-CM

## 2024-01-19 DIAGNOSIS — N18.30 STAGE 3 CHRONIC KIDNEY DISEASE, UNSPECIFIED WHETHER STAGE 3A OR 3B CKD (H): ICD-10-CM

## 2024-01-19 DIAGNOSIS — Z94.0 KIDNEY TRANSPLANTED: ICD-10-CM

## 2024-01-19 DIAGNOSIS — D63.1 ANEMIA IN CHRONIC RENAL DISEASE: ICD-10-CM

## 2024-01-19 DIAGNOSIS — Z94.0 KIDNEY REPLACED BY TRANSPLANT: ICD-10-CM

## 2024-01-19 LAB
ERYTHROCYTE [DISTWIDTH] IN BLOOD BY AUTOMATED COUNT: 13.9 % (ref 10–15)
HCT VFR BLD AUTO: 33.9 % (ref 40–53)
HGB BLD-MCNC: 10.3 G/DL (ref 13.3–17.7)
MCH RBC QN AUTO: 28.9 PG (ref 26.5–33)
MCHC RBC AUTO-ENTMCNC: 30.4 G/DL (ref 31.5–36.5)
MCV RBC AUTO: 95 FL (ref 78–100)
PLATELET # BLD AUTO: 229 10E3/UL (ref 150–450)
RBC # BLD AUTO: 3.57 10E6/UL (ref 4.4–5.9)
WBC # BLD AUTO: 5.9 10E3/UL (ref 4–11)

## 2024-01-19 PROCEDURE — 80158 DRUG ASSAY CYCLOSPORINE: CPT

## 2024-01-19 PROCEDURE — 80048 BASIC METABOLIC PNL TOTAL CA: CPT

## 2024-01-19 PROCEDURE — 85027 COMPLETE CBC AUTOMATED: CPT

## 2024-01-19 PROCEDURE — 36415 COLL VENOUS BLD VENIPUNCTURE: CPT

## 2024-01-20 LAB
ANION GAP SERPL CALCULATED.3IONS-SCNC: 13 MMOL/L (ref 7–15)
BUN SERPL-MCNC: 52.9 MG/DL (ref 6–20)
CALCIUM SERPL-MCNC: 9 MG/DL (ref 8.6–10)
CHLORIDE SERPL-SCNC: 105 MMOL/L (ref 98–107)
CREAT SERPL-MCNC: 3.34 MG/DL (ref 0.67–1.17)
CYCLOSPORINE BLD LC/MS/MS-MCNC: 121 UG/L (ref 50–400)
DEPRECATED HCO3 PLAS-SCNC: 19 MMOL/L (ref 22–29)
EGFRCR SERPLBLD CKD-EPI 2021: 22 ML/MIN/1.73M2
GLUCOSE SERPL-MCNC: 87 MG/DL (ref 70–99)
POTASSIUM SERPL-SCNC: 4.8 MMOL/L (ref 3.4–5.3)
SODIUM SERPL-SCNC: 137 MMOL/L (ref 135–145)
TME LAST DOSE: NORMAL H
TME LAST DOSE: NORMAL H

## 2024-01-23 NOTE — RESULT ENCOUNTER NOTE
CSA levels up and down with variable dose administration times and lab collection times. Dose increased on 1/5/24 for low level collected on 12/29/23. Now with level higher than goal . Plan to remain on same dose; no changes at this time.

## 2024-01-28 DIAGNOSIS — I15.1 HTN, KIDNEY TRANSPLANT RELATED: ICD-10-CM

## 2024-01-28 DIAGNOSIS — Z94.0 HTN, KIDNEY TRANSPLANT RELATED: ICD-10-CM

## 2024-01-29 DIAGNOSIS — Z94.0 KIDNEY TRANSPLANTED: ICD-10-CM

## 2024-01-29 DIAGNOSIS — M1A.9XX1 CHRONIC TOPHACEOUS GOUT: ICD-10-CM

## 2024-01-29 DIAGNOSIS — M25.571 PAIN IN JOINT INVOLVING ANKLE AND FOOT, RIGHT: ICD-10-CM

## 2024-01-29 DIAGNOSIS — Z79.60 LONG-TERM USE OF IMMUNOSUPPRESSANT MEDICATION: ICD-10-CM

## 2024-01-29 DIAGNOSIS — Z94.0 KIDNEY REPLACED BY TRANSPLANT: ICD-10-CM

## 2024-01-29 RX ORDER — ALLOPURINOL 100 MG/1
TABLET ORAL
Qty: 0.001 TABLET | Refills: 0 | Status: SHIPPED | OUTPATIENT
Start: 2024-01-29 | End: 2024-02-17

## 2024-01-29 RX ORDER — MYCOPHENOLATE MOFETIL 250 MG/1
750 CAPSULE ORAL 2 TIMES DAILY
Qty: 540 CAPSULE | Refills: 3 | Status: SHIPPED | OUTPATIENT
Start: 2024-01-29

## 2024-01-29 NOTE — TELEPHONE ENCOUNTER
Requested Prescriptions   Pending Prescriptions Disp Refills    allopurinol (ZYLOPRIM) 100 MG tablet 10 tablet 0     Sig: Take 0.5 tablets (50 mg) by mouth every other day Ongoing for prevent gout  (add Medrol for burst and taper off at times of flare only)       There is no refill protocol information for this order        Last office visit: 11/29/2023 ; last virtual visit: Visit date not found with prescribing provider:  Isela Jean   Future Office Visit:      Thank you,  Jade HAUSER [facility]  [facility]

## 2024-01-29 NOTE — TELEPHONE ENCOUNTER
Patient last seen over 1 yr ago and Provider previously notified no further refills without appt.  Pt last appt was NoShow.

## 2024-02-02 RX ORDER — LOSARTAN POTASSIUM 25 MG/1
TABLET ORAL
Qty: 90 TABLET | Refills: 0 | Status: SHIPPED | OUTPATIENT
Start: 2024-02-02 | End: 2024-05-09

## 2024-02-06 ENCOUNTER — PATIENT OUTREACH (OUTPATIENT)
Dept: CARE COORDINATION | Facility: CLINIC | Age: 49
End: 2024-02-06
Payer: COMMERCIAL

## 2024-02-06 NOTE — PROGRESS NOTES
Anemia Management Note - Reminder     Follow-up with anemia management service:    LVM for Miguel Angel,  He is due to have his Anemia Labs drawn.         Latest Ref Rng & Units 12/20/2023 12/28/2023 12/29/2023 1/4/2024 1/11/2024 1/16/2024 1/19/2024   Anemia   CINDY Dose  10,000 units 10,000 units  10,000 units 10,000 units     Hemoglobin 13.3 - 17.7 g/dL   8.5    10.0  10.3    Ferritin 31 - 409 ng/mL      244             Follow-up call date: 2/13/24    Janna Louis RN   Anemia Services  [facility]  [email or web address]   Office : [phone]  Fax: [phone]

## 2024-02-08 ENCOUNTER — TELEPHONE (OUTPATIENT)
Dept: TRANSPLANT | Facility: CLINIC | Age: 49
End: 2024-02-08
Payer: COMMERCIAL

## 2024-02-08 NOTE — TELEPHONE ENCOUNTER
Called Miguel Angel to follow up and was only able to leave a voice message. Asked him to return my call today so we can get him scheduled for a return wait list appointment and get the remaining evaluation items scheduled. He still needs the following items: DSE per cardiology, rheumatology ( no show 11/29/2023) dermatology, review of MTP or schedule virtual class. [employer] and [employer] forms to be signed. PVR in clinic. I told him I would send him a [email or web address] message as well. Gave him his assigned coordinator Charisse Lizarraga phone number [phone]

## 2024-02-13 ENCOUNTER — LAB (OUTPATIENT)
Dept: LAB | Facility: CLINIC | Age: 49
End: 2024-02-13
Payer: COMMERCIAL

## 2024-02-13 DIAGNOSIS — N18.30 STAGE 3 CHRONIC KIDNEY DISEASE, UNSPECIFIED WHETHER STAGE 3A OR 3B CKD (H): ICD-10-CM

## 2024-02-13 DIAGNOSIS — D63.1 ANEMIA IN CHRONIC RENAL DISEASE: ICD-10-CM

## 2024-02-13 DIAGNOSIS — Z94.0 KIDNEY REPLACED BY TRANSPLANT: ICD-10-CM

## 2024-02-13 DIAGNOSIS — N18.9 ANEMIA IN CHRONIC RENAL DISEASE: ICD-10-CM

## 2024-02-13 LAB
HCT VFR BLD AUTO: 34 % (ref 40–53)
HGB BLD-MCNC: 10.4 G/DL (ref 13.3–17.7)

## 2024-02-13 PROCEDURE — 36415 COLL VENOUS BLD VENIPUNCTURE: CPT

## 2024-02-13 PROCEDURE — 82728 ASSAY OF FERRITIN: CPT

## 2024-02-13 PROCEDURE — 85018 HEMOGLOBIN: CPT

## 2024-02-13 PROCEDURE — 83540 ASSAY OF IRON: CPT

## 2024-02-13 PROCEDURE — 83550 IRON BINDING TEST: CPT

## 2024-02-13 PROCEDURE — 85014 HEMATOCRIT: CPT

## 2024-02-14 LAB
FERRITIN SERPL-MCNC: 269 NG/ML (ref 31–409)
IRON BINDING CAPACITY (ROCHE): 249 UG/DL (ref 240–430)
IRON SATN MFR SERPL: 36 % (ref 15–46)
IRON SERPL-MCNC: 90 UG/DL (ref 61–157)

## 2024-02-15 ENCOUNTER — PATIENT OUTREACH (OUTPATIENT)
Dept: CARE COORDINATION | Facility: CLINIC | Age: 49
End: 2024-02-15
Payer: COMMERCIAL

## 2024-02-15 NOTE — PROGRESS NOTES
Anemia Management Note - Follow Up      SUBJECTIVE/OBJECTIVE:    Referred by Dr. Lan Patino on 10/12/2023  Primary Diagnosis: Anemia in Chronic Kidney Disease (N18.3, D63.1)     Secondary Diagnosis:  Organ or tissue replaced by transplant, kidney (Z94.0)  Hgb goal range:  9-10  Epo/Darbo:   Retacrit 10,000 units subcutaneous every week, Hgb <10, AT HOME  *2/15/24: No doses at home.  Holding off on refilling.   Iron regimen:  Daily oral iron. He will try to BID dosing  Labs : [id number]  Recent CINDY use, transfusion, IV iron: None     RX/TX plans : [id number]  No MI and blood clots or cancers. 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  RTC for hgb then Retacrit  if needed in 2 week(s).    Orders needed to be renewed (for next follow-up date) in EPIC: None    Iron labs due:  Mid 2024    Plan discussed with:  Miguel Angel via [place]      NEXT FOLLOW-UP DATE:  24    Janna Louis RN   Anemia Services  [facility]  [email or web address]   Office : [phone]  Fax: [phone]

## 2024-02-16 RX ORDER — METHYLPREDNISOLONE 4 MG
TABLET, DOSE PACK ORAL
Qty: 21 TABLET | Refills: 0 | Status: CANCELLED | OUTPATIENT
Start: 2024-02-16

## 2024-02-16 NOTE — TELEPHONE ENCOUNTER
Health Call Center    Phone Message    May a detailed message be left on voicemail: yes     Reason for Call: Medication Refill Request    Has the patient contacted the pharmacy for the refill? Yes   Name of medication being requested: methylPREDNISolone (MEDROL) 4 MG tablet    Pt states he is not aware that this was discontinued, Pt would like a refill on this.  Please contact Pt back if there is an issue sending in additional refills    Provider who prescribed the medication: Isela Jean    Pharmacy: [facility] [address] [place] [address] [id number]    Date medication is needed: ASAP      Action Taken: Message routed to:  Other: [place] rheum

## 2024-02-16 NOTE — TELEPHONE ENCOUNTER
Patient currently have a flare of his gout?  Patient has an appointment with me on Monday at which time we can discuss his medications. I have not seen him since 12/2022 and we should meet to discuss refills.    Isela Jean, PAC

## 2024-02-16 NOTE — TELEPHONE ENCOUNTER
Called pt left message to return call.  When patient calls back read message from Isela below.  Rx will not be filled until his appt on Monday.    Coby Mayse  [facility] RN

## 2024-02-16 NOTE — TELEPHONE ENCOUNTER
To provider to review and issue new rx for Medrol.  Last filled on 08-14-23.  Pt no showed his scheduled appt on 11-29-23.  Last face to face ov was on 12-06-22.    Coby Mayes  [facility] RN

## 2024-02-18 NOTE — PROGRESS NOTES
Rheumatology Clinic Visit  [facility]  MARTHA Rodriguez     Miguel Angel Piña MRN# [id number]   YOB: 1975 Age: 48 year old   Date of Visit: 2/19/2024  Primary care provider: Lucrecia Hutson          Assessment and Plan:     1.  Chronic tophaceous gout  2.  On allopurinol therapy    Patient presents today for follow-up regarding his chronic tophaceous gout.  He did have a flare last week as he had been off of his allopurinol for approximately 1 month.  He does not use Medrol consistently but does use it for flares and does feel that it continues to help control the flares.  His tophi are stable.  No new tophi.  At this time given the control of the gout with allopurinol and the occasional Medrol we will continue that course.  Allopurinol was refilled at 50 mg every other day.  Medrol was refilled as well.  Follow-up with me in 1 year.  We will check his uric acid as well as his AST and ALT today.      Plan:     Schedule follow-up with Isela Jean PA-C in 12 months.   Labs: Uric acid, AST, ALT today;   Medication recommendations:   Allopurinol 100mg: take 1/2 tablet every other day  Medrol 4mg-follow taper instructions if you get a flare of gout;       MARTHA Rodriguez  Rheumatology         History of Present Illness:   Miguel Angel Piña presents for evaluation of chronic gout.     Interval history February 19, 2024:  He had a flare on Thursday. Flares still controlled with the Medrol. He has continued the Allopurinol.     Interval history December 6, 2022:  He continues to work with nephrology. The plan is to continue to monitor his kidney's. He is not currently planned to go on dialysis. He takes 50mg every other day. He continues on medrol. He does not take this daily. He only take the pack when there is a flare.     He states that he has had about 4 flares since his last visit.     HPI from consult on 5/11/2022:  Has a history of chronic kidney disease and a history of transplant  (one at age 18 and one at 35-currently doing the workup to get on the list for another one). He states that periodically he will get flares in his ankles. He states that last fall his right wrist and arm swelled. He started to take Prednisone and it resolved. He states that once a month he needs prednisone to get rid of his flares. Most recently was about 1 month ago. His left finger started to flare. Xray's were taken, not bone fragments were seen. He started Prednisone and it got better. He prefers to stay off of steroids due to the long term effects of it. He last used medrol yesterday. When he has flares he used Medrol dosepak.          Review of Systems:     Constitutional: negative  Skin: negative  Eyes: negative  Ears/Nose/Throat: negative  Respiratory: No shortness of breath, dyspnea on exertion, cough, or hemoptysis  Cardiovascular: negative  Gastrointestinal: negative  Genitourinary: negative  Musculoskeletal: As above  Neurologic: negative  Psychiatric: negative  Hematologic/Lymphatic/Immunologic: negative  Endocrine: negative         Active Problem List:     Patient Active Problem List    Diagnosis Date Noted    Morbid obesity (H) 12/06/2022     Priority: Medium    Clinical diagnosis of COVID-19 12/21/2021     Priority: Medium    Infection 10/14/2021     Priority: Medium     Right Small Finger       CKD (chronic kidney disease) stage 4, GFR 15-29 ml/min (H) 10/31/2018     Priority: Medium    Aftercare following organ transplant 09/08/2017     Priority: Medium    Immunosuppression (H24) 09/08/2017     Priority: Medium    Anemia in chronic renal disease 09/08/2017     Priority: Medium    Secondary renal hyperparathyroidism (H24) 09/08/2017     Priority: Medium    Gout 09/08/2017     Priority: Medium    Vitamin D deficiency 09/08/2017     Priority: Medium    Dyslipidemia 09/08/2017     Priority: Medium    Heterozygous factor V Leiden mutation (H24) 09/08/2017     Priority: Medium     No h/o DVT or PE       HTN, kidney transplant related      Priority: Medium    Conductive hearing loss, unilateral 10/03/2013     Priority: Medium     Pt reports insidious onset of apparent hearing loss, that had not bothered him, but his wife feels like he must have hearing loss, given that he cannot hear her talking to him often.  He denies any trauma, past issues with ear infections.  He is s/p two kidney transplants and is on rejection medications chronically (stable of late).      He denies excessive loud noise exposure, hazardous work environment (works at [employer]), does drive with window down during summer.  No shooting guns.        CARDIOVASCULAR SCREENING; LDL GOAL LESS THAN 100 10/31/2010     Priority: Medium    Kidney replaced by transplant      Priority: Medium     prior 11/1993      Medullary cystic kidney disease      Priority: Medium            Past Medical History:     Past Medical History:   Diagnosis Date    Anemia in chronic renal disease     Clinical diagnosis of COVID-19 12/21/2021    Conductive hearing loss, unilateral 10/3/2013    Pt reports insidious onset of apparent hearing loss, that had not bothered him, but his wife feels like he must have hearing loss, given that he cannot hear her talking to him often.  He denies any trauma, past issues with ear infections.  He is s/p two kidney transplants and is on rejection medications chronically (stable of late).    He denies excessive loud noise exposure, hazardous work enviro    CVA (cerebral vascular accident) (H)     Dyslipidemia     Factor V Leiden (H24)     Gout     History of blood transfusion     Hypertension secondary to other renal disorders     Immunosuppressed status (H24)     Infection 10/14/2021    Right Small Finger     Kidney replaced by transplant 4/2010 (2nd)    prior 11/1993    Medullary cystic kidney disease     Secondary renal hyperparathyroidism (H24)     Sleep apnea      Past Surgical History:   Procedure Laterality Date    IR RENAL BIOPSY LEFT   8/17/2020    REPAIR PATENT FORAMEN OVALE      s/p LDKT      TRANSPLANT              Social History:     Social History     Socioeconomic History    Marital status:      Spouse name: Not on file    Number of children: 2    Years of education: Not on file    Highest education level: Not on file   Occupational History    Not on file   Tobacco Use    Smoking status: Never    Smokeless tobacco: Never   Vaping Use    Vaping Use: Never used   Substance and Sexual Activity    Alcohol use: No    Drug use: No    Sexual activity: Not Currently     Partners: Female   Other Topics Concern    Parent/sibling w/ CABG, MI or angioplasty before 65F 55M? Not Asked   Social History Narrative    Not on file     Social Determinants of Health     Financial Resource Strain: Low Risk  (1/5/2024)    Financial Resource Strain     Within the past 12 months, have you or your family members you live with been unable to get utilities (heat, electricity) when it was really needed?: No   Food Insecurity: Low Risk  (1/5/2024)    Food Insecurity     Within the past 12 months, did you worry that your food would run out before you got money to buy more?: No     Within the past 12 months, did the food you bought just not last and you didn t have money to get more?: No   Transportation Needs: Low Risk  (1/5/2024)    Transportation Needs     Within the past 12 months, has lack of transportation kept you from medical appointments, getting your medicines, non-medical meetings or appointments, work, or from getting things that you need?: No   Physical Activity: Not on file   Stress: Not on file   Social Connections: Not on file   Interpersonal Safety: Low Risk  (1/5/2024)    Interpersonal Safety     Do you feel physically and emotionally safe where you currently live?: Yes     Within the past 12 months, have you been hit, slapped, kicked or otherwise physically hurt by someone?: No     Within the past 12 months, have you been humiliated or emotionally  abused in other ways by your partner or ex-partner?: No   Housing Stability: Low Risk  (1/5/2024)    Housing Stability     Do you have housing? : Yes     Are you worried about losing your housing?: No          Family History:     Family History   Problem Relation Age of Onset    Hypertension Father     Kidney Disease Sister         medullary cystic kidney disease, s/p kidney transplant    Hypertension Sister     Lung Cancer Paternal Grandmother     Prostate Cancer No family hx of     Colon Cancer No family hx of     Diabetes No family hx of     Coronary Artery Disease No family hx of             Allergies:   No Known Allergies         Medications:     Current Outpatient Medications   Medication Sig Dispense Refill    albuterol (PROAIR HFA/PROVENTIL HFA/VENTOLIN HFA) 108 (90 Base) MCG/ACT inhaler Inhale 2 puffs into the lungs every 6 hours as needed for shortness of breath, wheezing or cough 18 g 0    allopurinol (ZYLOPRIM) 100 MG tablet Take 0.5 tablets (50 mg) by mouth every other day 23 tablet 2    amLODIPine (NORVASC) 5 MG tablet TAKE 1 TABLET BY MOUTH AT BEDTIME AT NIGHT 30 tablet 0    CELLCEPT (BRAND) 250 MG capsule TAKE THREE CAPSULES BY MOUTH TWICE A  capsule 3    cholecalciferol 2000 units TABS Take 2,000 Units by mouth daily 90 tablet 3    epoetin beto (EPOGEN/PROCRIT) 52545 UNIT/ML injection Inject 1 mL (10,000 Units) Subcutaneous every 7 days 4 mL 11    Ferrous Gluconate 324 (37.5 Fe) MG TABS Take by mouth daily      ipratropium (ATROVENT) 0.03 % nasal spray Spray 2 sprays into both nostrils every 12 hours 30 mL 0    losartan (COZAAR) 25 MG tablet Take 1 tablet by mouth once daily 90 tablet 0    methylPREDNISolone (MEDROL DOSEPAK) 4 MG tablet therapy pack Follow Package Directions 21 tablet 1    NEORAL (BRAND) 100 MG capsule Take 1 capsule (100 mg) by mouth 2 times daily Total dose = 125 mg in the AM and 100 mg in the PM 60 capsule 11    NEORAL (BRAND) 25 MG capsule Take 1 capsule (25 mg) by mouth  "every morning Total dose = 125 mg in the AM and 100 mg in the PM 30 capsule 11    sodium bicarbonate 650 MG tablet Take 3 tablets (1,950 mg) by mouth 3 times daily 810 tablet 0    calcitRIOL (ROCALTROL) 0.25 MCG capsule Take 1 capsule (0.25 mcg) by mouth daily 90 capsule 3            Physical Exam:   Blood pressure (!) 142/100, pulse 95, resp. rate 20, height 1.753 m (5' 9\"), weight 108 kg (238 lb 3.2 oz), SpO2 97%.  Wt Readings from Last 6 Encounters:   02/19/24 108 kg (238 lb 3.2 oz)   01/05/24 108.8 kg (239 lb 12.8 oz)   12/06/22 109.8 kg (242 lb)   08/18/22 108.6 kg (239 lb 6.4 oz)   06/20/22 103.9 kg (229 lb)   05/11/22 103.9 kg (229 lb)     Constitutional: well-developed, appearing stated age; cooperative  Eyes: nlconjunctiva, sclera  ENT: nl external ears,  hearing,   Resp: No shortness of breath with normal conversation  MS: Patient has tophi of the right pointer finger PIP.  He also has a tophi on the left fifth PIP.  Psych: nl judgement, orientation, memory, affect.           Data:   Imaging:  N/A    Laboratory:  4/25/22  Creatinine 2.62, GFR 30  Uric acid 9.4    6/20/2022   uric acid 9.9    8/16/2022  Creatinine 3.00, GFR 25  Hemoglobin 9.4  "

## 2024-02-19 ENCOUNTER — TELEPHONE (OUTPATIENT)
Dept: RHEUMATOLOGY | Facility: CLINIC | Age: 49
End: 2024-02-19

## 2024-02-19 ENCOUNTER — TELEPHONE (OUTPATIENT)
Dept: PULMONOLOGY | Facility: CLINIC | Age: 49
End: 2024-02-19

## 2024-02-19 ENCOUNTER — OFFICE VISIT (OUTPATIENT)
Dept: RHEUMATOLOGY | Facility: CLINIC | Age: 49
End: 2024-02-19
Payer: COMMERCIAL

## 2024-02-19 VITALS
BODY MASS INDEX: 35.28 KG/M2 | HEART RATE: 95 BPM | WEIGHT: 238.2 LBS | OXYGEN SATURATION: 97 % | DIASTOLIC BLOOD PRESSURE: 100 MMHG | HEIGHT: 69 IN | RESPIRATION RATE: 20 BRPM | SYSTOLIC BLOOD PRESSURE: 142 MMHG

## 2024-02-19 DIAGNOSIS — M1A.9XX1 CHRONIC TOPHACEOUS GOUT: Primary | ICD-10-CM

## 2024-02-19 DIAGNOSIS — M25.571 PAIN IN JOINT INVOLVING ANKLE AND FOOT, RIGHT: ICD-10-CM

## 2024-02-19 DIAGNOSIS — Z79.899 ON ALLOPURINOL THERAPY: ICD-10-CM

## 2024-02-19 LAB
ALT SERPL W P-5'-P-CCNC: 10 U/L (ref 0–70)
AST SERPL W P-5'-P-CCNC: 12 U/L (ref 0–45)
URATE SERPL-MCNC: 10.2 MG/DL (ref 3.4–7)

## 2024-02-19 PROCEDURE — 84450 TRANSFERASE (AST) (SGOT): CPT | Performed by: PHYSICIAN ASSISTANT

## 2024-02-19 PROCEDURE — 99214 OFFICE O/P EST MOD 30 MIN: CPT | Performed by: PHYSICIAN ASSISTANT

## 2024-02-19 PROCEDURE — 84550 ASSAY OF BLOOD/URIC ACID: CPT | Performed by: PHYSICIAN ASSISTANT

## 2024-02-19 PROCEDURE — 36415 COLL VENOUS BLD VENIPUNCTURE: CPT | Performed by: PHYSICIAN ASSISTANT

## 2024-02-19 PROCEDURE — 84460 ALANINE AMINO (ALT) (SGPT): CPT | Performed by: PHYSICIAN ASSISTANT

## 2024-02-19 RX ORDER — METHYLPREDNISOLONE 4 MG
TABLET, DOSE PACK ORAL
Qty: 21 TABLET | Refills: 1 | Status: SHIPPED | OUTPATIENT
Start: 2024-02-19 | End: 2024-04-15

## 2024-02-19 RX ORDER — ALLOPURINOL 100 MG/1
50 TABLET ORAL EVERY OTHER DAY
Qty: 23 TABLET | Refills: 2 | Status: SHIPPED | OUTPATIENT
Start: 2024-02-19 | End: 2024-05-14

## 2024-02-19 ASSESSMENT — PAIN SCALES - GENERAL: PAINLEVEL: MILD PAIN (2)

## 2024-02-19 NOTE — TELEPHONE ENCOUNTER
Requires attending today's appt or will not receive a refill.  Gabrielle WASHINGTON   Specialty Clinic RN

## 2024-02-19 NOTE — PATIENT INSTRUCTIONS
After Visit Instructions:     Thank you for coming to [facility] for your care. It is my goal to partner with you to help you reach your optimal state of health.       Plan:     Schedule follow-up with Isela Jean PA-C in 12 months.   Labs: Uric acid, AST, ALT today;   Medication recommendations:   Allopurinol 100mg: take 1/2 tablet every other day  Medrol 4mg-follow taper instructions if you get a flare of gout;       Isela Jean PA-C  [facility]  [facility]    Contact information: [facility]  Clinic Number:  [phone]  Please call or send a [employer] message with any questions about your care

## 2024-02-19 NOTE — TELEPHONE ENCOUNTER
Refill request  methylPREDNISolone (MEDROL DOSEPAK) 4 MG tablet therapy pack       [id number] - [place], [place] - [address]   [address] [address]   Phone: [phone] Fax: [phone]   Hours: Not open 24 hours     Thank you,  Jade HAUSER [facility]

## 2024-02-20 NOTE — TELEPHONE ENCOUNTER
Patient was seen on 2/19/24. Per note:    Plan:      Schedule follow-up with Isela Jean PA-C in 12 months.   Labs: Uric acid, AST, ALT today;   Medication recommendations:             Allopurinol 100mg: take 1/2 tablet every other day  Medrol 4mg-follow taper instructions if you get a flare of gout;       Isela Jean refilled Medrol yesterday.     Closing refill request.  Cindy Gutierrez RN on 2/20/2024 at 9:38 AM

## 2024-03-05 ENCOUNTER — PATIENT OUTREACH (OUTPATIENT)
Dept: CARE COORDINATION | Facility: CLINIC | Age: 49
End: 2024-03-05
Payer: COMMERCIAL

## 2024-03-05 NOTE — PROGRESS NOTES
Anemia Management Note - Reminder     Follow-up with anemia management service:    Miguel Angel is due for Anemia Labs to be drawn.  Sent reminder via [employer] .        Latest Ref Rng & Units 12/28/2023 12/29/2023 1/4/2024 1/11/2024 1/16/2024 1/19/2024 2/13/2024   Anemia   CINDY Dose  10,000 units  10,000 units 10,000 units      Hemoglobin 13.3 - 17.7 g/dL  8.5    10.0  10.3  10.4    TSAT 15 - 46 %     24   36    Ferritin 31 - 409 ng/mL     244   269            Follow-up call date: 3/13/24    Janna Louis RN   Anemia Services  [facility]  [email or web address]   Office : [phone]  Fax: [phone]

## 2024-03-07 ENCOUNTER — TELEPHONE (OUTPATIENT)
Dept: INFUSION THERAPY | Facility: CLINIC | Age: 49
End: 2024-03-07
Payer: COMMERCIAL

## 2024-03-07 NOTE — TELEPHONE ENCOUNTER
[facility] Prior Authorization Team   Phone: [phone]  Fax: [phone]    PA Initiation    Medication: CELLCEPT PO  Insurance Company: [employer] [place] - Phone [phone] Fax [phone]  Pharmacy Filling the Rx: [facility] - [place], [place] - [facility] [address]  Filling Pharmacy Phone: [phone]  Filling Pharmacy Fax: [phone]  Start Date: 3/7/2024

## 2024-03-11 NOTE — TELEPHONE ENCOUNTER
Prior Authorization Approval    Medication: CELLCEPT PO  Authorization Effective Date: 3/7/2024  Authorization Expiration Date: 3/7/2025  Approved Dose/Quantity: 540 per 90 days  Reference #: [id number]   Insurance Company: [facility] - Phone [phone] Fax [phone]  Expected CoPay: $ 15  CoPay Card Available: No    Financial Assistance Needed: No  Which Pharmacy is filling the prescription: [facility] - [place], [place] - [facility] [address]  Pharmacy Notified: Yes  Patient Notified: Yes

## 2024-03-20 ENCOUNTER — PATIENT OUTREACH (OUTPATIENT)
Dept: CARE COORDINATION | Facility: CLINIC | Age: 49
End: 2024-03-20
Payer: COMMERCIAL

## 2024-03-20 NOTE — PROGRESS NOTES
Anemia Management Note - Reminder     Follow-up with anemia management service:    Spoke with Miguel Angel.  He is feeling really good and wants to hold off on having his Hgb checked.  Will follow up again in a  month.  Miguel Angel agreed with this plan.         Latest Ref Rng & Units 12/28/2023 12/29/2023 1/4/2024 1/11/2024 1/16/2024 1/19/2024 2/13/2024   Anemia   CINDY Dose  10,000 units  10,000 units 10,000 units      Hemoglobin 13.3 - 17.7 g/dL  8.5    10.0  10.3  10.4    TSAT 15 - 46 %     24   36    Ferritin 31 - 409 ng/mL     244   269          Follow-up call date: 4/17/24    Janna Louis RN   Anemia Services  [facility]  [email or web address]   Office : [phone]  Fax: [phone]

## 2024-04-04 ENCOUNTER — PATIENT OUTREACH (OUTPATIENT)
Dept: NEPHROLOGY | Facility: CLINIC | Age: 49
End: 2024-04-04
Payer: COMMERCIAL

## 2024-04-04 NOTE — PROGRESS NOTES
Nephrology Note: RN CC Chart Review    REASON FOR ENCOUNTER:     Chart reviewed by nephrology RN CC                          SITUATION/BACKROUND:     Last nephrology visit: last seen by Dr Patino 12/12/22    Last Renal Panel:  Sodium   Date Value Ref Range Status   01/19/2024 137 135 - 145 mmol/L Final     Comment:     Reference intervals for this test were updated on 09/26/2023 to more accurately reflect our healthy population. There may be differences in the flagging of prior results with similar values performed with this method. Interpretation of those prior results can be made in the context of the updated reference intervals.    05/06/2021 136 133 - 144 mmol/L Final     Potassium   Date Value Ref Range Status   01/19/2024 4.8 3.4 - 5.3 mmol/L Final   05/03/2023 4.3 3.4 - 5.3 mmol/L Final   05/06/2021 4.4 3.4 - 5.3 mmol/L Final     Chloride   Date Value Ref Range Status   01/19/2024 105 98 - 107 mmol/L Final   05/03/2023 109 94 - 109 mmol/L Final   05/06/2021 107 94 - 109 mmol/L Final     Carbon Dioxide   Date Value Ref Range Status   05/06/2021 23 20 - 32 mmol/L Final     Carbon Dioxide (CO2)   Date Value Ref Range Status   01/19/2024 19 (L) 22 - 29 mmol/L Final   05/03/2023 19 (L) 20 - 32 mmol/L Final     Anion Gap   Date Value Ref Range Status   01/19/2024 13 7 - 15 mmol/L Final   05/03/2023 9 3 - 14 mmol/L Final   05/06/2021 6 3 - 14 mmol/L Final     Glucose   Date Value Ref Range Status   01/19/2024 87 70 - 99 mg/dL Final   05/03/2023 89 70 - 99 mg/dL Final   05/06/2021 87 70 - 99 mg/dL Final     Urea Nitrogen   Date Value Ref Range Status   01/19/2024 52.9 (H) 6.0 - 20.0 mg/dL Final   05/03/2023 53 (H) 7 - 30 mg/dL Final   05/06/2021 54 (H) 7 - 30 mg/dL Final     Creatinine   Date Value Ref Range Status   01/19/2024 3.34 (H) 0.67 - 1.17 mg/dL Final   05/06/2021 3.10 (H) 0.66 - 1.25 mg/dL Final     GFR Estimate   Date Value Ref Range Status   01/19/2024 22 (L) >60 mL/min/1.73m2 Final   05/06/2021 23 (L) >60  mL/min/[1.73_m2] Final     Comment:     Non  GFR Calc  Starting 12/18/2018, serum creatinine based estimated GFR (eGFR) will be   calculated using the Chronic Kidney Disease Epidemiology Collaboration   (CKD-EPI) equation.       Calcium   Date Value Ref Range Status   01/19/2024 9.0 8.6 - 10.0 mg/dL Final   05/06/2021 8.3 (L) 8.5 - 10.1 mg/dL Final     Phosphorus   Date Value Ref Range Status   01/18/2023 5.0 (H) 2.5 - 4.5 mg/dL Final   09/04/2018 3.5 2.5 - 4.5 mg/dL Final     Albumin   Date Value Ref Range Status   01/18/2023 3.6 3.4 - 5.0 g/dL Final   02/17/2020 3.7 3.4 - 5.0 g/dL Final         Neph Tracking Status:  Neph Tracking Flowsheet Last Filled Values       CKD Education Status Deferred    Patient's Referral Dates Auto Populate Patient's Referral Dates    Anemia Services Referral 12/31/21    Dietician Referral 8/23/21    Journey Referral 4/6/22    Vein Mapping/US Order 8/8/22    Access Surgeon Referral Status  Referred    Dialysis Access Referral 08/08/22  referred to Dr. Rivera    Access Surgical Consult --   8/31/22 has yet to call back to schedule US and consult appointments    Diaylsis Access Type AV Fistula  previous fistula, unsure laterality    Transplant Evaluation Referral 5/27/21    Transplant Status  Referred              ASSESSMENT/PLAN     Patient to follow up as scheduled at next appointment  Patient to call/MyChart message with updates    Upcoming Appointments:  Future Appts Next 180 days       No appointments to display              MUNIR CALIX RN

## 2024-04-15 DIAGNOSIS — Z79.899 ON ALLOPURINOL THERAPY: ICD-10-CM

## 2024-04-15 DIAGNOSIS — M1A.9XX1 CHRONIC TOPHACEOUS GOUT: ICD-10-CM

## 2024-04-15 RX ORDER — METHYLPREDNISOLONE 4 MG
TABLET, DOSE PACK ORAL
Qty: 21 TABLET | Refills: 1 | Status: SHIPPED | OUTPATIENT
Start: 2024-04-15 | End: 2024-05-30

## 2024-04-15 NOTE — TELEPHONE ENCOUNTER
Requested Prescriptions   Pending Prescriptions Disp Refills    methylPREDNISolone (MEDROL DOSEPAK) 4 MG tablet therapy pack 21 tablet 1     Sig: Follow Package Directions       There is no refill protocol information for this order          Last office visit: 2/19/2024 ; last virtual visit: Visit date not found with prescribing provider:  Isela Jean   Future Office Visit:      Thank you,  Jade HAUSER [facility]  Specialty Clinic - [facility]

## 2024-04-15 NOTE — TELEPHONE ENCOUNTER
Medication not on nurse protocol. Sent to provider for review.  Vika Kan RN BSN PHN      LOV 2/19/24  Plan:      Schedule follow-up with Isela Jean PA-C in 12 months.   Labs: Uric acid, AST, ALT today;   Medication recommendations:             Allopurinol 100mg: take 1/2 tablet every other day  Medrol 4mg-follow taper instructions if you get a flare of gout;

## 2024-04-17 ENCOUNTER — TELEPHONE (OUTPATIENT)
Dept: INFUSION THERAPY | Facility: CLINIC | Age: 49
End: 2024-04-17
Payer: COMMERCIAL

## 2024-04-17 ENCOUNTER — PATIENT OUTREACH (OUTPATIENT)
Dept: CARE COORDINATION | Facility: CLINIC | Age: 49
End: 2024-04-17
Payer: COMMERCIAL

## 2024-04-17 NOTE — PROGRESS NOTES
Anemia Management Note - Reminder     Follow-up with anemia management service:    Miguel Angel is overdue to have his Anemia Labs drawn.  Sent remind via [employer].         Latest Ref Rng & Units 12/28/2023 12/29/2023 1/4/2024 1/11/2024 1/16/2024 1/19/2024 2/13/2024   Anemia   CINDY Dose  10,000 units  10,000 units 10,000 units      Hemoglobin 13.3 - 17.7 g/dL  8.5    10.0  10.3  10.4    TSAT 15 - 46 %     24   36    Ferritin 31 - 409 ng/mL     244   269          Follow-up call date: 4/30/24    Janna Louis RN   Anemia Services  [facility]  [email or web address]   Office : [phone]  Fax: [phone]

## 2024-04-17 NOTE — TELEPHONE ENCOUNTER
[facility] Prior Authorization Team   Phone: [phone]  Fax: [phone]      Prior Authorization Approval    Medication: CYCLOSPORINE MODIFIED (NEORAL BRAND) 100 MG/ML PO SOLN  Authorization Effective Date: 4/17/2024  Authorization Expiration Date: 4/17/2025  Approved Dose/Quantity: 60 per 30 days  Reference #: [id number]   Insurance Company: [employer] [place] - Phone [phone] Fax [phone]  Expected CoPay: $ 0  CoPay Card Available: No    Financial Assistance Needed: No  Which Pharmacy is filling the prescription: [facility] - [place], [place] -  [address]  Pharmacy Notified: Yes  Patient Notified: Yes

## 2024-04-30 NOTE — PROGRESS NOTES
[facility]  Psychology  Progress Note  Individual Psychotherapy (PhD/LCSW)    Patient Name: Jean Armenta  MRN: [id number]    Patient Class: IP- Inpatient  Admission Date: 6/19/2023  Hospital Length of Stay: 1 days  Attending Physician: Girish Michael MD  Primary Care Provider: Ramiro Joseph MD    SUBJECTIVE  Chief complaint/reason for encounter: Met with patient and mother for follow-up addressing  developmental regression .     Interval history and content of current session: Met with mother and Jean together along with neurologist, hospitalist, and psychiatrist. Attempted to engage Jean, who was fixated on his computer game. We asked mother to take the computer away from him so we could evaluate him. Mother expressed concern that this would make him upset, and she stated that he would do better with advanced notice that we were taking the game. Used a phone timer to give him 2 minutes before taking game. He tried to negotiate with mom to keep the computer and would not let go of computer as she tried to take it from him, though he eventually relented. He did not engage with any of the providers in the room. He did not speak and would stare past us. Rarely did he make eye contact.    Met with mother alone outside of the room to discuss behavioral parenting interventions. Discussed need for encouraging Jean to participate in PT/OT and cooperate with care team. Mother stated that when Jean first began complaining of pain, she would distract him with trips to the water park and his favorite video games. Over time, she noticed that the only thing that would distract him was the video game. She stated that she has allowed him full access to the game because it seems to be the only thing that can calm him down. She recognized that she did not want him to become dependent on the game, but also stated she did not want to see him suffering. She reported that Jean had been attending school despite  Labs are schedule for 5/3/24.     Janna Louis RN   Anemia Services  [facility]  [email or web address]   Office : [phone]  Fax: [phone]     "complaining of hurting at home. Teachers did not notice any changes in his behavior or ability to function. Mother stated Jean told her this is because he "faked being okay" because he was too shy to tell teachers he was hurting. Eventually, Jean told mom that he could not go to school because the pain was too great and he was too exhausted. Mom then allowed Jean to stay home and play video games. Since that time, his functional impairment has regressed dramatically, to the point where he requires mother to push him in a stroller. Mother stated that she used to be able to encourage him to get out and do things by offering incentives, however she stated that no longer work. When asked what the most rewarding thing would be for Jean, she stated his video games. She agreed that allowing him to have full access to his video games and permission to stay home from school, there could be nothing else she could offer that would be more reinforcing. She agreed to restrict his access to video games and use them as positive reinforcement for engagement in therapies, getting up on his own, etc.    OBJECTIVE  Behavioral Observations:   Appearance: Casually dressed and No abnormalities noted   Behavior:  inconsistent eye contact, Not engaged, and Resistant/not amenable to engaging with Psychology   Consciousness: awake   Rapport: Not established   Affect: Flat   Psychomotor:  intermittent movements such as eye rolling, grimacing, flexing his legs       Speech:  Did not speak to attendings; Spoke only to mother when she took away game    Interventions used:   Behavioral parenting strategies and/or training    Diagnostic Impression - Plan:     Other  * Developmental regression  ASSESSMENT  Based on the diagnostic evaluation and background information provided, Jean is exhibiting the following notable symptoms: regressive behaviors, sudden behavioral change, and complaints of pain. The current diagnostic impression is: "     ICD-10-CM ICD-9-CM   1. Encephalopathy  G93.40 348.30   2. Developmental regression  R62.50 315.9     Of note, further medical testing is needed to rule out organic causes, as well as neuropsychological testing to further assessment Jean's functioning.     PLAN/RECOMMENDATIONS    Between-session practice and goals: Mother will refrain from giving him computer game whenever he says he is in pain or can't do something. Will begin using the game as a positive reinforcement. Mother agreed to this plan.    Recommendations for Hospitalization: Patient would benefit from supportive therapy and behavioral supports over the course of hospitalization to facilitate adjustment and adaptive functioning.  · Behavioral parenting strategies and/or training related to managing functional impairment  · Supportive therapy with mother     Recommendations for Outpatient Follow-Up  · Patient would benefit from a neuropsychological evaluation for diagnostic clarification and understanding of his developmental and neurological strengths and weaknesses.   · Family was provided contact information for this provider should they need support accessing resources or desire follow-up with this provider.    Psychology appreciates being involved in the care of this patient. The above plan and recommendations were discussed with the patient and guardian who were in agreement. We will continue to follow throughout hospitalization and consult with multidisciplinary team to support adjustment and adherence with treatment plan. You may contact this provider with questions about this consult or additional concerns about this patient through [employer] or [employer]iku Secure Chat.    INTERACTIVE COMPLEXITY EXPLANATION  This session involved Interactive Complexity (45008); that is, specific communication factors complicated the delivery of the procedure.  Specifically, patient's developmental level precludes adequate expressive communication skills to  provide necessary information to the psychologist independently.        Length of Service (minutes): 60    Monty Blanco, PhD  Psychology  [facility]

## 2024-05-03 ENCOUNTER — LAB (OUTPATIENT)
Dept: LAB | Facility: CLINIC | Age: 49
End: 2024-05-03
Payer: COMMERCIAL

## 2024-05-03 ENCOUNTER — HOSPITAL ENCOUNTER (OUTPATIENT)
Facility: CLINIC | Age: 49
Discharge: HOME | End: 2024-05-03
Admitting: FAMILY MEDICINE
Payer: COMMERCIAL

## 2024-05-03 DIAGNOSIS — Z94.0 KIDNEY REPLACED BY TRANSPLANT: ICD-10-CM

## 2024-05-03 DIAGNOSIS — N18.30 STAGE 3 CHRONIC KIDNEY DISEASE, UNSPECIFIED WHETHER STAGE 3A OR 3B CKD (H): ICD-10-CM

## 2024-05-03 DIAGNOSIS — M1A.9XX1 CHRONIC TOPHACEOUS GOUT: ICD-10-CM

## 2024-05-03 DIAGNOSIS — D63.1 ANEMIA IN CHRONIC RENAL DISEASE: ICD-10-CM

## 2024-05-03 DIAGNOSIS — Z79.899 ENCOUNTER FOR LONG-TERM CURRENT USE OF MEDICATION: ICD-10-CM

## 2024-05-03 DIAGNOSIS — N18.9 ANEMIA IN CHRONIC RENAL DISEASE: ICD-10-CM

## 2024-05-03 DIAGNOSIS — Z98.890 OTHER SPECIFIED POSTPROCEDURAL STATES: ICD-10-CM

## 2024-05-03 DIAGNOSIS — Z48.298 AFTERCARE FOLLOWING ORGAN TRANSPLANT: ICD-10-CM

## 2024-05-03 LAB
ERYTHROCYTE [DISTWIDTH] IN BLOOD BY AUTOMATED COUNT: 13.7 % (ref 10–15)
HCT VFR BLD AUTO: 30 % (ref 40–53)
HGB BLD-MCNC: 9.7 G/DL (ref 13.3–17.7)
MCH RBC QN AUTO: 29.4 PG (ref 26.5–33)
MCHC RBC AUTO-ENTMCNC: 32.3 G/DL (ref 31.5–36.5)
MCV RBC AUTO: 91 FL (ref 78–100)
PLATELET # BLD AUTO: 206 10E3/UL (ref 150–450)
RBC # BLD AUTO: 3.3 10E6/UL (ref 4.4–5.9)
WBC # BLD AUTO: 6.3 10E3/UL (ref 4–11)

## 2024-05-03 PROCEDURE — 82728 ASSAY OF FERRITIN: CPT

## 2024-05-03 PROCEDURE — 83540 ASSAY OF IRON: CPT

## 2024-05-03 PROCEDURE — 80158 DRUG ASSAY CYCLOSPORINE: CPT

## 2024-05-03 PROCEDURE — 85027 COMPLETE CBC AUTOMATED: CPT

## 2024-05-03 PROCEDURE — 84550 ASSAY OF BLOOD/URIC ACID: CPT

## 2024-05-03 PROCEDURE — 83550 IRON BINDING TEST: CPT

## 2024-05-03 PROCEDURE — 80048 BASIC METABOLIC PNL TOTAL CA: CPT | Mod: ORL

## 2024-05-03 PROCEDURE — 84156 ASSAY OF PROTEIN URINE: CPT

## 2024-05-03 PROCEDURE — 36415 COLL VENOUS BLD VENIPUNCTURE: CPT

## 2024-05-04 LAB
ALBUMIN MFR UR ELPH: 182 MG/DL
ANION GAP SERPL CALCULATED.3IONS-SCNC: 13 MMOL/L (ref 7–15)
BUN SERPL-MCNC: 50 MG/DL (ref 6–20)
CALCIUM SERPL-MCNC: 8.5 MG/DL (ref 8.6–10)
CHLORIDE SERPL-SCNC: 104 MMOL/L (ref 98–107)
CREAT SERPL-MCNC: 3.11 MG/DL (ref 0.67–1.17)
CREAT UR-MCNC: 97.6 MG/DL
CYCLOSPORINE BLD LC/MS/MS-MCNC: 69 UG/L (ref 50–400)
DEPRECATED HCO3 PLAS-SCNC: 19 MMOL/L (ref 22–29)
EGFRCR SERPLBLD CKD-EPI 2021: 24 ML/MIN/1.73M2
FERRITIN SERPL-MCNC: 297 NG/ML (ref 31–409)
GLUCOSE SERPL-MCNC: 92 MG/DL (ref 70–99)
IRON BINDING CAPACITY (ROCHE): 249 UG/DL (ref 240–430)
IRON SATN MFR SERPL: 28 % (ref 15–46)
IRON SERPL-MCNC: 70 UG/DL (ref 61–157)
POTASSIUM SERPL-SCNC: 4 MMOL/L (ref 3.4–5.3)
PROT/CREAT 24H UR: 1.86 MG/MG CR (ref 0–0.2)
SODIUM SERPL-SCNC: 136 MMOL/L (ref 135–145)
TME LAST DOSE: NORMAL H
TME LAST DOSE: NORMAL H
URATE SERPL-MCNC: 8.8 MG/DL (ref 3.4–7)

## 2024-05-07 ENCOUNTER — PATIENT OUTREACH (OUTPATIENT)
Dept: NEPHROLOGY | Facility: CLINIC | Age: 49
End: 2024-05-07
Payer: COMMERCIAL

## 2024-05-07 NOTE — RESULT ENCOUNTER NOTE
Elevated urine protein creatinine ratio. Recommend checking blood pressure on a regular basis and report to nephrology nurse if BP remains elevated above 130/80 so losartan can possibly be increased.     Nephrology nurse- Can you please check in with Miguel Angel on how his blood pressures are running? Suspect they are driving up protein in urine and we need to titrate that losartan up.

## 2024-05-07 NOTE — PROGRESS NOTES
"Left vm for patient to return call or My Chart with an update on home BP's. RN will follow up.  MATOE Nicholas Care Coordinator  Nephrology    Spoke with patient to inquire on current home BP's as his Losartan may need to be increased. Patient stated he has been out of Losartan for \"a few weeks\" and did not follow up fr a refill. His last refill was 2/2/24. Transplant nurse informed.  A refill for 25 mg Losartan daily was sent to his [facility] pharmacy in [place]. He stated he will pick it up today and start taking it again. He was informed he will be called again next week for an update on home BP's.  MATEO Nicholas Care Coordinator  Nephrology  "

## 2024-05-08 ENCOUNTER — PATIENT OUTREACH (OUTPATIENT)
Dept: CARE COORDINATION | Facility: CLINIC | Age: 49
End: 2024-05-08
Payer: COMMERCIAL

## 2024-05-08 NOTE — PROGRESS NOTES
Anemia Management Note - Follow Up      SUBJECTIVE/OBJECTIVE:    Referred by Dr. Lan Patino on 10/12/2023  Primary Diagnosis: Anemia in Chronic Kidney Disease (N18.3, D63.1)     Secondary Diagnosis:  Organ or tissue replaced by transplant, kidney (Z94.0)  Hgb goal range:  9-10  Epo/Darbo:   Retacrit 10,000 units subcutaneous every week, Hgb <10, AT HOME  *2/15/24: No doses at home.  Holding off on refilling.   Iron regimen:  Daily oral iron. He will try to BID dosing  Labs : 113192  Recent CINDY use, transfusion, IV iron: None     RX/TX plans : 084919  No MI and blood clots or cancers. 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     Contact:  No consent to communicate on file        Latest Ref Rng & Units 2023 2024 2024 2024 2024 2024 5/3/2024   Anemia   CINDY Dose   10,000 units 10,000 units       Hemoglobin 13.3 - 17.7 g/dL 8.5    10.0  10.3  10.4  9.7    TSAT 15 - 46 %    24   36  28    Ferritin 31 - 409 ng/mL    244   269  297      BP Readings from Last 3 Encounters:   24 (!) 142/100   24 (!) 144/80   22 136/84     Wt Readings from Last 2 Encounters:   24 108 kg (238 lb 3.2 oz)   24 108.8 kg (239 lb 12.8 oz)           ASSESSMENT:    Hgb:At goal - recommend dose  TSat: not at goal of >30% Ferritin: At goal (>100ng/mL)        PLAN:  LVM for Miguel Angel to check in re Hgb <10.0.  Per previous conversation, Miguel Angel does not have any Retacrit does at home.      Orders needed to be renewed (for next follow-up date) in EPIC: None    Iron labs due:  2024    Plan discussed with:  LVM for Miguel Angel      NEXT FOLLOW-UP DATE:  5/15/24    Janna Louis RN   Anemia Services  [facility]  [email or web address]   Office : [phone]  Fax: [phone]

## 2024-05-09 ENCOUNTER — MYC REFILL (OUTPATIENT)
Dept: NEPHROLOGY | Facility: CLINIC | Age: 49
End: 2024-05-09
Payer: COMMERCIAL

## 2024-05-09 ENCOUNTER — TELEPHONE (OUTPATIENT)
Dept: TRANSPLANT | Facility: CLINIC | Age: 49
End: 2024-05-09
Payer: COMMERCIAL

## 2024-05-09 DIAGNOSIS — Z94.0 HTN, KIDNEY TRANSPLANT RELATED: ICD-10-CM

## 2024-05-09 DIAGNOSIS — Z94.0 KIDNEY REPLACED BY TRANSPLANT: Primary | ICD-10-CM

## 2024-05-09 DIAGNOSIS — I15.1 HTN, KIDNEY TRANSPLANT RELATED: ICD-10-CM

## 2024-05-09 RX ORDER — LOSARTAN POTASSIUM 25 MG/1
25 TABLET ORAL DAILY
Qty: 90 TABLET | Refills: 0 | Status: SHIPPED | OUTPATIENT
Start: 2024-05-09 | End: 2024-05-09

## 2024-05-09 RX ORDER — LOSARTAN POTASSIUM 25 MG/1
25 TABLET ORAL DAILY
Qty: 90 TABLET | Refills: 0 | Status: SHIPPED | OUTPATIENT
Start: 2024-05-09 | End: 2024-05-14

## 2024-05-09 NOTE — TELEPHONE ENCOUNTER
"ISSUE:      Previous Messages       ----- Message -----  From: Ivanna Rivera, MATEO  Sent: 5/7/2024   2:54 PM CDT  To: Yu Husain RN; Denisse Stratton PA-C    Elevated urine protein creatinine ratio. Recommend checking blood pressure on a regular basis and report to nephrology nurse if BP remains elevated above 130/80 so losartan can possibly be increased.    Nephrology nurse- Can you please check in with Miguel Angel on how his blood pressures are running? Suspect they are driving up protein in urine and we need to titrate that losartan up.    PLAN: Refilled Losartan.     Message  Received: Today  Yu Husain RN [place], MATEO Clark,  I called to follow up on BP's and he reports he has not been taking his Losartan for \"a few weeks\" and no BP's to report. He didn't follow up on a refill. A refill sent if that's okay and I will call next week for an update on BP's.  MATEO Nicholas Care Coordinator  Nephrology          Further Follow Up:  Overdue for transplant appt w/Dr. Patino. Last completed 8/18/22. Order placed for annual visit; first/next available. Spoke with Miguel Angel who verbalized understand to follow up. He is able to do video visit and will await schedulers to call for 5/14/24 RKT/v. If he doesn't hear anything, asked that he call schedulers at [phone] option 4.     Should also be following with general nephrology for CKD management, high uric acid level, hypertension mgmt, etc. Nephrology RN notified of need for follow up.     Ivanna Rivera, RN, BSN  Solid Organ Transplant, Post Kidney and Pancreas  Transplant Care Coordinator  [phone]     "

## 2024-05-14 ENCOUNTER — PATIENT OUTREACH (OUTPATIENT)
Dept: CARE COORDINATION | Facility: CLINIC | Age: 49
End: 2024-05-14
Payer: COMMERCIAL

## 2024-05-14 ENCOUNTER — VIRTUAL VISIT (OUTPATIENT)
Dept: TRANSPLANT | Facility: CLINIC | Age: 49
End: 2024-05-14
Attending: INTERNAL MEDICINE
Payer: COMMERCIAL

## 2024-05-14 ENCOUNTER — TELEPHONE (OUTPATIENT)
Dept: RHEUMATOLOGY | Facility: CLINIC | Age: 49
End: 2024-05-14

## 2024-05-14 DIAGNOSIS — D84.9 IMMUNOSUPPRESSED STATUS (H): ICD-10-CM

## 2024-05-14 DIAGNOSIS — Z94.0 KIDNEY REPLACED BY TRANSPLANT: Primary | ICD-10-CM

## 2024-05-14 DIAGNOSIS — N18.4 CKD (CHRONIC KIDNEY DISEASE) STAGE 4, GFR 15-29 ML/MIN (H): ICD-10-CM

## 2024-05-14 DIAGNOSIS — Z94.0 HTN, KIDNEY TRANSPLANT RELATED: ICD-10-CM

## 2024-05-14 DIAGNOSIS — Z48.298 AFTERCARE FOLLOWING ORGAN TRANSPLANT: ICD-10-CM

## 2024-05-14 DIAGNOSIS — M1A.9XX1 CHRONIC TOPHACEOUS GOUT: ICD-10-CM

## 2024-05-14 DIAGNOSIS — I15.1 HTN, KIDNEY TRANSPLANT RELATED: ICD-10-CM

## 2024-05-14 DIAGNOSIS — N25.81 SECONDARY RENAL HYPERPARATHYROIDISM (H): ICD-10-CM

## 2024-05-14 PROBLEM — E66.09 CLASS 2 OBESITY DUE TO EXCESS CALORIES WITHOUT SERIOUS COMORBIDITY WITH BODY MASS INDEX (BMI) OF 35.0 TO 35.9 IN ADULT: Status: ACTIVE | Noted: 2022-12-06

## 2024-05-14 PROBLEM — B99.9 INFECTION: Status: RESOLVED | Noted: 2021-10-14 | Resolved: 2024-05-14

## 2024-05-14 PROBLEM — D63.1 ANEMIA IN STAGE 4 CHRONIC KIDNEY DISEASE (H): Status: ACTIVE | Noted: 2017-09-08

## 2024-05-14 PROBLEM — U07.1 CLINICAL DIAGNOSIS OF COVID-19: Status: RESOLVED | Noted: 2021-12-21 | Resolved: 2024-05-14

## 2024-05-14 PROBLEM — E66.812 CLASS 2 OBESITY DUE TO EXCESS CALORIES WITHOUT SERIOUS COMORBIDITY WITH BODY MASS INDEX (BMI) OF 35.0 TO 35.9 IN ADULT: Status: ACTIVE | Noted: 2022-12-06

## 2024-05-14 PROCEDURE — G2211 COMPLEX E/M VISIT ADD ON: HCPCS | Performed by: INTERNAL MEDICINE

## 2024-05-14 PROCEDURE — 99214 OFFICE O/P EST MOD 30 MIN: CPT | Mod: 95 | Performed by: INTERNAL MEDICINE

## 2024-05-14 RX ORDER — AMLODIPINE BESYLATE 5 MG/1
5 TABLET ORAL DAILY
Qty: 30 TABLET | Refills: 3 | Status: SHIPPED | OUTPATIENT
Start: 2024-05-14 | End: 2024-08-29

## 2024-05-14 RX ORDER — CALCITRIOL 0.25 UG/1
0.25 CAPSULE, LIQUID FILLED ORAL DAILY
Qty: 90 CAPSULE | Refills: 3 | Status: SHIPPED | OUTPATIENT
Start: 2024-05-14 | End: 2024-08-29

## 2024-05-14 RX ORDER — LOSARTAN POTASSIUM 25 MG/1
25 TABLET ORAL DAILY
Qty: 90 TABLET | Refills: 3 | Status: SHIPPED | OUTPATIENT
Start: 2024-05-14

## 2024-05-14 RX ORDER — CYCLOSPORINE 25 MG/1
100 CAPSULE, LIQUID FILLED ORAL 2 TIMES DAILY
COMMUNITY
Start: 2024-05-14 | End: 2024-07-24

## 2024-05-14 RX ORDER — ALLOPURINOL 100 MG/1
50 TABLET ORAL DAILY
COMMUNITY
Start: 2024-05-14

## 2024-05-14 NOTE — TELEPHONE ENCOUNTER
Left message on answering machine for patient to call back.    Please advise:   Your uric acid is down to 8.8.    As it is still elevated I would recommend that you increase your dose to a half a tablet daily.     Isela Jean, MARTHA     ______________________________    Gabrielle WASHINGTON   Specialty Clinic RN

## 2024-05-14 NOTE — PROGRESS NOTES
"TRANSPLANT NEPHROLOGY CHRONIC POST TRANSPLANT VISIT    Virtual Visit Details    Type of service:  Video Visit   Video start time: 4:33 PM    Video end time: 4:56pm    Originating Location (pt. Location): Home    Distant Location (provider location):  On-site  Platform used for Video Visit: Doximity    Assessment & Plan   # DDKT: Increased but stable Scr.  Kidney transplant biopsy 8/2020 showed severe chronic changes.  Patient has been referred for another kidney transplant and is in the evaluation process.   - Baseline Creatinine:  ~ 2.9-3.3   - Proteinuria: Moderate (1-3 grams), increasing but wasn't taking losartan for \"a few weeks\"   - Date DSA Last Checked: Aug/2021      Latest DSA: No   - BK Viremia: Not checked recently due to time from transplant   - Kidney Tx Biopsy: Aug 17, 2020; Result: No diagnostic evidence of acute rejection.  Mild glomerulitis with moderate transplant glomerulopathy, but no DSA.  Secondary focal segmental glomerulosclerosis.  Severe interstitial fibrosis and tubular atrophy.  Mild arterio- and severe arteriolosclerosis.             Jul 25, 2011; Result: No diagnostic evidence of acute rejection.  Unremarkable.             May 14, 2010; Result: No diagnostic evidence of acute rejection.  Moderate acute tubular injury.   -Pt is due for DSE, dermatology follow up, review of MTP or virtual class.    # CKD Management: Patient is nearing need for renal replacement therapy with CKD Stage 4.   - Renal Replacement Therapy Discussion Completed by Provider: Yes   - Transplant Referral: Yes    - On Kidney Transplant Waiting List: No, needs another follow up Any Potential Kidney Donors: No   - CKD Dialysis Journey Referral: Yes   - CKD Management Provider: Denisse Stratton PA-C, re-refer    - Dialysis Modality Preference: Unsure at this time   - H/O Previous Dialysis: In [facility]   - Contraindications for Any Dialysis Modality: No   - Has Working Dialysis Access: No   - Recommended " Timing for Dialysis Access: When GFR consistently <20ml/min       Patient was informed of their options that includes transplant referral to any transplant center they prefer and/or referral to a dialysis center that offers their dialysis modality of choice, as appropriate.    # Immunosuppression: Cyclosporine (goal ) and Mycophenolate mofetil (dose 1000 mg every 12 hours)   - Changes: No    # Infection Prophylaxis:   Last CD4 Level: 573 (9/2017)  - PJP: None    # Hypertension: Not checked recently;  Goal BP: < 130/80   - Changes: Not at this time. Continue amlodipine 5mg daily, losartan 25mg daily   -Needs to start checking BP    # Anemia in Chronic Renal Disease: Hgb: Stable, low      CINDY: Yes   - Iron studies: Replete    # Mineral Bone Disorder:   - Secondary renal hyperparathyroidism; PTH level: Moderately elevated (301-600 pg/ml)        On treatment: Calcitriol 0.25mcg daily   - Vitamin D; level: Normal        On supplement: No  - Calcium; level: Low normal        On supplement: No    # Electrolytes:   - Potassium; level: Normal        On supplement: No  - Bicarbonate; level: Low        On supplement: Yes,  bicarb  1950mg tid    # Obesity, Class II (BMI = 35.1kg/m2):    - Recommend weight loss for overall health by increasing exercise and watching caloric intake.    # Gout:    -Follows with rheumatology. Currently on allopurinol 50mg every other day. Was recommended by rheumatology today to increase to 50mg daily. He takes a medrol dose pack for gout flare.     # Skin Cancer Risk:    - Discussed sun protection and recommend regular follow up with Dermatology.    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    Return visit: Return in about 1 year (around 5/14/2025).    Bryan Nickerson MD    The longitudinal plan of care for kidney transplant was addressed during this visit. Due to the added complexity in care, I will continue to support Miguel Angel Piña in the subsequent management of this condition(s) and with the ongoing continuity of care of this condition(s).       Chief Complaint   Mr. Piña is a 48 year old here for kidney transplant and immunosuppression management.    History of Present Illness   The patient was seen on 1/5/24 by PCP for cough. He was given albuterol, tessalon, and ipratropium. He is no longer taking these things.     The patient overall feels well. He denies any recent hospitalizations. He denies nausea, vomiting, diarrhea, fever, chills, shortness of breath, chest pain, LE edema, unintentional weight loss, nights sweats, dysuria, hematuria.       Home BP: Not checked    Problem List   Patient Active Problem List   Diagnosis    Kidney replaced by transplant    Medullary cystic kidney disease    CARDIOVASCULAR SCREENING; LDL GOAL LESS THAN 100    Conductive hearing loss, unilateral    Aftercare following organ transplant    Immunosuppression (H24)    Anemia in chronic renal disease    Secondary renal hyperparathyroidism (H24)    Gout    Vitamin D deficiency    Dyslipidemia    Heterozygous factor V Leiden mutation (H24)    HTN, kidney transplant related    CKD (chronic kidney disease) stage 4, GFR 15-29 ml/min (H)    Infection    Clinical diagnosis of COVID-19    Morbid obesity (H)       Social History   Social History     Tobacco Use    Smoking status: Never    Smokeless tobacco: Never   Vaping Use    Vaping status: Never Used   Substance Use  Topics    Alcohol use: No    Drug use: No       Allergies   No Known Allergies    Medications   Current Outpatient Medications   Medication Sig Dispense Refill    allopurinol (ZYLOPRIM) 100 MG tablet Take 0.5 tablets (50 mg) by mouth daily      amLODIPine (NORVASC) 5 MG tablet Take 1 tablet (5 mg) by mouth daily for 90 days TAKE 1 TABLET BY MOUTH AT BEDTIME AT NIGHT 30 tablet 3    calcitRIOL (ROCALTROL) 0.25 MCG capsule Take 1 capsule (0.25 mcg) by mouth daily for 90 days 90 capsule 3    losartan (COZAAR) 25 MG tablet Take 1 tablet (25 mg) by mouth daily 90 tablet 3    NEORAL (BRAND) 25 MG capsule Take 4 capsules (100 mg) by mouth 2 times daily Total dose = 100mg twice daily      CELLCEPT (BRAND) 250 MG capsule TAKE THREE CAPSULES BY MOUTH TWICE A  capsule 3    cholecalciferol 2000 units TABS Take 2,000 Units by mouth daily 90 tablet 3    epoetin beto (EPOGEN/PROCRIT) 70184 UNIT/ML injection Inject 1 mL (10,000 Units) Subcutaneous every 7 days 4 mL 11    Ferrous Gluconate 324 (37.5 Fe) MG TABS Take by mouth daily      methylPREDNISolone (MEDROL DOSEPAK) 4 MG tablet therapy pack Follow Package Directions 21 tablet 1    NEORAL (BRAND) 100 MG capsule Take 1 capsule (100 mg) by mouth 2 times daily Total dose = 125 mg in the AM and 100 mg in the PM 60 capsule 11    sodium bicarbonate 650 MG tablet Take 3 tablets (1,950 mg) by mouth 3 times daily 810 tablet 0     No current facility-administered medications for this visit.     Medications Discontinued During This Encounter   Medication Reason    albuterol (PROAIR HFA/PROVENTIL HFA/VENTOLIN HFA) 108 (90 Base) MCG/ACT inhaler     allopurinol (ZYLOPRIM) 100 MG tablet     calcitRIOL (ROCALTROL) 0.25 MCG capsule Reorder (No AVS)    amLODIPine (NORVASC) 5 MG tablet     ipratropium (ATROVENT) 0.03 % nasal spray     NEORAL (BRAND) 25 MG capsule     losartan (COZAAR) 25 MG tablet Reorder (No AVS)       Physical Exam   Vital Signs: There were no vitals taken for this  visit.  GENERAL APPEARANCE: alert and no distress  HENT: no obvious abnormalities on appearance  RESP: breathing appears unremarkable with normal rate, no audible wheezing or cough and no apparent shortness of breath with conversation  MS: extremities normal - no gross deformities noted  SKIN: no apparent rash and normal skin tone  NEURO: speech is clear with no obvious neurological deficits  PSYCH: mentation appears normal and affect normal     Data         Latest Ref Rng & Units 5/3/2024    10:43 AM 1/19/2024     4:01 PM 12/11/2023     8:17 AM   Renal   Sodium 135 - 145 mmol/L 136  137  137    K 3.4 - 5.3 mmol/L 4.0  4.8  5.2    Cl 98 - 107 mmol/L 104  105  107    Cl (external) 98 - 107 mmol/L 104  105  107    CO2 22 - 29 mmol/L 19  19  18    Urea Nitrogen 6.0 - 20.0 mg/dL 50.0  52.9  45.0    Creatinine 0.67 - 1.17 mg/dL 3.11  3.34  2.94    Glucose 70 - 99 mg/dL 92  87  113    Calcium 8.6 - 10.0 mg/dL 8.5  9.0  9.0          Latest Ref Rng & Units 1/18/2023     2:57 PM 6/20/2022     2:51 PM 4/25/2022    10:19 AM   Bone Health   Phosphorus 2.5 - 4.5 mg/dL 5.0  3.5  3.2    Parathyroid Hormone Intact 15 - 65 pg/mL 488  417     Vit D Def 20 - 75 ug/L 35  35           Latest Ref Rng & Units 5/3/2024    10:48 AM 2/13/2024     2:54 PM 1/19/2024     4:01 PM   Heme   WBC 4.0 - 11.0 10e3/uL 6.3   5.9    Hgb 13.3 - 17.7 g/dL 9.7  10.4  10.3    Plt 150 - 450 10e3/uL 206   229          Latest Ref Rng & Units 2/19/2024    12:57 PM 1/18/2023     2:57 PM 6/20/2022     2:51 PM   Liver   AP 40 - 150 U/L   81    TBili 0.2 - 1.3 mg/dL   1.0    ALT 0 - 70 U/L 10   21    AST 0 - 45 U/L 12   12    Tot Protein 6.8 - 8.8 g/dL   6.6    Albumin 3.4 - 5.0 g/dL  3.6  3.5             Latest Ref Rng & Units 5/3/2024    10:43 AM 2/13/2024     2:54 PM 1/16/2024     4:38 PM   Iron studies   Iron 61 - 157 ug/dL 70  90  57    Iron Sat Index 15 - 46 % 28  36  24    Ferritin 31 - 409 ng/mL 297  269  244          Latest Ref Rng & Units 8/23/2021     12:40 PM 8/17/2020    12:34 PM 9/13/2012     8:09 AM   [facility] Txp Virology   CVM DNA Quant   Whole blood, EDTA anticoagulant     CMV QUANT IU/ML CMVND^CMV DNA Not Detected [IU]/mL  CMV DNA Not Detected     LOG IU/ML OF CMVQNT <2.1 [Log_IU]/mL  Not Calculated     BK Spec   Plasma  PLASMA    BK Res BKNEG^BK Virus DNA Not Detected copies/mL  BK Virus DNA Not Detected  <1000    BK Log <2.7 Log copies/mL  Not Calculated  <3.0  The lower limit of detection for this assay is 1000 copies/mL.  Real-time TaqMan   PCR was performed using BK primers and probe for the detection of a 90 bp   portion of the  1 gene.  The performance characteristics were validated by   the [facility], [place].   It has not been cleared or approved by the U.S. Food and Drug Administration.    EBV CAPSID ANTIBODY IGG No detectable antibody. Positive        Failed to redirect to the Timeline version of the REVFS SmartLink.      Recent Labs   Lab Test 12/31/21  1021   MPACID 2.36   MPAG 186.7*

## 2024-05-14 NOTE — LETTER
"    5/14/2024         RE: Miguel Angel Piña  [address] [address] [facility] [address]        Dear Colleague,    Thank you for referring your patient, Miguel Angel Piña, to the [facility] TRANSPLANT CLINIC. Please see a copy of my visit note below.    TRANSPLANT NEPHROLOGY CHRONIC POST TRANSPLANT VISIT    Virtual Visit Details    Type of service:  Video Visit   Video start time: 4:33 PM    Video end time: 4:56pm    Originating Location (pt. Location): Home    Distant Location (provider location):  On-site  Platform used for Video Visit: Doximity    Assessment & Plan  # DDKT: Increased but stable Scr.  Kidney transplant biopsy 8/2020 showed severe chronic changes.  Patient has been referred for another kidney transplant and is in the evaluation process.   - Baseline Creatinine:  ~ 2.9-3.3   - Proteinuria: Moderate (1-3 grams), increasing but wasn't taking losartan for \"a few weeks\"   - Date DSA Last Checked: Aug/2021      Latest DSA: No   - BK Viremia: Not checked recently due to time from transplant   - Kidney Tx Biopsy: Aug 17, 2020; Result: No diagnostic evidence of acute rejection.  Mild glomerulitis with moderate transplant glomerulopathy, but no DSA.  Secondary focal segmental glomerulosclerosis.  Severe interstitial fibrosis and tubular atrophy.  Mild arterio- and severe arteriolosclerosis.             Jul 25, 2011; Result: No diagnostic evidence of acute rejection.  Unremarkable.             May 14, 2010; Result: No diagnostic evidence of acute rejection.  Moderate acute tubular injury.   -Pt is due for DSE, dermatology follow up, review of MTP or virtual class.    # CKD Management: Patient is nearing need for renal replacement therapy with CKD Stage 4.   - Renal Replacement Therapy Discussion Completed by Provider: Yes   - Transplant Referral: Yes    - On Kidney Transplant Waiting List: No, needs another follow up Any Potential Kidney Donors: No   - CKD Dialysis Journey Referral: Yes   - CKD Management " Provider: Denisse Stratton PA-C, re-refer    - Dialysis Modality Preference: Unsure at this time   - H/O Previous Dialysis: In Center Hemodialysis   - Contraindications for Any Dialysis Modality: No   - Has Working Dialysis Access: No   - Recommended Timing for Dialysis Access: When GFR consistently <20ml/min       Patient was informed of their options that includes transplant referral to any transplant center they prefer and/or referral to a dialysis center that offers their dialysis modality of choice, as appropriate.    # Immunosuppression: Cyclosporine (goal ) and Mycophenolate mofetil (dose 1000 mg every 12 hours)   - Changes: No    # Infection Prophylaxis:   Last CD4 Level: 573 (9/2017)  - PJP: None    # Hypertension: Not checked recently;  Goal BP: < 130/80   - Changes: Not at this time. Continue amlodipine 5mg daily, losartan 25mg daily   -Needs to start checking BP    # Anemia in Chronic Renal Disease: Hgb: Stable, low      CINDY: Yes   - Iron studies: Replete    # Mineral Bone Disorder:   - Secondary renal hyperparathyroidism; PTH level: Moderately elevated (301-600 pg/ml)        On treatment: Calcitriol 0.25mcg daily   - Vitamin D; level: Normal        On supplement: No  - Calcium; level: Low normal        On supplement: No    # Electrolytes:   - Potassium; level: Normal        On supplement: No  - Bicarbonate; level: Low        On supplement: Yes,  bicarb  1950mg tid    # Obesity, Class II (BMI = 35.1kg/m2):    - Recommend weight loss for overall health by increasing exercise and watching caloric intake.    # Gout:    -Follows with rheumatology. Currently on allopurinol 50mg every other day. Was recommended by rheumatology today to increase to 50mg daily. He takes a medrol dose pack for gout flare.     # Skin Cancer Risk:    - Discussed sun protection and recommend regular follow up with Dermatology.    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    Return visit: Return in about 1 year (around 5/14/2025).    Bryan Nickerson MD    The longitudinal plan of care for kidney transplant was addressed during this visit. Due to the added complexity in care, I will continue to support Miguel Angel Piña in the subsequent management of this condition(s) and with the ongoing continuity of care of this condition(s).       Chief Complaint  Mr. Piña is a 48 year old here for kidney transplant and immunosuppression management.    History of Present Illness  The patient was seen on 1/5/24 by PCP for cough. He was given albuterol, tessalon, and ipratropium. He is no longer taking these things.     The patient overall feels well. He denies any recent hospitalizations. He denies nausea, vomiting, diarrhea, fever, chills, shortness of breath, chest pain, LE edema, unintentional weight loss, nights sweats, dysuria, hematuria.       Home BP: Not checked    Problem List  Patient Active Problem List   Diagnosis     Kidney replaced by transplant     Medullary cystic kidney disease     CARDIOVASCULAR SCREENING; LDL GOAL LESS THAN 100     Conductive hearing loss, unilateral     Aftercare following organ transplant     Immunosuppression (H24)     Anemia in chronic renal disease     Secondary renal hyperparathyroidism (H24)     Gout     Vitamin D deficiency     Dyslipidemia     Heterozygous factor V Leiden mutation (H24)     HTN, kidney transplant related     CKD (chronic kidney disease) stage 4, GFR  15-29 ml/min (H)     Infection     Clinical diagnosis of COVID-19     Morbid obesity (H)       Social History  Social History     Tobacco Use     Smoking status: Never     Smokeless tobacco: Never   Vaping Use     Vaping status: Never Used   Substance Use Topics     Alcohol use: No     Drug use: No       Allergies  No Known Allergies    Medications  Current Outpatient Medications   Medication Sig Dispense Refill     allopurinol (ZYLOPRIM) 100 MG tablet Take 0.5 tablets (50 mg) by mouth daily       amLODIPine (NORVASC) 5 MG tablet Take 1 tablet (5 mg) by mouth daily for 90 days TAKE 1 TABLET BY MOUTH AT BEDTIME AT NIGHT 30 tablet 3     calcitRIOL (ROCALTROL) 0.25 MCG capsule Take 1 capsule (0.25 mcg) by mouth daily for 90 days 90 capsule 3     losartan (COZAAR) 25 MG tablet Take 1 tablet (25 mg) by mouth daily 90 tablet 3     NEORAL (BRAND) 25 MG capsule Take 4 capsules (100 mg) by mouth 2 times daily Total dose = 100mg twice daily       CELLCEPT (BRAND) 250 MG capsule TAKE THREE CAPSULES BY MOUTH TWICE A  capsule 3     cholecalciferol 2000 units TABS Take 2,000 Units by mouth daily 90 tablet 3     epoetin beto (EPOGEN/PROCRIT) 56220 UNIT/ML injection Inject 1 mL (10,000 Units) Subcutaneous every 7 days 4 mL 11     Ferrous Gluconate 324 (37.5 Fe) MG TABS Take by mouth daily       methylPREDNISolone (MEDROL DOSEPAK) 4 MG tablet therapy pack Follow Package Directions 21 tablet 1     NEORAL (BRAND) 100 MG capsule Take 1 capsule (100 mg) by mouth 2 times daily Total dose = 125 mg in the AM and 100 mg in the PM 60 capsule 11     sodium bicarbonate 650 MG tablet Take 3 tablets (1,950 mg) by mouth 3 times daily 810 tablet 0     No current facility-administered medications for this visit.     Medications Discontinued During This Encounter   Medication Reason     albuterol (PROAIR HFA/PROVENTIL HFA/VENTOLIN HFA) 108 (90 Base) MCG/ACT inhaler      allopurinol (ZYLOPRIM) 100 MG tablet      calcitRIOL (ROCALTROL) 0.25 MCG  capsule Reorder (No AVS)     amLODIPine (NORVASC) 5 MG tablet      ipratropium (ATROVENT) 0.03 % nasal spray      NEORAL (BRAND) 25 MG capsule      losartan (COZAAR) 25 MG tablet Reorder (No AVS)       Physical Exam  Vital Signs: There were no vitals taken for this visit.  GENERAL APPEARANCE: alert and no distress  HENT: no obvious abnormalities on appearance  RESP: breathing appears unremarkable with normal rate, no audible wheezing or cough and no apparent shortness of breath with conversation  MS: extremities normal - no gross deformities noted  SKIN: no apparent rash and normal skin tone  NEURO: speech is clear with no obvious neurological deficits  PSYCH: mentation appears normal and affect normal     Data        Latest Ref Rng & Units 5/3/2024    10:43 AM 1/19/2024     4:01 PM 12/11/2023     8:17 AM   Renal   Sodium 135 - 145 mmol/L 136  137  137    K 3.4 - 5.3 mmol/L 4.0  4.8  5.2    Cl 98 - 107 mmol/L 104  105  107    Cl (external) 98 - 107 mmol/L 104  105  107    CO2 22 - 29 mmol/L 19  19  18    Urea Nitrogen 6.0 - 20.0 mg/dL 50.0  52.9  45.0    Creatinine 0.67 - 1.17 mg/dL 3.11  3.34  2.94    Glucose 70 - 99 mg/dL 92  87  113    Calcium 8.6 - 10.0 mg/dL 8.5  9.0  9.0          Latest Ref Rng & Units 1/18/2023     2:57 PM 6/20/2022     2:51 PM 4/25/2022    10:19 AM   Bone Health   Phosphorus 2.5 - 4.5 mg/dL 5.0  3.5  3.2    Parathyroid Hormone Intact 15 - 65 pg/mL 488  417     Vit D Def 20 - 75 ug/L 35  35           Latest Ref Rng & Units 5/3/2024    10:48 AM 2/13/2024     2:54 PM 1/19/2024     4:01 PM   Heme   WBC 4.0 - 11.0 10e3/uL 6.3   5.9    Hgb 13.3 - 17.7 g/dL 9.7  10.4  10.3    Plt 150 - 450 10e3/uL 206   229          Latest Ref Rng & Units 2/19/2024    12:57 PM 1/18/2023     2:57 PM 6/20/2022     2:51 PM   Liver   AP 40 - 150 U/L   81    TBili 0.2 - 1.3 mg/dL   1.0    ALT 0 - 70 U/L 10   21    AST 0 - 45 U/L 12   12    Tot Protein 6.8 - 8.8 g/dL   6.6    Albumin 3.4 - 5.0 g/dL  3.6  3.5              Latest Ref Rng & Units 5/3/2024    10:43 AM 2/13/2024     2:54 PM 1/16/2024     4:38 PM   Iron studies   Iron 61 - 157 ug/dL 70  90  57    Iron Sat Index 15 - 46 % 28  36  24    Ferritin 31 - 409 ng/mL 297  269  244          Latest Ref Rng & Units 8/23/2021    12:40 PM 8/17/2020    12:34 PM 9/13/2012     8:09 AM   [facility] Txp Virology   CVM DNA Quant   Whole blood, EDTA anticoagulant     CMV QUANT IU/ML CMVND^CMV DNA Not Detected [IU]/mL  CMV DNA Not Detected     LOG IU/ML OF CMVQNT <2.1 [Log_IU]/mL  Not Calculated     BK Spec   Plasma  PLASMA    BK Res BKNEG^BK Virus DNA Not Detected copies/mL  BK Virus DNA Not Detected  <1000    BK Log <2.7 Log copies/mL  Not Calculated  <3.0  The lower limit of detection for this assay is 1000 copies/mL.  Real-time TaqMan   PCR was performed using BK primers and probe for the detection of a 90 bp   portion of the  1 gene.  The performance characteristics were validated by   the [facility].   It has not been cleared or approved by the U.S. Food and Drug Administration.    EBV CAPSID ANTIBODY IGG No detectable antibody. Positive        Failed to redirect to the Timeline version of the REVFS SmartLink.      Recent Labs   Lab Test 12/31/21  1021   MPACID 2.36   MPAG 186.7*         Again, thank you for allowing me to participate in the care of your patient.        Sincerely,        Bryan Nickerson MD

## 2024-05-14 NOTE — PATIENT INSTRUCTIONS
Patient Recommendations:  - Increase allopurinol to 50mg daily   -I recommend a dermatology appointment for skin check  -I recommend Shingrix (Shingles) and PCV 20 (Prevnar 20) vaccines    Transplant Patient Information  Your Post Transplant Coordinator is: Ivanna Rivera  For non urgent items, we encourage you to contact your coordinator/care team online via [employer]  You and your care team can also contact your transplant coordinator Monday - Friday, 8am - 5pm at [phone] (Option 2 to reach the coordinator or Option 4 to schedule an appointment).  After hours for urgent matters, please call [facility] at [phone].

## 2024-05-14 NOTE — NURSING NOTE
Is the patient currently in the state of [place]? YES    Visit mode:VIDEO    If the visit is dropped, the patient can be reconnected by: VIDEO VISIT: Text to cell phone:   Telephone Information:   Mobile [phone]       Will anyone else be joining the visit? NO  (If patient encounters technical issues they should call [phone]    How would you like to obtain your AVS? MyChart    Are changes needed to the allergy or medication list? No    Are refills needed on medications prescribed by this physician? NO    Reason for visit: RECHECK    Marcel OHARA

## 2024-05-15 ENCOUNTER — TELEPHONE (OUTPATIENT)
Dept: INFUSION THERAPY | Facility: CLINIC | Age: 49
End: 2024-05-15
Payer: COMMERCIAL

## 2024-05-15 ENCOUNTER — TELEPHONE (OUTPATIENT)
Dept: NEPHROLOGY | Facility: CLINIC | Age: 49
End: 2024-05-15
Payer: COMMERCIAL

## 2024-05-15 ENCOUNTER — TELEPHONE (OUTPATIENT)
Dept: TRANSPLANT | Facility: CLINIC | Age: 49
End: 2024-05-15
Payer: COMMERCIAL

## 2024-05-15 ENCOUNTER — PATIENT OUTREACH (OUTPATIENT)
Dept: NEPHROLOGY | Facility: CLINIC | Age: 49
End: 2024-05-15
Payer: COMMERCIAL

## 2024-05-15 DIAGNOSIS — N18.4 CKD (CHRONIC KIDNEY DISEASE) STAGE 4, GFR 15-29 ML/MIN (H): Primary | ICD-10-CM

## 2024-05-15 NOTE — PROGRESS NOTES
Left vm for patient to return call to schedule an appointment with nephrology for follow up in between his yearly transplant appointments.   MATEO Nicholas Care Coordinator  Nephrology

## 2024-05-15 NOTE — TELEPHONE ENCOUNTER
ALL NEGATIVE ON THE FLU AND COVID   Please let them know [facility]    Phone Message    May a detailed message be left on voicemail: no     Reason for Call: Other: Patient called and wanted to speak with Yu about the June 11th appointment that she had offered him. Please call patient back to discuss.     Action Taken: Other: [facility] Nephrology    Travel Screening: Not Applicable

## 2024-05-15 NOTE — TELEPHONE ENCOUNTER
Transplant nephrology video visit w/Dr. Nickerson on 5/14/24.     PLAN:  Message  Received: 2 days ago  Denisse Stratton PA-C Blaisdell, Christin Rebecca, MATEO; Yu Husain, RN  He also has not been seen in general nephrology since 12/2022 so should get this set up soon.    Denisse       plan  Received: Yesterday  Bryan Nickerson MD Blaisdell, Ivanna Schafer, RN; Charisse Lizarraga RN Christin,     Please refer back for CKD journey, refer back to CKD JOHNNY for follow up.    Charisse,     He said he will be on board with whatever needs to be done. He will  the phone when you call to schedule follow up.    Bryan Young    OUTCOME: Patient has been scheduled to see Denisse Stratton. KHALIDA on June 25, 2024 with labs prior to visit on 6/19/24.     Ivanna Rivera RN, BSN  Solid Organ Transplant, Post Kidney and Pancreas  Transplant Care Coordinator  [phone]

## 2024-05-17 ENCOUNTER — TELEPHONE (OUTPATIENT)
Dept: TRANSPLANT | Facility: CLINIC | Age: 49
End: 2024-05-17
Payer: COMMERCIAL

## 2024-05-17 DIAGNOSIS — N18.9 CHRONIC KIDNEY DISEASE (CKD): Primary | ICD-10-CM

## 2024-05-17 DIAGNOSIS — I10 HYPERTENSION: ICD-10-CM

## 2024-05-17 DIAGNOSIS — Z76.82 ORGAN TRANSPLANT CANDIDATE: ICD-10-CM

## 2024-05-17 DIAGNOSIS — Z94.0 KIDNEY REPLACED BY TRANSPLANT: ICD-10-CM

## 2024-05-17 NOTE — TELEPHONE ENCOUNTER
Called Miguel Angel to discuss moving his evaluation forward. He has not been seen by the transplant team since 2021 and needs wait list appointments. He never completed the exercise echo from when he saw cardiology 10/2021. He still needs dermatology,and dental. Discussed the need for him  to sign and return the required regulatory documents. CAITLYN/KDPI forms resent. He was not in favor of attending the virtual MTP class but I explained it is a requirement for listing. He will be off work the week of Shanda 3 so he agreed to watch the MTP that day. Orders routed to scheduling.

## 2024-05-17 NOTE — TELEPHONE ENCOUNTER
"Writer attempted to call patient's home phone and received a message that the number was not in service.  Writer attempted to call patient's cell phone and there was no answer, it rang several times, then terminated the call in a \"busy\" signal.    Second attempt.    Javier GALINDO [facility]    "

## 2024-05-28 NOTE — TELEPHONE ENCOUNTER
PA Initiation    Medication: CALCITRIOL 0.25 MCG PO CAPS  Insurance Company: Comment:  [facility]  Pharmacy Filling the Rx: [id number] - [place], [place] - [address]  Filling Pharmacy Phone: [phone]  Filling Pharmacy Fax:    Start Date: 5/28/2024  Retail Pharmacy Prior Authorization Team   Phone: [phone]

## 2024-05-29 NOTE — TELEPHONE ENCOUNTER
Prior Authorization Approval    Medication: CALCITRIOL 0.25 MCG PO CAPS  Authorization Effective Date: 5/28/2024  Authorization Expiration Date: 5/28/2025  Approved Dose/Quantity:   Reference #:     Insurance Company: Comment:  [employer]  Expected CoPay: $    CoPay Card Available:      Financial Assistance Needed: No  Which Pharmacy is filling the prescription: [id number] - [place], [place] - [address]  Pharmacy Notified: Yes  Patient Notified: Pharmacy will notify the patient.

## 2024-05-30 ENCOUNTER — MYC MEDICAL ADVICE (OUTPATIENT)
Dept: SURGERY | Facility: CLINIC | Age: 49
End: 2024-05-30
Payer: COMMERCIAL

## 2024-05-30 DIAGNOSIS — E87.8 LOW BICARBONATE LEVEL: Primary | ICD-10-CM

## 2024-05-30 DIAGNOSIS — M1A.9XX1 CHRONIC TOPHACEOUS GOUT: ICD-10-CM

## 2024-05-30 DIAGNOSIS — Z79.899 ON ALLOPURINOL THERAPY: ICD-10-CM

## 2024-05-30 DIAGNOSIS — Z94.0 KIDNEY TRANSPLANTED: ICD-10-CM

## 2024-05-30 DIAGNOSIS — E87.20 METABOLIC ACIDOSIS: ICD-10-CM

## 2024-05-30 RX ORDER — SODIUM BICARBONATE 650 MG/1
1950 TABLET ORAL 3 TIMES DAILY
Qty: 810 TABLET | Refills: 0 | Status: SHIPPED | OUTPATIENT
Start: 2024-05-30

## 2024-05-30 RX ORDER — METHYLPREDNISOLONE 4 MG
TABLET, DOSE PACK ORAL
Qty: 21 TABLET | Refills: 1 | Status: SHIPPED | OUTPATIENT
Start: 2024-05-30 | End: 2024-08-05

## 2024-05-30 NOTE — TELEPHONE ENCOUNTER
"Per 2/19/24 Rheum note:    \"He does not use Medrol consistently but does use it for flares and does feel that it continues to help control the flares. His tophi are stable. No new tophi. At this time given the control of the gout with allopurinol and the occasional Medrol we will continue that course. Allopurinol was refilled at 50 mg every other day. Medrol was refilled as well. \"    Medrol dosepak was last Rx'd on 4/15/24, #21 tabs with 1 refill.    Sent patient a [employer] message requesting an update if he's used both of these prescriptions already? (Possibly an automatic refill request otherwise)    Waiting for response.  Cindy Gutierrez RN on 5/30/2024 at 8:29 AM        "

## 2024-05-30 NOTE — TELEPHONE ENCOUNTER
Requested Prescriptions   Pending Prescriptions Disp Refills    methylPREDNISolone (MEDROL DOSEPAK) 4 MG tablet therapy pack 21 tablet 1     Sig: Follow Package Directions       There is no refill protocol information for this order          Last office visit: 2/19/2024 ; last virtual visit: Visit date not found with prescribing provider:  Isela Jean       Future Office Visit:      Sandra Donnelly   [facility] [place]   [facility]  [phone] ( Please do not share number with patient)

## 2024-05-30 NOTE — TELEPHONE ENCOUNTER
Patient responded via [employer]--see response from 5/30/24 for more details.    Rx refilled per specialty scope of practice.    Cindy Gutierrez RN on 5/30/2024 at 8:41 AM

## 2024-06-03 ENCOUNTER — VIRTUAL VISIT (OUTPATIENT)
Dept: TRANSPLANT | Facility: CLINIC | Age: 49
End: 2024-06-03
Attending: SURGERY
Payer: COMMERCIAL

## 2024-06-03 DIAGNOSIS — Z76.82 ORGAN TRANSPLANT CANDIDATE: Primary | ICD-10-CM

## 2024-06-03 DIAGNOSIS — Z01.818 PRE-TRANSPLANT EVALUATION FOR KIDNEY TRANSPLANT: ICD-10-CM

## 2024-06-03 NOTE — LETTER
6/3/2024         RE: Miguel Angel Piña  [address] [address]        Dear Colleague,    Thank you for referring your patient, Miguel Angel Piña, to the [facility] TRANSPLANT CLINIC. Please see a copy of my visit note below.    No notes on file    Again, thank you for allowing me to participate in the care of your patient.        Sincerely,        Transplant Class

## 2024-06-03 NOTE — GROUP NOTE
"Date: 6/3/2024    Scheduled Start Time:  8:00 AM   Scheduled End Time:  9:15 AM    Department: [facility] Transplant [facility]    Facilitator(s): Megan Robles RN    Documentation:  Solid Organ Transplant Education      Patient attended the pre-transplant patient education class today. The [facility] website pre-transplant modules were viewed:     Content reviewed:  Living Donation and how to access that program  Paired exchange  Kidney Donor Profile Index (KDPI)  Waiting list issues (right to decline without penalty, high PHS risk donors, what to expect when called with an offer)  Hospital experience, length of stay, need to stay locally post-discharge (2-4 weeks)                        Post-surgery lifting and driving restrictions  Post-transplant routines, frequency of lab work and clinic visits  Role of Transplant Coordinator    Participants were informed of the benefits of transplant as well as potential risks such as infection, cancer, and death. The need for total adherence with immunosuppression medications and following transplant regimens was stressed.  The overall evaluation/approval/listing process was reviewed.    Patient signed the  Receipt of Information for Organ Transplant Recipient.\"  Patient was provided contact information for RNCC and instructed to call with additional questions.    Pt was engaged with learning.      Patient:   Miguel Angel Piña    Transplant Episode(s):  Kidney Transplant Evaluation - 8/23/2021    Pt attended class alone  "

## 2024-06-04 NOTE — PROGRESS NOTES
Anemia Management Note - Follow Up      SUBJECTIVE/OBJECTIVE:    Referred by Dr. Lan Patino on 10/12/2023  Primary Diagnosis: Anemia in Chronic Kidney Disease (N18.3, D63.1)     Secondary Diagnosis:  Organ or tissue replaced by transplant, kidney (Z94.0)  Hgb goal range:  9-10  Epo/Darbo:   Retacrit 10,000 units subcutaneous every week, Hgb <10, AT HOME  *2/15/24: No doses at home.  Holding off on refilling.   Iron regimen:  Daily oral iron. He will try to BID dosing  Labs : 315944  Recent CINDY use, transfusion, IV iron: None     RX/TX plans : No Active Rx.   No MI and blood clots or cancers. 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     Contact:  No consent to communicate on file        Latest Ref Rng & Units 2023 2024 2024 2024 2024 2024 5/3/2024   Anemia   CINDY Dose   10,000 units 10,000 units       Hemoglobin 13.3 - 17.7 g/dL 8.5    10.0  10.3  10.4  9.7    TSAT 15 - 46 %    24   36  28    Ferritin 31 - 409 ng/mL    244   269  297      BP Readings from Last 3 Encounters:   24 (!) 142/100   24 (!) 144/80   22 136/84     Wt Readings from Last 2 Encounters:   24 108 kg (238 lb 3.2 oz)   24 108.8 kg (239 lb 12.8 oz)         ASSESSMENT:    Hgb:At goal - recommend dose  TSat: not at goal of >30% Ferritin: At goal (>100ng/mL)        PLAN:  Spoke with Miguel Angel.  He is feeling good and wants to hold on ordering any CINDY doses for at home. Will follow up again in one month.  Miguel Angel agreed with this plan.     Orders needed to be renewed (for next follow-up date) in EPIC: None    Iron labs due:  2024    Plan discussed with:  Miguel Angel      NEXT FOLLOW-UP DATE:  24    Janna Louis RN   Anemia Services  [facility]  [email or web address]   Office : [phone]  Fax: [phone]      
Labs are schedule for 6/19/24.   Janna Louis RN   Anemia Services  [facility]  [email or web address]   Office : [phone]  Fax: [phone]    
[facility] AT HOME

## 2024-06-09 ENCOUNTER — HEALTH MAINTENANCE LETTER (OUTPATIENT)
Age: 49
End: 2024-06-09

## 2024-06-19 ENCOUNTER — LAB (OUTPATIENT)
Dept: LAB | Facility: CLINIC | Age: 49
End: 2024-06-19
Payer: COMMERCIAL

## 2024-06-19 DIAGNOSIS — N18.30 STAGE 3 CHRONIC KIDNEY DISEASE, UNSPECIFIED WHETHER STAGE 3A OR 3B CKD (H): ICD-10-CM

## 2024-06-19 DIAGNOSIS — N18.30 ANEMIA IN STAGE 3 CHRONIC KIDNEY DISEASE, UNSPECIFIED WHETHER STAGE 3A OR 3B CKD (H): ICD-10-CM

## 2024-06-19 DIAGNOSIS — D63.1 ANEMIA IN STAGE 3 CHRONIC KIDNEY DISEASE, UNSPECIFIED WHETHER STAGE 3A OR 3B CKD (H): ICD-10-CM

## 2024-06-19 DIAGNOSIS — Z94.0 KIDNEY TRANSPLANTED: Primary | ICD-10-CM

## 2024-06-19 DIAGNOSIS — N18.4 CKD (CHRONIC KIDNEY DISEASE) STAGE 4, GFR 15-29 ML/MIN (H): ICD-10-CM

## 2024-06-19 LAB
ALBUMIN UR-MCNC: >=300 MG/DL
APPEARANCE UR: CLEAR
BILIRUB UR QL STRIP: NEGATIVE
COLOR UR AUTO: YELLOW
GLUCOSE UR STRIP-MCNC: NEGATIVE MG/DL
HGB BLD-MCNC: 8.7 G/DL (ref 13.3–17.7)
HGB UR QL STRIP: ABNORMAL
KETONES UR STRIP-MCNC: NEGATIVE MG/DL
LEUKOCYTE ESTERASE UR QL STRIP: NEGATIVE
MUCOUS THREADS #/AREA URNS LPF: PRESENT /LPF
NITRATE UR QL: NEGATIVE
PH UR STRIP: 7 [PH] (ref 5–7)
RBC #/AREA URNS AUTO: ABNORMAL /HPF
SP GR UR STRIP: 1.02 (ref 1–1.03)
SQUAMOUS #/AREA URNS AUTO: ABNORMAL /LPF
UROBILINOGEN UR STRIP-ACNC: 1 E.U./DL
WBC #/AREA URNS AUTO: ABNORMAL /HPF

## 2024-06-19 PROCEDURE — 85018 HEMOGLOBIN: CPT

## 2024-06-19 PROCEDURE — 84156 ASSAY OF PROTEIN URINE: CPT

## 2024-06-19 PROCEDURE — 80069 RENAL FUNCTION PANEL: CPT

## 2024-06-19 PROCEDURE — 83540 ASSAY OF IRON: CPT

## 2024-06-19 PROCEDURE — 83550 IRON BINDING TEST: CPT

## 2024-06-19 PROCEDURE — 82728 ASSAY OF FERRITIN: CPT

## 2024-06-19 PROCEDURE — 81001 URINALYSIS AUTO W/SCOPE: CPT

## 2024-06-19 PROCEDURE — 36415 COLL VENOUS BLD VENIPUNCTURE: CPT

## 2024-06-19 NOTE — PROGRESS NOTES
Lab orders re-entered. Monthly frequency (every 4 weeks w/anemia labs) given declining renal function. Followed by CKD Journey. Will see Denisse Stratton on 6/25/24.

## 2024-06-20 ENCOUNTER — PATIENT OUTREACH (OUTPATIENT)
Dept: CARE COORDINATION | Facility: CLINIC | Age: 49
End: 2024-06-20
Payer: COMMERCIAL

## 2024-06-20 DIAGNOSIS — N18.30 ANEMIA IN STAGE 3 CHRONIC KIDNEY DISEASE, UNSPECIFIED WHETHER STAGE 3A OR 3B CKD (H): Primary | ICD-10-CM

## 2024-06-20 DIAGNOSIS — D63.1 ANEMIA IN STAGE 3 CHRONIC KIDNEY DISEASE, UNSPECIFIED WHETHER STAGE 3A OR 3B CKD (H): Primary | ICD-10-CM

## 2024-06-20 DIAGNOSIS — N18.30 STAGE 3 CHRONIC KIDNEY DISEASE, UNSPECIFIED WHETHER STAGE 3A OR 3B CKD (H): ICD-10-CM

## 2024-06-20 LAB
ALBUMIN MFR UR ELPH: 122 MG/DL
ALBUMIN SERPL BCG-MCNC: 3.7 G/DL (ref 3.5–5.2)
ANION GAP SERPL CALCULATED.3IONS-SCNC: 13 MMOL/L (ref 7–15)
BUN SERPL-MCNC: 53.6 MG/DL (ref 6–20)
CALCIUM SERPL-MCNC: 8.1 MG/DL (ref 8.6–10)
CHLORIDE SERPL-SCNC: 105 MMOL/L (ref 98–107)
CREAT SERPL-MCNC: 3.31 MG/DL (ref 0.67–1.17)
CREAT UR-MCNC: 78.9 MG/DL
DEPRECATED HCO3 PLAS-SCNC: 18 MMOL/L (ref 22–29)
EGFRCR SERPLBLD CKD-EPI 2021: 22 ML/MIN/1.73M2
FERRITIN SERPL-MCNC: 317 NG/ML (ref 31–409)
GLUCOSE SERPL-MCNC: 109 MG/DL (ref 70–99)
IRON BINDING CAPACITY (ROCHE): 225 UG/DL (ref 240–430)
IRON SATN MFR SERPL: 34 % (ref 15–46)
IRON SERPL-MCNC: 77 UG/DL (ref 61–157)
PHOSPHATE SERPL-MCNC: 5 MG/DL (ref 2.5–4.5)
POTASSIUM SERPL-SCNC: 4.3 MMOL/L (ref 3.4–5.3)
PROT/CREAT 24H UR: 1.55 MG/MG CR (ref 0–0.2)
SODIUM SERPL-SCNC: 136 MMOL/L (ref 135–145)

## 2024-06-20 RX ORDER — EPOETIN ALFA-EPBX 10000 [IU]/ML
10000 INJECTION, SOLUTION INTRAVENOUS; SUBCUTANEOUS
Qty: 4 ML | Refills: 11 | Status: SHIPPED | OUTPATIENT
Start: 2024-06-20

## 2024-06-20 NOTE — PROGRESS NOTES
Anemia Management Note - Follow Up      SUBJECTIVE/OBJECTIVE:    Referred by Dr. Lan Patino on 10/12/2023  Primary Diagnosis: Anemia in Chronic Kidney Disease (N18.3, D63.1)     Secondary Diagnosis:  Organ or tissue replaced by transplant, kidney (Z94.0)  Hgb goal range:  9-10  Epo/Darbo:   Retacrit 10,000 units subcutaneous every week, Hgb <10, AT HOME  24 Sent new Rx to [facility]  *2/15/24: No doses at home.  Holding off on refilling.   Iron regimen:  Daily oral iron. He will try to BID dosing  Labs : [id number]  Recent CINDY use, transfusion, IV iron: None     RX/TX plans : 2025  No MI and blood clots or cancers. 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     Contact:  No consent to communicate on file           Latest Ref Rng & Units 2024 2024 2024 2024 2024 5/3/2024 2024   Anemia   CINDY Dose  10,000 units 10,000 units        Hemoglobin 13.3 - 17.7 g/dL   10.0  10.3  10.4  9.7  8.7    TSAT 15 - 46 %   24   36  28  34    Ferritin 31 - 409 ng/mL   244   269  297  317         BP Readings from Last 3 Encounters:   24 (!) 142/100   24 (!) 144/80   22 136/84     Wt Readings from Last 2 Encounters:   24 108 kg (238 lb 3.2 oz)   24 108.8 kg (239 lb 12.8 oz)           ASSESSMENT:    Hgb:Not at goal/Known  TSat: at goal Ferritin: at goal         PLAN:  Iron levels were not drawn on 24. Was able to add-on,  results are pending. Miguel Angel's Hgb dropped as well.  He has no CINDY at home. Will follow up with Miguel Angel after Iron levels are back.    *New Retacrit rx sent to [facility].  Message sent to Miguel Angel via [email or web address]    Orders needed to be renewed (for next follow-up date) in EPIC: None    Iron labs due:  End of 2024    Plan discussed with:  Miguel Angel via [employer]       NEXT FOLLOW-UP DATE:  24    Janna Louis RN   [facility]  [email or web address]   Office : [phone]  Fax: [phone]

## 2024-07-01 ENCOUNTER — PATIENT OUTREACH (OUTPATIENT)
Dept: CARE COORDINATION | Facility: CLINIC | Age: 49
End: 2024-07-01
Payer: COMMERCIAL

## 2024-07-01 NOTE — PROGRESS NOTES
Anemia Management Note - Reminder     Follow-up with anemia management service:    Miguel Angel has not read/replied to the [employer] message regarding restarting CINDY.   Miguel Angel was a no show for his 6/25/24 Nephrology appt and has not yet been rescheduled.    Will follow up again in one week.         Latest Ref Rng & Units 1/4/2024 1/11/2024 1/16/2024 1/19/2024 2/13/2024 5/3/2024 6/19/2024   Anemia   CINDY Dose  10,000 units 10,000 units        Hemoglobin 13.3 - 17.7 g/dL   10.0  10.3  10.4  9.7  8.7    TSAT 15 - 46 %   24   36  28  34    Ferritin 31 - 409 ng/mL   244   269  297  317             Follow-up call date: 7/8/24    Janna Louis RN   Anemia Services  [facility]  [email or web address]   Office : [phone]  Fax: [phone]

## 2024-07-08 ENCOUNTER — TELEPHONE (OUTPATIENT)
Dept: INFUSION THERAPY | Facility: CLINIC | Age: 49
End: 2024-07-08

## 2024-07-08 NOTE — TELEPHONE ENCOUNTER
PA Initiation    Medication: RETACRIT 00312 UNIT/ML IJ SOLN  Insurance Company: [employer] [place] - Phone [phone] Fax [phone]  Pharmacy Filling the Rx: [facility] - [place], [place] - [facility] [address]  Filling Pharmacy Phone: [phone]  Filling Pharmacy Fax: [phone]  Start Date: 7/8/2024

## 2024-07-08 NOTE — PROGRESS NOTES
Message sent to [facility] re Procrit PA approval/co pay.  Waiting to hear back from the pharmacy before calling Miguel Angel.       Janna Louis RN   [facility]  [email or web address]   Office : [phone]  Fax: [phone]

## 2024-07-09 ENCOUNTER — OFFICE VISIT (OUTPATIENT)
Dept: FAMILY MEDICINE | Facility: CLINIC | Age: 49
End: 2024-07-09
Payer: COMMERCIAL

## 2024-07-09 VITALS
SYSTOLIC BLOOD PRESSURE: 130 MMHG | WEIGHT: 244.6 LBS | HEART RATE: 89 BPM | RESPIRATION RATE: 18 BRPM | OXYGEN SATURATION: 97 % | HEIGHT: 68 IN | DIASTOLIC BLOOD PRESSURE: 86 MMHG | BODY MASS INDEX: 37.07 KG/M2 | TEMPERATURE: 97.7 F

## 2024-07-09 DIAGNOSIS — Z94.0 KIDNEY REPLACED BY TRANSPLANT: ICD-10-CM

## 2024-07-09 DIAGNOSIS — D63.1 ANEMIA IN STAGE 4 CHRONIC KIDNEY DISEASE (H): ICD-10-CM

## 2024-07-09 DIAGNOSIS — Z87.39 HX OF GOUT: ICD-10-CM

## 2024-07-09 DIAGNOSIS — M10.9 ACUTE GOUT OF RIGHT FOOT, UNSPECIFIED CAUSE: ICD-10-CM

## 2024-07-09 DIAGNOSIS — M79.671 RIGHT FOOT PAIN: ICD-10-CM

## 2024-07-09 DIAGNOSIS — L03.115 CELLULITIS OF RIGHT LOWER EXTREMITY: Primary | ICD-10-CM

## 2024-07-09 DIAGNOSIS — N18.4 ANEMIA IN STAGE 4 CHRONIC KIDNEY DISEASE (H): ICD-10-CM

## 2024-07-09 DIAGNOSIS — J20.9 ACUTE BRONCHITIS, UNSPECIFIED ORGANISM: ICD-10-CM

## 2024-07-09 PROCEDURE — 99214 OFFICE O/P EST MOD 30 MIN: CPT | Performed by: NURSE PRACTITIONER

## 2024-07-09 PROCEDURE — G2211 COMPLEX E/M VISIT ADD ON: HCPCS | Performed by: NURSE PRACTITIONER

## 2024-07-09 RX ORDER — PREDNISONE 20 MG/1
40 TABLET ORAL DAILY
Qty: 10 TABLET | Refills: 0 | Status: SHIPPED | OUTPATIENT
Start: 2024-07-09 | End: 2024-07-14

## 2024-07-09 RX ORDER — DOXYCYCLINE 100 MG/1
100 CAPSULE ORAL 2 TIMES DAILY
Qty: 14 CAPSULE | Refills: 0 | Status: SHIPPED | OUTPATIENT
Start: 2024-07-09 | End: 2024-07-16

## 2024-07-09 ASSESSMENT — PAIN SCALES - GENERAL: PAINLEVEL: MODERATE PAIN (4)

## 2024-07-09 ASSESSMENT — ENCOUNTER SYMPTOMS: COUGH: 1

## 2024-07-09 NOTE — PROGRESS NOTES
"  {PROVIDER CHARTING PREFERENCE:280513}    Sandi Michelle is a 48 year old, presenting for the following health issues:  Cough, Foot Problems, and Infection  Needs work note for today July 9th, 2024.  Had cough in June now symptoms are back.  Right foot thinks possible infections X 1 week, is red, not overly warm, hurts to walk.        7/9/2024     9:41 AM   Additional Questions   Roomed by Shahla SALDAÑA     Cough    History of Present Illness       Reason for visit:  Cough  Symptom onset:  3-7 days ago  Symptoms include:  Mucas  Symptom intensity:  Mild  Symptom progression:  Staying the same  Had these symptoms before:  Yes  Has tried/received treatment for these symptoms:  Yes  Previous treatment was successful:  No  What makes it worse:  Laying down  What makes it better:  Sitting up    He eats 0-1 servings of fruits and vegetables daily.He consumes 2 sweetened beverage(s) daily.He exercises with enough effort to increase his heart rate 9 or less minutes per day.  He exercises with enough effort to increase his heart rate 7 days per week. He is missing 1 dose(s) of medications per week.  He is not taking prescribed medications regularly due to remembering to take.       {MA/LPN/RN Pre-Provider Visit Orders- hCG/UA/Strep (Optional):516462}  {SUPERLIST (Optional):034350}  {additonal problems for provider to add (Optional):833556}    {ROS Picklists (Optional):429719}      Objective    /86   Pulse 89   Temp 97.7  F (36.5  C) (Temporal)   Resp 18   Ht 1.72 m (5' 7.72\")   Wt 110.9 kg (244 lb 9.6 oz)   SpO2 97%   BMI 37.50 kg/m    Body mass index is 37.5 kg/m .  Physical Exam   {Exam List (Optional):360129}    {Diagnostic Test Results (Optional):443933}        Signed Electronically by: SHAHLA TAYLOR NP  {Email feedback regarding this note to [email or web address]   :560111}  "

## 2024-07-09 NOTE — PROGRESS NOTES
"  Assessment & Plan     Kidney replaced by transplant    - doxycycline hyclate (VIBRAMYCIN) 100 MG capsule; Take 1 capsule (100 mg) by mouth 2 times daily for 7 days    Acute gout of right foot, unspecified cause    - predniSONE (DELTASONE) 20 MG tablet; Take 2 tablets (40 mg) by mouth daily for 5 days Take in the morning with food; take first dose right away  - Orthopedic  Referral; Future    Cellulitis of right lower extremity    - doxycycline hyclate (VIBRAMYCIN) 100 MG capsule; Take 1 capsule (100 mg) by mouth 2 times daily for 7 days  - Orthopedic  Referral; Future    Anemia in stage 4 chronic kidney disease (H)    - doxycycline hyclate (VIBRAMYCIN) 100 MG capsule; Take 1 capsule (100 mg) by mouth 2 times daily for 7 days    Acute bronchitis, unspecified organism    - doxycycline hyclate (VIBRAMYCIN) 100 MG capsule; Take 1 capsule (100 mg) by mouth 2 times daily for 7 days    Right foot pain    - Orthopedic  Referral; Future    Hx of gout      BMI  Estimated body mass index is 37.5 kg/m  as calculated from the following:    Height as of this encounter: 1.72 m (5' 7.72\").    Weight as of this encounter: 110.9 kg (244 lb 9.6 oz).         Work on weight loss  Regular exercise  See Patient Instructions: Advised take full course of antibiotics with daily yogurt or probiotics.  Take prednisone in the morning with food.  If symptoms do not improve he needs to see specialist if symptoms worsen follow-up for further evaluation    Subjective   Miguel Angel is a 48 year old, presenting for the following health issues:  Cough, Foot Problems, and Infection  Needs work note for today July 9th, 2024. Works for [employer]. Grandchild was sick recently with runny nose.   Had cough in June now symptoms are back. Hx of kidney transplant on immune suppressing medication.  Cough has purulent green tinged sputum. Denies fever or SOB. No chronic lung issues. Non smoker.      Hx of gout- has not been taking his " "preventative gout medication.  Restarted medication and inflammation went down a little in foot.  Right foot thinks possible infections X 1 week, is red, not overly warm, hurts to walk. He reports he needs to work on diet.       7/9/2024     9:50 AM   Additional Questions   Roomed by Shahla SALDAÑA         7/9/2024   Forms   Any forms needing to be completed Yes       Cough    History of Present Illness       Reason for visit:  Cough  Symptom onset:  3-7 days ago  Symptoms include:  Mucas  Symptom intensity:  Mild  Symptom progression:  Staying the same  Had these symptoms before:  Yes  Has tried/received treatment for these symptoms:  Yes  Previous treatment was successful:  No  What makes it worse:  Laying down  What makes it better:  Sitting up    He eats 0-1 servings of fruits and vegetables daily.He consumes 2 sweetened beverage(s) daily.He exercises with enough effort to increase his heart rate 9 or less minutes per day.  He exercises with enough effort to increase his heart rate 7 days per week. He is missing 1 dose(s) of medications per week.  He is not taking prescribed medications regularly due to remembering to take.         Review of Systems  Constitutional, HEENT, cardiovascular, pulmonary, GI, , musculoskeletal, neuro, skin, endocrine and psych systems are negative, except as otherwise noted.      Objective    /86   Pulse 89   Temp 97.7  F (36.5  C) (Temporal)   Resp 18   Ht 1.72 m (5' 7.72\")   Wt 110.9 kg (244 lb 9.6 oz)   SpO2 97%   BMI 37.50 kg/m    Body mass index is 37.5 kg/m .  Physical Exam   GENERAL: alert and no distress  EYES: Eyes grossly normal to inspection  HENT: ear canals and TM's normal, nose and mouth without ulcers or lesions  NECK: no adenopathy  RESP: POSITIVE coarse inspiration on RLL  CV: regular rate and rhythm, normal S1 S2, no S3 or S4, no murmur, click or rub, no peripheral edema  MS: POSITIVE redness, warmth swelling and pain near 5th metatarsal; possible " bunion  PSYCH: mentation appears normal, affect normal/bright    See orders      The longitudinal plan of care for the diagnosis(es)/condition(s) as documented were addressed during this visit. Due to the added complexity in care, I will continue to support Miguel Angel in the subsequent management and with ongoing continuity of care.      Signed Electronically by: MACKENZIE GORE

## 2024-07-09 NOTE — LETTER
July 9, 2024      Miguel Angel Piña  [address] [address]        To Whom It May Concern:    Miguel Angel CAROL Piña  was seen on 07/09/2024.  Please excuse his work absence.         Sincerely,        MACKENZIE GORE     Holding RN to OR RN

## 2024-07-11 NOTE — PROGRESS NOTES
As of 7/10/24 PA for Retacrit is still pending.     Janna Louis RN   [facility]  [facility]  [email or web address]   Office : [phone]  Fax: [phone]

## 2024-07-12 NOTE — TELEPHONE ENCOUNTER
PRIOR AUTHORIZATION DENIED    Medication: RETACRIT 19530 UNIT/ML IJ [facility]  Insurance Company: [employer] [place] - Phone [phone] Fax [phone]  Denial Date: 7/11/2024  Denial Reason(s): see attached  Appeal Information: see attached  Patient Notified: yes

## 2024-07-15 ENCOUNTER — LAB (OUTPATIENT)
Dept: LAB | Facility: CLINIC | Age: 49
End: 2024-07-15
Payer: COMMERCIAL

## 2024-07-15 DIAGNOSIS — N18.9 ANEMIA IN CHRONIC RENAL DISEASE: ICD-10-CM

## 2024-07-15 DIAGNOSIS — D63.1 ANEMIA IN CHRONIC RENAL DISEASE: ICD-10-CM

## 2024-07-15 DIAGNOSIS — Z94.0 KIDNEY TRANSPLANTED: ICD-10-CM

## 2024-07-15 DIAGNOSIS — N18.30 STAGE 3 CHRONIC KIDNEY DISEASE, UNSPECIFIED WHETHER STAGE 3A OR 3B CKD (H): ICD-10-CM

## 2024-07-15 DIAGNOSIS — Z94.0 KIDNEY REPLACED BY TRANSPLANT: ICD-10-CM

## 2024-07-15 LAB
ERYTHROCYTE [DISTWIDTH] IN BLOOD BY AUTOMATED COUNT: 13.3 % (ref 10–15)
HCT VFR BLD AUTO: 29.8 % (ref 40–53)
HGB BLD-MCNC: 9.8 G/DL (ref 13.3–17.7)
MCH RBC QN AUTO: 30.5 PG (ref 26.5–33)
MCHC RBC AUTO-ENTMCNC: 32.9 G/DL (ref 31.5–36.5)
MCV RBC AUTO: 93 FL (ref 78–100)
PLATELET # BLD AUTO: 196 10E3/UL (ref 150–450)
RBC # BLD AUTO: 3.21 10E6/UL (ref 4.4–5.9)
WBC # BLD AUTO: 7.6 10E3/UL (ref 4–11)

## 2024-07-15 PROCEDURE — 80048 BASIC METABOLIC PNL TOTAL CA: CPT

## 2024-07-15 PROCEDURE — 80158 DRUG ASSAY CYCLOSPORINE: CPT

## 2024-07-15 PROCEDURE — 36415 COLL VENOUS BLD VENIPUNCTURE: CPT

## 2024-07-15 PROCEDURE — 83540 ASSAY OF IRON: CPT

## 2024-07-15 PROCEDURE — 83550 IRON BINDING TEST: CPT

## 2024-07-15 PROCEDURE — 85027 COMPLETE CBC AUTOMATED: CPT

## 2024-07-15 PROCEDURE — 82728 ASSAY OF FERRITIN: CPT

## 2024-07-16 ENCOUNTER — PATIENT OUTREACH (OUTPATIENT)
Dept: CARE COORDINATION | Facility: CLINIC | Age: 49
End: 2024-07-16
Payer: COMMERCIAL

## 2024-07-16 LAB
ANION GAP SERPL CALCULATED.3IONS-SCNC: 17 MMOL/L (ref 7–15)
BUN SERPL-MCNC: 70.3 MG/DL (ref 6–20)
CALCIUM SERPL-MCNC: 8.9 MG/DL (ref 8.8–10.4)
CHLORIDE SERPL-SCNC: 103 MMOL/L (ref 98–107)
CREAT SERPL-MCNC: 3.63 MG/DL (ref 0.67–1.17)
CYCLOSPORINE BLD LC/MS/MS-MCNC: 58 UG/L (ref 50–400)
EGFRCR SERPLBLD CKD-EPI 2021: 20 ML/MIN/1.73M2
FERRITIN SERPL-MCNC: 313 NG/ML (ref 31–409)
GLUCOSE SERPL-MCNC: 140 MG/DL (ref 70–99)
HCO3 SERPL-SCNC: 16 MMOL/L (ref 22–29)
IRON BINDING CAPACITY (ROCHE): 253 UG/DL (ref 240–430)
IRON SATN MFR SERPL: 13 % (ref 15–46)
IRON SERPL-MCNC: 34 UG/DL (ref 61–157)
POTASSIUM SERPL-SCNC: 5 MMOL/L (ref 3.4–5.3)
SODIUM SERPL-SCNC: 136 MMOL/L (ref 135–145)
TME LAST DOSE: NORMAL H
TME LAST DOSE: NORMAL H

## 2024-07-16 NOTE — PROGRESS NOTES
Anemia Management Note - Follow Up      SUBJECTIVE/OBJECTIVE:    Referred by Dr. Lan Patino on 10/12/2023  Primary Diagnosis: Anemia in Chronic Kidney Disease (N18.3, D63.1)     Secondary Diagnosis:  Organ or tissue replaced by transplant, kidney (Z94.0)  Hgb goal range:  9-10  Epo/Darbo:   Retacrit 10,000 units subcutaneous every week, Hgb <10, AT HOME  7/15/24: Retacrit was denied.   24 Sent new Rx to [facility]  *2/15/24: No doses at home.  Holding off on refilling.   Iron regimen:  Daily oral iron. He will try to BID dosing  Labs : [id number]  Recent CINDY use, transfusion, IV iron: None     RX/TX plans : 2025  No MI and blood clots or cancers. 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     Contact:  No consent to communicate on file        Latest Ref Rng & Units 2024 2024 2024 2024 5/3/2024 2024 7/15/2024   Anemia   CINDY Dose  10,000 units         Hemoglobin 13.3 - 17.7 g/dL  10.0  10.3  10.4  9.7  8.7  9.8    TSAT 15 - 46 %  24   36  28  34     Ferritin 31 - 409 ng/mL  244   269  297  317          BP Readings from Last 3 Encounters:   24 130/86   24 (!) 142/100   24 (!) 144/80     Wt Readings from Last 2 Encounters:   24 110.9 kg (244 lb 9.6 oz)   24 108 kg (238 lb 3.2 oz)           ASSESSMENT:    Hgb:at goal - continue to monitor  TSat: at goal >30% Ferritin: At goal (>100ng/mL)        PLAN:  Hgb improved yo 9.8.  Retacrit was denied.   If/when Miguel Angel needs CINDY, he may need to go to clinic for Aranesp. Will monitor Hgb.     Orders needed to be renewed (for next follow-up date) in EPIC: None    Iron labs due:  Pending    Plan discussed with:  No call, chart review       NEXT FOLLOW-UP DATE:  24    Janna Louis RN   Anemia Services  [facility]  [email or web address]   Office : [phone]  Fax: [phone]

## 2024-07-18 ENCOUNTER — TELEPHONE (OUTPATIENT)
Dept: TRANSPLANT | Facility: CLINIC | Age: 49
End: 2024-07-18
Payer: COMMERCIAL

## 2024-07-18 DIAGNOSIS — Z48.298 AFTERCARE FOLLOWING ORGAN TRANSPLANT: ICD-10-CM

## 2024-07-18 DIAGNOSIS — Z79.899 ENCOUNTER FOR LONG-TERM CURRENT USE OF MEDICATION: ICD-10-CM

## 2024-07-18 DIAGNOSIS — Z98.890 OTHER SPECIFIED POSTPROCEDURAL STATES: ICD-10-CM

## 2024-07-18 DIAGNOSIS — Z94.0 KIDNEY TRANSPLANTED: ICD-10-CM

## 2024-07-18 DIAGNOSIS — Z94.0 KIDNEY REPLACED BY TRANSPLANT: Primary | ICD-10-CM

## 2024-07-18 NOTE — TELEPHONE ENCOUNTER
"ISSUE:   Cyclosporine level 58 on 7/15/24 15:22, goal , Neoral dose 100 mg BID. Last dose date/time recorded as 7/15/24 3:30 AM.    Creatinine 3.63, baseline 2.9-3.3. No Showed to nephrology appt w/Denisse Stratton on 6/25/24. Reschedule.  Bicarbonate 16, supplementing with sodium bicarbonate 1,950 mg (3 tabs) TID?    PLAN:   Call Patient and confirm this was an accurate 12-hour trough.   Verify Cyclosporine dose 100 mg BID.   Confirm no new medications or or missed doses.   Confirm no new illness / infection / diarrhea.   If accurate trough and accurate dose, increase Cyclosporine dose to 125 mg BID     Is this more than a 50% increase or decrease in current IS dose: No  If YES, justification: N/A    Repeat labs in 2 weeks.  *If > 50% change in immunosuppression dose, repeat labs in 1 week.     OUTCOME: MyChart sent to patient after attempted to call twice with abrupt disconnect.    Mason Michelle,     I tried unsuccessfully to reach you by phone x2. I wanted to review your recent lab work from 7/15/24 3:22 PM.     -Creatinine further elevated to 3.63, baseline 2.9-3.3. Denisse Julien said you missed your nephrology visit with her scheduled on 6/25/24. Recommend rescheduling as soon as possible for appropriate chronic kidney disease management. (Please note that \"no-show\" to appointments can effect your kidney evaluation status. If you know you can not make an appointment for whatever reason, best to call and cancel/reschedule beforehand).     -Bicarbonate level low at 16 (normal range 22-29) Are you supplementing with sodium bicarbonate 1,950 mg (3 tabs) Three times daily as prescribed or missing doses?    -Cyclosporine level low at 58. Target goal range .   Your Neoral prescription reads wrong, maybe taking 125 mg every morning and 100 mg every evening? Or only 100 mg twice daily? Please clarify what dose you're actually taking.  Confirm this was a 12 hour trough level as it appears. Last dose date/time " recorded as 7/15/24 3:30 AM. If accurate trough and no missed doses, would increase dose to 125 mg every 12 hours. Please-I need a reply so that I can update your prescription for more medication.     Repeat labs in 2 weeks. Thank you-    Ivanna Rivera, RN, BSN  Solid Organ Transplant, Post Kidney and Pancreas  Transplant Care Coordinator  [phone]     Addendum 7/25/24: Spoke with Miguel Angel. Confirmed 12 hr trough cyclosporine level, but current dose is 100 mg AM/125 mg PM. Miguel Angel verbalized understanding to increase dose to 125 mg PO BID. He is having trouble taking sodium bicarbonate 3 tablets PO TID, taking BID. Encouraged him to increase to 3 times daily. He will take a bottle to work and leave there so he remembers to take at lunch time every day. Discussed importance of CKD journey and appt with nephrology JOHNNY- Denisse Stratton. He was given nephrology clinic ph# [phone], option 1 for schedulers to rebook the appt he missed 4 weeks ago. Discussed missing appointments can be a compliance ding for another kidney transplant and that in the future if he can not make an appointment, he needs to call the clinic. Labs again in 2 weeks. He states it has been hard to get into lab but he will try. Orders entered.

## 2024-07-18 NOTE — LETTER
Miguel Angel Piña  [address] [address]                July 24, 2024

## 2024-07-22 PROBLEM — L03.90 CELLULITIS: Status: ACTIVE | Noted: 2024-07-09

## 2024-07-24 RX ORDER — CYCLOSPORINE 100 MG/1
100 CAPSULE, LIQUID FILLED ORAL 2 TIMES DAILY
Qty: 60 CAPSULE | Refills: 0 | Status: SHIPPED | OUTPATIENT
Start: 2024-07-21 | End: 2024-07-25

## 2024-07-24 RX ORDER — CYCLOSPORINE 25 MG/1
100 CAPSULE, LIQUID FILLED ORAL 2 TIMES DAILY
Qty: 240 CAPSULE | Refills: 0 | Status: SHIPPED | OUTPATIENT
Start: 2024-07-21 | End: 2024-07-25

## 2024-07-25 ENCOUNTER — TELEPHONE (OUTPATIENT)
Dept: TRANSPLANT | Facility: CLINIC | Age: 49
End: 2024-07-25
Payer: COMMERCIAL

## 2024-07-25 DIAGNOSIS — N18.9 CHRONIC KIDNEY DISEASE (CKD): Primary | ICD-10-CM

## 2024-07-25 DIAGNOSIS — Z76.82 ORGAN TRANSPLANT CANDIDATE: ICD-10-CM

## 2024-07-25 DIAGNOSIS — Z94.0 KIDNEY REPLACED BY TRANSPLANT: ICD-10-CM

## 2024-07-25 RX ORDER — CYCLOSPORINE 100 MG/1
100 CAPSULE, LIQUID FILLED ORAL 2 TIMES DAILY
Qty: 60 CAPSULE | Refills: 0 | Status: SHIPPED | OUTPATIENT
Start: 2024-07-25 | End: 2024-08-19

## 2024-07-25 RX ORDER — CYCLOSPORINE 25 MG/1
100 CAPSULE, LIQUID FILLED ORAL 2 TIMES DAILY
Qty: 240 CAPSULE | Refills: 0 | Status: SHIPPED | OUTPATIENT
Start: 2024-07-25

## 2024-07-25 NOTE — TELEPHONE ENCOUNTER
Spoke to Miguel Angel today and we reviewed the need for in person Wait list appointments. These orders were entered in May 2024 and after multiple attempts by scheduling they were cancelled. Asked Miguel Angel to please answer the phone so we can move forward. He still needs to schedule his dental. Dermatology is scheduled for 10/21/2024. He states his gout is under control now. BMI is up to 37.5 now. Orders routed to scheduling.

## 2024-07-29 ENCOUNTER — TELEPHONE (OUTPATIENT)
Dept: NEPHROLOGY | Facility: CLINIC | Age: 49
End: 2024-07-29
Payer: COMMERCIAL

## 2024-07-29 NOTE — TELEPHONE ENCOUNTER
Left Voicemail (1st Attempt) and Sent Mychart (1st Attempt) for the patient to call back and schedule the following:    Appointment type: Return Nephrology  Provider: Denisse Stratton  Return date: Next Avail  Specialty phone number: [phone]  Additional appointment(s) needed: Labs Prior  Additonal Notes: Resched 7/29 appt

## 2024-07-30 ENCOUNTER — PATIENT OUTREACH (OUTPATIENT)
Dept: CARE COORDINATION | Facility: CLINIC | Age: 49
End: 2024-07-30
Payer: COMMERCIAL

## 2024-07-30 NOTE — PROGRESS NOTES
Anemia Management Note - Follow Up      SUBJECTIVE/OBJECTIVE:    Referred by Dr. Lan Patino on 10/12/2023  Primary Diagnosis: Anemia in Chronic Kidney Disease (N18.3, D63.1)     Secondary Diagnosis:  Organ or tissue replaced by transplant, kidney (Z94.0)  Hgb goal range:  9-10  Epo/Darbo:   Retacrit 10,000 units subcutaneous every week, Hgb <10, AT HOME  7/15/24: Retacrit was denied.   24 Sent new Rx to [facility]  *2/15/24: No doses at home.  Holding off on refilling.   Iron regimen:  Daily oral iron. He will try to BID dosing  Labs : [id number]  Recent CINDY use, transfusion, IV iron: None     RX/TX plans : 2025  No MI and blood clots or cancers. 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     Contact:  No consent to communicate on file        Latest Ref Rng & Units 2024 2024 2024 2024 5/3/2024 2024 7/15/2024   Anemia   CINDY Dose  10,000 units         Hemoglobin 13.3 - 17.7 g/dL  10.0  10.3  10.4  9.7  8.7  9.8    TSAT 15 - 46 %  24   36  28  34  13    Ferritin 31 - 409 ng/mL  244   269  297  317  313         BP Readings from Last 3 Encounters:   24 130/86   24 (!) 142/100   24 (!) 144/80     Wt Readings from Last 2 Encounters:   24 110.9 kg (244 lb 9.6 oz)   24 108 kg (238 lb 3.2 oz)           ASSESSMENT:    Hgb:at goal - continue to monitor  TSat: not at goal of >30% Ferritin: At goal (>100ng/mL)        PLAN:  Retacrit was denied. Hgb is stable in the 9s.  TSAT has dropped to 13%. Spoke with Miguel Angel.  He is taking Ferrous Sulf 1 tab daily.  He will have his Iron labs drawn the end of Aug 2024.     Orders needed to be renewed (for next follow-up date) in EPIC: None    Iron labs due:  Will have drawn the end of Aug 2024    Plan discussed with:  Miguel Angel      NEXT FOLLOW-UP DATE:  24    Janna Louis RN   Anemia Services  [facility]  [email or web address]   Office : [phone]  Fax: [phone]

## 2024-08-05 ENCOUNTER — LAB (OUTPATIENT)
Dept: LAB | Facility: CLINIC | Age: 49
End: 2024-08-05
Payer: COMMERCIAL

## 2024-08-05 ENCOUNTER — MYC REFILL (OUTPATIENT)
Dept: RHEUMATOLOGY | Facility: CLINIC | Age: 49
End: 2024-08-05

## 2024-08-05 DIAGNOSIS — N18.9 CHRONIC KIDNEY DISEASE (CKD): ICD-10-CM

## 2024-08-05 DIAGNOSIS — Z94.0 KIDNEY REPLACED BY TRANSPLANT: ICD-10-CM

## 2024-08-05 DIAGNOSIS — Z76.82 ORGAN TRANSPLANT CANDIDATE: ICD-10-CM

## 2024-08-05 DIAGNOSIS — M1A.9XX1 CHRONIC TOPHACEOUS GOUT: ICD-10-CM

## 2024-08-05 DIAGNOSIS — Z79.899 ENCOUNTER FOR LONG-TERM CURRENT USE OF MEDICATION: ICD-10-CM

## 2024-08-05 DIAGNOSIS — Z79.899 ON ALLOPURINOL THERAPY: ICD-10-CM

## 2024-08-05 LAB
ERYTHROCYTE [DISTWIDTH] IN BLOOD BY AUTOMATED COUNT: 13.1 % (ref 10–15)
HCT VFR BLD AUTO: 31.2 % (ref 40–53)
HGB BLD-MCNC: 10.1 G/DL (ref 13.3–17.7)
MCH RBC QN AUTO: 30.1 PG (ref 26.5–33)
MCHC RBC AUTO-ENTMCNC: 32.4 G/DL (ref 31.5–36.5)
MCV RBC AUTO: 93 FL (ref 78–100)
PLATELET # BLD AUTO: 247 10E3/UL (ref 150–450)
RBC # BLD AUTO: 3.35 10E6/UL (ref 4.4–5.9)
WBC # BLD AUTO: 8.2 10E3/UL (ref 4–11)

## 2024-08-05 PROCEDURE — 86833 HLA CLASS II HIGH DEFIN QUAL: CPT

## 2024-08-05 PROCEDURE — 85027 COMPLETE CBC AUTOMATED: CPT

## 2024-08-05 PROCEDURE — 80048 BASIC METABOLIC PNL TOTAL CA: CPT

## 2024-08-05 PROCEDURE — 86832 HLA CLASS I HIGH DEFIN QUAL: CPT

## 2024-08-05 PROCEDURE — 36415 COLL VENOUS BLD VENIPUNCTURE: CPT

## 2024-08-05 PROCEDURE — 80158 DRUG ASSAY CYCLOSPORINE: CPT

## 2024-08-05 RX ORDER — METHYLPREDNISOLONE 4 MG
TABLET, DOSE PACK ORAL
Qty: 21 TABLET | Refills: 1 | Status: SHIPPED | OUTPATIENT
Start: 2024-08-05

## 2024-08-05 NOTE — TELEPHONE ENCOUNTER
Pt refill met requirements.    Last Office visit: 2/19/2024    Plan from last office visit note:      Schedule follow-up with Isela Jean PA-C in 12 months.   Labs: Uric acid, AST, ALT today;   Medication recommendations:             Allopurinol 100mg: take 1/2 tablet every other day  Medrol 4mg-follow taper instructions if you get a flare of gout

## 2024-08-05 NOTE — TELEPHONE ENCOUNTER
Requested Prescriptions   Pending Prescriptions Disp Refills    methylPREDNISolone (MEDROL DOSEPAK) 4 MG tablet therapy pack 21 tablet 1     Sig: Follow Package Directions       There is no refill protocol information for this order          Last office visit: 2/19/2024 ; last virtual visit: Visit date not found with prescribing provider:  Isela Jean   Future Office Visit:      Thank you,  Jade Wharton  [facility]  [address] [place] [address]  Priority line: [phone] (please do not share number with patient)   Employed by [employer]

## 2024-08-06 LAB
ANION GAP SERPL CALCULATED.3IONS-SCNC: 14 MMOL/L (ref 7–15)
BUN SERPL-MCNC: 52.2 MG/DL (ref 6–20)
CALCIUM SERPL-MCNC: 8.4 MG/DL (ref 8.8–10.4)
CHLORIDE SERPL-SCNC: 104 MMOL/L (ref 98–107)
CREAT SERPL-MCNC: 3.76 MG/DL (ref 0.67–1.17)
CYCLOSPORINE BLD LC/MS/MS-MCNC: 113 UG/L (ref 50–400)
EGFRCR SERPLBLD CKD-EPI 2021: 19 ML/MIN/1.73M2
GLUCOSE SERPL-MCNC: 83 MG/DL (ref 70–99)
HCO3 SERPL-SCNC: 19 MMOL/L (ref 22–29)
POTASSIUM SERPL-SCNC: 4.7 MMOL/L (ref 3.4–5.3)
SODIUM SERPL-SCNC: 137 MMOL/L (ref 135–145)
TME LAST DOSE: NORMAL H
TME LAST DOSE: NORMAL H

## 2024-08-08 LAB
DONOR IDENTIFICATION: NORMAL
DSA COMMENTS: NORMAL
DSA PRESENT: NO
DSA TEST METHOD: NORMAL
ORGAN: NORMAL
PROTOCOL CUTOFF: NORMAL
SA 1  COMMENTS: NORMAL
SA 1 CELL: NORMAL
SA 1 TEST METHOD: NORMAL
SA 2 CELL: NORMAL
SA 2 COMMENTS: NORMAL
SA 2 TEST METHOD: NORMAL
SA1 HI RISK ABY: NORMAL
SA1 MOD RISK ABY: NORMAL
SA2 HI RISK ABY: NORMAL
SA2 MOD RISK ABY: NORMAL
UNACCEPTABLE ANTIGENS: NORMAL
UNOS CPRA: 84

## 2024-08-15 ENCOUNTER — PATIENT OUTREACH (OUTPATIENT)
Dept: NEPHROLOGY | Facility: CLINIC | Age: 49
End: 2024-08-15
Payer: COMMERCIAL

## 2024-08-19 DIAGNOSIS — Z94.0 KIDNEY REPLACED BY TRANSPLANT: ICD-10-CM

## 2024-08-19 DIAGNOSIS — Z79.899 ENCOUNTER FOR LONG-TERM CURRENT USE OF MEDICATION: ICD-10-CM

## 2024-08-19 RX ORDER — CYCLOSPORINE 100 MG/1
CAPSULE, LIQUID FILLED ORAL
Qty: 60 CAPSULE | Refills: 11 | Status: SHIPPED | OUTPATIENT
Start: 2024-08-19

## 2024-08-27 NOTE — PROGRESS NOTES
Nephrology Note: Patient Outreach Encounter    REASON FOR CALL:     REASON FOR CALL: Chronic Disease Management                                  SITUATION/BACKROUND:   Patient is being treated for CKD Stage 4.      Patient Journey Status:  Active    ASSESSMENT:     Follow up appointment to be scheduled.     Neph Assessments:  No assessment indicated    PLAN:     Education:  Method:  modeling  Discussed: uremic symptoms  These interventions were used: Empathy/Understanding  Education material provided and patient was given an opportunity to ask questions.      Follow Up:   Patient to follow up as scheduled at next appointment  Patient to call/MyChart message with updates     Patient verbalized understanding and will follow up as recommended.    MUNIR CALIX RN

## 2024-08-29 ENCOUNTER — TELEPHONE (OUTPATIENT)
Dept: TRANSPLANT | Facility: CLINIC | Age: 49
End: 2024-08-29

## 2024-08-29 ENCOUNTER — HOSPITAL ENCOUNTER (OUTPATIENT)
Dept: CARDIOLOGY | Facility: CLINIC | Age: 49
Discharge: HOME | End: 2024-08-29
Attending: NURSE PRACTITIONER
Payer: COMMERCIAL

## 2024-08-29 ENCOUNTER — LAB (OUTPATIENT)
Dept: LAB | Facility: CLINIC | Age: 49
End: 2024-08-29
Attending: NURSE PRACTITIONER
Payer: COMMERCIAL

## 2024-08-29 ENCOUNTER — ALLIED HEALTH/NURSE VISIT (OUTPATIENT)
Dept: TRANSPLANT | Facility: CLINIC | Age: 49
End: 2024-08-29
Attending: NURSE PRACTITIONER
Payer: COMMERCIAL

## 2024-08-29 VITALS
TEMPERATURE: 97.8 F | WEIGHT: 246.2 LBS | HEART RATE: 84 BPM | OXYGEN SATURATION: 99 % | SYSTOLIC BLOOD PRESSURE: 131 MMHG | DIASTOLIC BLOOD PRESSURE: 94 MMHG | BODY MASS INDEX: 37.75 KG/M2

## 2024-08-29 VITALS
TEMPERATURE: 97.8 F | WEIGHT: 246 LBS | DIASTOLIC BLOOD PRESSURE: 94 MMHG | BODY MASS INDEX: 37.72 KG/M2 | OXYGEN SATURATION: 99 % | HEART RATE: 84 BPM | SYSTOLIC BLOOD PRESSURE: 131 MMHG

## 2024-08-29 DIAGNOSIS — Z01.818 PRE-TRANSPLANT EVALUATION FOR KIDNEY TRANSPLANT: Primary | ICD-10-CM

## 2024-08-29 DIAGNOSIS — N18.5 CHRONIC KIDNEY DISEASE, STAGE 5, KIDNEY FAILURE (H): ICD-10-CM

## 2024-08-29 DIAGNOSIS — Z76.82 ORGAN TRANSPLANT CANDIDATE: ICD-10-CM

## 2024-08-29 DIAGNOSIS — N18.4 STAGE 4 CHRONIC KIDNEY DISEASE (H): ICD-10-CM

## 2024-08-29 DIAGNOSIS — T86.19 TRANSPLANT GLOMERULOPATHY: ICD-10-CM

## 2024-08-29 DIAGNOSIS — E66.812 CLASS 2 SEVERE OBESITY DUE TO EXCESS CALORIES WITH SERIOUS COMORBIDITY AND BODY MASS INDEX (BMI) OF 37.0 TO 37.9 IN ADULT (H): ICD-10-CM

## 2024-08-29 DIAGNOSIS — Z94.0 KIDNEY REPLACED BY TRANSPLANT: ICD-10-CM

## 2024-08-29 DIAGNOSIS — N18.9 ANEMIA IN CHRONIC RENAL DISEASE: ICD-10-CM

## 2024-08-29 DIAGNOSIS — D63.1 ANEMIA IN CHRONIC RENAL DISEASE: ICD-10-CM

## 2024-08-29 DIAGNOSIS — N18.9 CHRONIC KIDNEY DISEASE (CKD): ICD-10-CM

## 2024-08-29 DIAGNOSIS — Z79.899 ENCOUNTER FOR LONG-TERM CURRENT USE OF MEDICATION: ICD-10-CM

## 2024-08-29 DIAGNOSIS — N05.9 TRANSPLANT GLOMERULOPATHY: ICD-10-CM

## 2024-08-29 DIAGNOSIS — E66.01 CLASS 2 SEVERE OBESITY DUE TO EXCESS CALORIES WITH SERIOUS COMORBIDITY AND BODY MASS INDEX (BMI) OF 37.0 TO 37.9 IN ADULT (H): ICD-10-CM

## 2024-08-29 DIAGNOSIS — D84.9 IMMUNOSUPPRESSED STATUS (H): ICD-10-CM

## 2024-08-29 DIAGNOSIS — Z94.0 KIDNEY REPLACED BY TRANSPLANT: Primary | ICD-10-CM

## 2024-08-29 DIAGNOSIS — Z94.0 KIDNEY TRANSPLANTED: ICD-10-CM

## 2024-08-29 DIAGNOSIS — Z01.810 PREPROCEDURAL CARDIOVASCULAR EXAMINATION: Primary | ICD-10-CM

## 2024-08-29 DIAGNOSIS — N18.30 STAGE 3 CHRONIC KIDNEY DISEASE, UNSPECIFIED WHETHER STAGE 3A OR 3B CKD (H): ICD-10-CM

## 2024-08-29 DIAGNOSIS — Q61.5 MEDULLARY CYSTIC KIDNEY DISEASE: ICD-10-CM

## 2024-08-29 DIAGNOSIS — R79.89 ELEVATED SERUM CREATININE: ICD-10-CM

## 2024-08-29 LAB
ANION GAP SERPL CALCULATED.3IONS-SCNC: 15 MMOL/L (ref 7–15)
BUN SERPL-MCNC: 63.4 MG/DL (ref 6–20)
CALCIUM SERPL-MCNC: 8.4 MG/DL (ref 8.8–10.4)
CHLORIDE SERPL-SCNC: 104 MMOL/L (ref 98–107)
CREAT SERPL-MCNC: 3.89 MG/DL (ref 0.67–1.17)
CYCLOSPORINE BLD LC/MS/MS-MCNC: 116 UG/L (ref 50–400)
EGFRCR SERPLBLD CKD-EPI 2021: 18 ML/MIN/1.73M2
ERYTHROCYTE [DISTWIDTH] IN BLOOD BY AUTOMATED COUNT: 13.2 % (ref 10–15)
FERRITIN SERPL-MCNC: 392 NG/ML (ref 31–409)
GLUCOSE SERPL-MCNC: 95 MG/DL (ref 70–99)
HCO3 SERPL-SCNC: 17 MMOL/L (ref 22–29)
HCT VFR BLD AUTO: 29 % (ref 40–53)
HGB BLD-MCNC: 9.4 G/DL (ref 13.3–17.7)
IRON BINDING CAPACITY (ROCHE): 243 UG/DL (ref 240–430)
IRON SATN MFR SERPL: 33 % (ref 15–46)
IRON SERPL-MCNC: 81 UG/DL (ref 61–157)
MCH RBC QN AUTO: 29.9 PG (ref 26.5–33)
MCHC RBC AUTO-ENTMCNC: 32.4 G/DL (ref 31.5–36.5)
MCV RBC AUTO: 92 FL (ref 78–100)
PLATELET # BLD AUTO: 190 10E3/UL (ref 150–450)
POTASSIUM SERPL-SCNC: 4.5 MMOL/L (ref 3.4–5.3)
RBC # BLD AUTO: 3.14 10E6/UL (ref 4.4–5.9)
SODIUM SERPL-SCNC: 136 MMOL/L (ref 135–145)
TME LAST DOSE: NORMAL H
TME LAST DOSE: NORMAL H
WBC # BLD AUTO: 6 10E3/UL (ref 4–11)

## 2024-08-29 PROCEDURE — 80158 DRUG ASSAY CYCLOSPORINE: CPT

## 2024-08-29 PROCEDURE — 99204 OFFICE O/P NEW MOD 45 MIN: CPT | Performed by: TRANSPLANT SURGERY

## 2024-08-29 PROCEDURE — 99214 OFFICE O/P EST MOD 30 MIN: CPT | Performed by: NURSE PRACTITIONER

## 2024-08-29 PROCEDURE — 93321 DOPPLER ECHO F-UP/LMTD STD: CPT | Mod: 26 | Performed by: INTERNAL MEDICINE

## 2024-08-29 PROCEDURE — 255N000002 HC RX 255 OP 636: Performed by: INTERNAL MEDICINE

## 2024-08-29 PROCEDURE — 80048 BASIC METABOLIC PNL TOTAL CA: CPT

## 2024-08-29 PROCEDURE — 99213 OFFICE O/P EST LOW 20 MIN: CPT | Mod: 25,27 | Performed by: TRANSPLANT SURGERY

## 2024-08-29 PROCEDURE — 99211 OFF/OP EST MAY X REQ PHY/QHP: CPT | Mod: 25 | Performed by: NURSE PRACTITIONER

## 2024-08-29 PROCEDURE — 36415 COLL VENOUS BLD VENIPUNCTURE: CPT

## 2024-08-29 PROCEDURE — 83550 IRON BINDING TEST: CPT

## 2024-08-29 PROCEDURE — 93016 CV STRESS TEST SUPVJ ONLY: CPT | Performed by: INTERNAL MEDICINE

## 2024-08-29 PROCEDURE — 93325 DOPPLER ECHO COLOR FLOW MAPG: CPT | Mod: 26 | Performed by: INTERNAL MEDICINE

## 2024-08-29 PROCEDURE — 93018 CV STRESS TEST I&R ONLY: CPT | Performed by: INTERNAL MEDICINE

## 2024-08-29 PROCEDURE — 93350 STRESS TTE ONLY: CPT | Mod: 26 | Performed by: INTERNAL MEDICINE

## 2024-08-29 PROCEDURE — 85027 COMPLETE CBC AUTOMATED: CPT

## 2024-08-29 PROCEDURE — 82728 ASSAY OF FERRITIN: CPT

## 2024-08-29 RX ADMIN — PERFLUTREN 10 ML: 6.52 INJECTION, SUSPENSION INTRAVENOUS at 08:17

## 2024-08-29 NOTE — PROGRESS NOTES
[facility] SOLID ORGAN TRANSPLANT  OUTPATIENT MNT: KIDNEY TRANSPLANT EVALUATION    Current BMI: 37.7 (HT 67.75 in,  lbs/112 kg)  BMI guideline for kidney transplant up to a BMI of 40 / per surgeon discretion     Frailty Assessment-- Not Frail (1/5 points)- low    Handgrip Strength: 18    Reference:  Score of 0-2 = Not Frail  Score of 3-5 = Frail      TIME SPENT: 15 minutes  VISIT TYPE: follow up (saw pt for txp eval 8/2021)  REFERRING PHYSICIAN: Sukhdeep  PT ACCOMPANIED BY: self    History of previous txp: kidney 1993, 2010   Dialysis: no     NUTRITION ASSESSMENT  Pt has been working on diet changes the last few weeks to help lose weight for another kidney txp. He reports usually getting fast food daily due to convenience, but is trying to reduce this.     Vitamins, Supplements, Pertinent Meds: vit D, iron   Herbal Medicines/Supplements: none   Protein Supplements: none     Weight hx // fluid retention:   - ? KARMEN- on feet all day so difficult to tell per pt   - no weight change recently, but wt overall up >20 lbs since last visit in 2021     Wt Readings from Last 10 Encounters:   08/29/24 111.7 kg (246 lb 3.2 oz)   07/09/24 110.9 kg (244 lb 9.6 oz)   02/19/24 108 kg (238 lb 3.2 oz)   01/05/24 108.8 kg (239 lb 12.8 oz)   12/06/22 109.8 kg (242 lb)   08/18/22 108.6 kg (239 lb 6.4 oz)   06/20/22 103.9 kg (229 lb)   05/11/22 103.9 kg (229 lb)   04/25/22 104 kg (229 lb 3.2 oz)   10/20/21 102.8 kg (226 lb 9.6 oz)     PHYSICAL ACTIVITY   Works at "[employer] and is on feet majority of the day- works 40 hours/week  No routine activity outside of work  Energy level mostly good     DIET RECALL  Breakfast 10 am- small bag of popcorn or beef jerky   Lunch Salad from [email or web address] (veggies, cheese, 1/2 egg, ham w/ [place])   Dinner Fast food 1x/week  Egg salad sandwich  Burgers   Not a lot of veggies at dinner - some baby carrots    Snacks PM break prn- beef jerky   Cut out nighttime bag of chips-->now grapes     Beverages Gatorade zero (2 bottles/day), water, has reduced pop or sugary drinks but still has a little, some milk    Alcohol None    Dining out Used to eat fast food daily-->1x/week now  Previously would get [employer] lemonade or Coke (medium) + sandwich      LABS  8/29 K 4.5 - wnl levels   6/19 Phos 5.0    NUTRITION DIAGNOSIS   No nutrition diagnosis identified at this time     NUTRITION INTERVENTION   Nutrition education provided:  Discussed sodium intake (low sodium foods and drinks, seasoning food without salt and tips for low sodium diet).  Reviewed K/Phos levels. Reviewed diet changes pt has made thus far.     Reviewed post txp diet guidelines in brief (will review in further detail post txp):  (1) Review of proper food safety measures d/t immunosuppressant therapy post-op and increased risk for food-borne illness    (2) Avoid the following post txp d/t risk for rejection, unknown effects on the organs, and/or potential interactions with immunosuppressants:  - Herbal, Chinese, holistic, chiropractic, natural, alternative medicines and supplements  - Detoxes and cleanses  - Weight loss pills  - Protein powders or other products with extracts or herbs (ie green tea extract)    (3) Med regimen and possible side effects    Patient Understanding: Pt verbalized understanding of education provided.  Expected Engagement: Good  Follow-Up Plans: PRN     NUTRITION GOALS   No nutrition goals identified at this time     Julia Kearney, RD, LD, CCTD

## 2024-08-29 NOTE — PROGRESS NOTES
TRANSPLANT NEPHROLOGY WAITLIST VISIT    Recommendations:  Assessment and Plan:  # Kidney Transplant Wait List Evaluation: Patient is a good candidate overall. Patient should be considered for active status after he completes:    Recommendations:   - repeat stress test (Lexiscan ordered).   - derm check   - dental check   - Would likely move to Tacrolimus and MMF after transplant.     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 Immunosuppression includes CSA and MMF. When ready, he would benefit from another kidney transplant.    # Cardiac Risk: 2021 ECHO with normal LVEF  ~ 60-65% and an ascending aorta 4.3 cm that should be followed locally PCP or cardiology in the future. He had an  ECHO stress test today was non diagnostic. Will get  Lexiscan.      # Obesity: BMI 37. Seen by transplant surgery today.      # Chronic tophaceous gout:  doing OK on allopurinol, but still needing needing prednisone bursts a few times per year, followed by rheumatology, UA  ~ 8.8.     # Heterozygote Factor V Leiden mutation: No h/o DVT or PE, but did have a blood clot due to tunneled dialysis catheter that led to CVA via PFO.  Previously seen by Hematology and not recommended to need long term anticoagulation and no recent issues.     # Dermatology: No h/o skin cancer, but significant skin changes due to long term immunosuppression use.  Derm check pending.     # Health Maintenance: Dermatology: Not up to date and Dental: Not up to date     Discussed the risks and benefits of a transplant, including the risk of surgery and immunosuppression medications.        Patient presently appears to be enough of an acceptable kidney transplant recipient candidate to have any potential kidney donors start the evaluation process.  Patient s overall re-evaluation may require further discussion in the Transplant Program s multidisciplinary  selection committee for a final recommendation on the patient s suitability for transplant.     Reason for Visit:  Miguel Angel Piña is a 48 year old male with failing DDKT , who presents for kidney transplant wait list evaluation.     Date of Initial Transplant Evaluation:  8/2021        Current Transplant Phase: Evaluation: Active  Official UNOS Listing Date:    Blood Type: O        cPRA: 84%       Date of cPRA: 8/2024  Transplant Coordinator: Ivanna Chiang Transplant Office phone number [phone]       History of Present Illness:          Interim Events: None          Kidney Disease:        Kidney Disease Dx:  medullary cystic kidney disease, s/p failing DDKT        On Dialysis: No       Primary Nephrologist: [facility] cinthia neph and Denisse Stratton          Cardiac/Vascular Disease History:       Known CAD: No       Known PAD/Claudication Symptoms: No       Known Heart Failure: No       Arrhythmia:  No       Pulmonary Hypertension: No        Valvular Disease: No       Other: None       New Cardiac/Vascular Events: No         Functional Capacity/Frailty:        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  Working 40 hours a week at [employer], on his feet for 8 hour shifts. Can do stairs OK without chest pains or shortness of breath.      #Identified Care Partner Post Transplant Surgery: Spouse       Allergy Testing Questions:   Medication that caused a reaction None   Antibiotics used that didn't give an allergic reaction?  Patient doesn't know    COVID Vaccination Up To Date: Not asked    Other Pertinent Transplant Surgical Issues:  Recent Blood Transfusion: No  Previous Abdominal Transplant: No  Bladder Dysfunction: No  Chronic/Recurrent Infections: No  Chronic Anticoagulation: No  Jehovah s Witness: No       Active Problem List:   Patient Active Problem List   Diagnosis    Kidney replaced by transplant    Medullary cystic kidney disease    Conductive hearing loss, unilateral     Aftercare following organ transplant    Immunosuppressed status (H24)    Anemia in stage 4 chronic kidney disease (H)    Secondary renal hyperparathyroidism (H24)    Gout    Vitamin D deficiency    Dyslipidemia    Heterozygous factor V Leiden mutation (H24)    HTN, kidney transplant related    CKD (chronic kidney disease) stage 4, GFR 15-29 ml/min (H)    Class 2 obesity due to excess calories without serious comorbidity with body mass index (BMI) of 35.0 to 35.9 in adult    Cellulitis       Personal History:  Nonsmoker      Allergies:  No Known Allergies     Medications:  Current Outpatient Medications   Medication Sig Dispense Refill    allopurinol (ZYLOPRIM) 100 MG tablet Take 0.5 tablets (50 mg) by mouth daily      amLODIPine (NORVASC) 5 MG tablet Take 1 tablet (5 mg) by mouth daily for 90 days TAKE 1 TABLET BY MOUTH AT BEDTIME AT NIGHT 30 tablet 3    calcitRIOL (ROCALTROL) 0.25 MCG capsule Take 1 capsule (0.25 mcg) by mouth daily for 90 days 90 capsule 3    CELLCEPT (BRAND) 250 MG capsule TAKE THREE CAPSULES BY MOUTH TWICE A  capsule 3    cholecalciferol 2000 units TABS Take 2,000 Units by mouth daily 90 tablet 3    epoetin beto-epbx (RETACRIT) 91767 UNIT/ML injection Inject 1 mL (10,000 Units) Subcutaneous every 7 days Adminster for Hgb <10.0.  HOLD dose if systolic BP >180. (Patient not taking: Reported on 7/9/2024) 4 mL 11    Ferrous Gluconate 324 (37.5 Fe) MG TABS Take by mouth daily      losartan (COZAAR) 25 MG tablet Take 1 tablet (25 mg) by mouth daily 90 tablet 3    methylPREDNISolone (MEDROL DOSEPAK) 4 MG tablet therapy pack Follow Package Directions 21 tablet 1    methylPREDNISolone (MEDROL DOSEPAK) 4 MG tablet therapy pack Follow Package Directions 21 tablet 1    NEORAL (BRAND) 100 MG capsule TAKE ONE CAPSULE BY MOUTH TWICE A DAY (TOTAL DOSE 125 MG TWICE DAILY) 60 capsule 11    NEORAL (BRAND) 25 MG capsule Take 4 capsules (100 mg) by mouth 2 times daily Total dose = 125 mg twice daily 240  capsule 0    sodium bicarbonate 650 MG tablet Take 3 tablets (1,950 mg) by mouth 3 times daily 810 tablet 0     No current facility-administered medications for this visit.        Vitals:  There were no vitals taken for this visit.     Exam:  GENERAL APPEARANCE: alert and no distress  HENT: mouth without ulcers or lesions  LYMPHATICS: no cervical or supraclavicular nodes  RESP: lungs clear to auscultation - no rales, rhonchi or wheezes  CV: regular rhythm, normal rate, no rub, no murmur  EDEMA: no LE edema bilaterally  ABDOMEN: soft, nondistended, nontender, bowel sounds normal  MS: extremities normal - no gross deformities noted, no evidence of inflammation in joints, no muscle tenderness  SKIN: no rash

## 2024-08-29 NOTE — NURSING NOTE
Patient Name: Miguel Angel Piña  : 1975  Age: 48 year old  MRN: [id number]  Date of Initial Social Work Evaluation: 2021    Patient on transplant wait list.  Saw today to update psychosocial assessment.      Presenting Information   Living Situation: Miguel Angel lives with his spouse Lenka in [place], [place].  If not local, plans for short term stay:  N/A  Previous Functional Status: Independent ADL's  Cultural/Language/Spiritual Considerations: None identified at this time.    Support System  Primary Support Person: Lenka  Other support: Miguel Angel's parents, step-daughters and extended family.  Plan for support in immediate post-transplant period: Miguel Angel will receive support from his family during his recovery.     Health Care Directive  Decision Maker: Self  Alternate Decision Maker: Lenka  Health Care Directive: Patient considering completing    Mental Health/Coping:   History of Mental Health: No known history.  History of Chemical Health: No known history.  Current status: Miguel Angel denies any mental health concerns and denies any use of tobaccos or alcohol products.   Coping: Miguel Angel enjoys listening to music, fishing and sitting by the lake.   Services Needed/Recommended: None identified at this time.    Financial   Income: Miguel Angel works full-time at [employer].   Impact of transplant on income: No impact on income. Miguel Angel will take FMLA or short-term disability leave from work.   Insurance and medication coverage: [employer] [facility] [place] of [place]  Financial concerns: No concerns identified at this time.  Resources needed: No concerns identified at this time.    Assessment and recommendations and plan:  Reviewed transplant education (Medicare, rehabilitation, donor issues, community/financial resources, and psych/family adjustment) as well as psychosocial risks of transplant. Provided patient with a copy of post-transplant informational sheet that includes information on potential costs of medications, Medicare  ESRD, post-transplant lodging, etc. Patient seemed to process information well. Appeared well informed, motivated, and able to follow post transplant requirements. Behavior was appropriate during interview. Has adequate income and insurance coverage. Adequate social support. No major contraindications noted for transplant. At this time, patient appears to understand the risks and benefits of transplant.                LIVAN Nieto, [facility]  Kidney, Pancreas, Auto-Islet Pancreas   [address] [place] [id number]  [email or web address]  Office: [phone]  Fax: [phone]  Employed by [employer]

## 2024-08-29 NOTE — NURSING NOTE
"Chief Complaint   Patient presents with    RECHECK     Waitlist     Vital signs:  Temp: 97.8  F (36.6  C)   BP: (!) 131/94 Pulse: 84     SpO2: 99 %       Weight: 111.6 kg (246 lb)  Estimated body mass index is 37.72 kg/m  as calculated from the following:    Height as of 7/9/24: 1.72 m (5' 7.72\").    Weight as of this encounter: 111.6 kg (246 lb).      Patient had another appointment where medications where reviewed and vitals were obtained. Vitals were pulled from that visit.     Britt Almonte CMA   8/29/2024 1:10 PM    "

## 2024-08-29 NOTE — LETTER
8/29/2024      Miguel Angel Piña  [id number] [address] [id number]      Dear Colleague,    Thank you for referring your patient, Miguel Angel Piña, to the [facility]. Please see a copy of my visit note below.    47 yo man, SP 2 prior KT (1993, 2010), now with rising Crt.  Not on HD.    IP  No anatomic reasons to prevent repeat txp.  Can prob implant kidney on R common iliac vessels.   Persistent trace hematuria. Make sure no mass lesion,  Repeat UA  BMI 37 and significant central fat.  See dietician, set goals for weight loss.  Will be difficult surgical transplant due to prior scarring and adiposity.  Losing weight would facilitate healing.  He had long term vac with second txp.  HTN.  Multiple skin lesions on face: see Derm re: risk of malignant evolution.  He has not seen/cancelled Derm by my review of EMR.  Chronic immunosuppression.  Would check EBV PCR, suspect he may be +.    No real change from my 2021 note.  He is still not a perfect candidate and he now has 3 more years of IS.  He is likely to have malignant deterioration of skin lesions.    I spent 45 min 10 path and xr review, 20 f2f, discussion and exam, 15 documentation    2 prior txp for medullary cystic disease. cPRA: 84%  First txp lasted 17 yrs (LD), second >14 yrs (DD).    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  Empties bladder ok    IS: /125, /750, pred 5    Current Outpatient Medications   Medication Sig Dispense Refill     allopurinol (ZYLOPRIM) 100 MG tablet Take 0.5 tablets (50 mg) by mouth daily       amLODIPine (NORVASC) 5 MG tablet Take 1 tablet (5 mg) by mouth daily for 90 days TAKE 1 TABLET BY MOUTH AT BEDTIME AT NIGHT 30 tablet 3     calcitRIOL (ROCALTROL) 0.25 MCG capsule Take 1 capsule (0.25 mcg) by mouth daily for 90 days 90 capsule 3     CELLCEPT (BRAND) 250 MG capsule TAKE THREE CAPSULES BY MOUTH TWICE A  capsule 3     cholecalciferol 2000 units  TABS Take 2,000 Units by mouth daily 90 tablet 3     Ferrous Gluconate 324 (37.5 Fe) MG TABS Take by mouth daily       losartan (COZAAR) 25 MG tablet Take 1 tablet (25 mg) by mouth daily 90 tablet 3     methylPREDNISolone (MEDROL DOSEPAK) 4 MG tablet therapy pack Follow Package Directions 21 tablet 1     methylPREDNISolone (MEDROL DOSEPAK) 4 MG tablet therapy pack Follow Package Directions 21 tablet 1     NEORAL (BRAND) 100 MG capsule TAKE ONE CAPSULE BY MOUTH TWICE A DAY (TOTAL DOSE 125 MG TWICE DAILY) 60 capsule 11     NEORAL (BRAND) 25 MG capsule Take 4 capsules (100 mg) by mouth 2 times daily Total dose = 125 mg twice daily 240 capsule 0     sodium bicarbonate 650 MG tablet Take 3 tablets (1,950 mg) by mouth 3 times daily 810 tablet 0     epoetin beto-epbx (RETACRIT) 64091 UNIT/ML injection Inject 1 mL (10,000 Units) Subcutaneous every 7 days Adminster for Hgb <10.0.  HOLD dose if systolic BP >180. (Patient not taking: Reported on 7/9/2024) 4 mL 11     No current facility-administered medications for this visit.     BP (!) 131/94   Pulse 84   Temp 97.8  F (36.6  C) (Oral)   Wt 111.7 kg (246 lb 3.2 oz)   SpO2 99%   BMI 37.75 kg/m    Overweight wm, no distress  Multiple skin lesions on face  Abd: protruberent, scar deformity LLQ  No sig edema.        Again, thank you for allowing me to participate in the care of your patient.        Sincerely,        Brodie Tarango MD

## 2024-08-29 NOTE — LETTER
8/29/2024      Miguel Angel Piña  [id number] [address] [id number]      Dear Colleague,    Thank you for referring your patient, Miguel Angel Piña, to the [facility]PL[facility]. Please see a copy of my visit note below.    TRANSPLANT NEPHROLOGY WAITLIST VISIT    Recommendations:  Assessment and Plan:  # Kidney Transplant Wait List Evaluation: Patient is a good candidate overall. Patient should be considered for active status after he completes:    Recommendations:   - repeat stress test (Lexiscan ordered).   - derm check   - dental check   - Would likely move to Tacrolimus and MMF after transplant.     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 Immunosuppression includes CSA and MMF. When ready, he would benefit from another kidney transplant.    # Cardiac Risk: 2021 ECHO with normal LVEF  ~ 60-65% and an ascending aorta 4.3 cm that should be followed locally PCP or cardiology in the future. He had an  ECHO stress test today was non diagnostic. Will get  Lexiscan.      # Obesity: BMI 37. Seen by transplant surgery today.      # Chronic tophaceous gout:  doing OK on allopurinol, but still needing needing prednisone bursts a few times per year, followed by rheumatology, UA  ~ 8.8.     # Heterozygote Factor V Leiden mutation: No h/o DVT or PE, but did have a blood clot due to tunneled dialysis catheter that led to CVA via PFO.  Previously seen by Hematology and not recommended to need long term anticoagulation and no recent issues.     # Dermatology: No h/o skin cancer, but significant skin changes due to long term immunosuppression use.  Derm check pending.     # Health Maintenance: Dermatology: Not up to date and Dental: Not up to date     Discussed the risks and benefits of a transplant, including the risk of surgery and immunosuppression medications.        Patient presently appears to  be enough of an acceptable kidney transplant recipient candidate to have any potential kidney donors start the evaluation process.  Patient s overall re-evaluation may require further discussion in the Transplant Program s multidisciplinary selection committee for a final recommendation on the patient s suitability for transplant.     Reason for Visit:  Miguel Angel Piña is a 48 year old male with failing DDKT , who presents for kidney transplant wait list evaluation.     Date of Initial Transplant Evaluation:  8/2021        Current Transplant Phase: Evaluation: Active  Official UNOS Listing Date:    Blood Type: O        cPRA: 84%       Date of cPRA: 8/2024  Transplant Coordinator: Ivanna Chiang Transplant Office phone number [phone]       History of Present Illness:          Interim Events: None          Kidney Disease:        Kidney Disease Dx:  medullary cystic kidney disease, s/p failing DDKT        On Dialysis: No       Primary Nephrologist: [facility] cinthia neph and Denisse Stratton          Cardiac/Vascular Disease History:       Known CAD: No       Known PAD/Claudication Symptoms: No       Known Heart Failure: No       Arrhythmia:  No       Pulmonary Hypertension: No        Valvular Disease: No       Other: None       New Cardiac/Vascular Events: No         Functional Capacity/Frailty:        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  Working 40 hours a week at [email or web address], on his feet for 8 hour shifts. Can do stairs OK without chest pains or shortness of breath.      #Identified Care Partner Post Transplant Surgery: Spouse       Allergy Testing Questions:   Medication that caused a reaction None   Antibiotics used that didn't give an allergic reaction?  Patient doesn't know    COVID Vaccination Up To Date: Not asked    Other Pertinent Transplant Surgical Issues:  Recent Blood Transfusion: No  Previous Abdominal Transplant: No  Bladder Dysfunction: No  Chronic/Recurrent  Infections: No  Chronic Anticoagulation: No  Jehovah s Witness: No       Active Problem List:   Patient Active Problem List   Diagnosis     Kidney replaced by transplant     Medullary cystic kidney disease     Conductive hearing loss, unilateral     Aftercare following organ transplant     Immunosuppressed status (H24)     Anemia in stage 4 chronic kidney disease (H)     Secondary renal hyperparathyroidism (H24)     Gout     Vitamin D deficiency     Dyslipidemia     Heterozygous factor V Leiden mutation (H24)     HTN, kidney transplant related     CKD (chronic kidney disease) stage 4, GFR 15-29 ml/min (H)     Class 2 obesity due to excess calories without serious comorbidity with body mass index (BMI) of 35.0 to 35.9 in adult     Cellulitis       Personal History:  Nonsmoker      Allergies:  No Known Allergies     Medications:  Current Outpatient Medications   Medication Sig Dispense Refill     allopurinol (ZYLOPRIM) 100 MG tablet Take 0.5 tablets (50 mg) by mouth daily       amLODIPine (NORVASC) 5 MG tablet Take 1 tablet (5 mg) by mouth daily for 90 days TAKE 1 TABLET BY MOUTH AT BEDTIME AT NIGHT 30 tablet 3     calcitRIOL (ROCALTROL) 0.25 MCG capsule Take 1 capsule (0.25 mcg) by mouth daily for 90 days 90 capsule 3     CELLCEPT (BRAND) 250 MG capsule TAKE THREE CAPSULES BY MOUTH TWICE A  capsule 3     cholecalciferol 2000 units TABS Take 2,000 Units by mouth daily 90 tablet 3     epoetin beto-epbx (RETACRIT) 61097 UNIT/ML injection Inject 1 mL (10,000 Units) Subcutaneous every 7 days Adminster for Hgb <10.0.  HOLD dose if systolic BP >180. (Patient not taking: Reported on 7/9/2024) 4 mL 11     Ferrous Gluconate 324 (37.5 Fe) MG TABS Take by mouth daily       losartan (COZAAR) 25 MG tablet Take 1 tablet (25 mg) by mouth daily 90 tablet 3     methylPREDNISolone (MEDROL DOSEPAK) 4 MG tablet therapy pack Follow Package Directions 21 tablet 1     methylPREDNISolone (MEDROL DOSEPAK) 4 MG tablet therapy pack  Follow Package Directions 21 tablet 1     NEORAL (BRAND) 100 MG capsule TAKE ONE CAPSULE BY MOUTH TWICE A DAY (TOTAL DOSE 125 MG TWICE DAILY) 60 capsule 11     NEORAL (BRAND) 25 MG capsule Take 4 capsules (100 mg) by mouth 2 times daily Total dose = 125 mg twice daily 240 capsule 0     sodium bicarbonate 650 MG tablet Take 3 tablets (1,950 mg) by mouth 3 times daily 810 tablet 0     No current facility-administered medications for this visit.        Vitals:  There were no vitals taken for this visit.     Exam:  GENERAL APPEARANCE: alert and no distress  HENT: mouth without ulcers or lesions  LYMPHATICS: no cervical or supraclavicular nodes  RESP: lungs clear to auscultation - no rales, rhonchi or wheezes  CV: regular rhythm, normal rate, no rub, no murmur  EDEMA: no LE edema bilaterally  ABDOMEN: soft, nondistended, nontender, bowel sounds normal  MS: extremities normal - no gross deformities noted, no evidence of inflammation in joints, no muscle tenderness  SKIN: no rash           Again, thank you for allowing me to participate in the care of your patient.        Sincerely,        Raymundo Rausch, DANIEL CNP

## 2024-08-29 NOTE — TELEPHONE ENCOUNTER
ISSUE:   CSA level is 116, goal . Current dose 125 mg BID.   Creatinine continues to increase. To schedule visit with nephrology for CKD Journey follow up.    PLAN:   Recommend repeat cyclosporine level in 2 weeks. Repeat BMP for creatinine trending up too.        Worsening creatinine/kidney function. Need to see nephrology-call [phone] to reschedule.   Written by Ivanna Rivera RN on 8/29/2024  9:09 AM CDT  Seen by patient Miguel Angel Piña on 8/29/2024 12:24 PM    OUTCOME:   Lab orders entered in EPIC.

## 2024-08-30 NOTE — PROGRESS NOTES
49 yo man, SP 2 prior KT (1993, 2010), now with rising Crt.  Not on HD.    IP  No anatomic reasons to prevent repeat txp.  Can prob implant kidney on R common iliac vessels.   Persistent trace hematuria. Make sure no mass lesion,  Repeat UA  BMI 37 and significant central fat.  See dietician, set goals for weight loss.  Will be difficult surgical transplant due to prior scarring and adiposity.  Losing weight would facilitate healing.  He had long term vac with second txp.  HTN.  Multiple skin lesions on face: see Derm re: risk of malignant evolution.  He has not seen/cancelled Derm by my review of EMR.  Chronic immunosuppression.  Would check EBV PCR, suspect he may be +.    No real change from my 2021 note.  He is still not a perfect candidate and he now has 3 more years of IS.  He is likely to have malignant deterioration of skin lesions.    I spent 45 min 10 path and xr review, 20 f2f, discussion and exam, 15 documentation    2 prior txp for medullary cystic disease. cPRA: 84%  First txp lasted 17 yrs (LD), second >14 yrs (DD).    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  Empties bladder ok    IS: /125, /750, pred 5    Current Outpatient Medications   Medication Sig Dispense Refill    allopurinol (ZYLOPRIM) 100 MG tablet Take 0.5 tablets (50 mg) by mouth daily      amLODIPine (NORVASC) 5 MG tablet Take 1 tablet (5 mg) by mouth daily for 90 days TAKE 1 TABLET BY MOUTH AT BEDTIME AT NIGHT 30 tablet 3    calcitRIOL (ROCALTROL) 0.25 MCG capsule Take 1 capsule (0.25 mcg) by mouth daily for 90 days 90 capsule 3    CELLCEPT (BRAND) 250 MG capsule TAKE THREE CAPSULES BY MOUTH TWICE A  capsule 3    cholecalciferol 2000 units TABS Take 2,000 Units by mouth daily 90 tablet 3    Ferrous Gluconate 324 (37.5 Fe) MG TABS Take by mouth daily      losartan (COZAAR) 25 MG tablet Take 1 tablet (25 mg) by mouth daily 90 tablet 3    methylPREDNISolone (MEDROL DOSEPAK) 4 MG tablet  therapy pack Follow Package Directions 21 tablet 1    methylPREDNISolone (MEDROL DOSEPAK) 4 MG tablet therapy pack Follow Package Directions 21 tablet 1    NEORAL (BRAND) 100 MG capsule TAKE ONE CAPSULE BY MOUTH TWICE A DAY (TOTAL DOSE 125 MG TWICE DAILY) 60 capsule 11    NEORAL (BRAND) 25 MG capsule Take 4 capsules (100 mg) by mouth 2 times daily Total dose = 125 mg twice daily 240 capsule 0    sodium bicarbonate 650 MG tablet Take 3 tablets (1,950 mg) by mouth 3 times daily 810 tablet 0    epoetin beto-epbx (RETACRIT) 60615 UNIT/ML injection Inject 1 mL (10,000 Units) Subcutaneous every 7 days Adminster for Hgb <10.0.  HOLD dose if systolic BP >180. (Patient not taking: Reported on 7/9/2024) 4 mL 11     No current facility-administered medications for this visit.     BP (!) 131/94   Pulse 84   Temp 97.8  F (36.6  C) (Oral)   Wt 111.7 kg (246 lb 3.2 oz)   SpO2 99%   BMI 37.75 kg/m    Overweight wm, no distress  Multiple skin lesions on face  Abd: protruberent, scar deformity LLQ  No sig edema.

## 2024-09-04 ENCOUNTER — PATIENT OUTREACH (OUTPATIENT)
Dept: CARE COORDINATION | Facility: CLINIC | Age: 49
End: 2024-09-04
Payer: COMMERCIAL

## 2024-09-04 NOTE — PROGRESS NOTES
Anemia Management Note - Follow Up      SUBJECTIVE/OBJECTIVE:    Referred by Dr. Lan Patino on 10/12/2023  Primary Diagnosis: Anemia in Chronic Kidney Disease (N18.3, D63.1)     Secondary Diagnosis:  Organ or tissue replaced by transplant, kidney (Z94.0)  Hgb goal range:  9-10  Epo/Darbo:   Retacrit 10,000 units subcutaneous every week, Hgb <10, AT HOME  7/15/24: Retacrit was denied.   24 Sent new Rx to [facility]  *2/15/24: No doses at home.  Holding off on refilling.   Iron regimen:  Daily oral iron. He will try to BID dosing  Labs : [id number]  Recent CINDY use, transfusion, IV iron: None     RX/TX plans : 2025  No MI and blood clots or cancers. 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     Contact:  No consent to communicate on file        Latest Ref Rng & Units 2024 2024 5/3/2024 2024 7/15/2024 2024 2024   Anemia   Hemoglobin 13.3 - 17.7 g/dL 10.3  10.4  9.7  8.7  9.8  10.1  9.4    TSAT 15 - 46 %  36  28  34  13   33    Ferritin 31 - 409 ng/mL  269  297  317  313   392         BP Readings from Last 3 Encounters:   24 (!) 131/94   24 (!) 131/94   24 130/86     Wt Readings from Last 2 Encounters:   24 111.6 kg (246 lb)   24 111.7 kg (246 lb 3.2 oz)           ASSESSMENT:    Hgb:at goal - continue to monitor  TSat: at goal >30% Ferritin: At goal (>100ng/mL)        PLAN:  RTC for Anemia Labs the end of 2024.      Orders needed to be renewed (for next follow-up date) in EPIC: None    Iron labs due:  End of 2024    Plan discussed with:  No call, chart review       NEXT FOLLOW-UP DATE:  10/2/24    Janna Louis RN   Anemia Services  [facility]  [email or web address]   Office : [phone]  Fax: [phone]

## 2024-09-04 NOTE — TELEPHONE ENCOUNTER
Call to confirm Miguel Angel received message/remind Miguel Angel he is due for cyclosporine level middle of next week ~9/12/24 since last level was slightly elevated. Current dose prescribed 125 mg BID.

## 2024-09-10 ENCOUNTER — LAB (OUTPATIENT)
Dept: LAB | Facility: CLINIC | Age: 49
End: 2024-09-10
Payer: COMMERCIAL

## 2024-09-10 DIAGNOSIS — R79.89 ELEVATED SERUM CREATININE: ICD-10-CM

## 2024-09-10 DIAGNOSIS — N18.9 CHRONIC KIDNEY DISEASE (CKD): ICD-10-CM

## 2024-09-10 DIAGNOSIS — Z79.899 ENCOUNTER FOR LONG-TERM CURRENT USE OF MEDICATION: ICD-10-CM

## 2024-09-10 DIAGNOSIS — N18.30 STAGE 3 CHRONIC KIDNEY DISEASE, UNSPECIFIED WHETHER STAGE 3A OR 3B CKD (H): ICD-10-CM

## 2024-09-10 DIAGNOSIS — Z94.0 KIDNEY REPLACED BY TRANSPLANT: ICD-10-CM

## 2024-09-10 DIAGNOSIS — D63.1 ANEMIA IN CHRONIC RENAL DISEASE: ICD-10-CM

## 2024-09-10 DIAGNOSIS — N18.9 ANEMIA IN CHRONIC RENAL DISEASE: ICD-10-CM

## 2024-09-10 LAB
ANION GAP SERPL CALCULATED.3IONS-SCNC: 14 MMOL/L (ref 7–15)
BUN SERPL-MCNC: 64.4 MG/DL (ref 6–20)
CALCIUM SERPL-MCNC: 8.5 MG/DL (ref 8.8–10.4)
CHLORIDE SERPL-SCNC: 106 MMOL/L (ref 98–107)
CREAT SERPL-MCNC: 3.7 MG/DL (ref 0.67–1.17)
CYCLOSPORINE BLD LC/MS/MS-MCNC: 92 UG/L (ref 50–400)
EGFRCR SERPLBLD CKD-EPI 2021: 19 ML/MIN/1.73M2
ERYTHROCYTE [DISTWIDTH] IN BLOOD BY AUTOMATED COUNT: 13 % (ref 10–15)
GLUCOSE SERPL-MCNC: 91 MG/DL (ref 70–99)
HCO3 SERPL-SCNC: 18 MMOL/L (ref 22–29)
HCT VFR BLD AUTO: 28.7 % (ref 40–53)
HGB BLD-MCNC: 9.5 G/DL (ref 13.3–17.7)
MCH RBC QN AUTO: 29.9 PG (ref 26.5–33)
MCHC RBC AUTO-ENTMCNC: 33.1 G/DL (ref 31.5–36.5)
MCV RBC AUTO: 90 FL (ref 78–100)
PLATELET # BLD AUTO: 219 10E3/UL (ref 150–450)
POTASSIUM SERPL-SCNC: 4.6 MMOL/L (ref 3.4–5.3)
RBC # BLD AUTO: 3.18 10E6/UL (ref 4.4–5.9)
SODIUM SERPL-SCNC: 138 MMOL/L (ref 135–145)
TME LAST DOSE: NORMAL H
TME LAST DOSE: NORMAL H
WBC # BLD AUTO: 5.3 10E3/UL (ref 4–11)

## 2024-09-10 PROCEDURE — 80158 DRUG ASSAY CYCLOSPORINE: CPT

## 2024-09-10 PROCEDURE — 36415 COLL VENOUS BLD VENIPUNCTURE: CPT

## 2024-09-10 PROCEDURE — 80048 BASIC METABOLIC PNL TOTAL CA: CPT

## 2024-09-10 PROCEDURE — 85027 COMPLETE CBC AUTOMATED: CPT

## 2024-09-13 ENCOUNTER — PATIENT OUTREACH (OUTPATIENT)
Dept: CARE COORDINATION | Facility: CLINIC | Age: 49
End: 2024-09-13
Payer: COMMERCIAL

## 2024-10-02 ENCOUNTER — PATIENT OUTREACH (OUTPATIENT)
Dept: CARE COORDINATION | Facility: CLINIC | Age: 49
End: 2024-10-02
Payer: COMMERCIAL

## 2024-10-02 NOTE — PROGRESS NOTES
Anemia Management Note - Discharge    Referred by Dr. Lan Patino on 10/12/2023     Hgb has been stable in the 9-10 range since 1/16/2024 with no intervention. Last CINDY dose was given on 1/11/2024.  CINDY was denied by insurance in July 2024.     Patient meets criteria for discharge from Anemia Clinic with at least 3 months without CINDY Therapy or IV Iron, and/or no Oral Iron therapy for 6 months.        Latest Ref Rng & Units 2/13/2024 5/3/2024 6/19/2024 7/15/2024 8/5/2024 8/29/2024 9/10/2024   Anemia   Hemoglobin 13.3 - 17.7 g/dL 10.4  9.7  8.7  9.8  10.1  9.4  9.5    TSAT 15 - 46 % 36  28  34  13   33     Ferritin 31 - 409 ng/mL 269  297  317  313   392                Anemia labs discontinued/outside lab/clinic notified (if applicable), Rx discontinued/Therapy Plans cancelled, removed from active patient list, patient and/or caregiver and referring provider notified as appropriate.     Janna Louis RN   Anemia Services  [facility]  [email or web address]   Office : [phone]  Fax: [phone]

## 2024-10-21 ENCOUNTER — OFFICE VISIT (OUTPATIENT)
Dept: DERMATOLOGY | Facility: CLINIC | Age: 49
End: 2024-10-21
Payer: COMMERCIAL

## 2024-10-21 DIAGNOSIS — Z76.82 ORGAN TRANSPLANT CANDIDATE: ICD-10-CM

## 2024-10-21 DIAGNOSIS — L73.8 SENILE SEBACEOUS GLAND HYPERPLASIA: ICD-10-CM

## 2024-10-21 DIAGNOSIS — Z12.83 ENCOUNTER FOR SCREENING FOR MALIGNANT NEOPLASM OF SKIN: Primary | ICD-10-CM

## 2024-10-21 DIAGNOSIS — Z94.0 KIDNEY REPLACED BY TRANSPLANT: ICD-10-CM

## 2024-10-21 DIAGNOSIS — L81.4 LENTIGINES: ICD-10-CM

## 2024-10-21 DIAGNOSIS — D22.9 MULTIPLE NEVI: ICD-10-CM

## 2024-10-21 DIAGNOSIS — D49.2 NEOPLASM OF UNSPECIFIED BEHAVIOR OF BONE, SOFT TISSUE, AND SKIN: ICD-10-CM

## 2024-10-21 DIAGNOSIS — L82.1 SEBORRHEIC KERATOSES: ICD-10-CM

## 2024-10-21 PROCEDURE — 99203 OFFICE O/P NEW LOW 30 MIN: CPT | Mod: 25 | Performed by: DERMATOLOGY

## 2024-10-21 PROCEDURE — 11103 TANGNTL BX SKIN EA SEP/ADDL: CPT | Performed by: DERMATOLOGY

## 2024-10-21 PROCEDURE — 88305 TISSUE EXAM BY PATHOLOGIST: CPT | Mod: TC | Performed by: PHYSICIAN ASSISTANT

## 2024-10-21 PROCEDURE — 11102 TANGNTL BX SKIN SINGLE LES: CPT | Performed by: DERMATOLOGY

## 2024-10-21 PROCEDURE — 88305 TISSUE EXAM BY PATHOLOGIST: CPT | Mod: 26 | Performed by: PATHOLOGY

## 2024-10-21 ASSESSMENT — PAIN SCALES - GENERAL: PAINLEVEL: NO PAIN (0)

## 2024-10-21 NOTE — PROGRESS NOTES
[facility] Dermatology Note  Encounter Date: Oct 21, 2024  Office Visit     Reviewed patients past medical history and pertinent chart review prior to patients visit today.     Dermatology Problem List:  # Kidney transplant x2, 1993 and 2010  # Pre-transplant evaluation, kidney transplant  # NUB x3, right upper lip, left cheek, right upper back, shave biopsy 10/21/2024       No personal history of skin cancer.  No family history of skin cancer.  ____________________________________________    Assessment & Plan:     # Neoplasm of uncertain behavior:  right upper lip  DDx includes NMSC vs acrochordon. Shave biopsy today.  # Neoplasm of uncertain behavior:  left cheek  DDx includes sebaceous hyperplasia vs NMSC. Shave biopsy today.  # Neoplasm of uncertain behavior:  right upper back  DDx includes BCC vs ISK. Shave biopsy today.    Procedure Note: Biopsy by shave technique  The risks and benefits of the procedure were described to the patient. These include but are not limited to bleeding, infection, scar, incomplete removal, and non-diagnostic biopsy. Verbal informed consent was obtained. The above site(s) was cleansed with an alcohol pad and injected with 1% lidocaine with epinephrine. Once anesthesia was obtained, a biopsy(ies) was performed with Gilette blade. The tissue(s) was placed in a labeled container(s) with formalin and sent to pathology. Hemostasis was achieved with aluminum chloride. Vaseline and a bandage were applied to the wound(s). The patient tolerated the procedure well and was given post biopsy care instructions.    # Sebaceous hyperplasia  - We reviewed this diagnosis. We discussed the option of shave biopsy if any of the lesions are bothersome. The patient will think about this option and follow up should he wish to pursue shave biopsy in the future.     # Chronic immunosuppression  # Multiple nevi, trunk and extremities  # Solar lentigines  - No concerning features on  dermoscopy. We discussed the importance of self exams at home.   - ABCDEs: Counseled ABCDEs of melanoma: Asymmetry, Border (irregularity), Color (not uniform, changes in color), Diameter (greater than 6 mm which is about the size of a pencil eraser), and Evolving (any changes in preexisting moles).  - Sun protection: Counseled SPF 30+ sunscreen, UPF clothing, sun avoidance, tanning bed avoidance.    # Seborrheic keratoses  - We discussed the benign nature of the skin lesions. No treatment required. Continued observation recommended. Follow up with any concerns.      Follow-up:  Annual for follow up full body skin exam, as needed for new or changing lesions or new concerns    I attest that I recorded the interview and Dr Anaya personally performed the exam.   All risks, benefits and alternatives were discussed with patient.  Patient is in agreement and understands the assessment and plan.  All questions were answered.  Lou Garrett PA-C  [facility] Dermatology    ____________________________________________    CC: Derm Problem (FBSE - various skin tags on face of concern to pt)    HPI:  Mr. Miguel Angel Piña is a(n) 49 year old male who presents today as a new patient for a full body skin cancer screening. The patient has a history of two kidney transplants and is undergoing evaluation for a third kidney transplant. The patient denies a personal history of skin cancer. The patient requests evaluation of a lesions on the face. The lesions began after his second transplant. One on the left cheek is particularly bothersome. No other specific cutaneous concerns today. The patient reports trying to be diligent with photoprotection.      Physical Exam:  Vitals: There were no vitals taken for this visit.  SKIN: Total skin excluding the genitalia areas was performed. The exam included the scalp, face, neck, bilateral arms, chest, back, abdomen, bilateral legs, digits, mons pubis, buttocks, and nails.   - Matthews  II.  - Involving the right upper lip is a 1.0 x 1.0 cm pink nodule with crust.   - Involving the left cheek is a 0.8 x 0.8 cm pink papule with crust.   - Involving the right mid back is a 0.4 x 0.4 cm pink papule with central blue globule.   - Scattered on the face are yellow papules with coronal telangiectasias, consistent with sebaceous hyperplasia.   - Multiple tan/brown macules and papules scattered throughout exam, consistent with benign nevi. No concerning features on dermoscopy.   - Scattered tan, homogenous macules scattered on sun exposed skin, consistent with solar lentigines.   - Scattered waxy, stuck on appearing papules and patches, consistent with seborrheic keratoses.      Medications:  Current Outpatient Medications   Medication Sig Dispense Refill    allopurinol (ZYLOPRIM) 100 MG tablet Take 0.5 tablets (50 mg) by mouth daily      CELLCEPT (BRAND) 250 MG capsule TAKE THREE CAPSULES BY MOUTH TWICE A  capsule 3    cholecalciferol 2000 units TABS Take 2,000 Units by mouth daily 90 tablet 3    Ferrous Gluconate 324 (37.5 Fe) MG TABS Take by mouth daily      losartan (COZAAR) 25 MG tablet Take 1 tablet (25 mg) by mouth daily 90 tablet 3    methylPREDNISolone (MEDROL DOSEPAK) 4 MG tablet therapy pack Follow Package Directions 21 tablet 1    methylPREDNISolone (MEDROL DOSEPAK) 4 MG tablet therapy pack Follow Package Directions 21 tablet 1    NEORAL (BRAND) 100 MG capsule TAKE ONE CAPSULE BY MOUTH TWICE A DAY (TOTAL DOSE 125 MG TWICE DAILY) 60 capsule 11    NEORAL (BRAND) 25 MG capsule Take 4 capsules (100 mg) by mouth 2 times daily Total dose = 125 mg twice daily 240 capsule 0    sodium bicarbonate 650 MG tablet Take 3 tablets (1,950 mg) by mouth 3 times daily 810 tablet 0    amLODIPine (NORVASC) 5 MG tablet Take 1 tablet (5 mg) by mouth daily for 90 days TAKE 1 TABLET BY MOUTH AT BEDTIME AT NIGHT 30 tablet 3    calcitRIOL (ROCALTROL) 0.25 MCG capsule Take 1 capsule (0.25 mcg) by mouth daily for 90  days 90 capsule 3    epoetin beto-epbx (RETACRIT) 80993 UNIT/ML injection Inject 1 mL (10,000 Units) Subcutaneous every 7 days [place] for Hgb <10.0.  HOLD dose if systolic BP >180. (Patient not taking: Reported on 7/9/2024) 4 mL 11     No current facility-administered medications for this visit.      Past Medical History:   Patient Active Problem List   Diagnosis    Kidney replaced by transplant    Medullary cystic kidney disease    Conductive hearing loss, unilateral    Aftercare following organ transplant    Immunosuppressed status (H)    Anemia in stage 4 chronic kidney disease (H)    Secondary renal hyperparathyroidism (H)    Gout    Vitamin D deficiency    Dyslipidemia    Heterozygous factor V Leiden mutation (H)    HTN, kidney transplant related    CKD (chronic kidney disease) stage 4, GFR 15-29 ml/min (H)    Class 2 obesity due to excess calories without serious comorbidity with body mass index (BMI) of 35.0 to 35.9 in adult    Cellulitis    Class 2 severe obesity due to excess calories with serious comorbidity in adult (H)     Past Medical History:   Diagnosis Date    Anemia in chronic renal disease     Cellulitis 07/09/2024    RLE      Clinical diagnosis of COVID-19 12/21/2021    Conductive hearing loss, unilateral 10/03/2013    Pt reports insidious onset of apparent hearing loss, that had not bothered him, but his wife feels like he must have hearing loss, given that he cannot hear her talking to him often.  He denies any trauma, past issues with ear infections.  He is s/p two kidney transplants and is on rejection medications chronically (stable of late).    He denies excessive loud noise exposure, hazardous work enviro    CVA (cerebral vascular accident) (H)     Dyslipidemia     Factor V Leiden (H)     Gout     History of blood transfusion     Hypertension secondary to other renal disorders     Immunosuppressed status (H)     Infection 10/14/2021    Right Small Finger     Kidney replaced by transplant  4/2010 (2nd)    prior 11/1993    Medullary cystic kidney disease     Secondary renal hyperparathyroidism (H)     Sleep apnea        CC Raymundo Rausch, APRN CNP  [address] [place] [address] on close of this encounter.

## 2024-10-21 NOTE — PROGRESS NOTES
Lidocaine-epinephrine 1-1:068325 % injection   3mL once for one use, starting 10/21/2024 ending 10/21/2024,  2mL disp, R-0, injection  Injected by Greg Jhaveri MA

## 2024-10-21 NOTE — PATIENT INSTRUCTIONS
Patient Education        Proper skin care from [facility]:     -Eliminate harsh soaps as they strip the natural oils from the skin, often resulting in dry itchy skin ( i.e. Dial, Zest, [place] Spring)  -Use mild soaps such as Cetaphil or Dove Sensitive Skin in the shower. You do not need to use soap on arms, legs, and trunk every time you shower unless visibly soiled.   -Avoid hot or cold showers.  -After showering, lightly dry off and apply moisturizing within 2-3 minutes. This will help trap moisture in the skin.   -Aggressive use of a moisturizer at least 1-2 times a day to the entire body (including -Vanicream, Cetaphil, Aquaphor or Cerave) and moisturize hands after every washing.  -We recommend using moisturizers that come in a tub that needs to be scooped out, not a pump. This has more of an oil base. It will hold moisture in your skin much better than a water base moisturizer. The above recommended are non-pore clogging.        Wear a sunscreen with at least SPF 30 on your face, ears, neck and V of the chest daily. Wear sunscreen on other areas of the body if those areas are exposed to the sun throughout the day. Sunscreens can contain physical and/or chemical blockers. Physical blockers are less likely to clog pores, these include zinc oxide and titanium dioxide. Reapply every two hour and after swimming.      Sunscreen examples: https://www.ewg.org/sunscreen/     UV radiation  UVA radiation remains constant throughout the day and throughout the year. It is a longer wavelength than UVB and therefore penetrates deeper into the skin leading to immediate and delayed tanning, photoaging, and skin cancer. 70-80% of UVA and UVB radiation occurs between the hours of 10am-2pm.  UVB radiation  UVB radiation causes the most harmful effects and is more significant during the summer months. However, snow and ice can reflect UVB radiation leading to skin damage during the winter months as well. UVB radiation is  responsible for tanning, burning, inflammation, delayed erythema (pinkness), pigmentation (brown spots), and skin cancer.      I recommend self monthly full body exams and yearly full body exams with a dermatology provider. If you develop a new or changing lesion please follow up for examination. Most skin cancers are pink and scaly or pink and pearly. However, we do see blue/brown/black skin cancers.  Consider the ABCDEs of melanoma when giving yourself your monthly full body exam ( don't forget the groin, buttocks, feet, toes, etc). A-asymmetry, B-borders, C-color, D-diameter, E-elevation or evolving. If you see any of these changes please follow up in clinic. If you cannot see your back I recommend purchasing a hand held mirror to use with a larger wall mirror.       Checking for Skin Cancer  You can find cancer early by checking your skin each month. There are 3 kinds of skin cancer. They are melanoma, basal cell carcinoma, and squamous cell carcinoma. Doing monthly skin checks is the best way to find new marks or skin changes. Follow the instructions below for checking your skin.   The ABCDEs of checking moles for melanoma   Check your moles or growths for signs of melanoma using ABCDE:   Asymmetry: the sides of the mole or growth don t match  Border: the edges are ragged, notched, or blurred  Color: the color within the mole or growth varies  Diameter: the mole or growth is larger than 6 mm (size of a pencil eraser)  Evolving: the size, shape, or color of the mole or growth is changing (evolving is not shown in the images below)    Checking for other types of skin cancer  Basal cell carcinoma or squamous cell carcinoma have symptoms such as:      A spot or mole that looks different from all other marks on your skin  Changes in how an area feels, such as itching, tenderness, or pain  Changes in the skin's surface, such as oozing, bleeding, or scaliness  A sore that does not heal  New swelling or redness beyond  the border of a mole     Who s at risk?  Anyone can get skin cancer. But you are at greater risk if you have:   Fair skin, light-colored hair, or light-colored eyes  Many moles or abnormal moles on your skin  A history of sunburns from sunlight or tanning beds  A family history of skin cancer  A history of exposure to radiation or chemicals  A weakened immune system  If you have had skin cancer in the past, you are at risk for recurring skin cancer.   How to check your skin  Do your monthly skin checkups in front of a full-length mirror. Check all parts of your body, including your:   Head (ears, face, neck, and scalp)  Torso (front, back, and sides)  Arms (tops, undersides, upper, and lower armpits)  Hands (palms, backs, and fingers, including under the nails)  Buttocks and genitals  Legs (front, back, and sides)  Feet (tops, soles, toes, including under the nails, and between toes)  If you have a lot of moles, take digital photos of them each month. Make sure to take photos both up close and from a distance. These can help you see if any moles change over time.   Most skin changes are not cancer. But if you see any changes in your skin, call your doctor right away. Only he or she can diagnose a problem. If you have skin cancer, seeing your doctor can be the first step toward getting the treatment that could save your life.   [employer] last reviewed this educational content on 4/1/2019 2000-2020 [employer]. [address] [place] [address], [place] [address]. All rights reserved. This information is not intended as a substitute for professional medical care. Always follow your healthcare professional's instructions.        When should I call my doctor?  If you are worsening or not improving, please, contact us or seek urgent care as noted below.      Who should I call with questions (adults)?  [facility] (adult and pediatric): [phone]  [facility]  [place] (adult): [phone]  [facility] ([place], [place], [place] and [place]) [phone]  For urgent needs outside of business hours call the [facility] at [phone] and ask for the dermatology resident on call to be paged  If this is a medical emergency and you are unable to reach an ER, Call 911        If you need a prescription refill, please contact your pharmacy. Refills are approved or denied by our Physicians during normal business hours, Monday through Fridays  Per office policy, refills will not be granted if you have not been seen within the past year (or sooner depending on your child's condition)

## 2024-10-21 NOTE — NURSING NOTE
Dermatology Rooming Note    Miguel Angel Piña's goals for this visit include:   Chief Complaint   Patient presents with    Derm Problem     FBSE - various skin tags on face of concern to pt     Kelsey Reed, EMT

## 2024-10-23 LAB
PATH REPORT.COMMENTS IMP SPEC: NORMAL
PATH REPORT.FINAL DX SPEC: NORMAL
PATH REPORT.GROSS SPEC: NORMAL
PATH REPORT.MICROSCOPIC SPEC OTHER STN: NORMAL
PATH REPORT.RELEVANT HX SPEC: NORMAL

## 2024-10-28 ENCOUNTER — ANCILLARY PROCEDURE (OUTPATIENT)
Dept: GENERAL RADIOLOGY | Facility: CLINIC | Age: 49
End: 2024-10-28
Attending: PHYSICIAN ASSISTANT
Payer: COMMERCIAL

## 2024-10-28 ENCOUNTER — OFFICE VISIT (OUTPATIENT)
Dept: FAMILY MEDICINE | Facility: CLINIC | Age: 49
End: 2024-10-28
Payer: COMMERCIAL

## 2024-10-28 VITALS
OXYGEN SATURATION: 96 % | TEMPERATURE: 99.7 F | HEART RATE: 125 BPM | BODY MASS INDEX: 34.8 KG/M2 | SYSTOLIC BLOOD PRESSURE: 126 MMHG | HEIGHT: 69 IN | DIASTOLIC BLOOD PRESSURE: 72 MMHG | RESPIRATION RATE: 24 BRPM | WEIGHT: 235 LBS

## 2024-10-28 DIAGNOSIS — J06.9 VIRAL URI WITH COUGH: ICD-10-CM

## 2024-10-28 DIAGNOSIS — J06.9 VIRAL URI WITH COUGH: Primary | ICD-10-CM

## 2024-10-28 LAB
FLUAV AG SPEC QL IA: NEGATIVE
FLUBV AG SPEC QL IA: NEGATIVE

## 2024-10-28 PROCEDURE — 99213 OFFICE O/P EST LOW 20 MIN: CPT | Performed by: PHYSICIAN ASSISTANT

## 2024-10-28 PROCEDURE — 87804 INFLUENZA ASSAY W/OPTIC: CPT | Performed by: PHYSICIAN ASSISTANT

## 2024-10-28 PROCEDURE — 71046 X-RAY EXAM CHEST 2 VIEWS: CPT | Mod: TC | Performed by: RADIOLOGY

## 2024-10-28 ASSESSMENT — ENCOUNTER SYMPTOMS
FATIGUE: 1
NAUSEA: 0
VOMITING: 0
SORE THROAT: 0
WHEEZING: 0
DIARRHEA: 1
RHINORRHEA: 0
COUGH: 1
SHORTNESS OF BREATH: 0
ABDOMINAL PAIN: 0

## 2024-10-28 NOTE — PROGRESS NOTES
Assessment & Plan     Viral URI with cough  Patient is a 49-year-old male with past medical history significant for kidney transplant-immunosuppression, who presents to clinic due to 3 days of fever, chills, cough, fatigue.  Vital signs significant for elevated temperature and tachycardia.  Physical exam significant for Rales at right side of lungs.  Chest x-ray completed, reviewed with patient, and without signs of pneumonia.  Influenza testing negative.  Symptoms are most consistent with viral URI.  Discussed expected course of recovery over 10-14 days.  Recommended continuing Tylenol, rest, hydration for symptom management.  Discussed the importance of following up urgently for any new or worsening symptoms.  Follow-up precautions provided.  - XR Chest 2 Views; Future  - Influenza A/B antigen        See Patient Instructions    Sandi Michelle is a 49 year old, presenting for the following health issues:  Illness      10/28/2024     1:42 PM   Additional Questions   Roomed by MP         10/28/2024     1:42 PM   Patient Reported Additional Medications   Patient reports taking the following new medications None per patient     History of Present Illness       Reason for visit:  Lab appointment  Symptom onset:  1-3 days ago  Symptoms include:  Cough diarea lost of energy  Symptom intensity:  Mild  Symptom progression:  Improving  Had these symptoms before:  No  What makes it worse:  Tylonal  What makes it better:  Sleep He is missing 1 dose(s) of medications per week.  He is not taking prescribed medications regularly due to remembering to take.       Pre-Provider Visit Orders - Rapid Strep  Does the patient have shortness of breath/trouble breathing, an earache, drooling/too much saliva, or any difficulty opening his mouth/moving neck?  No  Does the patient have a sore throat and either history of fever >100.4 in the previous 24 hours without a cough or recent exposure to a known case of strep throat? No      3  "days ago symptoms started with chills. Patient then got fever of 102 which broke yesterday. Mild diarrhea. Decreased appetite. Patient notes significant fatigue, mild cough. Symptoms treated with Tylenol. No known sick contacts.     Review of Systems   Constitutional:  Positive for fatigue.   HENT:  Negative for congestion, ear pain, rhinorrhea and sore throat.    Respiratory:  Positive for cough. Negative for shortness of breath and wheezing.    Cardiovascular:  Negative for chest pain.   Gastrointestinal:  Positive for diarrhea. Negative for abdominal pain, nausea and vomiting.           Objective    /72   Pulse (!) 125   Temp 99.7  F (37.6  C) (Temporal)   Resp 24   Ht 1.74 m (5' 8.5\")   Wt 106.6 kg (235 lb)   SpO2 96%   BMI 35.21 kg/m    Body mass index is 35.21 kg/m .  Physical Exam  Vitals and nursing note reviewed.   Constitutional:       General: He is not in acute distress.     Appearance: Normal appearance.   HENT:      Head: Normocephalic and atraumatic.      Nose: Congestion present. No rhinorrhea.      Mouth/Throat:      Mouth: Mucous membranes are moist.      Pharynx: Oropharynx is clear.   Eyes:      Extraocular Movements: Extraocular movements intact.      Pupils: Pupils are equal, round, and reactive to light.   Cardiovascular:      Rate and Rhythm: Normal rate and regular rhythm.      Heart sounds: Normal heart sounds.   Pulmonary:      Effort: Pulmonary effort is normal. No respiratory distress.      Breath sounds: Rales (right sided) present. No wheezing or rhonchi.   Musculoskeletal:         General: Normal range of motion.      Cervical back: Normal range of motion.   Skin:     General: Skin is warm and dry.   Neurological:      General: No focal deficit present.      Mental Status: He is alert.   Psychiatric:         Mood and Affect: Mood normal.         Behavior: Behavior normal.          Results for orders placed or performed in visit on 10/28/24   Influenza A/B antigen     " Status: Normal    Specimen: Nose; Swab   Result Value Ref Range    Influenza A antigen Negative Negative    Influenza B antigen Negative Negative    Narrative    Test results must be correlated with clinical data. If necessary, results should be confirmed by a molecular assay or viral culture.               Signed Electronically by: Lou Escalante PA-C

## 2024-10-28 NOTE — PATIENT INSTRUCTIONS
Your chest x-ray does not show signs of pneumonia.  Your symptoms are likely due to a viral upper respiratory infection and should clear up on their own within 10 to 14 days.  We are completing flu testing and results will be sent to [facility].  Please be reevaluated in clinic or urgent care for any new or worsening symptoms.  For any severe symptoms such as difficulty breathing, chest pain, wheezing, coughing up blood, fevers that you cannot control, please be seen in the emergency department.  Reach out with questions or concerns.

## 2024-10-28 NOTE — LETTER
October 28, 2024      Miguel Angel Piña  [address] [address] [facility] [id number]        To Whom It May Concern:    Miguel Angel Piña  was seen on 10/28/24.  Please excuse him  until symptoms improve due to illness.        Sincerely,        Lou Escalante PA-C

## 2024-10-29 DIAGNOSIS — J18.9 PNEUMONIA DUE TO INFECTIOUS ORGANISM, UNSPECIFIED LATERALITY, UNSPECIFIED PART OF LUNG: Primary | ICD-10-CM

## 2024-10-29 RX ORDER — AZITHROMYCIN 250 MG/1
TABLET, FILM COATED ORAL
Qty: 6 TABLET | Refills: 0 | Status: SHIPPED | OUTPATIENT
Start: 2024-10-29 | End: 2024-11-03

## 2024-10-29 RX ORDER — AMOXICILLIN AND CLAVULANATE POTASSIUM 500; 125 MG/1; MG/1
1 TABLET, FILM COATED ORAL 2 TIMES DAILY
Qty: 10 TABLET | Refills: 0 | Status: SHIPPED | OUTPATIENT
Start: 2024-10-29 | End: 2024-11-03

## 2024-10-30 ENCOUNTER — TELEPHONE (OUTPATIENT)
Dept: DERMATOLOGY | Facility: CLINIC | Age: 49
End: 2024-10-30
Payer: COMMERCIAL

## 2024-10-30 DIAGNOSIS — C44.310 BCC (BASAL CELL CARCINOMA), FACE: Primary | ICD-10-CM

## 2024-10-30 NOTE — TELEPHONE ENCOUNTER
----- Message from Lou Garrett sent at 10/24/2024  9:01 AM CDT -----  A. BCC, needs Mohs. Please place order and call patient to schedule. Thank you!  B, C. No further tx

## 2024-10-30 NOTE — TELEPHONE ENCOUNTER
Dermatological Path Order and Indications: Patient Communication     Edit Comments   Add Notifications     Dear Miguel Angel Piña,     During your evaluation at [facility], three skin biopsies were obtained. The specimens were examined under the microscope. The specimen involving your right upper lip showed a basal cell carcinoma arising within a desmoplastic trichilemmoma. The specimen involving your left cheek showed a sebaceous hyperplasia. 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     Basal cell carcinoma is a common type of skin cancer. This requires further treatment with Mohs micrographic surgery to ensure complete removal. You can expect to receive a call from our team to schedule.     Sebaceous hyperplasia is a benign overgrowth of oil glands. No further treatment is needed for this site.     Sebaceoma is a benign tumor of the oil glands. Sebaceomas can occur in the setting of a genetic condition called Arcelia-Osvaldo syndrome. I recommend working with your primary care to ensure you are up to date with all your cancer screenings, including colonoscopies.     I recommend continued sun avoidence, diligent use of sunscreens with an SPF of 30 or more, and regular skin examinations. Please follow up sooner if you have any concerns.     It was a pleasure to participate in your dermatologic care. Please do not hesitate to contact me if you have any questions.     Sincerely,  Lou Garrett PA-C  Dermatology   Written by Lou Garrett PA-C on 10/24/2024  9:01 AM CDT

## 2024-10-31 ENCOUNTER — TELEPHONE (OUTPATIENT)
Dept: DERMATOLOGY | Facility: CLINIC | Age: 49
End: 2024-10-31
Payer: COMMERCIAL

## 2024-11-02 ENCOUNTER — NURSE TRIAGE (OUTPATIENT)
Dept: NURSING | Facility: CLINIC | Age: 49
End: 2024-11-02
Payer: COMMERCIAL

## 2024-11-02 NOTE — TELEPHONE ENCOUNTER
"On 10/28/24 was seen for pneumonia.  Wants to be seen for a follow up.  I offered to transfer Miguel Angel to scheduling so he can make an appointment.  He said he was talking to someone in scheduling and they transferred him to [facility].  \"I just want somebody who knows what they're doing\" .    I told Miguel Angel he can be seen in an urgent care for that.  Caller stated understanding and agreement.  I gave him the address for the [facility].  He said he'll go there. He knows where it is.  I gave him their hours.    Bernie CLARK RN [facility]     "

## 2024-11-07 DIAGNOSIS — E87.20 METABOLIC ACIDOSIS: ICD-10-CM

## 2024-11-07 DIAGNOSIS — Z94.0 KIDNEY TRANSPLANTED: ICD-10-CM

## 2024-11-07 DIAGNOSIS — E87.8 LOW BICARBONATE LEVEL: ICD-10-CM

## 2024-11-07 RX ORDER — SODIUM BICARBONATE 650 MG/1
TABLET ORAL
Qty: 810 TABLET | Refills: 0 | Status: SHIPPED | OUTPATIENT
Start: 2024-11-07

## 2024-11-25 DIAGNOSIS — M1A.9XX1 CHRONIC TOPHACEOUS GOUT: ICD-10-CM

## 2024-11-25 DIAGNOSIS — Z79.899 ON ALLOPURINOL THERAPY: ICD-10-CM

## 2024-11-25 RX ORDER — METHYLPREDNISOLONE 4 MG/1
TABLET ORAL
Qty: 21 TABLET | Refills: 1 | Status: CANCELLED | OUTPATIENT
Start: 2024-11-25

## 2024-11-25 NOTE — TELEPHONE ENCOUNTER
02/19/2024 ALLOPURINOL 100MG [facility] [address] [place], [place] - [address] [address] [facility] [phone] [address] 2 92   Call placed to inquire if he has received Allopurinol from other provider as he has not filled a prescription since Feb.    Left message on answering machine for patient to call back.  Gabrielle WASHINGTON   Specialty Clinic RN

## 2024-11-25 NOTE — TELEPHONE ENCOUNTER
Please verify that the patient is taking his allopurinol.  Is he having increased gout flares?  We have not checked a uric acid since May and should do so.    Isela Jean [facility]

## 2024-11-25 NOTE — TELEPHONE ENCOUNTER
Requested Prescriptions   Pending Prescriptions Disp Refills    methylPREDNISolone (MEDROL DOSEPAK) 4 MG tablet therapy pack 21 tablet 1     Sig: Follow Package Directions       There is no refill protocol information for this order          Last office visit: 2/19/2024 ; last virtual visit: Visit date not found with prescribing provider:  Isela Jean       Future Office Visit:      Sandra Donnelly   Clinic Station [place]   [facility]  [phone]

## 2024-11-26 NOTE — TELEPHONE ENCOUNTER
[facility] Call Center    Phone Message    May a detailed message be left on voicemail: yes     Reason for Call: Medication Refill Request    Has the patient contacted the pharmacy for the refill? Yes   Name of medication being requested: Per patient has not had medication prescribed through another provider and needs a refill on both medications  Allopurinol   And   Prednisone    Provider who prescribed the medication: ALEX Jean   Pharmacy:   [facility] [address] [place] [id number] [address]     Date medication is needed: ASAp       Action Taken: Other: Rheum     Travel Screening: Not Applicable     Date of Service:

## 2024-11-27 RX ORDER — ALLOPURINOL 100 MG/1
50 TABLET ORAL DAILY
Qty: 45 TABLET | Refills: 0 | Status: SHIPPED | OUTPATIENT
Start: 2024-11-27

## 2024-11-27 RX ORDER — METHYLPREDNISOLONE 4 MG/1
TABLET ORAL
Qty: 21 TABLET | Refills: 0 | Status: SHIPPED | OUTPATIENT
Start: 2024-11-27

## 2024-11-27 NOTE — TELEPHONE ENCOUNTER
Ok Allopurinol refilled and medrol refilled. He needs to schedule a follow up with me. No further refills without appointment.     Isela Jean, [facility] Rheumatology

## 2025-01-02 ENCOUNTER — PATIENT OUTREACH (OUTPATIENT)
Dept: NEPHROLOGY | Facility: CLINIC | Age: 50
End: 2025-01-02
Payer: COMMERCIAL

## 2025-01-02 NOTE — TELEPHONE ENCOUNTER
Nephrology Note: RN CC Chart Review    REASON FOR ENCOUNTER:     Chart reviewed by nephrology RN CC            Patient has not responded to a nephrology follow up request.                SITUATION/BACKROUND:     Last nephrology visit:    Last Renal Panel:  Sodium   Date Value Ref Range Status   09/10/2024 138 135 - 145 mmol/L Final   05/06/2021 136 133 - 144 mmol/L Final     Potassium   Date Value Ref Range Status   09/10/2024 4.6 3.4 - 5.3 mmol/L Final   05/03/2023 4.3 3.4 - 5.3 mmol/L Final   05/06/2021 4.4 3.4 - 5.3 mmol/L Final     Chloride   Date Value Ref Range Status   09/10/2024 106 98 - 107 mmol/L Final   05/03/2023 109 94 - 109 mmol/L Final   05/06/2021 107 94 - 109 mmol/L Final     Carbon Dioxide   Date Value Ref Range Status   05/06/2021 23 20 - 32 mmol/L Final     Carbon Dioxide (CO2)   Date Value Ref Range Status   09/10/2024 18 (L) 22 - 29 mmol/L Final   05/03/2023 19 (L) 20 - 32 mmol/L Final     Anion Gap   Date Value Ref Range Status   09/10/2024 14 7 - 15 mmol/L Final   05/03/2023 9 3 - 14 mmol/L Final   05/06/2021 6 3 - 14 mmol/L Final     Glucose   Date Value Ref Range Status   09/10/2024 91 70 - 99 mg/dL Final   05/03/2023 89 70 - 99 mg/dL Final   05/06/2021 87 70 - 99 mg/dL Final     Urea Nitrogen   Date Value Ref Range Status   09/10/2024 64.4 (H) 6.0 - 20.0 mg/dL Final   05/03/2023 53 (H) 7 - 30 mg/dL Final   05/06/2021 54 (H) 7 - 30 mg/dL Final     Creatinine   Date Value Ref Range Status   09/10/2024 3.70 (H) 0.67 - 1.17 mg/dL Final   05/06/2021 3.10 (H) 0.66 - 1.25 mg/dL Final     GFR Estimate   Date Value Ref Range Status   09/10/2024 19 (L) >60 mL/min/1.73m2 Final     Comment:     eGFR calculated using 2021 CKD-EPI equation.   05/06/2021 23 (L) >60 mL/min/[1.73_m2] Final     Comment:     Non  GFR Calc  Starting 12/18/2018, serum creatinine based estimated GFR (eGFR) will be   calculated using the Chronic Kidney Disease Epidemiology Collaboration   (CKD-EPI) equation.        Calcium   Date Value Ref Range Status   09/10/2024 8.5 (L) 8.8 - 10.4 mg/dL Final     Comment:     Reference intervals for this test were updated on 7/16/2024 to reflect our healthy population more accurately. There may be differences in the flagging of prior results with similar values performed with this method. Those prior results can be interpreted in the context of the updated reference intervals.   05/06/2021 8.3 (L) 8.5 - 10.1 mg/dL Final     Phosphorus   Date Value Ref Range Status   06/19/2024 5.0 (H) 2.5 - 4.5 mg/dL Final   09/04/2018 3.5 2.5 - 4.5 mg/dL Final     Albumin   Date Value Ref Range Status   06/19/2024 3.7 3.5 - 5.2 g/dL Final   01/18/2023 3.6 3.4 - 5.0 g/dL Final   02/17/2020 3.7 3.4 - 5.0 g/dL Final         Neph Tracking Status:  Neph Tracking Flowsheet Last Filled Values       Patient's Referral Dates Auto Populate Patient's Referral Dates    Anemia Services Referral 12/31/21    Dietician Referral 8/23/21    Journey Referral 4/6/22    Vein Mapping/US Order 8/8/22    Access Surgeon Referral Status  Referred    Dialysis Access Referral 08/08/22  referred to Dr. Rivera    Access Surgical Consult --   8/31/22 has yet to call back to schedule US and consult appointments    Diaylsis Access Type AV Fistula  previous fistula, unsure laterality    Transplant Evaluation Referral 5/27/21    Transplant Status  Referred              ASSESSMENT/PLAN     Patient to follow up as scheduled at next appointment  Patient to call/[facility] message with updates    Upcoming Appointments:  Future Appts Next 180 days       No appointments to display              MUNIR CALIX RN

## 2025-01-08 ENCOUNTER — OFFICE VISIT (OUTPATIENT)
Dept: FAMILY MEDICINE | Facility: CLINIC | Age: 50
End: 2025-01-08
Payer: COMMERCIAL

## 2025-01-08 VITALS
SYSTOLIC BLOOD PRESSURE: 137 MMHG | RESPIRATION RATE: 16 BRPM | DIASTOLIC BLOOD PRESSURE: 86 MMHG | WEIGHT: 237 LBS | HEIGHT: 68 IN | OXYGEN SATURATION: 97 % | BODY MASS INDEX: 35.92 KG/M2 | HEART RATE: 104 BPM | TEMPERATURE: 99.8 F

## 2025-01-08 DIAGNOSIS — J10.1 INFLUENZA A: Primary | ICD-10-CM

## 2025-01-08 DIAGNOSIS — D84.9 IMMUNOSUPPRESSED STATUS: ICD-10-CM

## 2025-01-08 DIAGNOSIS — Z94.0 KIDNEY REPLACED BY TRANSPLANT: ICD-10-CM

## 2025-01-08 LAB
FLUAV AG SPEC QL IA: POSITIVE
FLUBV AG SPEC QL IA: NEGATIVE

## 2025-01-08 PROCEDURE — 87804 INFLUENZA ASSAY W/OPTIC: CPT | Performed by: PHYSICIAN ASSISTANT

## 2025-01-08 PROCEDURE — 99213 OFFICE O/P EST LOW 20 MIN: CPT | Performed by: PHYSICIAN ASSISTANT

## 2025-01-08 RX ORDER — OSELTAMIVIR PHOSPHATE 30 MG/1
30 CAPSULE ORAL DAILY
Qty: 5 CAPSULE | Refills: 0 | Status: SHIPPED | OUTPATIENT
Start: 2025-01-08 | End: 2025-01-13

## 2025-01-08 ASSESSMENT — PAIN SCALES - GENERAL: PAINLEVEL_OUTOF10: NO PAIN (0)

## 2025-01-08 NOTE — PROGRESS NOTES
"  Assessment & Plan     (J10.1) Influenza A  (primary encounter diagnosis)  Comment: Patient with URI symptoms.  Positive influenza A.  History of renal impairment.  Influenza positive discussed Tamiflu.  Patient within 48 hours of onset of symptoms.  Discussed red flags to seek more emergent evaluation.  Work note provided.  Lungs were clear to auscultation.  Initially influenza testing was not resulted patient was having ongoing cough discussed potential of chest x-ray, however, in shared decision-making process after influenza returned positive and reassuring examination discontinued chest x-ray.  Plan: Influenza A & B Antigen - Clinic Collect,         oseltamivir (TAMIFLU) 30 MG capsule, CANCELED:         XR Chest 2 Views          (Z94.0) Kidney replaced by transplant  Comment:   Plan:     (D84.9) Immunosuppressed status  Comment: Immunocompromise discussed Tamiflu.  Plan: CANCELED: XR Chest 2 Views             Sandi Michelle is a 49 year old, presenting for the following health issues:  Generalized Body Aches, Diarrhea, Cough, and Running Nose      1/8/2025     8:14 AM   Additional Questions   Roomed by ALEX VELA   Accompanied by SELF     HPI   1/6/2024 developed body aches.  No sore throat.  Has had rhinorrhea and cough.  No hemoptysis.  Cough has been productive of clear sputum.  Chills at times no recorded fever at home.  Slightly looser stools last evening without any blood.  Status post renal transplant on CellCept with immune compromised.  History of elevated creatinine.  Patient did have negative COVID test last evening.      Review of Systems  Constitutional, HEENT, cardiovascular, pulmonary, gi and gu systems are negative, except as otherwise noted.      Objective    /86   Pulse 104   Temp 99.8  F (37.7  C) (Tympanic)   Resp 16   Ht 1.727 m (5' 8\")   Wt 107.5 kg (237 lb)   SpO2 97%   BMI 36.04 kg/m    Body mass index is 36.04 kg/m .  Physical Exam   GENERAL: alert, no distress, and " over weight  EYES: Eyes grossly normal to inspection, PERRL and conjunctivae and sclerae normal  HENT: normal cephalic/atraumatic, right ear: clear effusion, left ear: normal: no effusions, no erythema, normal landmarks, nose and mouth without ulcers or lesions, oropharynx clear, and oral mucous membranes moist  NECK: no adenopathy, no asymmetry, masses, or scars  RESP: lungs clear to auscultation - no rales, rhonchi or wheezes  CV: regular rate and rhythm, normal S1 S2, no S3 or S4, no murmur, click or rub, no peripheral edema  MS: no gross musculoskeletal defects noted, no edema    Results for orders placed or performed in visit on 01/08/25   Influenza A & B Antigen - Clinic Collect     Status: Abnormal    Specimen: Nose; Swab   Result Value Ref Range    Influenza A antigen Positive (A) Negative    Influenza B antigen Negative Negative    Narrative    Test results must be correlated with clinical data. If necessary, results should be confirmed by a molecular assay or viral culture.           Signed Electronically by: Sherwin Wilkerson PA-C

## 2025-01-08 NOTE — LETTER
January 8, 2025      Miguel Angel Piña  [address] [facility] [id number]        To Whom It May Concern:    Miguel Angel Piña  was seen on 1/8/2025.  Please excuse him  until 1/11/2025 due to illness.        Sincerely,        Sherwin Wilkerson PA-C    Electronically signed

## 2025-01-12 DIAGNOSIS — Z94.0 KIDNEY REPLACED BY TRANSPLANT: ICD-10-CM

## 2025-01-12 DIAGNOSIS — Z79.60 LONG-TERM USE OF IMMUNOSUPPRESSANT MEDICATION: ICD-10-CM

## 2025-01-12 DIAGNOSIS — Z94.0 KIDNEY TRANSPLANTED: ICD-10-CM

## 2025-01-13 RX ORDER — MYCOPHENOLATE MOFETIL 250 MG/1
750 CAPSULE ORAL 2 TIMES DAILY
Qty: 540 CAPSULE | Refills: 0 | Status: SHIPPED | OUTPATIENT
Start: 2025-01-13

## 2025-01-16 ENCOUNTER — TELEPHONE (OUTPATIENT)
Dept: TRANSPLANT | Facility: CLINIC | Age: 50
End: 2025-01-16

## 2025-01-16 ENCOUNTER — LAB (OUTPATIENT)
Dept: LAB | Facility: CLINIC | Age: 50
End: 2025-01-16
Payer: COMMERCIAL

## 2025-01-16 DIAGNOSIS — D64.9 ANEMIA: Primary | ICD-10-CM

## 2025-01-16 DIAGNOSIS — Z79.899 ON ALLOPURINOL THERAPY: ICD-10-CM

## 2025-01-16 DIAGNOSIS — Z94.0 KIDNEY REPLACED BY TRANSPLANT: ICD-10-CM

## 2025-01-16 DIAGNOSIS — N18.4 STAGE 4 CHRONIC KIDNEY DISEASE (H): ICD-10-CM

## 2025-01-16 DIAGNOSIS — D64.9 ANEMIA: ICD-10-CM

## 2025-01-16 DIAGNOSIS — Z94.0 KIDNEY TRANSPLANTED: ICD-10-CM

## 2025-01-16 DIAGNOSIS — M1A.9XX1 CHRONIC TOPHACEOUS GOUT: ICD-10-CM

## 2025-01-16 LAB
ERYTHROCYTE [DISTWIDTH] IN BLOOD BY AUTOMATED COUNT: 13 % (ref 10–15)
HCT VFR BLD AUTO: 25 % (ref 40–53)
HGB BLD-MCNC: 8.3 G/DL (ref 13.3–17.7)
MCH RBC QN AUTO: 29.2 PG (ref 26.5–33)
MCHC RBC AUTO-ENTMCNC: 33.2 G/DL (ref 31.5–36.5)
MCV RBC AUTO: 88 FL (ref 78–100)
PLATELET # BLD AUTO: 224 10E3/UL (ref 150–450)
RBC # BLD AUTO: 2.84 10E6/UL (ref 4.4–5.9)
WBC # BLD AUTO: 6 10E3/UL (ref 4–11)

## 2025-01-16 NOTE — TELEPHONE ENCOUNTER
ISSUE:   Hgb 8.3 on 1/16/25 labwork in setting of chronic kidney disease.   Iron panel added on.    CSA level pending.    PLAN:    Message  Received: Today  Lan Patino MD [place]ais[place]ell, Christin Rebecca, MATEO  Yes please.          Previous Messages       ----- Message -----  From: Ivanna Rivera, MATEO  Sent: 1/16/2025   2:26 PM CST  To: Lan Patino MD    Do you want to re-refer to anemia services?    OUTCOME:   Spoke with Miguel Angel regarding anemia referral. He verbalized understanding anemia coordinator will be calling regarding EPO shot initiation and lab monitoring. He does have some shortness of breath with activity such as walking long distances.    Per Miguel Angel will be 11 hour cyclosporine trough level.  3 AM took dose of CSA  CSA Level collected just before 2 PM.     Ivanna Rivera RN, BSN, CCTN  Solid Organ Transplant, Post Kidney and Pancreas  Transplant Care Coordinator  [phone]

## 2025-01-22 NOTE — PROGRESS NOTES
Rheumatology Clinic Visit  [facility]  MARTHA Rodriguez     Miguel Angel Piña MRN# [id number]   YOB: 1975 Age: 49 year old   Date of Visit: 1/28/2025  Primary care provider: Lucrecia Hutson          Assessment and Plan:     1.  Chronic tophaceous gout  2.  On allopurinol therapy    Patient presents today for follow-up regarding his chronic tophaceous gout.  He states that he was off his allopurinol for approximately 1 week.  For the last 4 days he has been back on it consistently.  He states prior to being off the medication for a week he had been taking his allopurinol 50 mg every other day consistently.  His most recent uric acid is up to 10.0, therefore I would recommend he increase to 50 mg daily.  He has been eating a lot of Medrol recently so we will continue to closely monitor his Medrol use.  He does continue have multiple tophi and has not noticed any change with them.  He is working with his nephrologist also on getting the medication to use for his hemoglobin as his hemoglobin has been down.  Discussed with patient that the inflammation from the gout is probably not helping his hemoglobin and therefore getting that under better control may also help with that.  Will check labs in 1 month.  He will follow-up with me in 6 months.      Plan:     Schedule follow-up with Iesla Jean PA-C in 6 months.   Labs: Uric acid, AST, ALT, CBC, creatinine in 1 month   Medication recommendations:   Allopurinol 100mg: take 1/2 tablet every day  Medrol 4mg-follow taper instructions if you get a flare of gout;       MARTHA Rodriguez  Rheumatology         History of Present Illness:   Miguel Angel Piña presents for evaluation of chronic gout.     Interval history January 28, 2025:  He recently had a gout flare on Monday. He has been taking half tab every other day on Allopurinol consistently for about 4 days, this is up from the every other day. He took Medrol at the beginning of the month.        Interval history February 19, 2024:  He had a flare on Thursday. Flares still controlled with the Medrol. He has continued the Allopurinol.     Interval history December 6, 2022:  He continues to work with nephrology. The plan is to continue to monitor his kidney's. He is not currently planned to go on dialysis. He takes 50mg every other day. He continues on medrol. He does not take this daily. He only take the pack when there is a flare.     He states that he has had about 4 flares since his last visit.     HPI from consult on 5/11/2022:  Has a history of chronic kidney disease and a history of transplant (one at age 18 and one at 35-currently doing the workup to get on the list for another one). He states that periodically he will get flares in his ankles. He states that last fall his right wrist and arm swelled. He started to take Prednisone and it resolved. He states that once a month he needs prednisone to get rid of his flares. Most recently was about 1 month ago. His left finger started to flare. Xray's were taken, not bone fragments were seen. He started Prednisone and it got better. He prefers to stay off of steroids due to the long term effects of it. He last used medrol yesterday. When he has flares he used Medrol dosepak.          Review of Systems:     Constitutional: negative  Skin: negative  Eyes: negative  Ears/Nose/Throat: negative  Respiratory: No shortness of breath, dyspnea on exertion, cough, or hemoptysis  Cardiovascular: negative  Gastrointestinal: negative  Genitourinary: negative  Musculoskeletal: As above  Neurologic: negative  Psychiatric: negative  Hematologic/Lymphatic/Immunologic: negative  Endocrine: negative         Active Problem List:     Patient Active Problem List    Diagnosis Date Noted    Class 2 severe obesity due to excess calories with serious comorbidity in adult (H) 08/29/2024     Priority: Medium    Cellulitis 07/09/2024     Priority: Medium     RLE      Class 2  obesity due to excess calories without serious comorbidity with body mass index (BMI) of 35.0 to 35.9 in adult 12/06/2022     Priority: Medium    CKD (chronic kidney disease) stage 4, GFR 15-29 ml/min (H) 10/31/2018     Priority: Medium    Aftercare following organ transplant 09/08/2017     Priority: Medium    Immunosuppressed status 09/08/2017     Priority: Medium    Anemia in stage 4 chronic kidney disease (H) 09/08/2017     Priority: Medium    Secondary renal hyperparathyroidism 09/08/2017     Priority: Medium    Gout 09/08/2017     Priority: Medium    Vitamin D deficiency 09/08/2017     Priority: Medium    Dyslipidemia 09/08/2017     Priority: Medium    Heterozygous factor V Leiden mutation 09/08/2017     Priority: Medium     No h/o DVT or PE      HTN, kidney transplant related      Priority: Medium    Conductive hearing loss, unilateral 10/03/2013     Priority: Medium     Pt reports insidious onset of apparent hearing loss, that had not bothered him, but his wife feels like he must have hearing loss, given that he cannot hear her talking to him often.  He denies any trauma, past issues with ear infections.  He is s/p two kidney transplants and is on rejection medications chronically (stable of late).      He denies excessive loud noise exposure, hazardous work environment (works at [employer]), does drive with window down during summer.  No shooting guns.        Kidney replaced by transplant      Priority: Medium     prior 11/1993      Medullary cystic kidney disease      Priority: Medium            Past Medical History:     Past Medical History:   Diagnosis Date    Anemia in chronic renal disease     Cellulitis 07/09/2024    RLE      Clinical diagnosis of COVID-19 12/21/2021    Conductive hearing loss, unilateral 10/03/2013    Pt reports insidious onset of apparent hearing loss, that had not bothered him, but his wife feels like he must have hearing loss, given that he cannot hear her talking to him often.  He  denies any trauma, past issues with ear infections.  He is s/p two kidney transplants and is on rejection medications chronically (stable of late).    He denies excessive loud noise exposure, hazardous work enviro    CVA (cerebral vascular accident) (H)     Dyslipidemia     Factor V Leiden     Gout     History of blood transfusion     Hypertension secondary to other renal disorders     Immunosuppressed status     Infection 10/14/2021    Right Small Finger     Kidney replaced by transplant 4/2010 (2nd)    prior 11/1993    Medullary cystic kidney disease     Secondary renal hyperparathyroidism     Sleep apnea      Past Surgical History:   Procedure Laterality Date    IR RENAL BIOPSY LEFT  8/17/2020    REPAIR PATENT FORAMEN OVALE      s/p LDKT      TRANSPLANT              Social History:     Social History     Socioeconomic History    Marital status:      Spouse name: Not on file    Number of children: 2    Years of education: Not on file    Highest education level: Not on file   Occupational History    Not on file   Tobacco Use    Smoking status: Never     Passive exposure: Never    Smokeless tobacco: Never   Vaping Use    Vaping status: Never Used   Substance and Sexual Activity    Alcohol use: No    Drug use: No    Sexual activity: Not Currently     Partners: Female   Other Topics Concern    Parent/sibling w/ CABG, MI or angioplasty before 65F 55M? Not Asked   Social History Narrative    Not on file     Social Drivers of Health     Financial Resource Strain: Low Risk  (1/5/2024)    Financial Resource Strain     Within the past 12 months, have you or your family members you live with been unable to get utilities (heat, electricity) when it was really needed?: No   Food Insecurity: Low Risk  (1/5/2024)    Food Insecurity     Within the past 12 months, did you worry that your food would run out before you got money to buy more?: No     Within the past 12 months, did the food you bought just not last and you  didn t have money to get more?: No   Transportation Needs: Low Risk  (1/5/2024)    Transportation Needs     Within the past 12 months, has lack of transportation kept you from medical appointments, getting your medicines, non-medical meetings or appointments, work, or from getting things that you need?: No   Physical Activity: Not on file   Stress: Not on file   Social Connections: Not on file   Interpersonal Safety: Low Risk  (1/8/2025)    Interpersonal Safety     Do you feel physically and emotionally safe where you currently live?: Yes     Within the past 12 months, have you been hit, slapped, kicked or otherwise physically hurt by someone?: No     Within the past 12 months, have you been humiliated or emotionally abused in other ways by your partner or ex-partner?: No   Housing Stability: Low Risk  (1/5/2024)    Housing Stability     Do you have housing? : Yes     Are you worried about losing your housing?: No          Family History:     Family History   Problem Relation Age of Onset    Hypertension Father     Lung Cancer Paternal Grandmother     Kidney Disease Sister         medullary cystic kidney disease, s/p kidney transplant    Hypertension Sister     Prostate Cancer No family hx of     Colon Cancer No family hx of     Diabetes No family hx of     Coronary Artery Disease No family hx of     Skin Cancer No family hx of             Allergies:   No Known Allergies         Medications:     Current Outpatient Medications   Medication Sig Dispense Refill    allopurinol (ZYLOPRIM) 100 MG tablet Take 0.5 tablets (50 mg) by mouth daily. 45 tablet 0    amLODIPine (NORVASC) 5 MG tablet Take 1 tablet (5 mg) by mouth daily for 90 days TAKE 1 TABLET BY MOUTH AT BEDTIME AT NIGHT 30 tablet 3    calcitRIOL (ROCALTROL) 0.25 MCG capsule Take 1 capsule (0.25 mcg) by mouth daily for 90 days 90 capsule 3    CELLCEPT (BRAND) 250 MG capsule TAKE THREE CAPSULES BY MOUTH TWICE A  capsule 0    cholecalciferol 2000 units TABS  "Take 2,000 Units by mouth daily 90 tablet 3    Ferrous Gluconate 324 (37.5 Fe) MG TABS Take by mouth daily      losartan (COZAAR) 25 MG tablet Take 1 tablet (25 mg) by mouth daily 90 tablet 3    methylPREDNISolone (MEDROL DOSEPAK) 4 MG tablet therapy pack Follow Package Directions 21 tablet 1    NEORAL (BRAND) 100 MG capsule Take 1 capsule (100 mg) by mouth 2 times daily. Total dose 125 mg every morning and 100 mg every evening. 60 capsule 11    NEORAL (BRAND) 25 MG capsule Take 1 capsule (25 mg) by mouth every morning. Total dose 125 mg every morning and 100 mg every evening. 30 capsule 11    sodium bicarbonate 650 MG tablet TAKE 3 TABLETS BY MOUTH THREE TIMES DAILY 810 tablet 0    epoetin beto-epbx (RETACRIT) 71651 UNIT/ML injection Inject 1 mL (10,000 Units) subcutaneously every 7 days. [place] for Hgb <10.0.  HOLD dose if systolic BP >180. (Patient not taking: Reported on 1/29/2025) 4 mL 11            Physical Exam:   Blood pressure (!) 141/89, pulse 88, temperature 98  F (36.7  C), temperature source Tympanic, resp. rate 16, height 1.727 m (5' 8\"), weight 111 kg (244 lb 12.8 oz), SpO2 98%.  Wt Readings from Last 6 Encounters:   01/08/25 107.5 kg (237 lb)   10/28/24 106.6 kg (235 lb)   08/29/24 111.6 kg (246 lb)   08/29/24 111.7 kg (246 lb 3.2 oz)   07/09/24 110.9 kg (244 lb 9.6 oz)   02/19/24 108 kg (238 lb 3.2 oz)     Constitutional: well-developed, appearing stated age; cooperative  Eyes: nlconjunctiva, sclera  ENT: nl external ears,  hearing,   Resp: No shortness of breath with normal conversation  MS: Patient has tophi of the left pointer finger PIP.  He also has a tophi on the right fifth PIP.  Multiple tophi on his MCPs particularly on the right.  Tophi to the right DIP  Psych: nl judgement, orientation, memory, affect.           Data:   Imaging:  N/A    Laboratory:  4/25/22  Creatinine 2.62, GFR 30  Uric acid 9.4    6/20/2022   uric acid 9.9    8/16/2022  Creatinine 3.00, GFR 25  Hemoglobin " 9.4    1/16/2025  Creatinine 4.17, GFR 17  ALT 18, AST 19  Uric acid 10.0  White blood cell count 6.0, hemoglobin 8.3, platelet count 224

## 2025-01-23 ENCOUNTER — TELEPHONE (OUTPATIENT)
Dept: INFUSION THERAPY | Facility: CLINIC | Age: 50
End: 2025-01-23
Payer: COMMERCIAL

## 2025-01-23 NOTE — TELEPHONE ENCOUNTER
[facility] Prior Authorization Team   Phone: [phone]  Fax: [phone]    PA Initiation    Medication: RETACRIT 03865 UNIT/ML IJ SOLN  Insurance Company:    Pharmacy Filling the Rx: [facility] - [place], [place] - [facility] [address]  Filling Pharmacy Phone: [phone]  Filling Pharmacy Fax: [phone]  Start Date: 1/23/2025

## 2025-01-27 ENCOUNTER — PATIENT OUTREACH (OUTPATIENT)
Dept: CARE COORDINATION | Facility: CLINIC | Age: 50
End: 2025-01-27
Payer: COMMERCIAL

## 2025-01-27 NOTE — TELEPHONE ENCOUNTER
PRIOR AUTHORIZATION DENIED    Medication: RETACRIT [id number] UNIT/ML [facility]  Insurance Company:[employer]    Denial Date: 1/27/2025  Denial Reason(s): We denied your request because we did not see what we need to approve the drug you  asked for, (Retacrit). We may be able to approve this drug in a certain situation (when  not being used as treatment in the presence of a sudden loss of response with severe  anemia and a low reticulocyte count). We do not see that this applies to you. If this  applies to you, we may need more information (if you have a certain other illness; if you  are on a certain kidney treatment). We based this decision on your health plan s prior   Appeal Information: call our clinical reviewer at [phone].  Patient Notified: Yes

## 2025-01-27 NOTE — PROGRESS NOTES
Anemia Management Note - Reminder     Follow-up with anemia management service:    Miguel Angel called in to check on the Retacrit PA. Read him the denial that was posted in his chart today. Told him that I thought Mercedes was gathering more information on it. He asked that Mercedes call him tomorrow with an update.         Latest Ref Rng & Units 5/3/2024 6/19/2024 7/15/2024 8/5/2024 8/29/2024 9/10/2024 1/16/2025   Anemia   Hemoglobin 13.3 - 17.7 g/dL 9.7  8.7  9.8  10.1  9.4  9.5  8.3    TSAT 15 - 46 % 28  34  13   33   20    Ferritin 31 - 409 ng/mL 297  317  313   392   592        Follow-up call date: 012825    Carrie Leopold, RN BSN  Anemia Services  [facility]  [email or web address]  Office: [phone]  Fax [phone]

## 2025-01-28 NOTE — TELEPHONE ENCOUNTER
Medication Appeal Initiation    Medication: RETACRIT 62769 UNIT/ML IJ SOLN  Appeal Start Date:  1/28/2025  Insurance Company: julio murcia  Insurance Phone:   Insurance Fax:[phone]  Comments:

## 2025-01-28 NOTE — PROGRESS NOTES
PA for Retacrit is being appealed.   Called Miguel Angel with an update.     Janna Louis RN   [facility] Services  [facility]  [email or web address]   Office : [phone]  Fax: [phone]

## 2025-01-29 ENCOUNTER — OFFICE VISIT (OUTPATIENT)
Dept: RHEUMATOLOGY | Facility: CLINIC | Age: 50
End: 2025-01-29
Payer: COMMERCIAL

## 2025-01-29 VITALS
DIASTOLIC BLOOD PRESSURE: 89 MMHG | OXYGEN SATURATION: 98 % | SYSTOLIC BLOOD PRESSURE: 141 MMHG | BODY MASS INDEX: 37.1 KG/M2 | WEIGHT: 244.8 LBS | HEIGHT: 68 IN | HEART RATE: 88 BPM | RESPIRATION RATE: 16 BRPM | TEMPERATURE: 98 F

## 2025-01-29 DIAGNOSIS — M1A.9XX1 CHRONIC TOPHACEOUS GOUT: ICD-10-CM

## 2025-01-29 DIAGNOSIS — Z79.899 ON ALLOPURINOL THERAPY: ICD-10-CM

## 2025-01-29 PROCEDURE — 99214 OFFICE O/P EST MOD 30 MIN: CPT | Performed by: PHYSICIAN ASSISTANT

## 2025-01-29 RX ORDER — ALLOPURINOL 100 MG/1
50 TABLET ORAL DAILY
Qty: 45 TABLET | Refills: 0 | Status: SHIPPED | OUTPATIENT
Start: 2025-01-29

## 2025-01-29 RX ORDER — METHYLPREDNISOLONE 4 MG/1
TABLET ORAL
Qty: 21 TABLET | Refills: 0 | Status: SHIPPED | OUTPATIENT
Start: 2025-01-29

## 2025-01-29 RX ORDER — METHYLPREDNISOLONE 4 MG/1
TABLET ORAL
Qty: 21 TABLET | Refills: 1 | Status: CANCELLED | OUTPATIENT
Start: 2025-01-29

## 2025-01-29 ASSESSMENT — PAIN SCALES - GENERAL: PAINLEVEL_OUTOF10: SEVERE PAIN (9)

## 2025-01-29 NOTE — PATIENT INSTRUCTIONS
After Visit Instructions:     Thank you for coming to [facility] Rheumatology for your care. It is my goal to partner with you to help you reach your optimal state of health.       Plan:     Schedule follow-up with Isela Jean PA-C in 6 months.   Labs: Uric acid, AST, ALT, CBC, creatinine in 1 month   Medication recommendations:   Allopurinol 100mg: take 1/2 tablet every day  Medrol 4mg-follow taper instructions if you get a flare of gout;       Isela Jean PA-C  [facility] Rheumatology  [place]    Contact information: [facility]  Clinic Number:  [phone]  Please call or send a [email or web address] message with any questions about your care

## 2025-02-04 ENCOUNTER — PATIENT OUTREACH (OUTPATIENT)
Dept: CARE COORDINATION | Facility: CLINIC | Age: 50
End: 2025-02-04
Payer: COMMERCIAL

## 2025-02-04 NOTE — TELEPHONE ENCOUNTER
Called insurance to check on status of appeal was informed that it can take up to 30 days to review

## 2025-02-06 ENCOUNTER — PATIENT OUTREACH (OUTPATIENT)
Dept: CARE COORDINATION | Facility: CLINIC | Age: 50
End: 2025-02-06
Payer: COMMERCIAL

## 2025-02-06 NOTE — TELEPHONE ENCOUNTER
Still pending review at this time. I was informed that the due date for a determination on an outcome is  2/27

## 2025-02-13 NOTE — TELEPHONE ENCOUNTER
Called insurance to check on status. Was informed that this is still an open case with no determination at this time. (Still pending review)

## 2025-02-17 ENCOUNTER — TELEPHONE (OUTPATIENT)
Dept: TRANSPLANT | Facility: CLINIC | Age: 50
End: 2025-02-17
Payer: COMMERCIAL

## 2025-02-17 NOTE — TELEPHONE ENCOUNTER
PA Initiation    Medication: MYCOPHENOLATE MOFETIL (CELLCEPT BRAND) 250 MG PO CAPS  Insurance Company: Other (see comments)  Pharmacy Filling the Rx: [facility] - [place], [place] - [address]  Filling Pharmacy Phone:    Filling Pharmacy Fax:    Start Date: 2/17/2025

## 2025-02-18 DIAGNOSIS — Z79.899 ON ALLOPURINOL THERAPY: ICD-10-CM

## 2025-02-18 DIAGNOSIS — M1A.9XX1 CHRONIC TOPHACEOUS GOUT: ICD-10-CM

## 2025-02-18 NOTE — TELEPHONE ENCOUNTER
Requested Prescriptions   Pending Prescriptions Disp Refills    methylPREDNISolone (MEDROL DOSEPAK) 4 MG tablet therapy pack 21 tablet 0     Sig: Follow Package Directions       There is no refill protocol information for this order          Last office visit: 1/29/2025 ; last virtual visit: Visit date not found with prescribing provider:  Isela Jean       Future Office Visit:      Sandra Donnelly   [facility] [place]   [facility]  [phone]

## 2025-02-18 NOTE — TELEPHONE ENCOUNTER
PA for pt to begin Cellcept approved.    Too Soon for Medrol refill.    Lab appt not yet scheduled.    To provider to consider refill request.    Gabrielle WASHINGTON   Specialty Clinic [facility]

## 2025-02-18 NOTE — TELEPHONE ENCOUNTER
He still has two pills left of the last pack and is not currently symptomatic of flare.    Gabrielle WASHINGTON   Specialty Clinic RN

## 2025-02-18 NOTE — TELEPHONE ENCOUNTER
Prior Authorization Approval    Medication: MYCOPHENOLATE MOFETIL (CELLCEPT BRAND) 250 MG PO CAPS  Authorization Effective Date: 2/17/2025  Authorization Expiration Date: 2/17/2026  Approved Dose/Quantity: UD  Reference #:     Insurance Company: Other (see comments)  Expected CoPay: $    CoPay Card Available: No    Financial Assistance Needed: N/A  Which Pharmacy is filling the prescription: [facility] - [place], [place] - [address]  Pharmacy Notified: YES  Patient Notified: NO

## 2025-02-19 ENCOUNTER — PATIENT OUTREACH (OUTPATIENT)
Dept: CARE COORDINATION | Facility: CLINIC | Age: 50
End: 2025-02-19
Payer: COMMERCIAL

## 2025-02-19 DIAGNOSIS — N18.30 ANEMIA IN STAGE 3 CHRONIC KIDNEY DISEASE, UNSPECIFIED WHETHER STAGE 3A OR 3B CKD (H): ICD-10-CM

## 2025-02-19 DIAGNOSIS — D63.1 ANEMIA IN STAGE 3 CHRONIC KIDNEY DISEASE, UNSPECIFIED WHETHER STAGE 3A OR 3B CKD (H): ICD-10-CM

## 2025-02-19 DIAGNOSIS — N18.30 STAGE 3 CHRONIC KIDNEY DISEASE, UNSPECIFIED WHETHER STAGE 3A OR 3B CKD (H): ICD-10-CM

## 2025-02-19 RX ORDER — EPOETIN ALFA-EPBX 10000 [IU]/ML
10000 INJECTION, SOLUTION INTRAVENOUS; SUBCUTANEOUS
Qty: 4 ML | Refills: 11 | Status: SHIPPED | OUTPATIENT
Start: 2025-02-19

## 2025-02-19 RX ORDER — METHYLPREDNISOLONE 4 MG/1
TABLET ORAL
Qty: 21 TABLET | Refills: 0 | Status: SHIPPED | OUTPATIENT
Start: 2025-02-19

## 2025-02-19 NOTE — TELEPHONE ENCOUNTER
MEDICATION APPEAL APPROVED    Medication: RETACRIT [id number] UNIT/ML IJ SOLN  Authorization Effective Date: 1/28/2025  Authorization Expiration Date: 1/27/2026  Approved Dose/Quantity: UD  Reference #:     Appeal Insurance Company: EMIR   CoPay Card Available:    Financial Assistance Needed: N/A  Filling Pharmacy: [facility] - [place] [address]  Patient Notified: NO  Comments:   Patient is now restricted to [facility] [phone]

## 2025-02-19 NOTE — PROGRESS NOTES
Miguel Angel returned call. He called the pharmacy. His Retacrit will be delivered on 2/27/25. He will have labs drawn on 2/22/25.     Janna Louis RN   Anemia Services  [facility]  [email or web address]   Office : [phone]  Fax: [phone]

## 2025-02-19 NOTE — PROGRESS NOTES
Anemia Management Note - Reminder     Follow-up with anemia management service:    Retacrit PA was approved through 1/27/2026.   Miguel Angel must fill Procrit through [facility].  Rx Escribed to their Specialty Pharmacy.     [facility]  Ph:  [phone]    LVM for Miguel Angel.                 Latest Ref Rng & Units 5/3/2024 6/19/2024 7/15/2024 8/5/2024 8/29/2024 9/10/2024 1/16/2025   Anemia   Hemoglobin 13.3 - 17.7 g/dL 9.7  8.7  9.8  10.1  9.4  9.5  8.3    TSAT 15 - 46 % 28  34  13   33   20    Ferritin 31 - 409 ng/mL 297  317  313   392   592            Follow-up call date: 2/24/25    Janna Louis RN   Anemia Services  [facility]  [email or web address]   Office : [phone]  Fax: [phone]

## 2025-02-22 ENCOUNTER — LAB (OUTPATIENT)
Dept: LAB | Facility: CLINIC | Age: 50
End: 2025-02-22
Payer: COMMERCIAL

## 2025-02-22 DIAGNOSIS — M1A.9XX1 CHRONIC TOPHACEOUS GOUT: ICD-10-CM

## 2025-02-22 DIAGNOSIS — Z79.899 ON ALLOPURINOL THERAPY: ICD-10-CM

## 2025-02-22 LAB
ALT SERPL W P-5'-P-CCNC: 14 U/L (ref 0–70)
AST SERPL W P-5'-P-CCNC: 17 U/L (ref 0–45)
CREAT SERPL-MCNC: 4.14 MG/DL (ref 0.67–1.17)
EGFRCR SERPLBLD CKD-EPI 2021: 17 ML/MIN/1.73M2
ERYTHROCYTE [DISTWIDTH] IN BLOOD BY AUTOMATED COUNT: 13.3 % (ref 10–15)
HCT VFR BLD AUTO: 26.1 % (ref 40–53)
HGB BLD-MCNC: 8.6 G/DL (ref 13.3–17.7)
MCH RBC QN AUTO: 29.5 PG (ref 26.5–33)
MCHC RBC AUTO-ENTMCNC: 33 G/DL (ref 31.5–36.5)
MCV RBC AUTO: 89 FL (ref 78–100)
PLATELET # BLD AUTO: 192 10E3/UL (ref 150–450)
RBC # BLD AUTO: 2.92 10E6/UL (ref 4.4–5.9)
URATE SERPL-MCNC: 9.4 MG/DL (ref 3.4–7)
WBC # BLD AUTO: 6.5 10E3/UL (ref 4–11)

## 2025-02-22 PROCEDURE — 36415 COLL VENOUS BLD VENIPUNCTURE: CPT

## 2025-02-22 PROCEDURE — 85027 COMPLETE CBC AUTOMATED: CPT

## 2025-02-22 PROCEDURE — 82565 ASSAY OF CREATININE: CPT

## 2025-02-22 PROCEDURE — 84450 TRANSFERASE (AST) (SGOT): CPT

## 2025-02-22 PROCEDURE — 84460 ALANINE AMINO (ALT) (SGPT): CPT

## 2025-02-22 PROCEDURE — 84550 ASSAY OF BLOOD/URIC ACID: CPT

## 2025-02-25 ENCOUNTER — TELEPHONE (OUTPATIENT)
Dept: TRANSPLANT | Facility: CLINIC | Age: 50
End: 2025-02-25
Payer: COMMERCIAL

## 2025-02-26 ENCOUNTER — TELEPHONE (OUTPATIENT)
Dept: TRANSPLANT | Facility: CLINIC | Age: 50
End: 2025-02-26
Payer: COMMERCIAL

## 2025-02-26 NOTE — TELEPHONE ENCOUNTER
PA Initiation    Medication: PROCRIT 97795 UNIT/ML IJ SOLN  Insurance Company: [facility] Phone: [phone],  Fax: [phone]  Pharmacy Filling the Rx: [facility] [address]   Start Date: 2/26/2025        Thank you,  Shana Kaur Oncology/Transplant Liaison  Phone: [phone]  Fax: [phone]

## 2025-02-26 NOTE — CONFIDENTIAL NOTE
Spoke with Nahum GARDINER RPh at the pharmacy.   Retacrit is out of stock. Expects to be back in stock Approx Mid March 2025.  OK to send Procrit 10,000 units and supplies.      Janna Louis RN   Anemia Services  [facility]  [email or web address]   Office : [phone]  Fax: [phone]

## 2025-02-28 NOTE — TELEPHONE ENCOUNTER
PRIOR AUTHORIZATION DENIED    Medication: PROCRIT [id number] UNIT/ML IJ [facility]  Insurance Company: [employer] Phone: [phone],  Fax: [phone]  Denial Date: 2/28/2025  Denial Reason(s):         Appeal Information:               Patient Notified: no

## 2025-02-28 NOTE — TELEPHONE ENCOUNTER
Medication Appeal Initiation    Medication: PROCRIT 97968 UNIT/ML [facility] SOLYUDY  Appeal Start Date:  2/28/2025  Insurance Company: julio murcia  Insurance Phone: [phone]  Insurance Fax: [phone]  Comments:       REFERENCE# [id number]

## 2025-03-03 NOTE — TELEPHONE ENCOUNTER
Patient Call: General  Route to LPN    Reason for call: Keturah [facility] Tech called to touch base with  and SOT team about patient medication Retacrit MDV being out of stock at pharmacy and other locations. Keturah wanted to know if  wants to patient to wait or is open to finding an alternative for this medication to provide patient. More details with call back.     Call back needed? Yes    Return Call Needed  Same as documented in contacts section  When to return call?: Same day: Route High Priority  
with patient

## 2025-03-04 ENCOUNTER — TELEPHONE (OUTPATIENT)
Dept: TRANSPLANT | Facility: CLINIC | Age: 50
End: 2025-03-04
Payer: COMMERCIAL

## 2025-03-04 ENCOUNTER — PATIENT OUTREACH (OUTPATIENT)
Dept: CARE COORDINATION | Facility: CLINIC | Age: 50
End: 2025-03-04
Payer: COMMERCIAL

## 2025-03-04 NOTE — PROGRESS NOTES
Anemia Management Note - Reminder     Follow-up with anemia management service:    PA for Gregory was approved.  LVM for chelle Michelle to call the pharmacy to schedule a delivery.     [facility] [phone]        Latest Ref Rng & Units 6/19/2024 7/15/2024 8/5/2024 8/29/2024 9/10/2024 1/16/2025 2/22/2025   Anemia   Hemoglobin 13.3 - 17.7 g/dL 8.7  9.8  10.1  9.4  9.5  8.3  8.6    TSAT 15 - 46 % 34  13   33   20     Ferritin 31 - 409 ng/mL 317  313   392   592             Follow-up call date: 3/10/25    Janna Louis RN   Anemia Services  [facility]  [email or web address]   Office : [phone]  Fax: [phone]

## 2025-03-04 NOTE — TELEPHONE ENCOUNTER
Provider Call: General  Route to LPN    Reason for call: Janna from [facility] Appeals dept called to give decision regarding Procrit. It was approved and the appeal was overturned. Letters have been faxed.    Call back needed? No

## 2025-03-04 NOTE — TELEPHONE ENCOUNTER
MEDICATION APPEAL APPROVED    Medication: PROCRIT [id number] UNIT/ML IJ SOLN  Authorization Effective Date: 2/28/2025  Authorization Expiration Date: 2/27/2026  Approved Dose/Quantity: UD  Reference #: [id number]   Appeal Insurance Company: [employer]      CoPay Card Available:    Financial Assistance Needed: N/A  Filling Pharmacy: [address] LO[address]  Patient Notified: NO  Comments:

## 2025-03-10 ENCOUNTER — PATIENT OUTREACH (OUTPATIENT)
Dept: CARE COORDINATION | Facility: CLINIC | Age: 50
End: 2025-03-10
Payer: COMMERCIAL

## 2025-03-10 NOTE — PROGRESS NOTES
Anemia Management Note - Reminder     Follow-up with anemia management service:    Spoke with Miguel Angel.  He has not contacted [facility] to fill his Procrit.  Gave Miguel Angel the ph #, he stated he will call today to schedule the Procrit delivery.         Latest Ref Rng & Units 6/19/2024 7/15/2024 8/5/2024 8/29/2024 9/10/2024 1/16/2025 2/22/2025   Anemia   Hemoglobin 13.3 - 17.7 g/dL 8.7  9.8  10.1  9.4  9.5  8.3  8.6    TSAT 15 - 46 % 34  13   33   20     Ferritin 31 - 409 ng/mL 317  313   392   592         Orders needed to be renewed (for next follow-up date) in EPIC: None      Follow-up call date: 3/17/25    Janna Louis RN   Anemia Services  [facility]  [email or web address]   Office : [phone]  Fax: [phone]

## 2025-03-17 ENCOUNTER — PATIENT OUTREACH (OUTPATIENT)
Dept: CARE COORDINATION | Facility: CLINIC | Age: 50
End: 2025-03-17
Payer: COMMERCIAL

## 2025-03-17 NOTE — PROGRESS NOTES
Anemia Management Note - Reminder     Follow-up with anemia management service:    LVM for Miguel Angel.  Has he received the Procrit delivery?         Latest Ref Rng & Units 6/19/2024 7/15/2024 8/5/2024 8/29/2024 9/10/2024 1/16/2025 2/22/2025   Anemia   Hemoglobin 13.3 - 17.7 g/dL 8.7  9.8  10.1  9.4  9.5  8.3  8.6    TSAT 15 - 46 % 34  13   33   20     Ferritin 31 - 409 ng/mL 317  313   392   592             Follow-up call date: 3/20/25    Janna Louis RN   Anemia Services  [facility]  [email or web address]   Office : [phone]  Fax: [phone]

## 2025-03-18 ENCOUNTER — TELEPHONE (OUTPATIENT)
Dept: TRANSPLANT | Facility: CLINIC | Age: 50
End: 2025-03-18
Payer: COMMERCIAL

## 2025-03-18 NOTE — TELEPHONE ENCOUNTER
Prior Authorization Approval    Medication: CYCLOSPORINE MODIFIED (NEORAL BRAND) 100 MG PO CAPS  Authorization Effective Date: 3/18/2025  Authorization Expiration Date: 3/18/2026  Approved Dose/Quantity: UD  Reference #:     Insurance Company: Other (see comments)  Expected CoPay: $    CoPay Card Available: No    Financial Assistance Needed: N/A  Which Pharmacy is filling the prescription:    Pharmacy Notified: YES  Patient Notified: NO

## 2025-03-18 NOTE — TELEPHONE ENCOUNTER
PA Initiation    Medication: CYCLOSPORINE MODIFIED (NEORAL BRAND) 100 MG PO CAPS  Insurance Company: Other (see comments)  Pharmacy Filling the Rx:    Filling Pharmacy Phone:    Filling Pharmacy Fax:    Start Date: 3/18/2025

## 2025-03-19 ENCOUNTER — PATIENT OUTREACH (OUTPATIENT)
Dept: CARE COORDINATION | Facility: CLINIC | Age: 50
End: 2025-03-19
Payer: COMMERCIAL

## 2025-03-19 NOTE — PROGRESS NOTES
Anemia Management Note - Follow Up      SUBJECTIVE/OBJECTIVE:      Referred by Dr. Lan Patino on 2025  Primary Diagnosis: Anemia in Chronic Kidney Disease (N18.4, D63.1)     Secondary Diagnosis: Chronic Kidney Disease, Stage 4 (N18.4)   Date of Kidney Transplant:   Hgb goal range: 9-10     Epo/Darbo: Procrit 10,000 units weekly for Hgb <10.0.  Doses at home.   Iron regimen: Ferrous Sulf 1 tab daily.      Labs : 2026  RX/TX plans : 2026     Recent CINDY use, transfusion, IV iron: Retacrit   No MI and blood clots or cancers. 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      Contact: No Consent to Communicate on File.         Latest Ref Rng & Units 7/15/2024 2024 2024 9/10/2024 2025 2025 3/18/2025   Anemia   HGB Goal        9 - 10   CINDY Dose        10,000 units   Hemoglobin 13.3 - 17.7 g/dL 9.8  10.1  9.4  9.5  8.3  8.6     TSAT 15 - 46 % 13   33   20      Ferritin 31 - 409 ng/mL 313   392   592        BP Readings from Last 3 Encounters:   25 (!) 141/89   25 137/86   10/28/24 126/72     Wt Readings from Last 2 Encounters:   25 111 kg (244 lb 12.8 oz)   25 107.5 kg (237 lb)           ASSESSMENT:    Hgb:Not at goal/Initiation of therapy  TSat: not at goal (>30%) but ferritin >500ng/mL.  IV iron not indicated at this time per anemia protocol. Ferritin: At goal (>100ng/mL)     Goals Addressed                      This Visit's Progress      Medical (pt-stated)   On track      Goal Statement: I will actively work with my care teams to manage my anemia.  Date Goal set: 3/19/2025  Barriers:  Busy work schedule  Strengths: support, coping, motivation, health awareness, and involvement with care team  Date to Achieve By: ongoing  Patient expressed understanding of goal: Yes   Action steps to achieve this goal:  I will receive my anemia injection(s)/infusion(s) as prescribed.  I will complete lab checks as recommended by my care team.  I will check  blood pressure at home as recommended and record the readings on a blood pressure log.  I will reach out to my nurse care coordinator with any follow-up questions.                  PLAN:  Dose with Procrit on 3/18 & 3/25.  Labs drawn in 1-2 weeks.    Orders needed to be renewed (for next follow-up date) in EPIC: None    Iron labs due:  April 2025    Plan discussed with: Miguel Angel      NEXT FOLLOW-UP DATE:  3/26/25    Janna Louis RN   Anemia Services  [facility]  [email or web address]   Office : [phone]  Fax: [phone]

## 2025-03-24 DIAGNOSIS — Z79.899 ON ALLOPURINOL THERAPY: ICD-10-CM

## 2025-03-24 DIAGNOSIS — M1A.9XX1 CHRONIC TOPHACEOUS GOUT: ICD-10-CM

## 2025-03-24 NOTE — TELEPHONE ENCOUNTER
Requested Prescriptions   Pending Prescriptions Disp Refills    methylPREDNISolone (MEDROL DOSEPAK) 4 MG tablet therapy pack 21 tablet 1     Sig: Follow Package Directions       There is no refill protocol information for this order          Last office visit: 1/29/2025 ; last virtual visit: Visit date not found with prescribing provider:  Isela Jean   Future Office Visit:      Thank you,  Jade Wharton  [facility]  [address] [place] [address]  Priority line: [phone] (please do not share number with patient)   Employed by [facility]

## 2025-03-25 ENCOUNTER — MYC MEDICAL ADVICE (OUTPATIENT)
Dept: SURGERY | Facility: CLINIC | Age: 50
End: 2025-03-25
Payer: COMMERCIAL

## 2025-03-25 RX ORDER — METHYLPREDNISOLONE 4 MG/1
TABLET ORAL
Qty: 21 TABLET | Refills: 1 | Status: SHIPPED | OUTPATIENT
Start: 2025-03-25

## 2025-03-25 NOTE — TELEPHONE ENCOUNTER
I don't have a flare as of now. My last flare was last week in my left ankle.  I am out of pills, I only take as needed.  Thanks  Me to Miguel Angel Piña         3/25/25  8:35 AM  Rosie Michelle     We received a request to refill the Medrol pack.  This medication requires documentation of symptoms/flare, when/how it was last used.  If for current flare or replacement for pack used last flare.     Please provide the info requested so Isela can consider the request.     Gabrielle WASHINGTON   Specialty Clinic RN

## 2025-03-27 ENCOUNTER — PATIENT OUTREACH (OUTPATIENT)
Dept: CARE COORDINATION | Facility: CLINIC | Age: 50
End: 2025-03-27
Payer: COMMERCIAL

## 2025-03-27 NOTE — PROGRESS NOTES
Anemia Management Note - Follow Up      SUBJECTIVE/OBJECTIVE:    Referred by Dr. Lan Patino on 2025  Primary Diagnosis: Anemia in Chronic Kidney Disease (N18.4, D63.1)     Secondary Diagnosis: Chronic Kidney Disease, Stage 4 (N18.4)   Date of Kidney Transplant:   Hgb goal range: 9-10     Epo/Darbo: Procrit 10,000 units weekly for Hgb <10.0.  Doses at home.   Iron regimen: Ferrous Sulf 1 tab daily.      Labs : 2026  RX/TX plans : 2026     Recent CINDY use, transfusion, IV iron: Retacrit   No MI and blood clots or cancers. 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      Contact: No Consent to Communicate on File.         Latest Ref Rng & Units 2024 2024 9/10/2024 2025 2025 3/18/2025 3/25/2025   Anemia   HGB Goal       9 - 10 9 - 10   CINDY Dose       10,000 units 10,000 units   Hemoglobin 13.3 - 17.7 g/dL 10.1  9.4  9.5  8.3  8.6      TSAT 15 - 46 %  33   20       Ferritin 31 - 409 ng/mL  392   592         BP Readings from Last 3 Encounters:   25 (!) 141/89   25 137/86   10/28/24 126/72     Wt Readings from Last 2 Encounters:   25 111 kg (244 lb 12.8 oz)   25 107.5 kg (237 lb)             PLAN:  Next Procrit dose is due 25.  Were labs drawn?     Orders needed to be renewed (for next follow-up date) in EPIC: None    Iron labs due:  Mid 2025    Plan discussed with:  No call today, Will check in next week.       NEXT FOLLOW-UP DATE:  25    Janna Louis RN   Anemia Services  [facility]  [email or web address]   Office : [phone]  Fax: [phone]

## 2025-04-01 ENCOUNTER — PATIENT OUTREACH (OUTPATIENT)
Dept: CARE COORDINATION | Facility: CLINIC | Age: 50
End: 2025-04-01
Payer: COMMERCIAL

## 2025-04-01 NOTE — PROGRESS NOTES
Anemia Management Note - Reminder     Follow-up with anemia management service:    Miguel Angel is due for labs.   He doses Procrit at home weekly and a Hgb has not been drawn since 2/22/25.     Spoke with Miguel Angel. He will go into [facility] and schedule lab appt.  Instructed Miguel Angel to hold the Procrit injections until he has labs drawn.  He verbalized understanding.         Latest Ref Rng & Units 8/5/2024 8/29/2024 9/10/2024 1/16/2025 2/22/2025 3/18/2025 3/25/2025   Anemia   HGB Goal       9 - 10 9 - 10   CINDY Dose       10,000 units 10,000 units   Hemoglobin 13.3 - 17.7 g/dL 10.1  9.4  9.5  8.3  8.6      TSAT 15 - 46 %  33   20       Ferritin 31 - 409 ng/mL  392   592             Follow-up call date: 4/7/25    Janna Louis RN   Anemia Services  [facility]  [email or web address]   Office : [phone]  Fax: [phone]

## 2025-04-07 ENCOUNTER — PATIENT OUTREACH (OUTPATIENT)
Dept: CARE COORDINATION | Facility: CLINIC | Age: 50
End: 2025-04-07
Payer: COMMERCIAL

## 2025-04-07 NOTE — PROGRESS NOTES
Anemia Management Note - Follow Up      SUBJECTIVE/OBJECTIVE:    Referred by Dr. Lan Patino on 2025  Primary Diagnosis: Anemia in Chronic Kidney Disease (N18.4, D63.1)     Secondary Diagnosis: Chronic Kidney Disease, Stage 4 (N18.4)   Date of Kidney Transplant:   Hgb goal range: 9-10     Epo/Darbo: Procrit 10,000 units weekly for Hgb <10.0.  Doses at home.   Iron regimen: Ferrous Sulf 1 tab daily.      Labs : 2026  RX/TX plans : 2026     Recent CINDY use, transfusion, IV iron: Retacrit   No MI and blood clots or cancers. 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      Contact: No Consent to Communicate on File.         Latest Ref Rng & Units 9/10/2024 2025 2025 3/18/2025 3/25/2025 2025 2025   Anemia   HGB Goal     9 - 10 9 - 10  9 - 10   CINDY Dose     10,000 units 10,000 units  10,000 units   Hemoglobin 13.3 - 17.7 g/dL 9.5  8.3  8.6    9.0     TSAT 15 - 46 %  20     29     Ferritin 31 - 409 ng/mL  592     203       BP Readings from Last 3 Encounters:   25 (!) 141/89   25 137/86   10/28/24 126/72     Wt Readings from Last 2 Encounters:   25 111 kg (244 lb 12.8 oz)   25 107.5 kg (237 lb)           ASSESSMENT:    Hgb:At goal - recommend dose  TSat: not at goal of >30% Ferritin: At goal (>100ng/mL)     Goals Addressed                      This Visit's Progress      Medical (pt-stated)   On track      Goal Statement: I will actively work with my care teams to manage my anemia.  Date Goal set: 3/19/2025  Barriers:  Busy work schedule  Strengths: support, coping, motivation, health awareness, and involvement with care team  Date to Achieve By: ongoing  Patient expressed understanding of goal: Yes   Action steps to achieve this goal:  I will receive my anemia injection(s)/infusion(s) as prescribed.  I will complete lab checks as recommended by my care team.  I will check blood pressure at home as recommended and record the readings on a blood  pressure log.  I will reach out to my nurse care coordinator with any follow-up questions.                  PLAN:  Dose with Procrit today and again on 4/14/25. Recheck labs in 2 weeks.       Orders needed to be renewed (for next follow-up date) in EPIC: None    Iron labs due:  May 2025    Plan discussed with:  Miguel Angel      NEXT FOLLOW-UP DATE:  4/14/25    Janna Louis RN   Anemia Services  [facility]  [email or web address]   Office : [phone]  Fax: [phone]

## 2025-04-08 ENCOUNTER — TELEPHONE (OUTPATIENT)
Dept: TRANSPLANT | Facility: CLINIC | Age: 50
End: 2025-04-08
Payer: COMMERCIAL

## 2025-04-08 ENCOUNTER — PATIENT OUTREACH (OUTPATIENT)
Dept: NEPHROLOGY | Facility: CLINIC | Age: 50
End: 2025-04-08
Payer: COMMERCIAL

## 2025-04-08 NOTE — TELEPHONE ENCOUNTER
ISSUE:   Creatinine 4.23 on 4/4/25 15:56.    Worsening kidney function. Discussed follow up with nephrology clinic nearing need for dialysis.     OUTCOME:  Spoke with Miguel Angel. Denies uremic symptoms. Explained that he needs close monitoring for complications of CKD and request that he make appointment with nephrology JOHNNY. Gave nephrology clinic scheduling line; [phone] as he had referral to [facility] 4/6/22.     He has a fistula but does not think it is working. Further assessment needed.   Miguel Angel verbalized understanding to set up appointment as discussed.    Ivanna Rivera, RN, BSN, CCTN  Solid Organ Transplant, Post Kidney and Pancreas  Transplant Care Coordinator  [phone]

## 2025-04-08 NOTE — TELEPHONE ENCOUNTER
Spoke with patient to assist in scheduling with clinic nephrology to start the process of dialysis preparation. Patient scheduled for 4/29 with [facility]. Patient stated he is having labs again on 4/21.   MATEO Nicholas Care Coordinator  Nephrology

## 2025-04-14 ENCOUNTER — PATIENT OUTREACH (OUTPATIENT)
Dept: CARE COORDINATION | Facility: CLINIC | Age: 50
End: 2025-04-14
Payer: COMMERCIAL

## 2025-04-14 NOTE — PROGRESS NOTES
Anemia Management Note - Follow Up      SUBJECTIVE/OBJECTIVE:    Referred by Dr. Lan Patino on 2025  Primary Diagnosis: Anemia in Chronic Kidney Disease (N18.4, D63.1)     Secondary Diagnosis: Chronic Kidney Disease, Stage 4 (N18.4)   Date of Kidney Transplant:   Hgb goal range: 9-10     Epo/Darbo: Procrit 10,000 units weekly for Hgb <10.0.  Doses at home.   Iron regimen: Ferrous Sulf 1 tab daily.      Labs : 2026  RX/TX plans : 2026     Recent CINDY use, transfusion, IV iron: Retacrit   No MI and blood clots or cancers. 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      Contact: No Consent to Communicate on File.         Latest Ref Rng & Units 2025 2025 3/18/2025 3/25/2025 2025 2025 2025   Anemia   HGB Goal    9 - 10 9 - 10  9 - 10 9 - 10   CINDY Dose    10,000 units 10,000 units  10,000 units 10,000 units   Hemoglobin 13.3 - 17.7 g/dL 8.3  8.6    9.0      TSAT 15 - 46 % 20     29      Ferritin 31 - 409 ng/mL 592     203        BP Readings from Last 3 Encounters:   25 (!) 141/89   25 137/86   10/28/24 126/72     Wt Readings from Last 2 Encounters:   25 111 kg (244 lb 12.8 oz)   25 107.5 kg (237 lb)           PLAN:  Dose with Procrit today and recheck labs towards the end of this week.     Orders needed to be renewed (for next follow-up date) in EPIC: None    Iron labs due:  Early May 2025    Plan discussed with:  No call, documented dose.       NEXT FOLLOW-UP DATE:  25    Janna Louis RN   Anemia Services  [facility]  [email or web address]   Office : [phone]  Fax: [phone]

## 2025-04-22 ENCOUNTER — LAB (OUTPATIENT)
Dept: LAB | Facility: CLINIC | Age: 50
End: 2025-04-22
Payer: COMMERCIAL

## 2025-04-22 DIAGNOSIS — N18.30 ANEMIA IN STAGE 3 CHRONIC KIDNEY DISEASE, UNSPECIFIED WHETHER STAGE 3A OR 3B CKD (H): ICD-10-CM

## 2025-04-22 DIAGNOSIS — N18.30 STAGE 3 CHRONIC KIDNEY DISEASE, UNSPECIFIED WHETHER STAGE 3A OR 3B CKD (H): ICD-10-CM

## 2025-04-22 DIAGNOSIS — D63.1 ANEMIA IN STAGE 3 CHRONIC KIDNEY DISEASE, UNSPECIFIED WHETHER STAGE 3A OR 3B CKD (H): ICD-10-CM

## 2025-04-22 LAB
HCT VFR BLD AUTO: 30.4 % (ref 40–53)
HGB BLD-MCNC: 9.9 G/DL (ref 13.3–17.7)

## 2025-04-22 PROCEDURE — 36415 COLL VENOUS BLD VENIPUNCTURE: CPT

## 2025-04-22 PROCEDURE — 85014 HEMATOCRIT: CPT

## 2025-04-22 PROCEDURE — 85018 HEMOGLOBIN: CPT

## 2025-04-23 ENCOUNTER — PATIENT OUTREACH (OUTPATIENT)
Dept: CARE COORDINATION | Facility: CLINIC | Age: 50
End: 2025-04-23
Payer: COMMERCIAL

## 2025-04-23 NOTE — PROGRESS NOTES
Anemia Management Note - Reminder     Follow-up with anemia management service:    Spoke with Miguel Angel.   He does not have any Procrit at home. He will call the pharmacy today and refill the procrit.  He will give himself a dose when it is delivered.  He has Nephrology appt on 4/29/25. Will check in with Miguel Angel after that appt.   Miguel Angel agreed with this plan.        Latest Ref Rng & Units 2/22/2025 3/18/2025 3/25/2025 4/4/2025 4/7/2025 4/14/2025 4/22/2025   Anemia   HGB Goal   9 - 10 9 - 10  9 - 10 9 - 10    CINDY Dose   10,000 units 10,000 units  10,000 units 10,000 units    Hemoglobin 13.3 - 17.7 g/dL 8.6    9.0    9.9    TSAT 15 - 46 %    29       Ferritin 31 - 409 ng/mL    203               Follow-up call date: 4/30/25    Janna Louis RN   Anemia Services  [facility]  [email or web address]   Office : [phone]  Fax: [phone]

## 2025-04-24 DIAGNOSIS — Z94.0 KIDNEY TRANSPLANTED: ICD-10-CM

## 2025-04-24 DIAGNOSIS — E87.8 LOW BICARBONATE LEVEL: ICD-10-CM

## 2025-04-24 DIAGNOSIS — E87.20 METABOLIC ACIDOSIS: ICD-10-CM

## 2025-04-24 RX ORDER — SODIUM BICARBONATE 650 MG/1
1950 TABLET ORAL
Qty: 810 TABLET | Refills: 0 | Status: SHIPPED | OUTPATIENT
Start: 2025-04-24 | End: 2025-04-29

## 2025-04-29 ENCOUNTER — OFFICE VISIT (OUTPATIENT)
Dept: NEPHROLOGY | Facility: CLINIC | Age: 50
End: 2025-04-29
Payer: COMMERCIAL

## 2025-04-29 VITALS
WEIGHT: 241.4 LBS | HEART RATE: 81 BPM | OXYGEN SATURATION: 100 % | DIASTOLIC BLOOD PRESSURE: 90 MMHG | BODY MASS INDEX: 36.7 KG/M2 | SYSTOLIC BLOOD PRESSURE: 138 MMHG

## 2025-04-29 DIAGNOSIS — E87.20 METABOLIC ACIDOSIS: ICD-10-CM

## 2025-04-29 DIAGNOSIS — Z94.0 HTN, KIDNEY TRANSPLANT RELATED: ICD-10-CM

## 2025-04-29 DIAGNOSIS — N18.4 CKD (CHRONIC KIDNEY DISEASE) STAGE 4, GFR 15-29 ML/MIN (H): Primary | ICD-10-CM

## 2025-04-29 DIAGNOSIS — I15.1 HTN, KIDNEY TRANSPLANT RELATED: ICD-10-CM

## 2025-04-29 DIAGNOSIS — E87.8 LOW BICARBONATE LEVEL: ICD-10-CM

## 2025-04-29 DIAGNOSIS — Z94.0 KIDNEY TRANSPLANTED: ICD-10-CM

## 2025-04-29 PROCEDURE — 3075F SYST BP GE 130 - 139MM HG: CPT | Performed by: PHYSICIAN ASSISTANT

## 2025-04-29 PROCEDURE — 3080F DIAST BP >= 90 MM HG: CPT | Performed by: PHYSICIAN ASSISTANT

## 2025-04-29 PROCEDURE — 99215 OFFICE O/P EST HI 40 MIN: CPT | Performed by: PHYSICIAN ASSISTANT

## 2025-04-29 RX ORDER — SODIUM BICARBONATE 650 MG/1
1950 TABLET ORAL 3 TIMES DAILY
Qty: 810 TABLET | Refills: 0 | Status: SHIPPED | OUTPATIENT
Start: 2025-04-29

## 2025-04-29 RX ORDER — LOSARTAN POTASSIUM 25 MG/1
25 TABLET ORAL DAILY
Qty: 90 TABLET | Refills: 3 | Status: SHIPPED | OUTPATIENT
Start: 2025-04-29

## 2025-04-29 RX ORDER — AMLODIPINE BESYLATE 5 MG/1
5 TABLET ORAL DAILY
Qty: 30 TABLET | Refills: 3 | Status: SHIPPED | OUTPATIENT
Start: 2025-04-29

## 2025-04-29 NOTE — PROGRESS NOTES
NEPHROLOGY PROGRESS NOTE  4/29/2025    Last visit with general nephrology in 6/20/22. He has had progression of CKD since last seen.    Assessment & Plan   # DDKT:   #CKD 4  Trend up Scr ~ 4.1-4.2, eGFR 16-19 since 9/2024, Scr 2.9-3.1 in 2023 and previous baseline of ~ 2.3-2.6, Kidney transplant biopsy 8/2020 showed severe chronic changes.    - Patient has been referred for another kidney transplant and has been active since 2021  - enrolled in CKD journey.   - Baseline Creatinine:  ~ 3.0-3.8   - Proteinuria: Minimal (0.2-0.5 grams)   - Date DSA Last Checked: Aug/2020      Latest DSA: No   - BK Viremia: No   - Kidney Tx Biopsy: Aug 17, 2020; Result: No diagnostic evidence of acute rejection.  Mild glomerulitis with moderate transplant glomerulopathy, but no DSA.  Secondary focal segmental glomerulosclerosis.  Severe interstitial fibrosis and tubular atrophy.  Mild arterio- and severe arteriolosclerosis.             Jul 25, 2011; Result: No diagnostic evidence of acute rejection.  Unremarkable.             May 14, 2010; Result: No diagnostic evidence of acute rejection.  Moderate acute tubular injury.    # Immunosuppression: Cyclosporine (goal ) and Mycophenolate mofetil (dose 1000 mg every 12 hours)   - Changes: No   - management per transplant team    # Infection Prophylaxis:   Last CD4 Level: 573 (9/2017)  - PJP: None  - management per transplant team    # Hypertension: Inadequate control; 138/90  Goal BP: < 130/80    - current regimen: amlodipine 5 mg daily, losartan 25 mg daily,    - Changes: Not at this time; Recommend patient check blood pressure at home on a regular basis, such as once every week or two, and follow up with Nephrology nurse if above goal.    # Anemia in Chronic Renal Disease: Hgb: 9.9 Stable      CINDY: yes   Iron studies: sat 29%    Follows with anemia clinic    # Mineral Bone Disorder:   - Secondary renal hyperparathyroidism; PTH level: Moderately elevated (301-600 pg/ml)        On  treatment: None; Insurance has denied calcitriol so far.  - Vitamin D; level: Normal        On supplement: Yes  - Calcium; level: 8.2 Normal        On supplement: No  Has not been taking calcitriol 0.25 mcg daily, vit D 2000 units daily  - check phos, PTH, and Vit D    # Electrolytes:   - Potassium; level: 4.5 Normal        On supplement: No  - Bicarbonate; level: 18 Low normal        On supplement: Yes sodium bicarb 3 tabs TID (ran out recently)      # Gout: Possible occasional flares on allopurinol.  He was treated with steroids with resolution of symptoms.  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    Return visit: labs and follow up in 6 weeks.        Chief Complaint   Mr. Piña is a 49 year old here for kidney transplant and immunosuppression management.    History of Present Illness    Mr. Piña reports feeling good overall with some medical complaints.  Since last clinic visit, patient reports no hospitalizations or new medical complaints though he has not been seen for close to 3 years.     Appetite is okay, has some occasional nausea in the mornings. He has no vomiting or diarrhea.  No fever, sweats or chills.  No night sweats. Has minimal LE edema, no recent change. He has no shortness of breath. Energy level is stable. He continues to follow with anemia clinic. He is completing requirements for kidney transplant evaluation.     Home BP: Not checked    Problem List   Patient Active Problem List   Diagnosis    Kidney replaced by transplant     Medullary cystic kidney disease    Conductive hearing loss, unilateral    Aftercare following organ transplant    Immunosuppressed status    Anemia in stage 4 chronic kidney disease (H)    Secondary renal hyperparathyroidism    Gout    Vitamin D deficiency    Dyslipidemia    Heterozygous factor V Leiden mutation    HTN, kidney transplant related    CKD (chronic kidney disease) stage 4, GFR 15-29 ml/min (H)    Class 2 obesity due to excess calories without serious comorbidity with body mass index (BMI) of 35.0 to 35.9 in adult    Cellulitis    Class 2 severe obesity due to excess calories with serious comorbidity in adult (H)       Social History   Social History     Tobacco Use    Smoking status: Never     Passive exposure: Never    Smokeless tobacco: Never   Vaping Use    Vaping status: Never Used   Substance Use Topics    Alcohol use: No    Drug use: No       Allergies   No Known Allergies    Medications   Current Outpatient Medications   Medication Sig Dispense Refill    allopurinol (ZYLOPRIM) 100 MG tablet Take 0.5 tablets (50 mg) by mouth daily. 45 tablet 0    amLODIPine (NORVASC) 5 MG tablet Take 1 tablet (5 mg) by mouth daily for 90 days TAKE 1 TABLET BY MOUTH AT BEDTIME AT NIGHT 30 tablet 3    calcitRIOL (ROCALTROL) 0.25 MCG capsule Take 1 capsule (0.25 mcg) by mouth daily for 90 days 90 capsule 3    CELLCEPT (BRAND) 250 MG capsule TAKE THREE CAPSULES BY MOUTH TWICE A  capsule 0    cholecalciferol 2000 units TABS Take 2,000 Units by mouth daily 90 tablet 3    epoetin beto-epbx (RETACRIT) 80763 UNIT/ML injection Inject 1 mL (10,000 Units) subcutaneously every 7 days. [place] for Hgb <10.0.  HOLD dose if systolic BP >180. 4 mL 11    Ferrous Gluconate 324 (37.5 Fe) MG TABS Take by mouth daily      losartan (COZAAR) 25 MG tablet Take 1 tablet (25 mg) by mouth daily 90 tablet 3    methylPREDNISolone (MEDROL DOSEPAK) 4 MG tablet therapy pack Follow Package Directions 21 tablet 0    methylPREDNISolone  (MEDROL DOSEPAK) 4 MG tablet therapy pack Follow Package Directions 21 tablet 1    NEORAL (BRAND) 100 MG capsule Take 1 capsule (100 mg) by mouth 2 times daily. Total dose 125 mg every morning and 100 mg every evening. 60 capsule 11    NEORAL (BRAND) 25 MG capsule Take 1 capsule (25 mg) by mouth every morning. Total dose 125 mg every morning and 100 mg every evening. 30 capsule 11    sodium bicarbonate 650 MG tablet TAKE 3 TABLETS BY MOUTH THREE TIMES DAILY 810 tablet 0     No current facility-administered medications for this visit.     There are no discontinued medications.    Physical Exam   Vital Signs: /90   Pulse 81   Wt 109.5 kg (241 lb 6.4 oz)   SpO2 100%   BMI 36.70 kg/m    GENERAL APPEARANCE: alert and no distress  RESP: lungs clear to auscultation - no rales, rhonchi or wheezes  CV: regular rhythm, normal rate, no rub, no murmur  EDEMA: trace nonpitting LE edema bilaterally  SKIN: no visible rash, many warts  TX KIDNEY: normal  DIALYSIS ACCESS: none    Data         Latest Ref Rng & Units 4/4/2025     3:56 PM 2/22/2025     3:55 PM 1/16/2025     1:48 PM   Renal   Sodium 135 - 145 mmol/L 138   138    K 3.4 - 5.3 mmol/L 4.5   4.7    Cl 98 - 107 mmol/L 104   103    Cl (external) 98 - 107 mmol/L 104   103    CO2 22 - 29 mmol/L 18   19    Urea Nitrogen 6.0 - 20.0 mg/dL 65.0   69.2    Creatinine 0.67 - 1.17 mg/dL 4.23  4.14  4.17    Glucose 70 - 99 mg/dL 93   91    Calcium 8.8 - 10.4 mg/dL 8.2   8.6          Latest Ref Rng & Units 6/19/2024     3:23 PM 1/18/2023     2:57 PM 6/20/2022     2:51 PM   Bone Health   Phosphorus 2.5 - 4.5 mg/dL 5.0  5.0  3.5    Parathyroid Hormone Intact 15 - 65 pg/mL  488  417    Vit D Def 20 - 75 ug/L  35  35          Latest Ref Rng & Units 4/22/2025     4:10 PM 4/4/2025     3:56 PM 2/22/2025     3:55 PM   Heme   WBC 4.0 - 11.0 10e3/uL  4.6  6.5    Hgb 13.3 - 17.7 g/dL 9.9  9.0  8.6    Plt 150 - 450 10e3/uL  182  192          Latest Ref Rng & Units 2/22/2025     3:55 PM  1/16/2025     1:48 PM 6/19/2024     3:23 PM   Liver   ALT 0 - 70 U/L 14  18     AST 0 - 45 U/L 17  19     Albumin 3.5 - 5.2 g/dL   3.7             Latest Ref Rng & Units 4/4/2025     3:56 PM 1/16/2025     1:48 PM 8/29/2024     7:44 AM   Iron studies   Iron 61 - 157 ug/dL 66  46  81    Iron Sat Index 15 - 46 % 29  20  33    Ferritin 31 - 409 ng/mL 203  592  392          Latest Ref Rng & Units 8/23/2021    12:40 PM 8/17/2020    12:34 PM 9/13/2012     8:09 AM   [facility] Txp Virology   CVM DNA Quant   Whole blood, EDTA anticoagulant     CMV QUANT IU/ML CMVND^CMV DNA Not Detected [IU]/mL  CMV DNA Not Detected     LOG IU/ML OF CMVQNT <2.1 [Log_IU]/mL  Not Calculated     BK Spec   Plasma  PLASMA    BK Res BKNEG^BK Virus DNA Not Detected copies/mL  BK Virus DNA Not Detected  <1000    BK Log <2.7 Log copies/mL  Not Calculated  <3.0  The lower limit of detection for this assay is 1000 copies/mL.  Real-time TaqMan   PCR was performed using BK primers and probe for the detection of a 90 bp   portion of the  1 gene.  The performance characteristics were validated by   the [facility], [place].   It has not been cleared or approved by the U.S. Food and Drug Administration.    EBV CAPSID ANTIBODY IGG No detectable antibody. Positive        Failed to redirect to the Timeline version of the REVFS SmartLink.      Recent Labs   Lab Test 12/31/21  1021   MPACID 2.36   MPAG 186.7*       Denisse Stratton PA-C    Visit length 20 minutes. An additional 20 minutes were spent on date of service in chart review, documentation, and other activities as noted.

## 2025-04-29 NOTE — PATIENT INSTRUCTIONS
Start checking blood pressure daily, keep log. Nurse will call in 2 weeks for blood pressures.   Schedule kidney smart class - nurse will help with this.   Avoid ibuprofen/aleve/advil.   Continue good hydration, low sodium diet.   Labs next week, call with results/recommendations.   Follow up in 6 weeks.

## 2025-04-30 ENCOUNTER — TELEPHONE (OUTPATIENT)
Dept: FAMILY MEDICINE | Facility: CLINIC | Age: 50
End: 2025-04-30
Payer: COMMERCIAL

## 2025-04-30 NOTE — TELEPHONE ENCOUNTER
Patient Returning Call    Reason for call:  Patient calling in to get labs but nothing showing at [facility] next wek - please call and advise.     Information relayed to patient:  Message Sent     Patient has additional questions:  Yes    What are your questions/concerns:  Patient calling in to get labs but nothing showing at [facility] next wek - please call and advise.     Could we send this information to you in [place] or would you prefer to receive a phone call?:   Patient would prefer a phone call   Okay to leave a detailed message?: Yes at Home number on file [phone] (home)

## 2025-05-06 ENCOUNTER — DOCUMENTATION ONLY (OUTPATIENT)
Dept: NEPHROLOGY | Facility: CLINIC | Age: 50
End: 2025-05-06
Payer: COMMERCIAL

## 2025-05-07 ENCOUNTER — RESULTS FOLLOW-UP (OUTPATIENT)
Dept: TRANSPLANT | Facility: CLINIC | Age: 50
End: 2025-05-07

## 2025-05-07 ENCOUNTER — LAB (OUTPATIENT)
Dept: LAB | Facility: CLINIC | Age: 50
End: 2025-05-07
Payer: COMMERCIAL

## 2025-05-07 DIAGNOSIS — N18.4 CKD (CHRONIC KIDNEY DISEASE) STAGE 4, GFR 15-29 ML/MIN (H): ICD-10-CM

## 2025-05-07 DIAGNOSIS — D63.1 ANEMIA IN STAGE 3 CHRONIC KIDNEY DISEASE, UNSPECIFIED WHETHER STAGE 3A OR 3B CKD (H): ICD-10-CM

## 2025-05-07 DIAGNOSIS — N18.30 ANEMIA IN STAGE 3 CHRONIC KIDNEY DISEASE, UNSPECIFIED WHETHER STAGE 3A OR 3B CKD (H): ICD-10-CM

## 2025-05-07 DIAGNOSIS — N18.30 STAGE 3 CHRONIC KIDNEY DISEASE, UNSPECIFIED WHETHER STAGE 3A OR 3B CKD (H): ICD-10-CM

## 2025-05-07 DIAGNOSIS — Z94.0 KIDNEY TRANSPLANTED: ICD-10-CM

## 2025-05-07 LAB
ALBUMIN MFR UR ELPH: 146 MG/DL
ALBUMIN SERPL BCG-MCNC: 3.9 G/DL (ref 3.5–5.2)
ANION GAP SERPL CALCULATED.3IONS-SCNC: 14 MMOL/L (ref 7–15)
BUN SERPL-MCNC: 68.8 MG/DL (ref 6–20)
CALCIUM SERPL-MCNC: 8.4 MG/DL (ref 8.8–10.4)
CHLORIDE SERPL-SCNC: 105 MMOL/L (ref 98–107)
CREAT SERPL-MCNC: 4.25 MG/DL (ref 0.67–1.17)
CREAT UR-MCNC: 105 MG/DL
CREAT UR-MCNC: 105 MG/DL
EGFRCR SERPLBLD CKD-EPI 2021: 16 ML/MIN/1.73M2
ERYTHROCYTE [DISTWIDTH] IN BLOOD BY AUTOMATED COUNT: 12.8 % (ref 10–15)
GLUCOSE SERPL-MCNC: 120 MG/DL (ref 70–99)
HCO3 SERPL-SCNC: 17 MMOL/L (ref 22–29)
HCT VFR BLD AUTO: 31.1 % (ref 40–53)
HGB BLD-MCNC: 10.1 G/DL (ref 13.3–17.7)
MCH RBC QN AUTO: 28.8 PG (ref 26.5–33)
MCHC RBC AUTO-ENTMCNC: 32.5 G/DL (ref 31.5–36.5)
MCV RBC AUTO: 89 FL (ref 78–100)
MICROALBUMIN UR-MCNC: 995 MG/L
MICROALBUMIN/CREAT UR: 947.62 MG/G CR (ref 0–17)
PHOSPHATE SERPL-MCNC: 4.6 MG/DL (ref 2.5–4.5)
PLATELET # BLD AUTO: 191 10E3/UL (ref 150–450)
POTASSIUM SERPL-SCNC: 4.8 MMOL/L (ref 3.4–5.3)
PROT/CREAT 24H UR: 1.39 MG/MG CR (ref 0–0.2)
PTH-INTACT SERPL-MCNC: 20 PG/ML (ref 15–65)
RBC # BLD AUTO: 3.51 10E6/UL (ref 4.4–5.9)
SODIUM SERPL-SCNC: 136 MMOL/L (ref 135–145)
VIT D+METAB SERPL-MCNC: 30 NG/ML (ref 20–50)
WBC # BLD AUTO: 7.4 10E3/UL (ref 4–11)

## 2025-05-07 PROCEDURE — 83970 ASSAY OF PARATHORMONE: CPT

## 2025-05-07 PROCEDURE — 85027 COMPLETE CBC AUTOMATED: CPT

## 2025-05-07 PROCEDURE — 82570 ASSAY OF URINE CREATININE: CPT

## 2025-05-07 PROCEDURE — 36415 COLL VENOUS BLD VENIPUNCTURE: CPT

## 2025-05-07 PROCEDURE — 82306 VITAMIN D 25 HYDROXY: CPT

## 2025-05-07 PROCEDURE — 84156 ASSAY OF PROTEIN URINE: CPT

## 2025-05-07 PROCEDURE — 80158 DRUG ASSAY CYCLOSPORINE: CPT

## 2025-05-07 PROCEDURE — 82043 UR ALBUMIN QUANTITATIVE: CPT

## 2025-05-07 PROCEDURE — 80069 RENAL FUNCTION PANEL: CPT

## 2025-05-08 ENCOUNTER — PATIENT OUTREACH (OUTPATIENT)
Dept: CARE COORDINATION | Facility: CLINIC | Age: 50
End: 2025-05-08
Payer: COMMERCIAL

## 2025-05-08 LAB
CYCLOSPORINE BLD LC/MS/MS-MCNC: 104 UG/L (ref 50–400)
TME LAST DOSE: NORMAL H
TME LAST DOSE: NORMAL H

## 2025-05-08 NOTE — PROGRESS NOTES
Anemia Management Note - Follow Up      SUBJECTIVE/OBJECTIVE:    Referred by Dr. Lan Patino on 2025  Primary Diagnosis: Anemia in Chronic Kidney Disease (N18.4, D63.1)     Secondary Diagnosis: Chronic Kidney Disease, Stage 4 (N18.4)   Date of Kidney Transplant:   Hgb goal range: 9-10     Epo/Darbo: Procrit 10,000 units weekly for Hgb <10.0.  Doses at home.   Iron regimen: Ferrous Sulf 1 tab daily.      Labs : 2026  RX/TX plans : 2026     Recent CINDY use, transfusion, IV iron: Retacrit   No MI and blood clots or cancers. 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      Contact: No Consent to Communicate on File.         Latest Ref Rng & Units 3/25/2025 2025 2025 2025 2025 2025 2025   Anemia   HGB Goal  9 - 10  9 - 10 9 - 10  9 - 10    CINDY Dose  10,000 units  10,000 units 10,000 units  10,000 units    Hemoglobin 13.3 - 17.7 g/dL  9.0    9.9   10.1    TSAT 15 - 46 %  29         Ferritin 31 - 409 ng/mL  203           BP Readings from Last 3 Encounters:   25 138/90   25 (!) 141/89   25 137/86     Wt Readings from Last 2 Encounters:   25 109.5 kg (241 lb 6.4 oz)   25 111 kg (244 lb 12.8 oz)           ASSESSMENT:    Hgb:Above goal - recommend hold dose  TSat: not at goal of >30% Ferritin: At goal (>100ng/mL)     Goals Addressed                      This Visit's Progress      Medical (pt-stated)   On track      Goal Statement: I will actively work with my care teams to manage my anemia.  Date Goal set: 3/19/2025  Barriers:  Busy work schedule  Strengths: support, coping, motivation, health awareness, and involvement with care team  Date to Achieve By: ongoing  Patient expressed understanding of goal: Yes   Action steps to achieve this goal:  I will receive my anemia injection(s)/infusion(s) as prescribed.  I will complete lab checks as recommended by my care team.  I will check blood pressure at home as recommended and record  the readings on a blood pressure log.  I will reach out to my nurse care coordinator with any follow-up questions.                  PLAN:  Hold Procrit.  RTC for hgb then Procrit if needed in 1-2 week(s).    Orders needed to be renewed (for next follow-up date) in EPIC: None    Iron labs due:  DUE    Plan discussed with:  Miguel Angel via [employer]      NEXT FOLLOW-UP DATE:  5/22/25    Janna Louis RN   Anemia Services  [facility]  [email or web address]   Office : [phone]  Fax: [phone]

## 2025-05-14 ENCOUNTER — MYC REFILL (OUTPATIENT)
Dept: RHEUMATOLOGY | Facility: CLINIC | Age: 50
End: 2025-05-14
Payer: COMMERCIAL

## 2025-05-14 DIAGNOSIS — Z79.899 ON ALLOPURINOL THERAPY: ICD-10-CM

## 2025-05-14 DIAGNOSIS — M1A.9XX1 CHRONIC TOPHACEOUS GOUT: ICD-10-CM

## 2025-05-14 RX ORDER — METHYLPREDNISOLONE 4 MG/1
TABLET ORAL
Qty: 21 TABLET | Refills: 1 | Status: SHIPPED | OUTPATIENT
Start: 2025-05-14

## 2025-05-14 NOTE — TELEPHONE ENCOUNTER
It does seem as if he is having more flares.  I did refill his Medrol but we may want to consider increasing his allopurinol.    Isela Jean, [facility]

## 2025-05-14 NOTE — TELEPHONE ENCOUNTER
Last visit 1/29/25.     Plan:      Schedule follow-up with Isela Jean PA-C in 6 months.   Labs: Uric acid, AST, ALT, CBC, creatinine in 1 month   Medication recommendations:             Allopurinol 100mg: take 1/2 tablet every day  Medrol 4mg-follow taper instructions if you get a flare of gout;        Last Uric Acid level was 2/22/25 = 9.4

## 2025-05-14 NOTE — TELEPHONE ENCOUNTER
Sent patient Isela's message via [employer]. Waiting for response.    Cindy Gutierrez RN on 5/14/2025 at 12:43 PM

## 2025-05-21 ENCOUNTER — PATIENT OUTREACH (OUTPATIENT)
Dept: CARE COORDINATION | Facility: CLINIC | Age: 50
End: 2025-05-21
Payer: COMMERCIAL

## 2025-05-21 NOTE — PROGRESS NOTES
Anemia Management Note - Reminder     Follow-up with anemia management service:    Miguel Angel is due to have his Hgb drawn. Sent reminder via [employer].         Latest Ref Rng & Units 3/25/2025 4/4/2025 4/7/2025 4/14/2025 4/22/2025 4/24/2025 5/7/2025   Anemia   HGB Goal  9 - 10  9 - 10 9 - 10  9 - 10    CINDY Dose  10,000 units  10,000 units 10,000 units  10,000 units    Hemoglobin 13.3 - 17.7 g/dL  9.0    9.9   10.1    TSAT 15 - 46 %  29         Ferritin 31 - 409 ng/mL  203               Follow-up call date: 5/27/25    Janna Louis RN   Anemia Services  [facility]  [email or web address]   Office : [phone]  Fax: [phone]

## 2025-05-22 NOTE — TELEPHONE ENCOUNTER
No response from patient.  He does have a follow up appointment with Isela scheduled on 7/30/25 to discuss further.    Cindy Gutierrez RN on 5/22/2025 at 10:22 AM

## 2025-05-29 ENCOUNTER — PATIENT OUTREACH (OUTPATIENT)
Dept: CARE COORDINATION | Facility: CLINIC | Age: 50
End: 2025-05-29
Payer: COMMERCIAL

## 2025-05-29 NOTE — PROGRESS NOTES
Anemia Management Note - Reminder     Follow-up with anemia management service:    Miguel Angel is due to have labs.  LVM for Miguel Angel.    Sent [employer] message on 5/21/25.         Latest Ref Rng & Units 3/25/2025 4/4/2025 4/7/2025 4/14/2025 4/22/2025 4/24/2025 5/7/2025   Anemia   HGB Goal  9 - 10  9 - 10 9 - 10  9 - 10    CINDY Dose  10,000 units  10,000 units 10,000 units  10,000 units    Hemoglobin 13.3 - 17.7 g/dL  9.0    9.9   10.1    TSAT 15 - 46 %  29         Ferritin 31 - 409 ng/mL  203                 Follow-up call date: 6/2/25    Janna Louis RN   Anemia Services  [facility]  [email or web address]   Office : [phone]  Fax: [phone]

## 2025-06-12 ENCOUNTER — PATIENT OUTREACH (OUTPATIENT)
Dept: CARE COORDINATION | Facility: CLINIC | Age: 50
End: 2025-06-12
Payer: COMMERCIAL

## 2025-06-12 NOTE — PROGRESS NOTES
Anemia Management Note - Reminder     Follow-up with anemia management service:    Miguel Angel is overdue to have Anemia Labs drawn.  LVM x 1 and two MyChart messages.   Labs have not been scheduled.  Has an in-person Nephrology appt on 6/23/25.         Latest Ref Rng & Units 3/25/2025 4/4/2025 4/7/2025 4/14/2025 4/22/2025 4/24/2025 5/7/2025   Anemia   HGB Goal  9 - 10  9 - 10 9 - 10  9 - 10    CINDY Dose  10,000 units  10,000 units 10,000 units  10,000 units    Hemoglobin 13.3 - 17.7 g/dL  9.0    9.9   10.1    TSAT 15 - 46 %  29         Ferritin 31 - 409 ng/mL  203               Follow-up call date: 6/24/25    Janna Louis RN   Anemia Services  [facility]  [email or web address]   Office : [phone]  Fax: [phone]

## 2025-06-15 ENCOUNTER — HEALTH MAINTENANCE LETTER (OUTPATIENT)
Age: 50
End: 2025-06-15

## 2025-06-25 ENCOUNTER — MYC REFILL (OUTPATIENT)
Dept: RHEUMATOLOGY | Facility: CLINIC | Age: 50
End: 2025-06-25
Payer: COMMERCIAL

## 2025-06-25 DIAGNOSIS — M1A.9XX1 CHRONIC TOPHACEOUS GOUT: ICD-10-CM

## 2025-06-25 DIAGNOSIS — Z79.899 ON ALLOPURINOL THERAPY: ICD-10-CM

## 2025-06-25 RX ORDER — METHYLPREDNISOLONE 4 MG/1
TABLET ORAL
Qty: 21 TABLET | Refills: 1 | Status: CANCELLED | OUTPATIENT
Start: 2025-06-25

## 2025-06-25 NOTE — TELEPHONE ENCOUNTER
Good afternoon, I had a flare on my right ankle last night. I am out of medication. My ankle is still sore Could you please refill my medication. Thank you Miguel Angel     Has 7/30/25 appt.    Fill dates: 4/21/25, 5/14/25, 5/26/25, and filled 6/25/25

## 2025-07-07 ENCOUNTER — OFFICE VISIT (OUTPATIENT)
Dept: FAMILY MEDICINE | Facility: CLINIC | Age: 50
End: 2025-07-07
Payer: COMMERCIAL

## 2025-07-07 ENCOUNTER — ANCILLARY PROCEDURE (OUTPATIENT)
Dept: GENERAL RADIOLOGY | Facility: CLINIC | Age: 50
End: 2025-07-07
Attending: PHYSICIAN ASSISTANT
Payer: COMMERCIAL

## 2025-07-07 VITALS
RESPIRATION RATE: 16 BRPM | OXYGEN SATURATION: 100 % | BODY MASS INDEX: 36.22 KG/M2 | HEIGHT: 68 IN | WEIGHT: 239 LBS | SYSTOLIC BLOOD PRESSURE: 124 MMHG | TEMPERATURE: 98.3 F | DIASTOLIC BLOOD PRESSURE: 80 MMHG | HEART RATE: 82 BPM

## 2025-07-07 DIAGNOSIS — Z94.0 KIDNEY REPLACED BY TRANSPLANT: ICD-10-CM

## 2025-07-07 DIAGNOSIS — Z87.39 HX OF GOUT: ICD-10-CM

## 2025-07-07 DIAGNOSIS — N18.30 ANEMIA IN STAGE 3 CHRONIC KIDNEY DISEASE, UNSPECIFIED WHETHER STAGE 3A OR 3B CKD (H): ICD-10-CM

## 2025-07-07 DIAGNOSIS — D63.1 ANEMIA IN STAGE 3 CHRONIC KIDNEY DISEASE, UNSPECIFIED WHETHER STAGE 3A OR 3B CKD (H): ICD-10-CM

## 2025-07-07 DIAGNOSIS — M79.644 PAIN OF FINGER OF RIGHT HAND: ICD-10-CM

## 2025-07-07 DIAGNOSIS — D84.9 IMMUNOSUPPRESSED STATUS: ICD-10-CM

## 2025-07-07 DIAGNOSIS — S62.639A CLOSED AVULSION FRACTURE OF DISTAL PHALANX OF FINGER, INITIAL ENCOUNTER: Primary | ICD-10-CM

## 2025-07-07 LAB
ANION GAP SERPL CALCULATED.3IONS-SCNC: 14 MMOL/L (ref 7–15)
BASOPHILS # BLD AUTO: 0 10E3/UL (ref 0–0.2)
BASOPHILS NFR BLD AUTO: 0 %
BUN SERPL-MCNC: 58.9 MG/DL (ref 6–20)
CALCIUM SERPL-MCNC: 8.8 MG/DL (ref 8.8–10.4)
CHLORIDE SERPL-SCNC: 102 MMOL/L (ref 98–107)
CREAT SERPL-MCNC: 4.01 MG/DL (ref 0.67–1.17)
EGFRCR SERPLBLD CKD-EPI 2021: 17 ML/MIN/1.73M2
EOSINOPHIL # BLD AUTO: 0.1 10E3/UL (ref 0–0.7)
EOSINOPHIL NFR BLD AUTO: 1 %
ERYTHROCYTE [DISTWIDTH] IN BLOOD BY AUTOMATED COUNT: 13.1 % (ref 10–15)
GLUCOSE SERPL-MCNC: 95 MG/DL (ref 70–99)
HCO3 SERPL-SCNC: 19 MMOL/L (ref 22–29)
HCT VFR BLD AUTO: 27 % (ref 40–53)
HGB BLD-MCNC: 8.9 G/DL (ref 13.3–17.7)
IMM GRANULOCYTES # BLD: 0.1 10E3/UL
IMM GRANULOCYTES NFR BLD: 1 %
LYMPHOCYTES # BLD AUTO: 1.6 10E3/UL (ref 0.8–5.3)
LYMPHOCYTES NFR BLD AUTO: 18 %
MCH RBC QN AUTO: 29.5 PG (ref 26.5–33)
MCHC RBC AUTO-ENTMCNC: 33 G/DL (ref 31.5–36.5)
MCV RBC AUTO: 89 FL (ref 78–100)
MONOCYTES # BLD AUTO: 0.7 10E3/UL (ref 0–1.3)
MONOCYTES NFR BLD AUTO: 8 %
NEUTROPHILS # BLD AUTO: 6.6 10E3/UL (ref 1.6–8.3)
NEUTROPHILS NFR BLD AUTO: 73 %
PLATELET # BLD AUTO: 218 10E3/UL (ref 150–450)
POTASSIUM SERPL-SCNC: 4.9 MMOL/L (ref 3.4–5.3)
RBC # BLD AUTO: 3.02 10E6/UL (ref 4.4–5.9)
SODIUM SERPL-SCNC: 135 MMOL/L (ref 135–145)
URATE SERPL-MCNC: 9.1 MG/DL (ref 3.4–7)
WBC # BLD AUTO: 9.1 10E3/UL (ref 4–11)

## 2025-07-07 PROCEDURE — 85025 COMPLETE CBC W/AUTO DIFF WBC: CPT | Performed by: PHYSICIAN ASSISTANT

## 2025-07-07 PROCEDURE — 36415 COLL VENOUS BLD VENIPUNCTURE: CPT | Performed by: PHYSICIAN ASSISTANT

## 2025-07-07 PROCEDURE — 73140 X-RAY EXAM OF FINGER(S): CPT | Mod: TC | Performed by: RADIOLOGY

## 2025-07-07 PROCEDURE — 3079F DIAST BP 80-89 MM HG: CPT | Performed by: PHYSICIAN ASSISTANT

## 2025-07-07 PROCEDURE — 80048 BASIC METABOLIC PNL TOTAL CA: CPT | Performed by: PHYSICIAN ASSISTANT

## 2025-07-07 PROCEDURE — 3074F SYST BP LT 130 MM HG: CPT | Performed by: PHYSICIAN ASSISTANT

## 2025-07-07 PROCEDURE — 84550 ASSAY OF BLOOD/URIC ACID: CPT | Performed by: PHYSICIAN ASSISTANT

## 2025-07-07 RX ORDER — CEPHALEXIN 500 MG/1
500 CAPSULE ORAL 2 TIMES DAILY
Qty: 14 CAPSULE | Refills: 0 | Status: SHIPPED | OUTPATIENT
Start: 2025-07-07 | End: 2025-07-14

## 2025-07-07 RX ORDER — METHYLPREDNISOLONE 4 MG/1
TABLET ORAL
Qty: 21 TABLET | Refills: 0 | Status: SHIPPED | OUTPATIENT
Start: 2025-07-07

## 2025-07-07 NOTE — PROGRESS NOTES
"  Assessment & Plan     Closed avulsion fracture of distal phalanx of finger, initial encounter  Finger splint given to patient, will wear this for likely 2-3 weeks.    Will start a preventative antibiotic given redness/swelling and immunosuppression.  Monitor for worrisome signs, such as increased redness, pain, discharge or fever.   Renal dose for Keflex given.     F/up appt in 1 week for a recheck.   - cephALEXin (KEFLEX) 500 MG capsule; Take 1 capsule (500 mg) by mouth 2 times daily for 7 days.    Pain of finger of right hand  Differentials included fracture versus traumatic strain/bruise vs acute gout flare up.    Uric acid level is elevated, but stable for him.  Gout is managed by specialist (on low dose allopurinol given renal function).   Initially planned to start medrol dose pack, however with xray confirming fracture, will hold off on this and instead splint and use antibiotic.     - XR Finger Right G/E 2 Views  - Uric acid  - CBC with platelets and differential  - Basic metabolic panel  (Ca, Cl, CO2, Creat, Gluc, K, Na, BUN)  - methylPREDNISolone (MEDROL DOSEPAK) 4 MG tablet therapy pack; Follow Package Directions    Hx of gout  Managed by specialist.   - Uric acid  - methylPREDNISolone (MEDROL DOSEPAK) 4 MG tablet therapy pack; Follow Package Directions    Immunosuppressed status  Preventative antibiotic prescribed.     Kidney replaced by transplant  Managed by specialist.           BMI  Estimated body mass index is 36.34 kg/m  as calculated from the following:    Height as of this encounter: 1.727 m (5' 8\").    Weight as of this encounter: 108.4 kg (239 lb).             Sandi Michelle is a 49 year old, presenting for the following health issues:  Musculoskeletal Problem        7/7/2025     9:47 AM   Additional Questions   Roomed by Isela   Accompanied by Self         7/7/2025   Forms   Any forms needing to be completed Yes - Wanting note for Work         7/7/2025     9:47 AM   Patient Reported " "Additional Medications   Patient reports taking the following new medications Na       Patient arrived with Injury to Right Pinky Finger x 1 week ago.      Pt had originally not come in for injury because Finger seemed to gradually be improving on its own before last night started to flare-up - When pt woke up this morning Finger was Red/Tingling/Swollen and Painful to the touch.    Pt has not done any icing or OTC pain relievers for Finger.     Initially painful after he jammed his pinky 1 week ago, then the pain was mild.    Overnight, this same pinky now is more painful, warm to the touch and more swollen.  Some tingling in there.      Works at [employer] and on computer a lot, this was uncomfortable typing.    He does have a h/o gout as well and this does feel similar to that as well.  He has had gout in this hand. He takes allopurinol for gout prevention (no missed doses)  When he gets flare-ups, will take medrol dose pack (try to limit this due to kidney transplant).     Not currently on medrol.     Musculoskeletal Problem    History of Present Illness       Reason for visit:  Right pinky  Symptom onset:  3-7 days ago  Symptoms include:  Sore  Symptom intensity:  Moderate  Symptom progression:  Worsening  Had these symptoms before:  No  What makes it worse:  Nothing  What makes it better:  Nothing   He is taking medications regularly.                  Review of Systems  Constitutional, neuro, ENT, endocrine, pulmonary, cardiac, gastrointestinal, genitourinary, musculoskeletal, integument and psychiatric systems are negative, except as otherwise noted.      Objective    /80 (BP Location: Right arm, Patient Position: Chair, Cuff Size: Adult Large)   Pulse 82   Temp 98.3  F (36.8  C) (Temporal)   Resp 16   Ht 1.727 m (5' 8\")   Wt 108.4 kg (239 lb)   SpO2 100%   BMI 36.34 kg/m    Body mass index is 36.34 kg/m .  Physical Exam   GENERAL: alert and no distress  Hand:  tenderness over DIP of right 5th " digit.  Redness and swelling, no warmth noted.  Full ROM.    Capillary refill and radial pulses normal.       Recent Results (from the past 24 hours)   XR Finger Right G/E 2 Views    Narrative    EXAM: XR FINGER RIGHT G/E 2 VIEWS  LOCATION: [facility]  DATE: 7/7/2025    INDICATION:  Pain of finger of right hand  COMPARISON: None.      Impression    IMPRESSION: There is a small avulsion fracture of the dorsal plate of the fifth distal phalanx, with soft tissue swelling of the fifth finger diffusely.   Uric acid   Result Value Ref Range    Uric Acid 9.1 (H) 3.4 - 7.0 mg/dL   CBC with platelets and differential    Narrative    The following orders were created for panel order CBC with platelets and differential.  Procedure                               Abnormality         Status                     ---------                               -----------         ------                     CBC with platelets and ...[[id number]]  Abnormal            Final result                 Please view results for these tests on the individual orders.   Basic metabolic panel  (Ca, Cl, CO2, Creat, Gluc, K, Na, BUN)   Result Value Ref Range    Sodium 135 135 - 145 mmol/L    Potassium 4.9 3.4 - 5.3 mmol/L    Chloride 102 98 - 107 mmol/L    Carbon Dioxide (CO2) 19 (L) 22 - 29 mmol/L    Anion Gap 14 7 - 15 mmol/L    Urea Nitrogen 58.9 (H) 6.0 - 20.0 mg/dL    Creatinine 4.01 (H) 0.67 - 1.17 mg/dL    GFR Estimate 17 (L) >60 mL/min/1.73m2    Calcium 8.8 8.8 - 10.4 mg/dL    Glucose 95 70 - 99 mg/dL   CBC with platelets and differential   Result Value Ref Range    WBC Count 9.1 4.0 - 11.0 10e3/uL    RBC Count 3.02 (L) 4.40 - 5.90 10e6/uL    Hemoglobin 8.9 (L) 13.3 - 17.7 g/dL    Hematocrit 27.0 (L) 40.0 - 53.0 %    MCV 89 78 - 100 fL    MCH 29.5 26.5 - 33.0 pg    MCHC 33.0 31.5 - 36.5 g/dL    RDW 13.1 10.0 - 15.0 %    Platelet Count 218 150 - 450 10e3/uL    % Neutrophils 73 %    % Lymphocytes 18 %    % Monocytes 8 %    %  Eosinophils 1 %    % Basophils 0 %    % Immature Granulocytes 1 %    Absolute Neutrophils 6.6 1.6 - 8.3 10e3/uL    Absolute Lymphocytes 1.6 0.8 - 5.3 10e3/uL    Absolute Monocytes 0.7 0.0 - 1.3 10e3/uL    Absolute Eosinophils 0.1 0.0 - 0.7 10e3/uL    Absolute Basophils 0.0 0.0 - 0.2 10e3/uL    Absolute Immature Granulocytes 0.1 <=0.4 10e3/uL           Signed Electronically by: Cindy Cordero PA-C

## 2025-07-07 NOTE — PATIENT INSTRUCTIONS
Xray does show a non-displaced finger fracture.     We'll want to keep it splinted for 2-3 weeks and monitor closely.      There is an increased risk of infection, given the redness today, let's start an antibiotic keflex twice per day 1 week (may extend this further at f/up appt).     Hold off on any further doses of steroid, the uric acid level is elevated (but stable).

## 2025-07-07 NOTE — LETTER
2025    Miguel Angel Piña   1975        To Whom it May Concern;    Please excuse Miguel Angel Piña from work/school for a healthcare visit on 2025.  He may return to work today, however I recommend he limit overuse of his right 5th digit x 1 week.      Sincerely,        Cindy Cordero PA-C

## 2025-07-09 ENCOUNTER — PATIENT OUTREACH (OUTPATIENT)
Dept: CARE COORDINATION | Facility: CLINIC | Age: 50
End: 2025-07-09
Payer: COMMERCIAL

## 2025-07-09 DIAGNOSIS — N18.30 ANEMIA IN STAGE 3 CHRONIC KIDNEY DISEASE, UNSPECIFIED WHETHER STAGE 3A OR 3B CKD (H): Primary | ICD-10-CM

## 2025-07-09 DIAGNOSIS — D63.1 ANEMIA IN STAGE 3 CHRONIC KIDNEY DISEASE, UNSPECIFIED WHETHER STAGE 3A OR 3B CKD (H): Primary | ICD-10-CM

## 2025-07-09 LAB
FERRITIN SERPL-MCNC: 406 NG/ML (ref 31–409)
IRON BINDING CAPACITY (ROCHE): 245 UG/DL (ref 240–430)
IRON SATN MFR SERPL: 25 % (ref 15–46)
IRON SERPL-MCNC: 62 UG/DL (ref 61–157)

## 2025-07-09 NOTE — PROGRESS NOTES
Anemia Management Note - Follow Up      SUBJECTIVE/OBJECTIVE:    Referred by Dr. Lan Patino on 2025  Primary Diagnosis: Anemia in Chronic Kidney Disease (N18.4, D63.1)     Secondary Diagnosis: Chronic Kidney Disease, Stage 4 (N18.4)   Date of Kidney Transplant:   Hgb goal range: 9-10     Epo/Darbo: Procrit 10,000 units weekly for Hgb <10.0.  Doses at home.   Iron regimen: Ferrous Sulf 1 tab daily.      Labs : 2026  RX/TX plans : 2026     Recent CINDY use, transfusion, IV iron: Retacrit   No MI and blood clots or cancers. 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      Contact: No Consent to Communicate on File.         Latest Ref Rng & Units 2025   Anemia   HGB Goal  9 - 10 9 - 10  9 - 10   9 - 10   CINDY Dose  10,000 units 10,000 units  10,000 units   10,000 units   Hemoglobin 13.3 - 17.7 g/dL   9.9   10.1  8.9       BP Readings from Last 3 Encounters:   25 124/80   25 138/90   25 (!) 141/89     Wt Readings from Last 2 Encounters:   25 108.4 kg (239 lb)   25 109.5 kg (241 lb 6.4 oz)           ASSESSMENT:    Hgb:Not at goal/Non-compliance   TSat: Due for labs Ferritin: Due for labs     Goals Addressed                      This Visit's Progress      Medical-Anemia (pt-stated)   50%      Goal Statement: I will actively work with my care teams to manage my anemia.  Date Goal set: 3/19/2025  Barriers:  Busy work schedule  Strengths: support, coping, motivation, health awareness, and involvement with care team  Date to Achieve By: ongoing  Patient expressed understanding of goal: Yes   Action steps to achieve this goal:  I will receive my anemia injection(s)/infusion(s) as prescribed.  I will complete lab checks as recommended by my care team.  I will check blood pressure at home as recommended and record the readings on a blood pressure log.  I will reach out to my nurse care coordinator with  any follow-up questions.                  PLAN:  Dose with Procrit today and then again on 7/16/25.  RTC for hgb then Procrit if needed in 2 week(s).    Orders needed to be renewed (for next follow-up date) in EPIC: None    Iron labs due:  Pending    Plan discussed with:  Miguel Angel via [employer]      NEXT FOLLOW-UP DATE:  6/23/25    Janna Louis RN   Anemia Services  [facility]  [email or web address]   Office : [phone]  Fax: [phone]

## 2025-07-14 ENCOUNTER — OFFICE VISIT (OUTPATIENT)
Dept: FAMILY MEDICINE | Facility: CLINIC | Age: 50
End: 2025-07-14
Payer: COMMERCIAL

## 2025-07-14 VITALS
OXYGEN SATURATION: 98 % | BODY MASS INDEX: 36.53 KG/M2 | HEART RATE: 88 BPM | RESPIRATION RATE: 18 BRPM | WEIGHT: 241 LBS | DIASTOLIC BLOOD PRESSURE: 70 MMHG | SYSTOLIC BLOOD PRESSURE: 134 MMHG | TEMPERATURE: 98.3 F | HEIGHT: 68 IN

## 2025-07-14 DIAGNOSIS — S62.639A CLOSED AVULSION FRACTURE OF DISTAL PHALANX OF FINGER, INITIAL ENCOUNTER: Primary | ICD-10-CM

## 2025-07-14 PROCEDURE — 3075F SYST BP GE 130 - 139MM HG: CPT | Performed by: PHYSICIAN ASSISTANT

## 2025-07-14 PROCEDURE — 3078F DIAST BP <80 MM HG: CPT | Performed by: PHYSICIAN ASSISTANT

## 2025-07-14 PROCEDURE — 99213 OFFICE O/P EST LOW 20 MIN: CPT | Performed by: PHYSICIAN ASSISTANT

## 2025-07-14 NOTE — PROGRESS NOTES
"  Assessment & Plan     Closed avulsion fracture of distal phalanx of finger, initial encounter  Repeat xray is stable.   Patient instructions:  Keep finger splinted for another 2 weeks.  In 2 weeks, you can start to do gentle range of motion exercises of your finger.   - XR Finger Right G/E 2 Views    BMI  Estimated body mass index is 36.4 kg/m  as calculated from the following:    Height as of this encounter: 1.733 m (5' 8.23\").    Weight as of this encounter: 109.3 kg (241 lb).       Follow up in 3 weeks.     Sandi Michelle is a 49 year old, presenting for the following health issues:  Hand Injury (Finger fractures follow up//)      7/14/2025     1:35 PM   Additional Questions   Roomed by Nadege   Accompanied by Self     Not fasting, and no vaccines today     History of Present Illness       Reason for visit:  Right pinky  Symptom onset:  3-7 days ago  Symptoms include:  Sore  Symptom intensity:  Moderate  Symptom progression:  Worsening  Had these symptoms before:  No  What makes it worse:  Nothing  What makes it better:  Nothing   He is taking medications regularly.      Pain and redness improved.  Splinting finger 24/7.  No numbness or tingling.                Review of Systems  Constitutional, HEENT, cardiovascular, pulmonary, gi and gu systems are negative, except as otherwise noted.      Objective    /70   Pulse 88   Temp 98.3  F (36.8  C) (Temporal)   Resp 18   Ht 1.733 m (5' 8.23\")   Wt 109.3 kg (241 lb)   SpO2 98%   BMI 36.40 kg/m    Body mass index is 36.4 kg/m .  Physical Exam   GENERAL: alert and no distress  Finger: Right 5th digit with decreased swelling, no warmth or redness.  Normal radial pulse and capillary refill.     Results for orders placed or performed in visit on 07/14/25   XR Finger Right G/E 2 Views     Status: None    Narrative    EXAM: XR FINGER RIGHT G/E 2 VIEWS  LOCATION: [facility]  DATE: 7/14/2025    INDICATION: f up on avulsion " fracture  COMPARISON: 7/7/2025      Impression    IMPRESSION: Normal joint spaces and alignment. No change in displaced avulsion fracture of the dorsal base of the distal phalanx little finger. There is mild flexion of the DIP joint. Soft tissue swelling involving the little finger near the PIP and MCP   joints with slight increased density is nonspecific but raises the possibility of tophi. No associated erosions.    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 There are likely erosion of the capitate and proximal pole of the hamate.           Signed Electronically by: Cindy Cordero PA-C

## 2025-07-14 NOTE — PATIENT INSTRUCTIONS
Keep finger splinted for another 2 weeks.  In 2 weeks, you can start to do gentle range of motion exercises of your finger.

## 2025-07-23 ENCOUNTER — PATIENT OUTREACH (OUTPATIENT)
Dept: CARE COORDINATION | Facility: CLINIC | Age: 50
End: 2025-07-23
Payer: COMMERCIAL

## 2025-07-23 NOTE — PROGRESS NOTES
Rheumatology Clinic Visit  [facility]  MARTHA Rodrgiuez     Miguel Angel Piña MRN# [id number]   YOB: 1975 Age: 49 year old   Date of Visit: 7/30/2025  Primary care provider: Lucrecia Hutson          Assessment and Plan:     1.  Chronic tophaceous gout  2.  On allopurinol therapy    Patient presents today for follow-up regarding his chronic tophaceous gout.  Recently he has been doing well.  He did break his finger and was given a brace for it.  There is also concern that he may have been having a gout flare.  We did discuss that it does appear that he has been needing Medrol pretty regularly.  With his uric acid still above 9 I recommend further increasing his allopurinol to a full tab daily.  Will check labs in 1 month to monitor for any medication toxicity but also to monitor improvement in the uric acid.  He will follow-up with me in 6 months, sooner if needed.     The longitudinal plan of care for the diagnosis(es)/condition(s) as documented were addressed during this visit. Due to the added complexity in care, I will continue to support Miguel Angel in the subsequent management and with ongoing continuity of care.    Plan:     Schedule follow-up with Isela Jean PA-C in 6 months.   Labs: Uric acid, AST, ALT, CBC, creatinine in 1 month   Medication recommendations:   Allopurinol 100mg: increase to take 1 tablet every day  Medrol 4mg-follow taper instructions if you get a flare of gout;       MARTHA Rodriguez  Rheumatology         History of Present Illness:   Miguel Angel Piña presents for evaluation of chronic gout.     Interval history July 30, 2025:  Currently he has been doing well.  He did break his finger and there was also concern at the time that he was having a gout flare.  He was given a Medrol Dosepak.    Interval history January 28, 2025:  He recently had a gout flare on Monday. He has been taking half tab every other day on Allopurinol consistently for about 4 days, this is  up from the every other day. He took Medrol at the beginning of the month.     Interval history February 19, 2024:  He had a flare on Thursday. Flares still controlled with the Medrol. He has continued the Allopurinol.     Interval history December 6, 2022:  He continues to work with nephrology. The plan is to continue to monitor his kidney's. He is not currently planned to go on dialysis. He takes 50mg every other day. He continues on medrol. He does not take this daily. He only take the pack when there is a flare.     He states that he has had about 4 flares since his last visit.     HPI from consult on 5/11/2022:  Has a history of chronic kidney disease and a history of transplant (one at age 18 and one at 35-currently doing the workup to get on the list for another one). He states that periodically he will get flares in his ankles. He states that last fall his right wrist and arm swelled. He started to take Prednisone and it resolved. He states that once a month he needs prednisone to get rid of his flares. Most recently was about 1 month ago. His left finger started to flare. Xray's were taken, not bone fragments were seen. He started Prednisone and it got better. He prefers to stay off of steroids due to the long term effects of it. He last used medrol yesterday. When he has flares he used Medrol dosepak.          Review of Systems:     Constitutional: negative  Skin: negative  Eyes: negative  Ears/Nose/Throat: negative  Respiratory: No shortness of breath, dyspnea on exertion, cough, or hemoptysis  Cardiovascular: negative  Gastrointestinal: negative  Genitourinary: negative  Musculoskeletal: As above  Neurologic: negative  Psychiatric: negative  Hematologic/Lymphatic/Immunologic: negative  Endocrine: negative         Active Problem List:     Patient Active Problem List    Diagnosis Date Noted    Class 2 severe obesity due to excess calories with serious comorbidity in adult (H) 08/29/2024     Priority:  Medium    Cellulitis 07/09/2024     Priority: Medium     RLE      Class 2 obesity due to excess calories without serious comorbidity with body mass index (BMI) of 35.0 to 35.9 in adult 12/06/2022     Priority: Medium    CKD (chronic kidney disease) stage 4, GFR 15-29 ml/min (H) 10/31/2018     Priority: Medium    Aftercare following organ transplant 09/08/2017     Priority: Medium    Immunosuppressed status 09/08/2017     Priority: Medium    Anemia in stage 4 chronic kidney disease (H) 09/08/2017     Priority: Medium    Secondary renal hyperparathyroidism 09/08/2017     Priority: Medium    Gout 09/08/2017     Priority: Medium    Vitamin D deficiency 09/08/2017     Priority: Medium    Dyslipidemia 09/08/2017     Priority: Medium    Heterozygous factor V Leiden mutation 09/08/2017     Priority: Medium     No h/o DVT or PE      HTN, kidney transplant related      Priority: Medium    Conductive hearing loss, unilateral 10/03/2013     Priority: Medium     Pt reports insidious onset of apparent hearing loss, that had not bothered him, but his wife feels like he must have hearing loss, given that he cannot hear her talking to him often.  He denies any trauma, past issues with ear infections.  He is s/p two kidney transplants and is on rejection medications chronically (stable of late).      He denies excessive loud noise exposure, hazardous work environment (works at [employer]), does drive with window down during summer.  No shooting guns.        Kidney replaced by transplant      Priority: Medium     prior 11/1993      Medullary cystic kidney disease      Priority: Medium            Past Medical History:     Past Medical History:   Diagnosis Date    Anemia in chronic renal disease     Cellulitis 07/09/2024    RLE      Clinical diagnosis of COVID-19 12/21/2021    Conductive hearing loss, unilateral 10/03/2013    Pt reports insidious onset of apparent hearing loss, that had not bothered him, but his wife feels like he must  have hearing loss, given that he cannot hear her talking to him often.  He denies any trauma, past issues with ear infections.  He is s/p two kidney transplants and is on rejection medications chronically (stable of late).    He denies excessive loud noise exposure, hazardous work enviro    CVA (cerebral vascular accident) (H)     Dyslipidemia     Factor V Leiden     Gout     History of blood transfusion     Hypertension secondary to other renal disorders     Immunosuppressed status     Infection 10/14/2021    Right Small Finger     Kidney replaced by transplant 4/2010 (2nd)    prior 11/1993    Medullary cystic kidney disease     Secondary renal hyperparathyroidism     Sleep apnea      Past Surgical History:   Procedure Laterality Date    IR RENAL BIOPSY LEFT  8/17/2020    REPAIR PATENT FORAMEN OVALE      s/p LDKT      TRANSPLANT              Social History:     Social History     Socioeconomic History    Marital status:      Spouse name: Not on file    Number of children: 2    Years of education: Not on file    Highest education level: Not on file   Occupational History    Not on file   Tobacco Use    Smoking status: Never     Passive exposure: Never    Smokeless tobacco: Never   Vaping Use    Vaping status: Never Used   Substance and Sexual Activity    Alcohol use: No    Drug use: No    Sexual activity: Not Currently     Partners: Female   Other Topics Concern    Parent/sibling w/ CABG, MI or angioplasty before 65F 55M? Not Asked   Social History Narrative    Not on file     Social Drivers of Health     Financial Resource Strain: Low Risk  (1/5/2024)    Financial Resource Strain     Within the past 12 months, have you or your family members you live with been unable to get utilities (heat, electricity) when it was really needed?: No   Food Insecurity: Low Risk  (1/5/2024)    Food Insecurity     Within the past 12 months, did you worry that your food would run out before you got money to buy more?: No      Within the past 12 months, did the food you bought just not last and you didn t have money to get more?: No   Transportation Needs: Low Risk  (1/5/2024)    Transportation Needs     Within the past 12 months, has lack of transportation kept you from medical appointments, getting your medicines, non-medical meetings or appointments, work, or from getting things that you need?: No   Physical Activity: Not on file   Stress: Not on file   Social Connections: Not on file   Interpersonal Safety: Low Risk  (7/14/2025)    Interpersonal Safety     Do you feel physically and emotionally safe where you currently live?: Yes     Within the past 12 months, have you been hit, slapped, kicked or otherwise physically hurt by someone?: No     Within the past 12 months, have you been humiliated or emotionally abused in other ways by your partner or ex-partner?: No   Housing Stability: Low Risk  (1/5/2024)    Housing Stability     Do you have housing? : Yes     Are you worried about losing your housing?: No          Family History:     Family History   Problem Relation Age of Onset    Hypertension Father     Lung Cancer Paternal Grandmother     Kidney Disease Sister         medullary cystic kidney disease, s/p kidney transplant    Hypertension Sister     Prostate Cancer No family hx of     Colon Cancer No family hx of     Diabetes No family hx of     Coronary Artery Disease No family hx of     Skin Cancer No family hx of             Allergies:   No Known Allergies         Medications:     Current Outpatient Medications   Medication Sig Dispense Refill    allopurinol (ZYLOPRIM) 100 MG tablet Take 0.5 tablets (50 mg) by mouth daily. 45 tablet 0    amLODIPine (NORVASC) 5 MG tablet Take 1 tablet (5 mg) by mouth daily. TAKE 1 TABLET BY MOUTH AT BEDTIME AT NIGHT 30 tablet 3    calcitRIOL (ROCALTROL) 0.25 MCG capsule Take 1 capsule (0.25 mcg) by mouth daily for 90 days 90 capsule 3    CELLCEPT (BRAND) 250 MG capsule Take 3 capsules (750 mg) by  "mouth 2 times daily. 540 capsule 3    cholecalciferol 2000 units TABS Take 2,000 Units by mouth daily 90 tablet 3    epoetin beto-epbx (RETACRIT) 14118 UNIT/ML injection Inject 1 mL (10,000 Units) subcutaneously every 7 days. [place] for Hgb <10.0.  HOLD dose if systolic BP >180. 4 mL 11    Ferrous Gluconate 324 (37.5 Fe) MG TABS Take by mouth daily      losartan (COZAAR) 25 MG tablet Take 1 tablet (25 mg) by mouth daily. 90 tablet 3    methylPREDNISolone (MEDROL DOSEPAK) 4 MG tablet therapy pack Follow Package Directions 21 tablet 0    NEORAL (BRAND) 100 MG capsule Take 1 capsule (100 mg) by mouth 2 times daily. Total dose 125 mg every morning and 100 mg every evening. 60 capsule 11    NEORAL (BRAND) 25 MG capsule Take 1 capsule (25 mg) by mouth every morning. Total dose 125 mg every morning and 100 mg every evening. 30 capsule 11    sodium bicarbonate 650 MG tablet Take 3 tablets (1,950 mg) by mouth 3 times daily. 810 tablet 0    methylPREDNISolone (MEDROL DOSEPAK) 4 MG tablet therapy pack Follow Package Directions (Patient not taking: Reported on 7/30/2025) 21 tablet 1            Physical Exam:   Blood pressure 125/85, pulse 84, temperature 97.4  F (36.3  C), temperature source Tympanic, resp. rate 16, height 1.733 m (5' 8.23\"), weight 109.3 kg (241 lb), SpO2 99%.  Wt Readings from Last 6 Encounters:   07/14/25 109.3 kg (241 lb)   07/07/25 108.4 kg (239 lb)   04/29/25 109.5 kg (241 lb 6.4 oz)   01/29/25 111 kg (244 lb 12.8 oz)   01/08/25 107.5 kg (237 lb)   10/28/24 106.6 kg (235 lb)     Constitutional: well-developed, appearing stated age; cooperative  Eyes: nlconjunctiva, sclera  ENT: nl external ears,  hearing,   Resp: No shortness of breath with normal conversation  MSK: Persistent tophi of the right fifth digit.  Increased swelling  Psych: nl judgement, orientation, memory, affect.           Data:   Imaging:  N/A    Laboratory:  4/25/22  Creatinine 2.62, GFR 30  Uric acid 9.4    6/20/2022   uric acid " 9.9    8/16/2022  Creatinine 3.00, GFR 25  Hemoglobin 9.4    1/16/2025  Creatinine 4.17, GFR 17  ALT 18, AST 19  Uric acid 10.0  White blood cell count 6.0, hemoglobin 8.3, platelet count 224    7/7/2025  Creatinine 4.01, GFR 17  Uric acid 9.1  White blood cell count 9.1, hemoglobin 8.9, platelet count 218

## 2025-07-23 NOTE — PROGRESS NOTES
Miguel Angel returned call. He has not taken any Procrit doses.   He will dose today.  He stated he has 3 doses left at home. Plan is to have labs drawn on 7/30/25.     Janna Louis RN   Anemia Services  [facility]  [email or web address]   Office : [phone]  Fax: [phone]

## 2025-07-23 NOTE — PROGRESS NOTES
Anemia Management Note - Reminder     Follow-up with anemia management service:    Left another message for Migeul Angel. He never read the [employer] message sent on 7/9/25. Unsure if he has been dosing his Procrit.         Latest Ref Rng & Units 4/7/2025 4/14/2025 4/22/2025 4/24/2025 5/7/2025 7/7/2025 7/23/2025   Anemia   HGB Goal  9 - 10 9 - 10  9 - 10   9 - 10   CINDY Dose  10,000 units 10,000 units  10,000 units   10,000 units   Hemoglobin 13.3 - 17.7 g/dL   9.9   10.1  8.9     TSAT 15 - 46 %      25     Ferritin 31 - 409 ng/mL      406             Follow-up call date: 7/30/25    Janna Louis RN   Anemia Services  [facility]  [email or web address]   Office : [phone]  Fax: [phone]

## 2025-07-26 DIAGNOSIS — Z79.60 LONG-TERM USE OF IMMUNOSUPPRESSANT MEDICATION: ICD-10-CM

## 2025-07-26 DIAGNOSIS — Z94.0 KIDNEY REPLACED BY TRANSPLANT: ICD-10-CM

## 2025-07-26 DIAGNOSIS — Z94.0 KIDNEY TRANSPLANTED: Primary | ICD-10-CM

## 2025-07-28 RX ORDER — MYCOPHENOLATE MOFETIL 250 MG/1
750 CAPSULE ORAL 2 TIMES DAILY
Qty: 540 CAPSULE | Refills: 3 | Status: SHIPPED | OUTPATIENT
Start: 2025-07-28

## 2025-07-30 ENCOUNTER — OFFICE VISIT (OUTPATIENT)
Dept: RHEUMATOLOGY | Facility: CLINIC | Age: 50
End: 2025-07-30
Payer: COMMERCIAL

## 2025-07-30 VITALS
HEIGHT: 68 IN | WEIGHT: 241 LBS | SYSTOLIC BLOOD PRESSURE: 125 MMHG | OXYGEN SATURATION: 99 % | DIASTOLIC BLOOD PRESSURE: 85 MMHG | HEART RATE: 84 BPM | TEMPERATURE: 97.4 F | BODY MASS INDEX: 36.53 KG/M2 | RESPIRATION RATE: 16 BRPM

## 2025-07-30 DIAGNOSIS — Z79.899 ON ALLOPURINOL THERAPY: ICD-10-CM

## 2025-07-30 DIAGNOSIS — M1A.9XX1 CHRONIC TOPHACEOUS GOUT: ICD-10-CM

## 2025-07-30 RX ORDER — ALLOPURINOL 100 MG/1
100 TABLET ORAL DAILY
Qty: 90 TABLET | Refills: 0 | Status: SHIPPED | OUTPATIENT
Start: 2025-07-30

## 2025-07-30 NOTE — LETTER
2025    Miguel Angel Piña   1975        To Whom it May Concern;    Please excuse Miguel Angel Piña from work for a healthcare visit on 2025.    Sincerely,        Isela Jean PA-C

## 2025-07-30 NOTE — PATIENT INSTRUCTIONS
After Visit Instructions:     Thank you for coming to [facility] for your care. It is my goal to partner with you to help you reach your optimal state of health.       Plan:     Schedule follow-up with Isela Jean PA-C in 6 months.   Labs: Uric acid, AST, ALT, CBC, creatinine in 1 month   Medication recommendations:   Allopurinol 100mg: increase to take 1 tablet every day  Medrol 4mg-follow taper instructions if you get a flare of gout;     Isela Jean PA-C  [facility]  [facility]    Contact information: [facility]  Clinic Number:  [phone]  Please call or send a [employer] message with any questions about your care

## 2025-07-31 ENCOUNTER — LAB (OUTPATIENT)
Dept: LAB | Facility: CLINIC | Age: 50
End: 2025-07-31
Payer: COMMERCIAL

## 2025-07-31 DIAGNOSIS — N18.30 STAGE 3 CHRONIC KIDNEY DISEASE, UNSPECIFIED WHETHER STAGE 3A OR 3B CKD (H): ICD-10-CM

## 2025-07-31 DIAGNOSIS — I15.1 HTN, KIDNEY TRANSPLANT RELATED: ICD-10-CM

## 2025-07-31 DIAGNOSIS — D63.1 ANEMIA IN STAGE 4 CHRONIC KIDNEY DISEASE (H): ICD-10-CM

## 2025-07-31 DIAGNOSIS — D63.1 ANEMIA IN STAGE 3 CHRONIC KIDNEY DISEASE, UNSPECIFIED WHETHER STAGE 3A OR 3B CKD (H): ICD-10-CM

## 2025-07-31 DIAGNOSIS — Z94.0 HTN, KIDNEY TRANSPLANT RELATED: ICD-10-CM

## 2025-07-31 DIAGNOSIS — N18.4 CKD (CHRONIC KIDNEY DISEASE) STAGE 4, GFR 15-29 ML/MIN (H): ICD-10-CM

## 2025-07-31 DIAGNOSIS — N18.30 ANEMIA IN STAGE 3 CHRONIC KIDNEY DISEASE, UNSPECIFIED WHETHER STAGE 3A OR 3B CKD (H): ICD-10-CM

## 2025-07-31 DIAGNOSIS — N18.4 ANEMIA IN STAGE 4 CHRONIC KIDNEY DISEASE (H): ICD-10-CM

## 2025-07-31 LAB
HCT VFR BLD AUTO: 25.7 % (ref 40–53)
HGB BLD-MCNC: 8.3 G/DL (ref 13.3–17.7)
MCV RBC AUTO: 90 FL (ref 78–100)

## 2025-08-04 ENCOUNTER — OFFICE VISIT (OUTPATIENT)
Dept: FAMILY MEDICINE | Facility: CLINIC | Age: 50
End: 2025-08-04
Payer: COMMERCIAL

## 2025-08-04 ENCOUNTER — MYC MEDICAL ADVICE (OUTPATIENT)
Dept: RHEUMATOLOGY | Facility: CLINIC | Age: 50
End: 2025-08-04

## 2025-08-04 ENCOUNTER — OFFICE VISIT (OUTPATIENT)
Dept: NEPHROLOGY | Facility: CLINIC | Age: 50
End: 2025-08-04
Payer: COMMERCIAL

## 2025-08-04 VITALS
SYSTOLIC BLOOD PRESSURE: 152 MMHG | WEIGHT: 242.6 LBS | RESPIRATION RATE: 16 BRPM | HEART RATE: 98 BPM | DIASTOLIC BLOOD PRESSURE: 87 MMHG | OXYGEN SATURATION: 100 % | BODY MASS INDEX: 36.64 KG/M2

## 2025-08-04 VITALS
BODY MASS INDEX: 36.68 KG/M2 | HEIGHT: 68 IN | OXYGEN SATURATION: 100 % | WEIGHT: 242 LBS | HEART RATE: 94 BPM | SYSTOLIC BLOOD PRESSURE: 134 MMHG | TEMPERATURE: 99.1 F | RESPIRATION RATE: 16 BRPM | DIASTOLIC BLOOD PRESSURE: 84 MMHG

## 2025-08-04 DIAGNOSIS — N18.4 ANEMIA IN STAGE 4 CHRONIC KIDNEY DISEASE (H): ICD-10-CM

## 2025-08-04 DIAGNOSIS — Z94.0 HTN, KIDNEY TRANSPLANT RELATED: ICD-10-CM

## 2025-08-04 DIAGNOSIS — N18.4 CKD (CHRONIC KIDNEY DISEASE) STAGE 4, GFR 15-29 ML/MIN (H): Primary | ICD-10-CM

## 2025-08-04 DIAGNOSIS — Z94.0 KIDNEY TRANSPLANTED: ICD-10-CM

## 2025-08-04 DIAGNOSIS — Z79.60 LONG-TERM USE OF IMMUNOSUPPRESSANT MEDICATION: ICD-10-CM

## 2025-08-04 DIAGNOSIS — E87.20 METABOLIC ACIDOSIS: ICD-10-CM

## 2025-08-04 DIAGNOSIS — N18.4 CKD (CHRONIC KIDNEY DISEASE) STAGE 4, GFR 15-29 ML/MIN (H): ICD-10-CM

## 2025-08-04 DIAGNOSIS — S62.639A CLOSED AVULSION FRACTURE OF DISTAL PHALANX OF FINGER, INITIAL ENCOUNTER: Primary | ICD-10-CM

## 2025-08-04 DIAGNOSIS — D84.9 IMMUNOSUPPRESSED STATUS: ICD-10-CM

## 2025-08-04 DIAGNOSIS — M1A.9XX1 CHRONIC TOPHACEOUS GOUT: Primary | ICD-10-CM

## 2025-08-04 DIAGNOSIS — N25.81 SECONDARY RENAL HYPERPARATHYROIDISM: ICD-10-CM

## 2025-08-04 DIAGNOSIS — M10.9 GOUT, UNSPECIFIED CAUSE, UNSPECIFIED CHRONICITY, UNSPECIFIED SITE: ICD-10-CM

## 2025-08-04 DIAGNOSIS — I15.1 HTN, KIDNEY TRANSPLANT RELATED: ICD-10-CM

## 2025-08-04 DIAGNOSIS — D63.1 ANEMIA IN STAGE 4 CHRONIC KIDNEY DISEASE (H): ICD-10-CM

## 2025-08-04 PROCEDURE — 3075F SYST BP GE 130 - 139MM HG: CPT | Performed by: PHYSICIAN ASSISTANT

## 2025-08-04 PROCEDURE — 3077F SYST BP >= 140 MM HG: CPT | Performed by: PHYSICIAN ASSISTANT

## 2025-08-04 PROCEDURE — 99212 OFFICE O/P EST SF 10 MIN: CPT | Performed by: PHYSICIAN ASSISTANT

## 2025-08-04 PROCEDURE — 3079F DIAST BP 80-89 MM HG: CPT | Performed by: PHYSICIAN ASSISTANT

## 2025-08-04 PROCEDURE — 1126F AMNT PAIN NOTED NONE PRSNT: CPT | Performed by: PHYSICIAN ASSISTANT

## 2025-08-04 PROCEDURE — 99214 OFFICE O/P EST MOD 30 MIN: CPT | Performed by: PHYSICIAN ASSISTANT

## 2025-08-04 RX ORDER — METHYLPREDNISOLONE 4 MG/1
TABLET ORAL
Qty: 21 TABLET | Refills: 0 | Status: SHIPPED | OUTPATIENT
Start: 2025-08-04

## 2025-08-04 RX ORDER — CALCITRIOL 0.25 UG/1
0.25 CAPSULE, LIQUID FILLED ORAL
Status: SHIPPED
Start: 2025-08-04

## 2025-08-04 ASSESSMENT — PAIN SCALES - GENERAL: PAINLEVEL_OUTOF10: NO PAIN (0)

## 2025-08-11 ENCOUNTER — PATIENT OUTREACH (OUTPATIENT)
Dept: CARE COORDINATION | Facility: CLINIC | Age: 50
End: 2025-08-11
Payer: COMMERCIAL

## 2025-08-22 ENCOUNTER — MYC REFILL (OUTPATIENT)
Dept: RHEUMATOLOGY | Facility: CLINIC | Age: 50
End: 2025-08-22
Payer: COMMERCIAL

## 2025-08-22 DIAGNOSIS — M1A.9XX1 CHRONIC TOPHACEOUS GOUT: ICD-10-CM

## 2025-08-22 RX ORDER — METHYLPREDNISOLONE 4 MG/1
TABLET ORAL
Qty: 21 TABLET | Refills: 0 | Status: CANCELLED | OUTPATIENT
Start: 2025-08-22

## 2025-08-25 ENCOUNTER — TELEPHONE (OUTPATIENT)
Dept: RHEUMATOLOGY | Facility: CLINIC | Age: 50
End: 2025-08-25
Payer: COMMERCIAL

## 2025-08-25 ENCOUNTER — MYC MEDICAL ADVICE (OUTPATIENT)
Dept: RHEUMATOLOGY | Facility: CLINIC | Age: 50
End: 2025-08-25
Payer: COMMERCIAL

## 2025-08-25 DIAGNOSIS — M1A.9XX1 CHRONIC TOPHACEOUS GOUT: ICD-10-CM

## 2025-08-25 RX ORDER — METHYLPREDNISOLONE 4 MG/1
TABLET ORAL
Qty: 21 TABLET | Refills: 0 | Status: SHIPPED | OUTPATIENT
Start: 2025-08-25

## 2025-08-27 ENCOUNTER — LAB (OUTPATIENT)
Dept: LAB | Facility: CLINIC | Age: 50
End: 2025-08-27
Payer: COMMERCIAL

## 2025-08-27 ENCOUNTER — DOCUMENTATION ONLY (OUTPATIENT)
Dept: INFUSION THERAPY | Facility: CLINIC | Age: 50
End: 2025-08-27

## 2025-08-27 DIAGNOSIS — D63.1 ANEMIA IN STAGE 3 CHRONIC KIDNEY DISEASE, UNSPECIFIED WHETHER STAGE 3A OR 3B CKD (H): ICD-10-CM

## 2025-08-27 DIAGNOSIS — Z79.60 LONG-TERM USE OF IMMUNOSUPPRESSANT MEDICATION: ICD-10-CM

## 2025-08-27 DIAGNOSIS — Z94.0 KIDNEY REPLACED BY TRANSPLANT: Primary | ICD-10-CM

## 2025-08-27 DIAGNOSIS — N18.30 ANEMIA IN STAGE 3 CHRONIC KIDNEY DISEASE, UNSPECIFIED WHETHER STAGE 3A OR 3B CKD (H): ICD-10-CM

## 2025-08-27 DIAGNOSIS — Z94.0 KIDNEY REPLACED BY TRANSPLANT: ICD-10-CM

## 2025-08-27 DIAGNOSIS — Z79.899 ON ALLOPURINOL THERAPY: ICD-10-CM

## 2025-08-27 DIAGNOSIS — N18.30 STAGE 3 CHRONIC KIDNEY DISEASE, UNSPECIFIED WHETHER STAGE 3A OR 3B CKD (H): ICD-10-CM

## 2025-08-27 DIAGNOSIS — M1A.9XX1 CHRONIC TOPHACEOUS GOUT: ICD-10-CM

## 2025-08-27 LAB
ERYTHROCYTE [DISTWIDTH] IN BLOOD BY AUTOMATED COUNT: 13.3 % (ref 10–15)
HCT VFR BLD AUTO: 26.2 % (ref 40–53)
HGB BLD-MCNC: 8.5 G/DL (ref 13.3–17.7)
MCH RBC QN AUTO: 29 PG (ref 26.5–33)
MCHC RBC AUTO-ENTMCNC: 32.4 G/DL (ref 31.5–36.5)
MCV RBC AUTO: 89.4 FL (ref 78–100)
PLATELET # BLD AUTO: 290 10E3/UL (ref 150–450)
RBC # BLD AUTO: 2.93 10E6/UL (ref 4.4–5.9)
WBC # BLD AUTO: 10.21 10E3/UL (ref 4–11)

## 2025-08-27 PROCEDURE — 84460 ALANINE AMINO (ALT) (SGPT): CPT

## 2025-08-27 PROCEDURE — 83540 ASSAY OF IRON: CPT

## 2025-08-27 PROCEDURE — 83550 IRON BINDING TEST: CPT

## 2025-08-27 PROCEDURE — 85027 COMPLETE CBC AUTOMATED: CPT

## 2025-08-27 PROCEDURE — 80048 BASIC METABOLIC PNL TOTAL CA: CPT

## 2025-08-27 PROCEDURE — 84450 TRANSFERASE (AST) (SGOT): CPT

## 2025-08-27 PROCEDURE — 82728 ASSAY OF FERRITIN: CPT

## 2025-08-27 PROCEDURE — 84550 ASSAY OF BLOOD/URIC ACID: CPT

## 2025-08-27 PROCEDURE — 80158 DRUG ASSAY CYCLOSPORINE: CPT

## 2025-08-27 PROCEDURE — 36415 COLL VENOUS BLD VENIPUNCTURE: CPT

## 2025-08-28 ENCOUNTER — PATIENT OUTREACH (OUTPATIENT)
Dept: CARE COORDINATION | Facility: CLINIC | Age: 50
End: 2025-08-28
Payer: COMMERCIAL

## 2025-08-28 DIAGNOSIS — Z79.899 ENCOUNTER FOR LONG-TERM (CURRENT) USE OF HIGH-RISK MEDICATION: ICD-10-CM

## 2025-08-28 DIAGNOSIS — Z94.0 KIDNEY TRANSPLANTED: Primary | ICD-10-CM

## 2025-08-28 LAB
ALT SERPL W P-5'-P-CCNC: 10 U/L (ref 0–70)
ANION GAP SERPL CALCULATED.3IONS-SCNC: 20 MMOL/L (ref 7–15)
AST SERPL W P-5'-P-CCNC: 20 U/L (ref 0–45)
BUN SERPL-MCNC: 82.5 MG/DL (ref 6–20)
CALCIUM SERPL-MCNC: 9.1 MG/DL (ref 8.8–10.4)
CHLORIDE SERPL-SCNC: 99 MMOL/L (ref 98–107)
CREAT SERPL-MCNC: 4.33 MG/DL (ref 0.67–1.17)
CREAT SERPL-MCNC: 4.43 MG/DL (ref 0.67–1.17)
CYCLOSPORINE BLD LC/MS/MS-MCNC: 124 UG/L (ref 50–400)
EGFRCR SERPLBLD CKD-EPI 2021: 15 ML/MIN/1.73M2
EGFRCR SERPLBLD CKD-EPI 2021: 16 ML/MIN/1.73M2
FERRITIN SERPL-MCNC: 528 NG/ML (ref 31–409)
GLUCOSE SERPL-MCNC: 105 MG/DL (ref 70–99)
HCO3 SERPL-SCNC: 16 MMOL/L (ref 22–29)
IRON BINDING CAPACITY (ROCHE): 276 UG/DL (ref 240–430)
IRON SATN MFR SERPL: 37 % (ref 15–46)
IRON SERPL-MCNC: 101 UG/DL (ref 61–157)
POTASSIUM SERPL-SCNC: 5 MMOL/L (ref 3.4–5.3)
SODIUM SERPL-SCNC: 135 MMOL/L (ref 135–145)
TME LAST DOSE: NORMAL H
TME LAST DOSE: NORMAL H
URATE SERPL-MCNC: 7.9 MG/DL (ref 3.4–7)

## 2025-08-30 DIAGNOSIS — M1A.9XX1 CHRONIC TOPHACEOUS GOUT: ICD-10-CM

## 2025-09-02 RX ORDER — METHYLPREDNISOLONE 4 MG/1
TABLET ORAL
Qty: 21 TABLET | Refills: 0 | OUTPATIENT
Start: 2025-09-02

## 2025-09-03 ENCOUNTER — MYC MEDICAL ADVICE (OUTPATIENT)
Dept: NEPHROLOGY | Facility: CLINIC | Age: 50
End: 2025-09-03
Payer: COMMERCIAL

## (undated) RX ORDER — FENTANYL CITRATE 50 UG/ML
INJECTION, SOLUTION INTRAMUSCULAR; INTRAVENOUS
Status: DISPENSED
Start: 2020-08-17

## (undated) RX ORDER — LIDOCAINE HYDROCHLORIDE 10 MG/ML
INJECTION, SOLUTION EPIDURAL; INFILTRATION; INTRACAUDAL; PERINEURAL
Status: DISPENSED
Start: 2020-08-17

## (undated) RX ORDER — SODIUM CHLORIDE 9 MG/ML
INJECTION, SOLUTION INTRAVENOUS
Status: DISPENSED
Start: 2020-08-17